# Patient Record
Sex: FEMALE | Race: WHITE | NOT HISPANIC OR LATINO | Employment: OTHER | URBAN - METROPOLITAN AREA
[De-identification: names, ages, dates, MRNs, and addresses within clinical notes are randomized per-mention and may not be internally consistent; named-entity substitution may affect disease eponyms.]

---

## 2023-09-27 ENCOUNTER — HOSPITAL ENCOUNTER (OUTPATIENT)
Dept: DATA CONVERSION | Facility: HOSPITAL | Age: 78
Setting detail: OBSERVATION
Discharge: HOME | End: 2023-09-30
Attending: STUDENT IN AN ORGANIZED HEALTH CARE EDUCATION/TRAINING PROGRAM | Admitting: FAMILY MEDICINE
Payer: MEDICARE

## 2023-09-27 DIAGNOSIS — K21.00 GASTROESOPHAGEAL REFLUX DISEASE WITH ESOPHAGITIS WITHOUT HEMORRHAGE: ICD-10-CM

## 2023-09-27 DIAGNOSIS — I24.9 ACS (ACUTE CORONARY SYNDROME) (MULTI): ICD-10-CM

## 2023-09-27 DIAGNOSIS — K58.1 IRRITABLE BOWEL SYNDROME WITH CONSTIPATION: Primary | ICD-10-CM

## 2023-09-27 LAB
ALANINE AMINOTRANSFERASE (SGPT) (U/L) IN SER/PLAS: 3 U/L (ref 7–45)
ALBUMIN (G/DL) IN SER/PLAS: 3.5 G/DL (ref 3.4–5)
ALKALINE PHOSPHATASE (U/L) IN SER/PLAS: 59 U/L (ref 33–136)
ANION GAP IN SER/PLAS: 9 MMOL/L (ref 10–20)
APPEARANCE, URINE: CLEAR
ASPARTATE AMINOTRANSFERASE (SGOT) (U/L) IN SER/PLAS: 12 U/L (ref 9–39)
BASOPHILS (10*3/UL) IN BLOOD BY AUTOMATED COUNT: 0.02 X10E9/L (ref 0–0.1)
BASOPHILS/100 LEUKOCYTES IN BLOOD BY AUTOMATED COUNT: 0.4 % (ref 0–2)
BILIRUBIN TOTAL (MG/DL) IN SER/PLAS: 0.2 MG/DL (ref 0–1.2)
BILIRUBIN, URINE: NEGATIVE
BLOOD, URINE: NEGATIVE
CALCIUM (MG/DL) IN SER/PLAS: 8.5 MG/DL (ref 8.6–10.3)
CARBON DIOXIDE, TOTAL (MMOL/L) IN SER/PLAS: 29 MMOL/L (ref 21–32)
CHLORIDE (MMOL/L) IN SER/PLAS: 106 MMOL/L (ref 98–107)
COLOR, URINE: YELLOW
CREATININE (MG/DL) IN SER/PLAS: 0.84 MG/DL (ref 0.5–1.05)
EOSINOPHILS (10*3/UL) IN BLOOD BY AUTOMATED COUNT: 0.41 X10E9/L (ref 0–0.4)
EOSINOPHILS/100 LEUKOCYTES IN BLOOD BY AUTOMATED COUNT: 8.6 % (ref 0–6)
ERYTHROCYTE DISTRIBUTION WIDTH (RATIO) BY AUTOMATED COUNT: 13.4 % (ref 11.5–14.5)
ERYTHROCYTE MEAN CORPUSCULAR HEMOGLOBIN CONCENTRATION (G/DL) BY AUTOMATED: 31.1 G/DL (ref 32–36)
ERYTHROCYTE MEAN CORPUSCULAR VOLUME (FL) BY AUTOMATED COUNT: 99 FL (ref 80–100)
ERYTHROCYTES (10*6/UL) IN BLOOD BY AUTOMATED COUNT: 3.84 X10E12/L (ref 4–5.2)
GFR FEMALE: 72 ML/MIN/1.73M2
GLUCOSE (MG/DL) IN SER/PLAS: 118 MG/DL (ref 74–99)
GLUCOSE, URINE: NEGATIVE MG/DL
HEMATOCRIT (%) IN BLOOD BY AUTOMATED COUNT: 38 % (ref 36–46)
HEMOGLOBIN (G/DL) IN BLOOD: 11.8 G/DL (ref 12–16)
HYALINE CASTS, URINE: ABNORMAL /LPF
IMMATURE GRANULOCYTES/100 LEUKOCYTES IN BLOOD BY AUTOMATED COUNT: 0.4 % (ref 0–0.9)
KETONES, URINE: ABNORMAL MG/DL
LACTATE (MMOL/L) IN SER/PLAS: 1.2 MMOL/L (ref 0.4–2)
LEUKOCYTE ESTERASE, URINE: NEGATIVE
LEUKOCYTES (10*3/UL) IN BLOOD BY AUTOMATED COUNT: 4.8 X10E9/L (ref 4.4–11.3)
LIPASE (U/L) IN SER/PLAS: 19 U/L (ref 9–82)
LYMPHOCYTES (10*3/UL) IN BLOOD BY AUTOMATED COUNT: 1.56 X10E9/L (ref 0.8–3)
LYMPHOCYTES/100 LEUKOCYTES IN BLOOD BY AUTOMATED COUNT: 32.6 % (ref 13–44)
MAGNESIUM (MG/DL) IN SER/PLAS: 1.18 MG/DL (ref 1.6–2.4)
MONOCYTES (10*3/UL) IN BLOOD BY AUTOMATED COUNT: 0.49 X10E9/L (ref 0.05–0.8)
MONOCYTES/100 LEUKOCYTES IN BLOOD BY AUTOMATED COUNT: 10.2 % (ref 2–10)
MUCUS, URINE: ABNORMAL /LPF
NEUTROPHILS (10*3/UL) IN BLOOD BY AUTOMATED COUNT: 2.29 X10E9/L (ref 1.6–5.5)
NEUTROPHILS/100 LEUKOCYTES IN BLOOD BY AUTOMATED COUNT: 47.8 % (ref 40–80)
NITRITE, URINE: NEGATIVE
PH, URINE: 5 (ref 5–8)
PLATELETS (10*3/UL) IN BLOOD AUTOMATED COUNT: 180 X10E9/L (ref 150–450)
POTASSIUM (MMOL/L) IN SER/PLAS: 3.9 MMOL/L (ref 3.5–5.3)
PROTEIN TOTAL: 6 G/DL (ref 6.4–8.2)
PROTEIN, URINE: ABNORMAL MG/DL
RBC, URINE: <1 /HPF (ref 0–5)
SARS-COV-2 RESULT: NOT DETECTED
SODIUM (MMOL/L) IN SER/PLAS: 140 MMOL/L (ref 136–145)
SPECIFIC GRAVITY, URINE: 1.03 (ref 1–1.03)
TROPONIN I, HIGH SENSITIVITY: 3 NG/L (ref 0–13)
UREA NITROGEN (MG/DL) IN SER/PLAS: 11 MG/DL (ref 6–23)
UROBILINOGEN, URINE: <2 MG/DL (ref 0–1.9)
WBC, URINE: <1 /HPF (ref 0–5)

## 2023-09-27 PROCEDURE — 74176 CT ABD & PELVIS W/O CONTRAST: CPT

## 2023-09-27 PROCEDURE — 80053 COMPREHEN METABOLIC PANEL: CPT

## 2023-09-27 PROCEDURE — 99285 EMERGENCY DEPT VISIT HI MDM: CPT | Mod: 25

## 2023-09-27 PROCEDURE — 93005 ELECTROCARDIOGRAM TRACING: CPT

## 2023-09-27 PROCEDURE — 94640 AIRWAY INHALATION TREATMENT: CPT

## 2023-09-27 PROCEDURE — 84484 ASSAY OF TROPONIN QUANT: CPT | Mod: 91

## 2023-09-27 PROCEDURE — 71046 X-RAY EXAM CHEST 2 VIEWS: CPT

## 2023-09-27 PROCEDURE — 83605 ASSAY OF LACTIC ACID: CPT

## 2023-09-27 PROCEDURE — 81001 URINALYSIS AUTO W/SCOPE: CPT

## 2023-09-27 PROCEDURE — 85025 COMPLETE CBC W/AUTO DIFF WBC: CPT

## 2023-09-27 PROCEDURE — 83690 ASSAY OF LIPASE: CPT

## 2023-09-27 PROCEDURE — 87635 SARS-COV-2 COVID-19 AMP PRB: CPT

## 2023-09-27 PROCEDURE — 83735 ASSAY OF MAGNESIUM: CPT

## 2023-09-27 PROCEDURE — 9990 CHARGE CONVERSION: Mod: 91

## 2023-09-27 PROCEDURE — 96372 THER/PROPH/DIAG INJ SC/IM: CPT

## 2023-09-28 LAB
ACTIVATED PARTIAL THROMBOPLASTIN TIME IN PPP BY COAGULATION ASSAY: 30 SEC (ref 27–38)
ANION GAP IN SER/PLAS: 10 MMOL/L (ref 10–20)
CALCIUM (MG/DL) IN SER/PLAS: 8.5 MG/DL (ref 8.6–10.3)
CARBON DIOXIDE, TOTAL (MMOL/L) IN SER/PLAS: 31 MMOL/L (ref 21–32)
CHLORIDE (MMOL/L) IN SER/PLAS: 104 MMOL/L (ref 98–107)
CREATININE (MG/DL) IN SER/PLAS: 0.97 MG/DL (ref 0.5–1.05)
ERYTHROCYTE DISTRIBUTION WIDTH (RATIO) BY AUTOMATED COUNT: 13.2 % (ref 11.5–14.5)
ERYTHROCYTE MEAN CORPUSCULAR HEMOGLOBIN CONCENTRATION (G/DL) BY AUTOMATED: 31.3 G/DL (ref 32–36)
ERYTHROCYTE MEAN CORPUSCULAR VOLUME (FL) BY AUTOMATED COUNT: 97 FL (ref 80–100)
ERYTHROCYTES (10*6/UL) IN BLOOD BY AUTOMATED COUNT: 3.99 X10E12/L (ref 4–5.2)
GFR FEMALE: 60 ML/MIN/1.73M2
GLUCOSE (MG/DL) IN SER/PLAS: 99 MG/DL (ref 74–99)
HEMATOCRIT (%) IN BLOOD BY AUTOMATED COUNT: 38.7 % (ref 36–46)
HEMOGLOBIN (G/DL) IN BLOOD: 12.1 G/DL (ref 12–16)
INR IN PPP BY COAGULATION ASSAY: 1 (ref 0.9–1.1)
LEUKOCYTES (10*3/UL) IN BLOOD BY AUTOMATED COUNT: 6.6 X10E9/L (ref 4.4–11.3)
PLATELETS (10*3/UL) IN BLOOD AUTOMATED COUNT: 189 X10E9/L (ref 150–450)
POTASSIUM (MMOL/L) IN SER/PLAS: 4.1 MMOL/L (ref 3.5–5.3)
PROTHROMBIN TIME (PT) IN PPP BY COAGULATION ASSAY: 10.9 SEC (ref 9.8–12.8)
SODIUM (MMOL/L) IN SER/PLAS: 141 MMOL/L (ref 136–145)
UREA NITROGEN (MG/DL) IN SER/PLAS: 7 MG/DL (ref 6–23)

## 2023-09-28 PROCEDURE — 80048 BASIC METABOLIC PNL TOTAL CA: CPT

## 2023-09-28 PROCEDURE — 99285 EMERGENCY DEPT VISIT HI MDM: CPT

## 2023-09-28 PROCEDURE — 87635 SARS-COV-2 COVID-19 AMP PRB: CPT

## 2023-09-28 PROCEDURE — 93005 ELECTROCARDIOGRAM TRACING: CPT | Mod: 59

## 2023-09-28 PROCEDURE — 83735 ASSAY OF MAGNESIUM: CPT

## 2023-09-28 PROCEDURE — 78452 HT MUSCLE IMAGE SPECT MULT: CPT

## 2023-09-28 PROCEDURE — 84484 ASSAY OF TROPONIN QUANT: CPT | Mod: 91

## 2023-09-28 PROCEDURE — 93017 CV STRESS TEST TRACING ONLY: CPT

## 2023-09-28 PROCEDURE — 83690 ASSAY OF LIPASE: CPT

## 2023-09-28 PROCEDURE — 85610 PROTHROMBIN TIME: CPT

## 2023-09-28 PROCEDURE — 85025 COMPLETE CBC W/AUTO DIFF WBC: CPT

## 2023-09-28 PROCEDURE — 85730 THROMBOPLASTIN TIME PARTIAL: CPT

## 2023-09-28 PROCEDURE — 9990 CHARGE CONVERSION

## 2023-09-28 PROCEDURE — 81001 URINALYSIS AUTO W/SCOPE: CPT

## 2023-09-28 PROCEDURE — A9502 TC99M TETROFOSMIN: HCPCS

## 2023-09-28 PROCEDURE — 83605 ASSAY OF LACTIC ACID: CPT

## 2023-09-28 PROCEDURE — 94640 AIRWAY INHALATION TREATMENT: CPT

## 2023-09-28 PROCEDURE — 85027 COMPLETE CBC AUTOMATED: CPT

## 2023-09-28 PROCEDURE — 80053 COMPREHEN METABOLIC PANEL: CPT

## 2023-09-28 PROCEDURE — 96372 THER/PROPH/DIAG INJ SC/IM: CPT

## 2023-09-29 PROBLEM — I24.9 ACS (ACUTE CORONARY SYNDROME) (MULTI): Status: ACTIVE | Noted: 2023-09-29

## 2023-09-29 PROCEDURE — 9990 CHARGE CONVERSION

## 2023-09-29 PROCEDURE — 85610 PROTHROMBIN TIME: CPT

## 2023-09-29 PROCEDURE — 78452 HT MUSCLE IMAGE SPECT MULT: CPT

## 2023-09-29 PROCEDURE — 80048 BASIC METABOLIC PNL TOTAL CA: CPT

## 2023-09-29 PROCEDURE — 85730 THROMBOPLASTIN TIME PARTIAL: CPT

## 2023-09-29 PROCEDURE — 85027 COMPLETE CBC AUTOMATED: CPT

## 2023-09-29 PROCEDURE — A9502 TC99M TETROFOSMIN: HCPCS

## 2023-09-29 PROCEDURE — 93005 ELECTROCARDIOGRAM TRACING: CPT

## 2023-09-29 RX ORDER — ACETAMINOPHEN 325 MG/1
650 TABLET ORAL EVERY 6 HOURS PRN
Status: DISCONTINUED | OUTPATIENT
Start: 2023-09-30 | End: 2023-09-30 | Stop reason: HOSPADM

## 2023-09-29 RX ORDER — DEXTROSE, SODIUM CHLORIDE, SODIUM LACTATE, POTASSIUM CHLORIDE, AND CALCIUM CHLORIDE 5; .6; .31; .03; .02 G/100ML; G/100ML; G/100ML; G/100ML; G/100ML
100 INJECTION, SOLUTION INTRAVENOUS CONTINUOUS
Status: DISCONTINUED | OUTPATIENT
Start: 2023-09-30 | End: 2023-09-30 | Stop reason: HOSPADM

## 2023-09-29 RX ORDER — POTASSIUM CHLORIDE 20 MEQ/1
20 TABLET, EXTENDED RELEASE ORAL DAILY
Status: DISCONTINUED | OUTPATIENT
Start: 2023-09-30 | End: 2023-09-30 | Stop reason: HOSPADM

## 2023-09-29 RX ORDER — LUBIPROSTONE 24 UG/1
24 CAPSULE ORAL 2 TIMES DAILY
Status: DISCONTINUED | OUTPATIENT
Start: 2023-09-30 | End: 2023-09-30 | Stop reason: HOSPADM

## 2023-09-29 RX ORDER — MEMANTINE HYDROCHLORIDE 10 MG/1
10 TABLET ORAL 2 TIMES DAILY
Status: DISCONTINUED | OUTPATIENT
Start: 2023-09-30 | End: 2023-09-30 | Stop reason: HOSPADM

## 2023-09-29 RX ORDER — DOCUSATE SODIUM 100 MG/1
100 CAPSULE, LIQUID FILLED ORAL 2 TIMES DAILY
Status: DISCONTINUED | OUTPATIENT
Start: 2023-09-30 | End: 2023-09-30 | Stop reason: HOSPADM

## 2023-09-29 RX ORDER — DONEPEZIL HYDROCHLORIDE 5 MG/1
10 TABLET, FILM COATED ORAL NIGHTLY
Status: DISCONTINUED | OUTPATIENT
Start: 2023-09-30 | End: 2023-09-30 | Stop reason: HOSPADM

## 2023-09-29 RX ORDER — ALPRAZOLAM 0.5 MG/1
0.5 TABLET ORAL EVERY 8 HOURS
Status: DISCONTINUED | OUTPATIENT
Start: 2023-09-30 | End: 2023-09-30 | Stop reason: HOSPADM

## 2023-09-29 RX ORDER — GABAPENTIN 300 MG/1
300 CAPSULE ORAL 3 TIMES DAILY
Status: DISCONTINUED | OUTPATIENT
Start: 2023-09-30 | End: 2023-09-30 | Stop reason: HOSPADM

## 2023-09-29 RX ORDER — ENOXAPARIN SODIUM 100 MG/ML
40 INJECTION SUBCUTANEOUS DAILY
Status: DISCONTINUED | OUTPATIENT
Start: 2023-09-30 | End: 2023-09-30 | Stop reason: HOSPADM

## 2023-09-29 RX ORDER — SUCRALFATE 1 G/1
1 TABLET ORAL
Status: DISCONTINUED | OUTPATIENT
Start: 2023-09-30 | End: 2023-09-30 | Stop reason: HOSPADM

## 2023-09-29 RX ORDER — FLUOXETINE HYDROCHLORIDE 20 MG/1
100 CAPSULE ORAL DAILY
Status: DISCONTINUED | OUTPATIENT
Start: 2023-09-30 | End: 2023-09-30 | Stop reason: HOSPADM

## 2023-09-29 RX ORDER — ZOLPIDEM TARTRATE 5 MG/1
5 TABLET ORAL NIGHTLY PRN
Status: DISCONTINUED | OUTPATIENT
Start: 2023-09-30 | End: 2023-09-30 | Stop reason: HOSPADM

## 2023-09-29 RX ORDER — PANTOPRAZOLE SODIUM 40 MG/1
40 TABLET, DELAYED RELEASE ORAL DAILY
Status: DISCONTINUED | OUTPATIENT
Start: 2023-09-30 | End: 2023-09-30 | Stop reason: HOSPADM

## 2023-09-29 RX ORDER — ALPRAZOLAM 0.5 MG/1
1 TABLET ORAL NIGHTLY
Status: DISCONTINUED | OUTPATIENT
Start: 2023-09-30 | End: 2023-09-30 | Stop reason: HOSPADM

## 2023-09-29 RX ORDER — TRAZODONE HYDROCHLORIDE 50 MG/1
50 TABLET ORAL 2 TIMES DAILY
Status: DISCONTINUED | OUTPATIENT
Start: 2023-09-30 | End: 2023-09-30 | Stop reason: HOSPADM

## 2023-09-29 RX ORDER — CARBIDOPA AND LEVODOPA 25; 100 MG/1; MG/1
2 TABLET ORAL 3 TIMES DAILY
Status: DISCONTINUED | OUTPATIENT
Start: 2023-09-30 | End: 2023-09-30 | Stop reason: HOSPADM

## 2023-09-29 RX ORDER — BUSPIRONE HYDROCHLORIDE 5 MG/1
10 TABLET ORAL EVERY 8 HOURS
Status: DISCONTINUED | OUTPATIENT
Start: 2023-09-30 | End: 2023-09-30 | Stop reason: HOSPADM

## 2023-09-29 RX ORDER — FLUTICASONE FUROATE AND VILANTEROL 100; 25 UG/1; UG/1
1 POWDER RESPIRATORY (INHALATION) DAILY
Status: DISCONTINUED | OUTPATIENT
Start: 2023-09-30 | End: 2023-09-30 | Stop reason: HOSPADM

## 2023-09-29 RX ORDER — NITROGLYCERIN 0.4 MG/1
0.4 TABLET SUBLINGUAL EVERY 5 MIN PRN
Status: DISCONTINUED | OUTPATIENT
Start: 2023-09-30 | End: 2023-09-30 | Stop reason: HOSPADM

## 2023-09-29 RX ORDER — BISACODYL 5 MG
5 TABLET, DELAYED RELEASE (ENTERIC COATED) ORAL NIGHTLY PRN
Status: DISCONTINUED | OUTPATIENT
Start: 2023-09-30 | End: 2023-09-30 | Stop reason: HOSPADM

## 2023-09-29 RX ORDER — CALCIUM CARBONATE 500(1250)
500 TABLET ORAL DAILY
Status: DISCONTINUED | OUTPATIENT
Start: 2023-09-30 | End: 2023-09-30 | Stop reason: HOSPADM

## 2023-09-30 ENCOUNTER — PHARMACY VISIT (OUTPATIENT)
Dept: PHARMACY | Facility: CLINIC | Age: 78
End: 2023-09-30

## 2023-09-30 VITALS
DIASTOLIC BLOOD PRESSURE: 79 MMHG | SYSTOLIC BLOOD PRESSURE: 147 MMHG | BODY MASS INDEX: 28.79 KG/M2 | TEMPERATURE: 97.7 F | HEIGHT: 64 IN | RESPIRATION RATE: 18 BRPM | OXYGEN SATURATION: 93 % | WEIGHT: 168.65 LBS | HEART RATE: 110 BPM

## 2023-09-30 PROBLEM — I10 ESSENTIAL (PRIMARY) HYPERTENSION: Status: ACTIVE | Noted: 2020-07-02

## 2023-09-30 PROBLEM — J44.9 COPD (CHRONIC OBSTRUCTIVE PULMONARY DISEASE) (MULTI): Status: ACTIVE | Noted: 2020-07-02

## 2023-09-30 PROBLEM — I24.9 ACS (ACUTE CORONARY SYNDROME) (MULTI): Status: RESOLVED | Noted: 2023-09-29 | Resolved: 2023-09-30

## 2023-09-30 PROBLEM — J96.11 CHRONIC RESPIRATORY FAILURE WITH HYPOXIA (MULTI): Status: ACTIVE | Noted: 2020-07-02

## 2023-09-30 PROBLEM — K58.9 IBS (IRRITABLE BOWEL SYNDROME): Status: ACTIVE | Noted: 2020-07-02

## 2023-09-30 LAB
ANION GAP SERPL CALC-SCNC: 9 MMOL/L (ref 10–20)
ATRIAL RATE: 89 BPM
BUN SERPL-MCNC: 8 MG/DL (ref 6–23)
CALCIUM SERPL-MCNC: 9.3 MG/DL (ref 8.6–10.3)
CHLORIDE SERPL-SCNC: 102 MMOL/L (ref 98–107)
CO2 SERPL-SCNC: 33 MMOL/L (ref 21–32)
CREAT SERPL-MCNC: 0.85 MG/DL (ref 0.5–1.05)
ERYTHROCYTE [DISTWIDTH] IN BLOOD BY AUTOMATED COUNT: 13.3 % (ref 11.5–14.5)
GFR SERPL CREATININE-BSD FRML MDRD: 71 ML/MIN/1.73M*2
GLUCOSE SERPL-MCNC: 109 MG/DL (ref 74–99)
HCT VFR BLD AUTO: 40.3 % (ref 36–46)
HGB BLD-MCNC: 12.9 G/DL (ref 12–16)
MAGNESIUM SERPL-MCNC: 1.64 MG/DL (ref 1.6–2.4)
MCH RBC QN AUTO: 31.4 PG (ref 26–34)
MCHC RBC AUTO-ENTMCNC: 32 G/DL (ref 32–36)
MCV RBC AUTO: 98 FL (ref 80–100)
NRBC BLD-RTO: 0 /100 WBCS (ref 0–0)
P AXIS: 25 DEGREES
P OFFSET: 197 MS
P ONSET: 152 MS
PLATELET # BLD AUTO: 205 X10*3/UL (ref 150–450)
PMV BLD AUTO: 11 FL (ref 7.5–11.5)
POTASSIUM SERPL-SCNC: 3.6 MMOL/L (ref 3.5–5.3)
PR INTERVAL: 144 MS
Q ONSET: 224 MS
QRS COUNT: 14 BEATS
QRS DURATION: 72 MS
QT INTERVAL: 412 MS
QTC CALCULATION(BAZETT): 501 MS
QTC FREDERICIA: 469 MS
R AXIS: -19 DEGREES
RBC # BLD AUTO: 4.11 X10*6/UL (ref 4–5.2)
SODIUM SERPL-SCNC: 140 MMOL/L (ref 136–145)
T AXIS: 12 DEGREES
T OFFSET: 430 MS
VENTRICULAR RATE: 89 BPM
WBC # BLD AUTO: 5.9 X10*3/UL (ref 4.4–11.3)

## 2023-09-30 PROCEDURE — 2500000004 HC RX 250 GENERAL PHARMACY W/ HCPCS (ALT 636 FOR OP/ED): Performed by: FAMILY MEDICINE

## 2023-09-30 PROCEDURE — 96360 HYDRATION IV INFUSION INIT: CPT

## 2023-09-30 PROCEDURE — RXMED WILLOW AMBULATORY MEDICATION CHARGE

## 2023-09-30 PROCEDURE — G0378 HOSPITAL OBSERVATION PER HR: HCPCS

## 2023-09-30 PROCEDURE — 2500000002 HC RX 250 W HCPCS SELF ADMINISTERED DRUGS (ALT 637 FOR MEDICARE OP, ALT 636 FOR OP/ED): Performed by: FAMILY MEDICINE

## 2023-09-30 PROCEDURE — 85027 COMPLETE CBC AUTOMATED: CPT | Performed by: INTERNAL MEDICINE

## 2023-09-30 PROCEDURE — 99239 HOSP IP/OBS DSCHRG MGMT >30: CPT | Performed by: INTERNAL MEDICINE

## 2023-09-30 PROCEDURE — 80048 BASIC METABOLIC PNL TOTAL CA: CPT | Performed by: INTERNAL MEDICINE

## 2023-09-30 PROCEDURE — 36415 COLL VENOUS BLD VENIPUNCTURE: CPT | Performed by: INTERNAL MEDICINE

## 2023-09-30 PROCEDURE — 99222 1ST HOSP IP/OBS MODERATE 55: CPT | Performed by: INTERNAL MEDICINE

## 2023-09-30 PROCEDURE — 96361 HYDRATE IV INFUSION ADD-ON: CPT

## 2023-09-30 PROCEDURE — 2500000001 HC RX 250 WO HCPCS SELF ADMINISTERED DRUGS (ALT 637 FOR MEDICARE OP): Performed by: FAMILY MEDICINE

## 2023-09-30 PROCEDURE — 83735 ASSAY OF MAGNESIUM: CPT | Performed by: INTERNAL MEDICINE

## 2023-09-30 RX ORDER — CARBIDOPA 25 MG/1
25 TABLET ORAL 3 TIMES DAILY
COMMUNITY
Start: 2023-08-23 | End: 2024-04-23

## 2023-09-30 RX ORDER — FLUTICASONE PROPIONATE AND SALMETEROL 100; 50 UG/1; UG/1
1 POWDER RESPIRATORY (INHALATION) 2 TIMES DAILY
COMMUNITY
Start: 2023-07-10

## 2023-09-30 RX ORDER — DONEPEZIL HYDROCHLORIDE 10 MG/1
1 TABLET, FILM COATED ORAL NIGHTLY
COMMUNITY
Start: 2023-03-24

## 2023-09-30 RX ORDER — ALBUTEROL SULFATE 90 UG/1
2 AEROSOL, METERED RESPIRATORY (INHALATION)
COMMUNITY
Start: 2021-08-26

## 2023-09-30 RX ORDER — DONEPEZIL HYDROCHLORIDE 10 MG/1
10 TABLET, FILM COATED ORAL NIGHTLY
Status: CANCELLED | OUTPATIENT
Start: 2023-09-30

## 2023-09-30 RX ORDER — LUBIPROSTONE 24 UG/1
24 CAPSULE ORAL 2 TIMES DAILY
Qty: 30 CAPSULE | Refills: 0 | Status: SHIPPED | OUTPATIENT
Start: 2023-09-30

## 2023-09-30 RX ORDER — FLAXSEED OIL 1000 MG
CAPSULE ORAL
COMMUNITY
End: 2024-01-25 | Stop reason: HOSPADM

## 2023-09-30 RX ORDER — ASCORBIC ACID, CHOLECALCIFEROL, PYRIDOXINE HYDROCHLORIDE, LEVOMEFOLATE MAGNESIUM, FOLIC ACID, CYANOCOBALAMIN, AND IRON PENTACARBONYL 170; 1000; 15; 600; 400; 16; 125 MG/1; [IU]/1; MG/1; UG/1; UG/1; UG/1; MG/1
1 TABLET ORAL DAILY
COMMUNITY

## 2023-09-30 RX ORDER — ONDANSETRON HYDROCHLORIDE 8 MG/1
8 TABLET, FILM COATED ORAL EVERY 8 HOURS PRN
COMMUNITY
Start: 2023-09-18

## 2023-09-30 RX ORDER — SIMETHICONE 125 MG
125 TABLET,CHEWABLE ORAL
COMMUNITY
Start: 2023-08-15 | End: 2024-01-25 | Stop reason: HOSPADM

## 2023-09-30 RX ORDER — BUSPIRONE HYDROCHLORIDE 5 MG/1
5 TABLET ORAL 2 TIMES DAILY
COMMUNITY
Start: 2017-01-21

## 2023-09-30 RX ORDER — SODIUM CHLORIDE 0.9 % (FLUSH) 0.9 %
10 SYRINGE (ML) INJECTION
COMMUNITY
Start: 2022-07-28 | End: 2023-12-09

## 2023-09-30 RX ORDER — FLUOXETINE HYDROCHLORIDE 20 MG/1
100 CAPSULE ORAL
COMMUNITY

## 2023-09-30 RX ORDER — CHOLECALCIFEROL (VITAMIN D3) 50 MCG
TABLET ORAL
COMMUNITY

## 2023-09-30 RX ORDER — CARBOXYMETHYLCELLULOSE SODIUM 10 MG/ML
GEL OPHTHALMIC 4 TIMES DAILY
COMMUNITY
End: 2024-01-25 | Stop reason: HOSPADM

## 2023-09-30 RX ORDER — ALPRAZOLAM 0.5 MG/1
TABLET ORAL 2 TIMES DAILY
COMMUNITY

## 2023-09-30 RX ORDER — DICYCLOMINE HYDROCHLORIDE 10 MG/1
10 CAPSULE ORAL EVERY 8 HOURS PRN
COMMUNITY
Start: 2023-07-18

## 2023-09-30 RX ORDER — FUROSEMIDE 20 MG/1
20-40 TABLET ORAL
COMMUNITY
Start: 2023-09-18 | End: 2024-01-25 | Stop reason: HOSPADM

## 2023-09-30 RX ORDER — OMEPRAZOLE 20 MG/1
20 CAPSULE, DELAYED RELEASE ORAL 2 TIMES DAILY
COMMUNITY
Start: 2023-09-18

## 2023-09-30 RX ORDER — ZOLPIDEM TARTRATE 10 MG/1
10 TABLET ORAL
COMMUNITY
Start: 2023-05-05 | End: 2023-11-01

## 2023-09-30 RX ORDER — SUCRALFATE 1 G/1
1 TABLET ORAL 2 TIMES DAILY
COMMUNITY
Start: 2023-09-22

## 2023-09-30 RX ORDER — TRAZODONE HYDROCHLORIDE 150 MG/1
50 TABLET ORAL 2 TIMES DAILY
COMMUNITY

## 2023-09-30 RX ORDER — GABAPENTIN 300 MG/1
300 CAPSULE ORAL 3 TIMES DAILY
COMMUNITY
Start: 2023-06-30

## 2023-09-30 RX ORDER — TIZANIDINE 4 MG/1
TABLET ORAL
COMMUNITY
Start: 2023-08-02 | End: 2024-01-25 | Stop reason: HOSPADM

## 2023-09-30 RX ORDER — POTASSIUM CHLORIDE 20 MEQ/1
1 TABLET, EXTENDED RELEASE ORAL DAILY
COMMUNITY

## 2023-09-30 RX ORDER — BUPROPION HYDROCHLORIDE 150 MG/1
TABLET ORAL EVERY 24 HOURS
COMMUNITY

## 2023-09-30 RX ORDER — MEMANTINE HYDROCHLORIDE 10 MG/1
TABLET ORAL 2 TIMES DAILY
COMMUNITY

## 2023-09-30 RX ORDER — ARFORMOTEROL TARTRATE 15 UG/2ML
15 SOLUTION RESPIRATORY (INHALATION) 2 TIMES DAILY
COMMUNITY
Start: 2022-11-22 | End: 2024-01-25 | Stop reason: HOSPADM

## 2023-09-30 RX ADMIN — FLUOXETINE HYDROCHLORIDE 100 MG: 20 CAPSULE ORAL at 09:00

## 2023-09-30 RX ADMIN — ENOXAPARIN SODIUM 40 MG: 40 INJECTION SUBCUTANEOUS at 09:00

## 2023-09-30 RX ADMIN — ALPRAZOLAM 0.5 MG: 0.5 TABLET ORAL at 03:36

## 2023-09-30 RX ADMIN — SODIUM CHLORIDE, SODIUM LACTATE, POTASSIUM CHLORIDE, CALCIUM CHLORIDE AND DEXTROSE MONOHYDRATE 100 ML/HR: 5; 600; 310; 30; 20 INJECTION, SOLUTION INTRAVENOUS at 00:00

## 2023-09-30 RX ADMIN — CALCIUM 1250 MG: 500 TABLET ORAL at 09:00

## 2023-09-30 RX ADMIN — ALPRAZOLAM 0.5 MG: 0.5 TABLET ORAL at 17:00

## 2023-09-30 RX ADMIN — FLUTICASONE FUROATE AND VILANTEROL TRIFENATATE 1 PUFF: 100; 25 POWDER RESPIRATORY (INHALATION) at 09:00

## 2023-09-30 RX ADMIN — LUBIPROSTONE 24 MCG: 24 CAPSULE, GELATIN COATED ORAL at 09:00

## 2023-09-30 RX ADMIN — ALPRAZOLAM 0.5 MG: 0.5 TABLET ORAL at 09:00

## 2023-09-30 RX ADMIN — MEMANTINE 10 MG: 10 TABLET ORAL at 09:00

## 2023-09-30 RX ADMIN — DOCUSATE SODIUM 100 MG: 100 CAPSULE, LIQUID FILLED ORAL at 09:00

## 2023-09-30 RX ADMIN — BUSPIRONE HYDROCHLORIDE 10 MG: 5 TABLET ORAL at 17:00

## 2023-09-30 RX ADMIN — POTASSIUM CHLORIDE 20 MEQ: 1500 TABLET, EXTENDED RELEASE ORAL at 09:00

## 2023-09-30 RX ADMIN — SUCRALFATE 1 G: 1 TABLET ORAL at 07:00

## 2023-09-30 RX ADMIN — GABAPENTIN 300 MG: 300 CAPSULE ORAL at 15:00

## 2023-09-30 RX ADMIN — CARBIDOPA AND LEVODOPA 2 TABLET: 25; 100 TABLET ORAL at 15:00

## 2023-09-30 RX ADMIN — GABAPENTIN 300 MG: 300 CAPSULE ORAL at 09:00

## 2023-09-30 RX ADMIN — SUCRALFATE 1 G: 1 TABLET ORAL at 16:00

## 2023-09-30 RX ADMIN — CARBIDOPA AND LEVODOPA 2 TABLET: 25; 100 TABLET ORAL at 09:00

## 2023-09-30 RX ADMIN — BUSPIRONE HYDROCHLORIDE 10 MG: 5 TABLET ORAL at 09:00

## 2023-09-30 RX ADMIN — PANTOPRAZOLE SODIUM 40 MG: 40 TABLET, DELAYED RELEASE ORAL at 09:00

## 2023-09-30 ASSESSMENT — ACTIVITIES OF DAILY LIVING (ADL)
BATHING: NEEDS ASSISTANCE
DRESSING YOURSELF: NEEDS ASSISTANCE
ASSISTIVE_DEVICE: WALKER
WALKS IN HOME: NEEDS ASSISTANCE
PATIENT'S MEMORY ADEQUATE TO SAFELY COMPLETE DAILY ACTIVITIES?: YES
JUDGMENT_ADEQUATE_SAFELY_COMPLETE_DAILY_ACTIVITIES: YES
ADEQUATE_TO_COMPLETE_ADL: YES
TOILETING: NEEDS ASSISTANCE
GROOMING: NEEDS ASSISTANCE
FEEDING YOURSELF: INDEPENDENT

## 2023-09-30 ASSESSMENT — COGNITIVE AND FUNCTIONAL STATUS - GENERAL
MOBILITY SCORE: 18
DAILY ACTIVITIY SCORE: 20
HELP NEEDED FOR BATHING: A LITTLE
MOVING TO AND FROM BED TO CHAIR: A LITTLE
WALKING IN HOSPITAL ROOM: A LITTLE
TURNING FROM BACK TO SIDE WHILE IN FLAT BAD: A LITTLE
STANDING UP FROM CHAIR USING ARMS: A LITTLE
CLIMB 3 TO 5 STEPS WITH RAILING: A LOT
DRESSING REGULAR LOWER BODY CLOTHING: A LOT
TOILETING: A LITTLE

## 2023-09-30 ASSESSMENT — PAIN DESCRIPTION - DESCRIPTORS: DESCRIPTORS: BURNING

## 2023-09-30 ASSESSMENT — PAIN SCALES - GENERAL: PAINLEVEL_OUTOF10: 3

## 2023-09-30 ASSESSMENT — PAIN - FUNCTIONAL ASSESSMENT: PAIN_FUNCTIONAL_ASSESSMENT: 0-10

## 2023-09-30 NOTE — DISCHARGE SUMMARY
Discharge Diagnosis  IBS (irritable bowel syndrome)     Issues Requiring Follow-Up  Follow-up with PCP in 1 to 2 weeks.  Follow-up with GI as outpatient for monitoring on new medications.  Follow recommendations for diet.  Follow-up with cardiology as needed.  If there is any confusion over your medications, please continue your prior home medications and start the Amitizia.    Discharge Meds     Your medication list        START taking these medications        Instructions Last Dose Given Next Dose Due   lubiprostone 24 mcg capsule  Commonly known as: Amitiza      Take 1 capsule (24 mcg) by mouth 2 times a day.              CONTINUE taking these medications        Instructions Last Dose Given Next Dose Due   albuterol 90 mcg/actuation inhaler           ALPRAZolam 0.5 mg tablet  Commonly known as: Xanax           arformoterol 15 mcg/2 mL nebulizer solution  Commonly known as: Brovana           buPROPion  mg 24 hr tablet  Commonly known as: Wellbutrin XL           busPIRone 5 mg tablet  Commonly known as: Buspar           carbidopa 25 mg tablet  Commonly known as: Lodsyn           carboxymethylcellulose 1 % ophthalmic solution dropperette  Commonly known as: Refresh Celluvisc           dicyclomine 10 mg capsule  Commonly known as: Bentyl           donepezil 10 mg tablet  Commonly known as: Aricept           flaxseed oiL 1,000 mg capsule           FLUoxetine 20 mg capsule  Commonly known as: PROzac           fluticasone propion-salmeteroL 100-50 mcg/dose diskus inhaler  Commonly known as: Advair Diskus           furosemide 20 mg tablet  Commonly known as: Lasix           gabapentin 300 mg capsule  Commonly known as: Neurontin           memantine 10 mg tablet  Commonly known as: Namenda           NuFera 125 mg-1 mg-170 mg-1,000 unit tablet  Generic drug: iron,carb-FA#9-vit C-[id number]-B12           omeprazole 20 mg DR capsule  Commonly known as: PriLOSEC           ondansetron 8 mg tablet  Commonly known as: Zofran            potassium chloride CR 20 mEq ER tablet  Commonly known as: Klor-Con M20           simethicone 125 mg chewable tablet  Commonly known as: Mylicon           sodium chloride 0.9% flush           sucralfate 1 gram tablet  Commonly known as: Carafate           Thera-D 50 MCG (2000 UT) tablet  Generic drug: cholecalciferol           tiZANidine 4 mg tablet  Commonly known as: Zanaflex           traZODone 150 mg tablet  Commonly known as: Desyrel           TYLENOL PM EXTRA STRENGTH ORAL           zolpidem 10 mg tablet  Commonly known as: Ambien                     Where to Get Your Medications        These medications are not ready yet because we are checking if your insurance will help you pay for them    We will let you know when these medications are ready. If you don't hear back within 3 business days, please contact us.  lubiprostone 24 mcg capsule         Test Results Pending At Discharge  Pending Labs       No current pending labs.            Hospital Course   78-year-old  female admitted to ED with abdominal pain and chest pain on 9/27.  Pain appears to be somewhat random and contains a significant GI element.  Has experienced chronic pain from her back for quite some time.  States the chest discomfort is new.  Becoming increasingly debilitated by chronic hypoxic respiratory failure and Parkinson's disease..  No EKG changes.  Nuclear stress tests were negative for ischemia, although patient does have some significant scar tissue.  Symptoms appear to be mostly GI in nature.  Responded well to GI cocktail.  Takes Prilosec twice a day as well as Carafate.  Appreciate input from GI.  Started on Amtizia.  Follow-up with PCP and GI as outpatient.    This discharge took greater than 35 minutes to arrange.    IBS.  Starting new medication.  IBS restrictions.  Follow-up GI as outpatient  COPD.  Chronic.  Continue current medications.  Chronic hypoxemic respiratory failure.  Currently on 2 L, states she was  on 3.  Polypharmacy.  Follow-up with PCP as outpatient.  DVT prophylaxis.    Pertinent Physical Exam At Time of Discharge  Physical Exam  Constitutional:       Appearance: Normal appearance. She is normal weight.   HENT:      Head: Normocephalic and atraumatic.      Nose: Nose normal. No congestion or rhinorrhea.      Mouth/Throat:      Mouth: Mucous membranes are dry.      Pharynx: Oropharynx is clear.   Eyes:      General: No scleral icterus.     Extraocular Movements: Extraocular movements intact.      Conjunctiva/sclera: Conjunctivae normal.      Pupils: Pupils are equal, round, and reactive to light.   Cardiovascular:      Rate and Rhythm: Normal rate and regular rhythm.      Pulses: Normal pulses.      Heart sounds: No murmur heard.     No gallop.   Pulmonary:      Effort: Pulmonary effort is normal.      Breath sounds: Normal breath sounds. No wheezing, rhonchi or rales.   Chest:      Chest wall: No tenderness.   Abdominal:      General: Bowel sounds are normal. There is no distension.      Palpations: Abdomen is soft.      Tenderness: There is no abdominal tenderness. There is no guarding or rebound.   Musculoskeletal:         General: No swelling, tenderness or signs of injury. Normal range of motion.   Skin:     General: Skin is warm and dry.      Coloration: Skin is not jaundiced.      Findings: No bruising, erythema or rash.   Neurological:      General: No focal deficit present.      Mental Status: She is alert and oriented to person, place, and time.      Cranial Nerves: No cranial nerve deficit.      Sensory: No sensory deficit.   Psychiatric:         Mood and Affect: Mood normal.         Behavior: Behavior normal.         Outpatient Follow-Up  No future appointments.      Henok Pavon MD

## 2023-09-30 NOTE — PROGRESS NOTES
Consult Type: subsequent visit/care     Service: Medicine     Subjective Data:   WILLIAM BRODERICK is a 78 year old Female who is Hospital Day # 2.    Additional Information:    78 year old Female admitted from the ED after presenting from home complaining of worsening abdominal pain and chest pain.  She describes that over the past month  she has been having intermittent abdominal pain and chest pain of a very random nature and cannot identify precipitating or relieving factors.  Presented today as it did not seem to go away as it has with previous episodes.  Also feels she has had some  shortness of breath and cough over the past week.  However no sputum fever or chills.  Overall relates that she is becoming very discouraged with her life as she is becoming increasingly debilitated by her chronic hypoxic respiratory failure and Parkinson's symptoms.  However feels she has been very compliant with her medications.  Has not noticed any unusual lower extremity edema    9/28:              Today the patient appeared to be remaining improved until about 6:30 PM when notified by the nurse that she was having severe episode of upper abdominal and sternal area chest discomfort.  This is essentially the same as her presenting  symptoms.  EKG at the time continue to show no obvious ischemic changes or changes from prior EKGs.  Stress test revealed no evidence for ischemia either on the EKG portion or perfusion portion.  When she had her episode this evening she was given GI cocktail which seemed to fairly quickly provide relief of her symptoms.  At this point it would seem certainly GI related.  She does take Prilosec 20 twice daily as well as Carafate twice daily and is compliant with this.  She has seen GI in the past in [place] and has had EGD at some time in the past but not sure how long  ago.  Feel at this point need to ask GI for evaluation to see if she would benefit from another EGD or advise in terms of  medication adjustment. Otherwise denies interval new symptoms or complaints including pleuritic type chest pain unusual shortness of breath  sputum nausea diarrhea dysuria flank pain fever or chills.        Objective Data:     Objective Information:      T   P  R  BP   MAP  SpO2   Value  36.7  89  20  151/76   107  95%  Date/Time 9/28 15:46 9/28 17:58 9/28 17:58 9/28 17:58  9/28 15:46 9/28 17:58  Range  (36.2C - 36.7C )  (64 - 91 )  (17 - 20 )  (111 - 178 )/ (51 - 98 )  (95 - 123 )  (85% - 100% )   As of 28-Sep-2023 17:35:00, patient is on 3 L/min of oxygen via nasal cannula.      Pain reported at 9/28 18:42: 8 = Severe      T   P  R  BP   MAP  SpO2   Value  36.7  89  20  151/76   107  95%  Date/Time 9/28 15:46 9/28 17:58 9/28 17:58 9/28 17:58  9/28 15:46 9/28 17:58  Range  (36.2C - 36.7C )  (64 - 91 )  (17 - 20 )  (111 - 178 )/ (51 - 98 )  (95 - 123 )  (85% - 100% )   As of 28-Sep-2023 17:35:00, patient is on 3 L/min of oxygen via nasal cannula.    Physical Exam by System:    Constitutional: Overweight elderly  female  who is awake alert and interactive appropriately and appears NAD at the time of visit   Eyes: PERRL, EOMI, clear sclera   ENMT: mucous membranes moist, no apparent injury,  no lesions seen   Head/Neck: Neck supple, no apparent injury, thyroid  without mass or tenderness, No JVD, trachea midline, no bruits   Respiratory/Thorax: Patent airways, CTAB, normal  breath sounds with good chest expansion, thorax symmetric   Cardiovascular: Regular, rate and rhythm, no murmurs,  2+ equal pulses of the extremities, normal S 1and S 2   Gastrointestinal: Nondistended, soft, mild diffuse  tenderness on palpation mostly in the epigastrium, no rebound tenderness or guarding, no masses palpable, no organomegaly, +BS, no bruits   Genitourinary: Deferred   Musculoskeletal: ROM intact, no joint swelling, normal  strength   Extremities: normal extremities, no cyanosis edema,  contusions or wounds, no clubbing    Neurological: alert and oriented x3, intact senses,  motor, response and reflexes, normal strength  Parkinsonian resting tremor upper extremities   Breast: Deferred   Lymphatic: No significant lymphadenopathy   Psychological: Appropriate mood and behavior  Pleasant and cooperative to exam   Skin: Warm and dry, no lesions, no rashes     Medication:    Medications:          Continuous Medications       --------------------------------    1. Dextrose 5% - Lactated Ringers Infusion:  1000  mL  IntraVenous  <Continuous>         Scheduled Medications       --------------------------------    1. ALPRAZolam:  0.5  mg  Oral  Every 8 Hours    2. ALPRAZolam:  1  mg  Oral  At Bedtime    3. busPIRone (BUSPAR):  10  mg  Oral  Every 8 Hours    4. Calcium Carbonate:  1250  mg  Oral  Daily    5. Carbidopa 25 mg - Levodopa 100 m  tablet(s)  Oral  3 Times a Day    6. Donepezil:  10  mg  Oral  At Bedtime    7. Enoxaparin SubCutaneous:  40  mg  SubCutaneous  Every 24 Hours    8. FLUoxetine:  100  mg  Oral  Daily    9. Fluticasone 100 microgram - Vilanterol 25 microgram/ Inh:  1  inhalation  Inhalation  Every 24 Hours    10. Gabapentin:  300  mg  Oral  3 Times a Day    11. Influenza Virus (Inactive) HIGH DOSE Adult Vaccine:  0.7  mL  IntraMuscular  Once    12. Memantine:  10  mg  Oral  2 Times a Day    13. Pantoprazole:  40  mg  Oral  Daily    14. Potassium Chloride Extended Release:  20  mEq  Oral  Daily    15. Regadenoson Injectable:  0.4  mg  IntraVenous Push  Once    16. Sucralfate:  1  gram(s)  Oral  2 Times a Day Before Meals    17. traZODone:  50  mg  Oral  2 Times a Day    18. traZODone:  200  mg  Oral  At Bedtime         PRN Medications       --------------------------------    1. Acetaminophen:  650  mg  Oral  Every 6 Hours    2. Nitroglycerin SubLingual:  0.4  mg  SubLingual  Every 5 Minutes    3. Sodium Chloride 0.9% Injectable Flush:  10  mL  IntraVenous Flush  Every 8 Hours and as Needed    4. Zolpidem:  5  mg  Oral   At Bedtime         Conditional Medication Orders       --------------------------------    1. Perflutren Protein A Microsphere Inj:  3  mL  IntraVenous Push  Once      Recent Lab Results:    Results:    CBC: 9/28/2023 03:37              \     Hgb     /                              \     12.1       /  WBC  ----------------  Plt               6.6       ----------------    189              /     Hct     \                              /     38.7       \            RBC: 3.99 L    MCV: 97           BMP: 9/28/2023 03:37  NA+        Cl-     BUN  /                         141    104    7  /  --------------------------------  Glucose                ---------------------------  99    K+     HCO3-   Creat \                         4.1  31    0.97  \  Calcium : 8.5 L    Anion Gap : 10      Coagulation: 9/28/2023 03:37  PT  /                    10.9  /  -------<    INR          ----------<      1.0  PTT\                    30  \                       Assessment and Plan:   Code Status:  ·  Code Status Full Code     Assessment:    Impression 1: Abdominal pain and chest pain   Plan for Impression 1: Uncertain etiology  and whether or not related.  ED reported abdominal pain resolved but chest pain  recurred requiring admission.  Very little to support cardiac etiology.  CT of the abdomen reveals no acute findings  She is on significant polypharmacy and quite high doses on many medications.  Suspect possible gastritis/GERD at this point.  EKG unremarkable  We will check stress test.  9/28:   Stress test reveals no evidence to support ischemia.  In view of patient having another episode this evening exactly as she had causing presentation which seem to be relieved by GI cocktail we will ask GI for opinion.   Impression 2: COPD   Plan for Impression 2: No evident exacerbation  Continue home medications   Impression 3: Chronic hypoxic respiratory  failure   Plan for Impression 3: Appears to be oxygenating  well on 1 L here  though describes she is on 3 L at home  Wean as able   Impression 4: Parkinson's   Plan for Impression 4: Continue home medications   Impression 5: Depression   Plan for Impression 5: Continue home medications   Impression 6: Polypharmacy   Plan for Impression 6: Very extensive list  of medications with several at very high doses.  We will review to see if we can improve this at all.   Impression 7: DVT prophylaxis   Plan for Impression 7: SCDs  Lovenox subcu         Electronic Signatures:  Mat Encarnacion)  (Signed 28-Sep-2023 19:44)   Authored: Service, Subjective Data, Objective Data, Assessment  and Plan, Note Completion      Last Updated: 28-Sep-2023 19:44 by Mat Encarnacion)

## 2023-09-30 NOTE — CONSULTS
[facility] Consult Note    Patient Name: Fidelia Norman  Patient : 1945  Room/Bed: -A    Date: 23  Time: 9:25 AM    Referred by Dr. Win ref. provider found for Other (AB PAIN.)     History Of Present Illness:    Fidelia Norman is a 78 y.o. female for which cardiology was asked to evaluate regarding chest discomfort.  Patient reports that she has had chest discomfort which will occur at random and can last all day.  She states that there are no specific inciting factors for her chest discomfort.  She does report her chest wall is tender with palpation which does reproduce some of her chest discomfort.  She states that she did receive some mild relief when taking a nitroglycerin.  CBC shows a hemoglobin of 12.1.  BMP shows normal serum sodium and potassium with a serum creatinine 0.97.  Troponin was 3x3.  Chest x-ray done 927 showed improved bronchial thickening.  Nuclear stress showed a small fixed apical perfusion defect as well as a small to moderate-sized fixed perfusion defect in the inferior wall, no reversible perfusion defects, low probability of ischemia, calculated ejection fraction 62%.  Echocardiogram done in 2023 showed normal left ventricular systolic function, normal right ventricular systolic function, no significant valve abnormalities.  Patient reports she had a fairly recent (within the last few months) cardiac catheterization through [facility] which per her report showed no significant obstructive disease although I do not have records for review.  During my exam patient was resting comfortably in bed.    Assessment/Plan:   1.  Atypical chest pain  The patient is reporting atypical chest pain which she states is reproducible when palpating her chest wall and upper abdomen.  Troponins have remained negative.  Nuclear stress showed a small fixed apical perfusion defect as well as a small to moderate-sized fixed perfusion defect in the  "inferior wall, no reversible perfusion defects, low probability of ischemia, calculated ejection fraction 62%.    Echocardiogram done in July 2023 showed normal left ventricular systolic function, normal right ventricular systolic function, no significant valve abnormalities.   I do not believe additional testing is necessary at this time.      Past Medical History:  She has no past medical history on file.  COPD/asthma, chronic hypoxic respiratory failure, Parkinson's disease, IBS, hyperlipidemia    Past Surgical History:  She has a past surgical history that includes Other surgical history (12/29/2020); Other surgical history (12/29/2020); MR angio head wo IV contrast (6/10/2022); and MR angio neck wo IV contrast (6/10/2022).      Social History:  She has no history on file for tobacco use, alcohol use, and drug use.  Former smoker, denies alcohol or illegal drug use.    Family History:  No family history on file.  Diabetes mellitus     Allergies:  Amitriptyline, Bactrim [sulfamethoxazole-trimethoprim], Iodinated contrast media, Penicillins, Topamax [topiramate], and Zoloft [sertraline]    Outpatient Medications:  No current outpatient medications     ROS:  A 14 point review of systems was done and is negative other than as stated in HPI    Vitals:  Vitals:    09/29/23 0008 09/29/23 2105   Weight: 76.5 kg (168 lb 10.4 oz) 76.5 kg (168 lb 10.4 oz)   Height: 1.625 m (5' 3.98\") 1.625 m (5' 3.98\")       Physical Exam:     General: Alert and Oriented, No distress, cooperative, appears stated age  Head: Normocephalic without obvious abnormality, atraumatic  Eyes: Conjunctiva/corneas clear, EOM's grossly intact  Nose: No drainage or sinus tenderness  Throat: Mucus membranes moist.  No ulcerations noted  Neck: Supple, trachea midline, No thyroid enlargement/tenderness/nodules; No carotid bruit or JVD  Lungs: Clear to auscultation bilaterally, no wheezes, rhonci, or rales. respirations unlabored  Chest Wall: No tenderness " or deformity  Heart: Regular rhythm, normal S1/S2, no murmur  Abdomen: Soft, non-tender, Non-distended, bowel sounds active, no masses  Extremities: No significant pitting edema, no cyanosis  Skin: Skin color, texture, turgor normal.  No rashes or lesions noted  Neurologic: CNII-XII grossly intact. Strength equal bilaterally, Sensation grossly intact.    Intake/Output:   No intake/output data recorded.    Outpatient Medications  No current facility-administered medications on file prior to encounter.     No current outpatient medications on file prior to encounter.       Scheduled medications  ALPRAZolam, 0.5 mg, oral, q8h  ALPRAZolam, 1 mg, oral, Nightly  busPIRone, 10 mg, oral, q8h  calcium carbonate, 500 mg of elemental calcium, oral, Daily  carbidopa-levodopa, 2 tablet, oral, TID  docusate sodium, 100 mg, oral, BID  donepezil, 10 mg, oral, Nightly  enoxaparin, 40 mg, subcutaneous, Daily  FLUoxetine, 100 mg, oral, Daily  fluticasone furoate-vilanteroL, 1 puff, inhalation, Daily  gabapentin, 300 mg, oral, TID  lubiprostone, 24 mcg, oral, BID  memantine, 10 mg, oral, BID  pantoprazole, 40 mg, oral, Daily  potassium chloride CR, 20 mEq, oral, Daily  sucralfate, 1 g, oral, BID AC  traZODone, 200 mg, oral, Nightly  traZODone, 50 mg, oral, BID      Continuous medications  dextrose 5 % and lactated Ringer's, 100 mL/hr, Last Rate: 100 mL/hr (09/30/23 0000)      PRN medications  PRN medications: acetaminophen, artificial tears (hypromellose), bisacodyl, nitroglycerin, zolpidem   No medications prior to admission.       Recent Labs: (past 2 days)  No results found for this or any previous visit (from the past 48 hour(s)).    ECG  No results found for this or any previous visit (from the past 4464 hour(s)).    Echocardiogram  No results found for this or any previous visit from the past 1095 days.        Ricci Sena DO, FACC, FACOI  9/30/2023  9:25 AM

## 2023-09-30 NOTE — H&P
History of Present Illness:   Admission Reason: Chest pain   HPI:    WILLIAM BRODERICK is a 78 year old Female admitted from the ED after presenting from home complaining of worsening abdominal pain and chest pain.  She describes that  over the past month she has been having intermittent abdominal pain and chest pain of a very random nature and cannot identify precipitating or relieving factors.  Presented today as it did not seem to go away as it has with previous episodes.  Also feels  she has had some shortness of breath and cough over the past week.  However no sputum fever or chills.  Overall relates that she is becoming very discouraged with her life as she is becoming increasingly debilitated by her chronic hypoxic respiratory failure and Parkinson's symptoms.  However feels she has been very compliant with her medications.  Has not noticed any unusual lower extremity edema.  Otherwise denies interval new symptoms or complaints including pleuritic type chest pain unusual shortness of breath sputum nausea diarrhea dysuria flank pain fever or chills.    PCP Bogdan Montana  PMH includes asthma/COPD chronic hypoxic respiratory failure on 3 L O2 hyperlipidemia Parkinson's disease IBS depression liver cysts  FMH includes DM 2  Social history reveals she is a former smoker and denies alcohol or illegal drug use  Describes allergies to penicillin Zoloft Topamax Elavil and IV contrast as well as Bactrim    Social History:   Social History:  Smoking Status former smoker (1)              Allergies:  ·  contrast (specific type unknown) : Unknown  ·  amitriptyline : Unknown  ·  Zoloft : Unknown  ·  Bactrim : Unknown  ·  Topamax : Unknown  ·  penicillin : Unknown    Medications Prior to Admission:      Orders Reconciliation is incomplete.    calcium carbonate: 1200 milligram(s) orally once a day  donepezil 10 mg oral tablet: 1 tab(s) orally once a day (at bedtime)  carbidopa-levodopa 25 mg-100 mg oral tablet: 2 tab(s)  orally 3 times a day (every 4-5 hours during the day)  dicyclomine 10 mg oral capsule: 1 cap(s) orally 3 times a day, As Needed  omeprazole 20 mg oral delayed release capsule: 1 cap(s) orally 2 times a day  FLUoxetine 20 mg oral capsule: 5 capsules (100mg) orally once a day  ondansetron 8 mg oral tablet: 1 tab(s) orally every 8 hours, As Needed  gabapentin 300 mg oral capsule: 1 cap(s) orally 3 times a day      Multiple Vitamins with Folic Acid and Iron oral tablet: 1 tab(s) orally once a day  traZODone 150 mg oral tablet: 50 milligram(s) orally 2 times a day  memantine 10 mg oral tablet: 1 tab(s) orally 2 times a day  potassium chloride 20 mEq oral tablet, extended release: 1 tab(s) orally once a day  flax oral capsule: 1 cap(s) orally once a day  cholecalciferol 25 mcg (1000 intl units) oral tablet: 1 tab(s) orally once a day  ALPRAZolam 0.5 mg oral tablet: 1 tab(s) orally 3 times a day  busPIRone 10 mg oral tablet: 1 tab(s) orally 3 times a day  traMADol 50 mg oral tablet: 0.5-1 tab(s) orally 2 times a day, As Needed     ALPRAZolam 1 mg oral tablet: 1 tab(s) orally once a day (at bedtime)  Advair Diskus 100 mcg-50 mcg inhalation powder: 1 puff(s) inhaled 2 times a day, As Needed  furosemide 20 mg oral tablet: 1-2 tab(s) orally once a day, As Needed  sucralfate 1 g oral tablet: 1 tab(s) orally 2 times a day  tiZANidine 4 mg oral tablet: 1 tab(s) orally 3 times a day, As Needed  zolpidem 10 mg oral tablet: 1 tab(s) orally once a day (at bedtime)  Vitamin B12 1000 mcg oral tablet: 1 tab(s) orally once a day  Vitamin B1 100 mg oral tablet: 1 tab(s) orally once a day  Hair, skin and Nails Multiple Vitamins with Minerals oral tablet: 1 tab(s) orally once a day  traZODone 150 mg oral tablet: 1.33 tab(s) orally once a day (at bedtime).    Review of Systems:   Constitutional: POSITIVE: Malaise; NEGATIVE: Fever,  Chills, Anorexia, Weight Loss     Eyes: NEGATIVE: Blurry Vision, Drainage, Diploplia,  Redness, Vision Loss/  Change     ENMT: NEGATIVE: Nasal Discharge, Nasal Congestion,  Ear Pain, Mouth Pain, Throat Pain     Respiratory: POSITIVE: Dry Cough, Shortness of Breath ; NEGATIVE: Productive Cough, Hemoptysis, Wheezing     Cardiac: POSITIVE: Chest Pain, Dyspnea on Exertion ; NEGATIVE: Orthopnea, Palpitations, Syncope     Gastrointestinal: POSITIVE: Abdominal Pain; NEGATIVE:  Nausea, Vomiting, Diarrhea, Constipation     Genitourinary: NEGATIVE: Discharge, Dysuria, Flank  Pain, Frequency, Hematuria     Musculoskeletal: NEGATIVE: Decreased ROM, Pain, Swelling,  Stiffness, Weakness     Neurological: NEGATIVE: Dizziness, Confusion, Headache,  Seizures, Syncope     Psychiatric: NEGATIVE: Mood Changes, Anxiety, Hallucinations,  Sleep Changes, Suicidal Ideas     Skin: NEGATIVE: Mass, Pain, Pruritus, Rash, Ulcer     Endocrine: NEGATIVE: Heat Intolerance, Cold Intolerance,  Sweat, Polyuria, Thirst     Hematologic/Lymph: NEGATIVE: Anemia, Bruising, Easy  Bleeding, Night Sweats, Petechiae     Allergic/Immunologic: NEGATIVE: Anaphylaxis, Itchy/  Teary Eyes, Itching, Sneezing, Swelling       Objective:     Objective Information:      T   P  R  BP   MAP  SpO2   Value  36.2  81  17  178/92      98%  Date/Time 9/27 17:29 9/27 17:29 9/27 17:29 9/27 17:29 9/27 17:35  Range  (36.2C - 36.2C )  (64 - 86 )  (17 - 18 )  (111 - 178 )/ (51 - 92 )    (85% - 100% )   As of 27-Sep-2023 17:45:00, patient is on 3 L/min of oxygen via nasal cannula.      Pain reported at 9/27 17:45: 2 = Mild      T   P  R  BP   MAP  SpO2   Value  36.2  81  17  178/92      98%  Date/Time 9/27 17:29 9/27 17:29 9/27 17:29 9/27 17:29 9/27 17:35  Range  (36.2C - 36.2C )  (64 - 86 )  (17 - 18 )  (111 - 178 )/ (51 - 92 )    (85% - 100% )   As of 27-Sep-2023 17:45:00, patient is on 3 L/min of oxygen via nasal cannula.    Physical Exam by System:    Constitutional: Overweight elderly  female  who is awake alert and interactive appropriately and appears NAD at the time of  visit   Eyes: PERRL, EOMI, clear sclera   ENMT: mucous membranes moist, no apparent injury,  no lesions seen   Head/Neck: Neck supple, no apparent injury, thyroid  without mass or tenderness, No JVD, trachea midline, no bruits   Respiratory/Thorax: Patent airways, CTAB, normal  breath sounds with good chest expansion, thorax symmetric   Cardiovascular: Regular, rate and rhythm, no murmurs,  2+ equal pulses of the extremities, normal S 1and S 2   Gastrointestinal: Nondistended, soft, mild diffuse  tenderness on palpation mostly in the epigastrium, no rebound tenderness or guarding, no masses palpable, no organomegaly, +BS, no bruits   Genitourinary: Deferred   Musculoskeletal: ROM intact, no joint swelling, normal  strength   Extremities: normal extremities, no cyanosis edema,  contusions or wounds, no clubbing   Neurological: alert and oriented x3, intact senses,  motor, response and reflexes, normal strength  Parkinsonian resting tremor upper extremities   Breast: Deferred   Lymphatic: No significant lymphadenopathy   Psychological: Appropriate mood and behavior  Pleasant and cooperative to exam   Skin: Warm and dry, no lesions, no rashes     Medications:    Medications:          Continuous Medications       --------------------------------    1. Dextrose 5% - Lactated Ringers Infusion:  1000  mL  IntraVenous  <Continuous>         Scheduled Medications       --------------------------------    1. ALPRAZolam:  0.5  mg  Oral  Every 8 Hours    2. ALPRAZolam:  1  mg  Oral  At Bedtime    3. busPIRone (BUSPAR):  10  mg  Oral  Every 8 Hours    4. Calcium Carbonate:  1250  mg  Oral  Daily    5. Carbidopa 25 mg - Levodopa 100 m  tablet(s)  Oral  3 Times a Day    6. Donepezil:  10  mg  Oral  At Bedtime    7. Enoxaparin SubCutaneous:  40  mg  SubCutaneous  Every 24 Hours    8. FLUoxetine:  100  mg  Oral  Daily    9. Fluticasone 100 microgram - Vilanterol 25 microgram/ Inh:  1  inhalation  Inhalation  Every 24 Hours     10. Gabapentin:  300  mg  Oral  3 Times a Day    11. Memantine:  10  mg  Oral  2 Times a Day    12. Pantoprazole:  40  mg  Oral  Daily    13. Potassium Chloride Extended Release:  20  mEq  Oral  Daily    14. Regadenoson Injectable:  0.4  mg  IntraVenous Push  Once    15. Sucralfate:  1  gram(s)  Oral  2 Times a Day Before Meals    16. Technetium Tc 99m Tetrofosmin (MYOVIEW - Radiology Contrast):  30  milliCurie  IntraVenous Push  Once    17. Technetium Tc 99m Tetrofosmin (MYOVIEW - Radiology Contrast):  10  milliCurie  IntraVenous Push  Once    18. traZODone:  200  mg  Oral  At Bedtime    19. traZODone:  50  mg  Oral  2 Times a Day         PRN Medications       --------------------------------    1. Acetaminophen:  650  mg  Oral  Every 6 Hours    2. Nitroglycerin SubLingual:  0.4  mg  SubLingual  Every 5 Minutes    3. Sodium Chloride 0.9% Injectable Flush:  10  mL  IntraVenous Flush  Every 8 Hours and as Needed    4. Zolpidem:  5  mg  Oral  At Bedtime         Conditional Medication Orders       --------------------------------    1. Perflutren Protein A Microsphere Inj:  3  mL  IntraVenous Push  Once      Recent Lab Results:    Results:    CBC: 9/27/2023 02:29              \     Hgb     /                              \     11.8 L    /  WBC  ----------------  Plt               4.8       ----------------    180              /     Hct     \                              /     38.0       \            RBC: 3.84 L    MCV: 99     Neutrophil %: 47.8      CMP: 9/27/2023 02:30  NA+        Cl-     BUN  /                         140    106    11  /  --------------------------------  Glucose                ---------------------------  118 H    K+     HCO3-   Creat \                         3.9    29    0.84  \           \  T Bili  /                    \  0.2  /  AST  x ---- x ALT        12 x ---- x 3 L         /  Alk P   \               /  59  \  Calcium : 8.5 L    Anion Gap : 9 L     Albumin : 3.5     T Protein : 6.0 L  "         Assessment and Plan:     Impression 1: Abdominal pain and chest pain   Plan for Impression 1: Uncertain etiology and whether  or not related.  ED reported abdominal pain resolved but chest pain  recurred requiring admission.  Very little to support cardiac etiology.  CT of the abdomen reveals no acute findings  She is on significant polypharmacy and quite high doses on many medications.  Suspect possible gastritis/GERD at this point.  EKG unremarkable  We will check stress test.   Impression 2: COPD   Plan for Impression 2: No evident exacerbation  Continue home medications   Impression 3: Chronic hypoxic respiratory failure   Plan for Impression 3: Appears to be oxygenating  well on 1 L here though describes she is on 3 L at home  Wean as able   Impression 4: Parkinson's   Plan for Impression 4: Continue home medications   Impression 5: Depression   Plan for Impression 5: Continue home medications   Impression 6: Polypharmacy   Plan for Impression 6: Very extensive list of medications  with several at very high doses.  We will review to see if we can improve this at all.   Impression 7: DVT prophylaxis   Plan for Impression 7: SCDs  Lovenox subcu       Electronic Signatures:  Mat Encarnacion)  (Signed 27-Sep-2023 18:41)   Authored: History of Present Illness, Comorbidities,  Social History, Allergies, Medications Prior to Admission, Review of Systems, Objective, Assessment and Plan, Note Completion      Last Updated: 27-Sep-2023 18:41 by Mat Encarnacion (MD)    References:  1.  Data Referenced From \"Patient Profile - Adult v2\" 27-Sep-2023 11:31   "

## 2023-09-30 NOTE — CARE PLAN
The patient's goals for the shift include      The clinical goals for the shift include      Over the shift, the patient did not make progress toward the following goals. Barriers to progression include being free from falls and protecting pt's skin. Recommendations to address these barriers include limited mobility.

## 2023-09-30 NOTE — PROGRESS NOTES
09/30/23 0903   Discharge Planning   Living Arrangements Spouse/significant other;Children   Support Systems Spouse/significant other;Children   Assistance Needed Patient ambulates with a walker and denies recent falls   Type of Residence Private residence   Home or Post Acute Services None   Patient expects to be discharged to: Home   Does the patient need discharge transport arranged? No

## 2023-09-30 NOTE — PROGRESS NOTES
Consult Type: subsequent visit/care     Service: Medicine     Subjective Data:   WILLIAM BRODERICK is a 78 year old Female who is Hospital Day # 3.    Additional Information:    78 year old Female admitted from the ED after presenting from home complaining of worsening abdominal pain and chest pain.  She describes that over the past month  she has been having intermittent abdominal pain and chest pain of a very random nature and cannot identify precipitating or relieving factors.  Presented today as it did not seem to go away as it has with previous episodes.  Also feels she has had some  shortness of breath and cough over the past week.  However no sputum fever or chills.  Overall relates that she is becoming very discouraged with her life as she is becoming increasingly debilitated by her chronic hypoxic respiratory failure and Parkinson's symptoms.  However feels she has been very compliant with her medications.  Has not noticed any unusual lower extremity edema    9/28:              Today the patient appeared to be remaining improved until about 6:30 PM when notified by the nurse that she was having severe episode of upper abdominal and sternal area chest discomfort.  This is essentially the same as her presenting  symptoms.  EKG at the time continue to show no obvious ischemic changes or changes from prior EKGs.  Stress test revealed no evidence for ischemia either on the EKG portion or perfusion portion.  When she had her episode this evening she was given GI cocktail which seemed to fairly quickly provide relief of her symptoms.  At this point it would seem certainly GI related.  She does take Prilosec 20 twice daily as well as Carafate twice daily and is compliant with this.  She has seen GI in the past in [place] and has had EGD at some time in the past but not sure how long  ago.  Feel at this point need to ask GI for evaluation to see if she would benefit from another EGD or advise in terms of  medication adjustment  9/29:           Today the patient states she is feeling improved and has had no further episodes of severe abdominal or chest discomfort.  GI did see the patient today and feels likely etiology is IBS and has recommended aggressive bowel regimen and recommended she remain in the hospital to start this.  Patient is agreeable to this.  She will be started on Colace and Dulcolax every other  day and Amitiza 24 daily which can be increased to twice daily. Otherwise denies interval new symptoms or complaints including pleuritic type chest pain unusual shortness of breath sputum nausea diarrhea dysuria flank pain fever or chills.      Objective Data:     Objective Information:      T   P  R  BP   MAP  SpO2   Value  36.3  89  18  139/70   93  95%  Date/Time 9/29 15:21 9/29 15:21 9/29 5:05 9/29 15:21  9/29 15:21 9/29 15:21  Range  (36.2C - 37C )  (74 - 95 )  (18 - 20 )  (114 - 173 )/ (66 - 98 )  (86 - 123 )  (93% - 99% )   As of 29-Sep-2023 11:01:00, patient is on 3 L/min of oxygen via nasal cannula.  Highest temp of 37 C was recorded at 9/29 5:05      Pain reported at 9/29 8:57: 0 = None    ---- Intake and Output  -----  Mn/Dy/Year Time  Intake   Output  Net  Sep 29, 2023 2:00 pm  0   1  -1  Sep 28, 2023 10:00 pm  0   0  0        T   P  R  BP   MAP  SpO2   Value  36.3  89  18  139/70   93  95%  Date/Time 9/29 15:21 9/29 15:21 9/29 5:05 9/29 15:21  9/29 15:21 9/29 15:21  Range  (36.2C - 37C )  (74 - 95 )  (18 - 20 )  (114 - 173 )/ (66 - 98 )  (86 - 123 )  (93% - 99% )   As of 29-Sep-2023 11:01:00, patient is on 3 L/min of oxygen via nasal cannula.  Highest temp of 37 C was recorded at 9/29 5:05    Physical Exam by System:    Constitutional: Overweight elderly  female  who is awake alert and interactive appropriately and appears NAD at the time of visit   Eyes: PERRL, EOMI, clear sclera   ENMT: mucous membranes moist, no apparent injury,  no lesions seen   Head/Neck: Neck supple, no apparent  injury, thyroid  without mass or tenderness, No JVD, trachea midline, no bruits   Respiratory/Thorax: Patent airways, CTAB, normal  breath sounds with good chest expansion, thorax symmetric   Cardiovascular: Regular, rate and rhythm, no murmurs,  2+ equal pulses of the extremities, normal S 1and S 2   Gastrointestinal: Nondistended, soft, mild diffuse  tenderness on palpation mostly in the epigastrium, no rebound tenderness or guarding, no masses palpable, no organomegaly, +BS, no bruits   Genitourinary: Deferred   Musculoskeletal: ROM intact, no joint swelling, normal  strength   Extremities: normal extremities, no cyanosis edema,  contusions or wounds, no clubbing   Neurological: alert and oriented x3, intact senses,  motor, response and reflexes, normal strength  Parkinsonian resting tremor upper extremities   Breast: Deferred   Lymphatic: No significant lymphadenopathy   Psychological: Appropriate mood and behavior  Pleasant and cooperative to exam   Skin: Warm and dry, no lesions, no rashes     Medication:    Medications:          Continuous Medications       --------------------------------    1. Dextrose 5% - Lactated Ringers Infusion:  1000  mL  IntraVenous  <Continuous>         Scheduled Medications       --------------------------------    1. ALPRAZolam:  0.5  mg  Oral  Every 8 Hours    2. ALPRAZolam:  1  mg  Oral  At Bedtime    3. busPIRone (BUSPAR):  10  mg  Oral  Every 8 Hours    4. Calcium Carbonate:  1250  mg  Oral  Daily    5. Carbidopa 25 mg - Levodopa 100 m  tablet(s)  Oral  3 Times a Day    6. Docusate:  100  mg  Oral  2 Times a Day    7. Donepezil:  10  mg  Oral  At Bedtime    8. Enoxaparin SubCutaneous:  40  mg  SubCutaneous  Every 24 Hours    9. FLUoxetine:  100  mg  Oral  Daily    10. Fluticasone 100 microgram - Vilanterol 25 microgram/ Inh:  1  inhalation  Inhalation  Every 24 Hours    11. Gabapentin:  300  mg  Oral  3 Times a Day    12. Lubiprostone:  24  microgram(s)  Oral  Daily     13. Memantine:  10  mg  Oral  2 Times a Day    14. Pantoprazole:  40  mg  Oral  Daily    15. Potassium Chloride Extended Release:  20  mEq  Oral  Daily    16. Regadenoson Injectable:  0.4  mg  IntraVenous Push  Once    17. Sucralfate:  1  gram(s)  Oral  2 Times a Day Before Meals    18. traZODone:  50  mg  Oral  2 Times a Day    19. traZODone:  200  mg  Oral  At Bedtime         PRN Medications       --------------------------------    1. Acetaminophen:  650  mg  Oral  Every 6 Hours    2. Artificial Tears (Preservative Free):  2  drop(s)  Both Eyes  Every 4 Hours    3. Bisacodyl Enteric Coated:  5  mg  Oral  At Bedtime    4. Nitroglycerin SubLingual:  0.4  mg  SubLingual  Every 5 Minutes    5. Sodium Chloride 0.9% Injectable Flush:  10  mL  IntraVenous Flush  Every 8 Hours and as Needed    6. Zolpidem:  5  mg  Oral  At Bedtime         Conditional Medication Orders       --------------------------------    1. Perflutren Protein A Microsphere Inj:  3  mL  IntraVenous Push  Once      Assessment and Plan:   Code Status:  ·  Code Status Full Code     Assessment:       Impression 1: Abdominal pain and chest pain   Plan for Impression 1: Uncertain etiology  and whether or not related.  ED reported abdominal pain resolved but chest pain  recurred requiring admission.  Very little to support cardiac etiology.  CT of the abdomen reveals no acute findings  She is on significant polypharmacy and quite high doses on many medications.  Suspect possible gastritis/GERD at this point.  EKG unremarkable  We will check stress test.  9/28:   Stress test reveals no evidence to support ischemia.  In view of patient having another episode this evening exactly as she had causing presentation which seem to be relieved by GI cocktail we will ask GI for opinion.  9/29: No further severe episodes.  GI saw the patient and felt it was likely related to IBS and initiated aggressive bowel regimen which they wanted to perform in the hospital.  If  doing well tomorrow perhaps can be discharged.   Impression 2: COPD   Plan for Impression 2: No evident exacerbation  Continue home medications   Impression 3: Chronic hypoxic respiratory  failure   Plan for Impression 3: Appears to be oxygenating  well on 1 L here though describes she is on 3 L at home  Wean as able   Impression 4: Parkinson's   Plan for Impression 4: Continue home medications   Impression 5: Depression   Plan for Impression 5: Continue home medications   Impression 6: Polypharmacy   Plan for Impression 6: Very extensive list  of medications with several at very high doses.  We will review to see if we can improve this at all.   Impression 7: DVT prophylaxis   Plan for Impression 7: SCDs  Lovenox subcu         Electronic Signatures:  Mat Encarnacion)  (Signed 29-Sep-2023 17:38)   Authored: Service, Subjective Data, Objective Data, Assessment  and Plan, Note Completion      Last Updated: 29-Sep-2023 17:38 by Mat Encarnacion)

## 2023-09-30 NOTE — DISCHARGE INSTRUCTIONS
Follow up with PCP in 1-2 weeks. Call to schedule. Bring all your discharge paperwork and medications when you go to your follow up appointment. Return to ED if your symptoms come back.  Follow-up with GI doctors.

## 2023-10-02 ENCOUNTER — TELEPHONE (OUTPATIENT)
Dept: HOME HEALTH SERVICES | Age: 78
End: 2023-10-02
Payer: MEDICARE

## 2023-10-03 ENCOUNTER — TELEPHONE (OUTPATIENT)
Dept: HOME HEALTH SERVICES | Age: 78
End: 2023-10-03
Payer: MEDICARE

## 2023-10-04 LAB
ANION GAP IN SER/PLAS: NORMAL
CALCIUM (MG/DL) IN SER/PLAS: NORMAL
CARBON DIOXIDE, TOTAL (MMOL/L) IN SER/PLAS: NORMAL
CHLORIDE (MMOL/L) IN SER/PLAS: NORMAL
CREATININE (MG/DL) IN SER/PLAS: NORMAL
ERYTHROCYTE DISTRIBUTION WIDTH (RATIO) BY AUTOMATED COUNT: NORMAL
ERYTHROCYTE MEAN CORPUSCULAR HEMOGLOBIN CONCENTRATION (G/DL) BY AUTOMATED: NORMAL
ERYTHROCYTE MEAN CORPUSCULAR VOLUME (FL) BY AUTOMATED COUNT: NORMAL
ERYTHROCYTES (10*6/UL) IN BLOOD BY AUTOMATED COUNT: NORMAL
GFR FEMALE: NORMAL
GFR MALE: NORMAL
GLOMERULAR FILTRATION RATE-AFRICAN AMERICAN: NORMAL ML/MIN/1.73M2
GLOMERULAR FILTRATION RATE-NON AFRICAN AMERICAN: NORMAL ML/MIN/1.73M2
GLUCOSE (MG/DL) IN SER/PLAS: NORMAL
HEMATOCRIT (%) IN BLOOD BY AUTOMATED COUNT: NORMAL
HEMOGLOBIN (G/DL) IN BLOOD: NORMAL
LEUKOCYTES (10*3/UL) IN BLOOD BY AUTOMATED COUNT: NORMAL
MAGNESIUM (MG/DL) IN SER/PLAS: NORMAL
NRBC (PER 100 WBCS) BY AUTOMATED COUNT: NORMAL
PLATELETS (10*3/UL) IN BLOOD AUTOMATED COUNT: NORMAL
POTASSIUM (MMOL/L) IN SER/PLAS: NORMAL
SODIUM (MMOL/L) IN SER/PLAS: NORMAL
UREA NITROGEN (MG/DL) IN SER/PLAS: NORMAL

## 2023-10-12 NOTE — CONSULTS
Service:   Service: Gastroenterology     Consult:  Consult requested by (Attending Name): Mat Encarnacion   Reason: Atypical chest pain with history of GERD     History of Present Illness:   HPI:    WILLIAM BRODERICK is a 78 year old Female who presented to the ED with complaints of worsening abdominal pain and chest pain.  She describeed that over the past month  she has had intermittent abdominal pain and chest pain of a very random nature and could not identify precipitating or relieving factors.  However on direct questioning she admitted that the abdominal pain is at times somewhat relieved after bowel movement  or after belching.  She has had multiple evaluations at the [facility] over the past several months and at the time of presentation has been taking pantoprazole 20 mg twice daily and Carafate 1 tablet twice daily.  She has an element of constipation, reporting about 3 bowel movements per week.  Linzess had been recommended in the past but according to patient she was unable to get it on account of cost.  An upper GI endoscopy in May 2023 revealed a small hiatal hernia.  At colonoscopy done at the same setting 4 colon polyps were removed.  She admitted to history of past back surgeries x2.    Review Family/Social History and ROS:     Review Family/Social History and ROS:    Constitutional: no fever, no chills, fatigue,  not feeling tired and no recent weight loss. No reports of dizziness.  SKIN: No skin rash or lesions  EYE: No pain photophobia, diplopia or blurred vision  EAR: No discharge, pain or hearing loss  NOSE: No congestion, epistaxis or obstruction  RESPIRATORY: No cough , dyspnea or  chest pain   CV: No chest pain, lower extremity swelling or palpitations  GE: Has chronic intermittent abdominal pain, nausea, vomiting, dysphagia or odynophagia. Denied , diarrhea has reported constipation, about 2-3 bowel movements per week.    : No dysuria or hematuria, urinary incontinence  or urine frequency  NEURO: Denied weakness, confusion, dizziness, or numbness   PSYCH : Denies anxiety, hallucinations or insomnia  HEME/ LYMPH : Denied bleeding , bruising or enlarged nodes  ENDOCRINE: No change in weight, polydipsia, or abnormal bleeding  .       Social History:    Smoking Status: former smoker  (1)            Allergies:  ·  contrast (specific type unknown) : Unknown  ·  amitriptyline : Unknown  ·  Zoloft : Unknown  ·  Bactrim : Unknown  ·  Topamax : Unknown  ·  penicillin : Unknown    Objective:     Objective Information:        T   P  R  BP   MAP  SpO2   Value  36.7  87  18  124/66   86  98%  Date/Time 9/29 11:01 9/29 11:01 9/29 5:05 9/29 11:01  9/29 11:01 9/29 11:01  Range  (36.2C - 37C )  (74 - 95 )  (18 - 20 )  (114 - 173 )/ (66 - 98 )  (86 - 123 )  (93% - 99% )   As of 29-Sep-2023 11:01:00, patient is on 3 L/min of oxygen via nasal cannula.  Highest temp of 37 C was recorded at 9/29 5:05          Head and neck examinations were  unremarkable. There was no temporal wasting. No jaundice noted. No thyromegaly.  No carotid bruits.  There is no peripheral lymphadenopathy.  Lungs were clear to auscultation. There when no rales, rhonchi or wheezes.  Cardiac examination revealed normal heart sounds. Rhythm was sinus . There were no murmurs.  Abdomen was full and soft. There was no organomegaly. Bowel sounds were normo- active. There was no ascites. The abdomen was nontender.  Rectal examination was not done.  Extremities: No edema or joint swelling.  Neurological examination: Patient was alert, oriented in person place, and time. There when no focal neurological signs. Cranial nerves were grossly intact. No evidence of encephalopathy. There was no asterixis. The  plantar response was flexor.      Medications:    Medications:          Continuous Medications       --------------------------------    1. Dextrose 5% - Lactated Ringers Infusion:  1000  mL  IntraVenous  <Continuous>         Scheduled  Medications       --------------------------------    1. ALPRAZolam:  0.5  mg  Oral  Every 8 Hours    2. ALPRAZolam:  1  mg  Oral  At Bedtime    3. busPIRone (BUSPAR):  10  mg  Oral  Every 8 Hours    4. Calcium Carbonate:  1250  mg  Oral  Daily    5. Carbidopa 25 mg - Levodopa 100 m  tablet(s)  Oral  3 Times a Day    6. Donepezil:  10  mg  Oral  At Bedtime    7. Enoxaparin SubCutaneous:  40  mg  SubCutaneous  Every 24 Hours    8. FLUoxetine:  100  mg  Oral  Daily    9. Fluticasone 100 microgram - Vilanterol 25 microgram/ Inh:  1  inhalation  Inhalation  Every 24 Hours    10. Gabapentin:  300  mg  Oral  3 Times a Day    11. Memantine:  10  mg  Oral  2 Times a Day    12. Pantoprazole:  40  mg  Oral  Daily    13. Potassium Chloride Extended Release:  20  mEq  Oral  Daily    14. Regadenoson Injectable:  0.4  mg  IntraVenous Push  Once    15. Sucralfate:  1  gram(s)  Oral  2 Times a Day Before Meals    16. traZODone:  50  mg  Oral  2 Times a Day    17. traZODone:  200  mg  Oral  At Bedtime         PRN Medications       --------------------------------    1. Acetaminophen:  650  mg  Oral  Every 6 Hours    2. Artificial Tears (Preservative Free):  2  drop(s)  Both Eyes  Every 4 Hours    3. Nitroglycerin SubLingual:  0.4  mg  SubLingual  Every 5 Minutes    4. Sodium Chloride 0.9% Injectable Flush:  10  mL  IntraVenous Flush  Every 8 Hours and as Needed    5. Zolpidem:  5  mg  Oral  At Bedtime         Conditional Medication Orders       --------------------------------    1. Perflutren Protein A Microsphere Inj:  3  mL  IntraVenous Push  Once      Recent Lab Results:    Results:        I have reviewed these laboratory results:    Complete Blood Count  28-Sep-2023 03:37:00      Result Value    White Blood Cell Count  6.6    Red Blood Cell Count  3.99   L   HGB  12.1    HCT  38.7    MCV  97    MCHC  31.3   L   PLT  189    RDW-CV  13.2      Basic Metabolic Panel  28-Sep-2023 03:37:00      Result Value    Glucose, Serum  99     NA  141    K  4.1    CL  104    Bicarbonate, Serum  31    Anion Gap, Serum  10    BUN  7    CREAT  0.97    GFR Female  60    Calcium, Serum  8.5   L     Troponin I, High Sensitivity  Trending View      Result 27-Sep-2023 08:00:00  27-Sep-2023 03:37:00  27-Sep-2023 02:29:00    Troponin I, High Sensitivity 3   3   3        Urinalysis, Microscopic  27-Sep-2023 05:10:00      Result Value    White Cells  <1    Red Blood Cells  <1    Mucous  1+    Hyaline Casts  OCC   A     Comprehensive Metabolic Panel  27-Sep-2023 02:30:00      Result Value    Glucose, Serum  118   H   NA  140    K  3.9    CL  106    Bicarbonate, Serum  29    Anion Gap, Serum  9   L   BUN  11    CREAT  0.84    GFR Female  72    Calcium, Serum  8.5   L   ALB  3.5    ALKP  59    T Pro  6.0   L   T Bili  0.2    Alanine Aminotransferase, Serum  3   L   Aspartate Transaminase, Serum  12      Complete Blood Count + Differential  27-Sep-2023 02:29:00      Result Value    White Blood Cell Count  4.8    Red Blood Cell Count  3.84   L   HGB  11.8   L   HCT  38.0    MCV  99    MCHC  31.1   L   PLT  180    RDW-CV  13.4    Neutrophil %  47.8    Immature Granulocytes %  0.4    Lymphocyte %  32.6    Monocyte %  10.2    Eosinophil %  8.6    Basophil %  0.4    Neutrophil Count  2.29    Lymphocyte Count  1.56    Monocyte Count  0.49    Eosinophil Count  0.41   H   Basophil Count  0.02      Lactate, Level  27-Sep-2023 02:29:00      Result Value    Lactate, Level  1.2        Radiology Results:    Results:        Impression:    1. Fixed perfusion defects as noted above. No reversible perfusion  defects seen. There is a low probability of ischemia.  2. Calculated ejection fraction of 62% without segmental wall motion  abnormality seen.           NM Cardiac Stress/Rest Nuclear Med Order [Sep 28 2023  5:11PM]      Conclusion:    [place] [id number]  Phone [phone] Fax [phone]    Nuclear Pharmacologic Stress  Test    Patient Name:     WILLIAM BRODERICK Ordering Physician:  Study Date:       9/28/2023     Reading Physician:     [id number] Ricci Sena DO  MRN/PID:          [id number]      Supervising Physician: [id number] Sena DO  Accession/Order#: [id number]     Referring Physician:   HOMER HEBERT  YOB: 1945      PCP:  Gender:           F             Fellow:  Admission Status: Inpatient     Technician:  Height:           162.56 cm     Nurse:                 Gita Lerma RN  Weight:           76.50 kg      Sonographer:           SARAH  BSA:              1.82 m2       Technologist:  BMI:              28.95 kg/m2  Additional Staff:  Age:              78 years      cc report to:  Patient Location: [facility]   cc report to:    Study Type:    Nuclear Stress Test Pharmacological  Diagnosis/ICD: R07.9-Chest pain, unspecified  Indication:    Chest Pain  Procedure/CPT: Stress Test Interpretation-66320; Stress Test Supervision-[id number]    Falls Risk:    Study Details: Correct procedure and correct patient verified verbally and with  ID Band checked.      Patient History: Chest pain and paarkinsons, chronic hypoxic resp. failure - home O2.  Allergies: Contrasat, amitryptilline, bactrim, PCN, topamax, zoloft.      Medications: Trazodone, zoldipem, potassium, omeprazole, memantine, gabapentin, Advair, alprazelam, buspirone, carbidopa-levodopa, doneziil, lasix. The patient took medications as prescribed.    Patient Performance: Patient received a total of 0.4 mg of Regadenoson at 11:37:19 AM. The resting blood pressure was 182/92 mmHg with a heart rate of 84 bpm. The patient developed no symptoms during the stress exam. The blood pressure response was normal.  The test was terminated due to: completed lab protocol and end of vasodilator infusion. Patient has met the discharge criteria and is discharged to home.    Baseline ECG: Resting ECG showed normal sinus rhythm with normal tracing.    Stress ECG: Stress ECG showed sinus tachycardia.  No ST changes.    Stress Stage Data:  +----------------+--+------+-------+                  HRSys BPDias BP  +----------------+--+------+-------+  Baseline [id number]   92       +----------------+--+------+-------+  Stage I         64795   89       +----------------+--+------+-------+  Stage II        99               +----------------+--+------+-------+  Stage III       47307   91       +----------------+--+------+-------+  Stage IV        96               +----------------+--+------+-------+  Stage V         44136   88       +----------------+--+------+-------+      Summary:  1. Correlate with myocardial perfusion imaging results.  2. No clinical or electrocardiographic evidence for ischemia at maximal infusion.  3. No ECG changes from baseline.  4. Nuclear image results are reported separately.    [id number] Ricci Sena DO  Electronically signed on 9/28/2023 at 12:24:18 PM                    *** Final ***     Nuclear Stress Testing Cardiology Order [Sep 28 2023 12:24PM]      Impression:    1. No acute process in the abdomen or pelvis.     2. Bibasilar bronchial wall thickening (right > left) with emphysema.     3. Cholelithiasis without evidence of acute cholecystitis.     4. Additional chronic nonacute findings as above.     MACRO:  None.     CT Abdomen and Pelvis without Contrast [Sep 27 2023  3:22AM]        Assessment:    78-year-old lady with longstanding history of COPD and Parkinson's disease presented with continued abdominal pain and also lower chest pain.  Troponin estimations  and cardiac nuclear medicine imaging excluded myocardial cause for her pain.  Abdominal pain has been chronic and intermittent and was most recently evaluated through the [facility].  The records indicate an upper GI endoscopy done in May 2023 revealed a small hiatal hernia.  Colonoscopy done at the same time revealed  4 benign polyps all of which were removed.  HIDA scan done 2  "weeks ago was unremarkable.  She has been chronically on treatment with Carafate 1 tablet twice daily and pantoprazole 20 mg twice daily.  These medications have not offered her relief.    However she did not some relief of her symptoms after bowel movement and after belching.  According to her efforts to treat her constipation (at the [facility] ) where limited because she could not afford Linzess which was prescribed at that time.   She has 2-3 bowel movements per week and believes that she is constipated.  Given the extent of her evaluation so far with no definite diagnosis her presentation appears suggestive of irritable bowel syndrome with constipation predominance.  She has not been able to afford Linzess and so has not tried such treatment.    REC:   Continue current treatment with Carafate and pantoprazole.  Start her on a bowel program that includes Colace 100 mg p.o. daily and Dulcolax 5 mg Q.O.D.  I suggest consideration at this abdominal pain may be referred pain from her chronic back lesions.  Trial ( while inpatient ) Amitiza 24mcg daily , may increase to 24 mcg BID, and observe.    Consult Status:  Consult Status    (select all that apply): initial  consult complete, will follow   Consult Order ID: [id number]       Electronic Signatures:  Joe Carpenter (MD)  (Signed 29-Sep-2023 14:45)   Authored: Service, History of Present Illness, Review  Family/Social History and ROS, Allergies, Objective, Assessment/Recommendations, Note Completion      Last Updated: 29-Sep-2023 14:45 by Joe Carpenter (MD)    References:  1.  Data Referenced From \"History and Physical\" 27-Sep-2023 18:26   "

## 2023-10-17 ENCOUNTER — PHARMACY VISIT (OUTPATIENT)
Dept: PHARMACY | Facility: CLINIC | Age: 78
End: 2023-10-17

## 2023-10-17 PROCEDURE — RXMED WILLOW AMBULATORY MEDICATION CHARGE

## 2023-11-10 ENCOUNTER — PHARMACY VISIT (OUTPATIENT)
Dept: PHARMACY | Facility: CLINIC | Age: 78
End: 2023-11-10
Payer: COMMERCIAL

## 2023-11-10 PROCEDURE — RXMED WILLOW AMBULATORY MEDICATION CHARGE

## 2023-11-10 RX ORDER — PREDNISONE 20 MG/1
40 TABLET ORAL DAILY
Qty: 10 TABLET | Refills: 0 | OUTPATIENT
Start: 2023-11-10 | End: 2024-01-25 | Stop reason: HOSPADM

## 2023-11-16 ENCOUNTER — PHARMACY VISIT (OUTPATIENT)
Dept: PHARMACY | Facility: CLINIC | Age: 78
End: 2023-11-16

## 2023-12-02 ENCOUNTER — PHARMACY VISIT (OUTPATIENT)
Dept: PHARMACY | Facility: CLINIC | Age: 78
End: 2023-12-02

## 2023-12-02 PROCEDURE — RXMED WILLOW AMBULATORY MEDICATION CHARGE

## 2023-12-23 ENCOUNTER — PHARMACY VISIT (OUTPATIENT)
Dept: PHARMACY | Facility: CLINIC | Age: 78
End: 2023-12-23

## 2023-12-23 PROCEDURE — RXMED WILLOW AMBULATORY MEDICATION CHARGE

## 2024-01-11 ENCOUNTER — PHARMACY VISIT (OUTPATIENT)
Dept: PHARMACY | Facility: CLINIC | Age: 79
End: 2024-01-11

## 2024-01-11 PROCEDURE — RXMED WILLOW AMBULATORY MEDICATION CHARGE

## 2024-01-22 ENCOUNTER — HOSPITAL ENCOUNTER (INPATIENT)
Facility: HOSPITAL | Age: 79
LOS: 2 days | Discharge: HOME HEALTH | DRG: 311 | End: 2024-01-25
Attending: STUDENT IN AN ORGANIZED HEALTH CARE EDUCATION/TRAINING PROGRAM | Admitting: INTERNAL MEDICINE
Payer: MEDICARE

## 2024-01-22 ENCOUNTER — APPOINTMENT (OUTPATIENT)
Dept: CARDIOLOGY | Facility: HOSPITAL | Age: 79
DRG: 311 | End: 2024-01-22
Payer: MEDICARE

## 2024-01-22 ENCOUNTER — APPOINTMENT (OUTPATIENT)
Dept: RADIOLOGY | Facility: HOSPITAL | Age: 79
DRG: 311 | End: 2024-01-22
Payer: MEDICARE

## 2024-01-22 DIAGNOSIS — R10.9 ABDOMINAL PAIN, UNSPECIFIED ABDOMINAL LOCATION: ICD-10-CM

## 2024-01-22 DIAGNOSIS — I10 ESSENTIAL (PRIMARY) HYPERTENSION: ICD-10-CM

## 2024-01-22 DIAGNOSIS — R07.9 CHEST PAIN, UNSPECIFIED TYPE: Primary | ICD-10-CM

## 2024-01-22 DIAGNOSIS — G20.A1 PARKINSON'S DISEASE, UNSPECIFIED WHETHER DYSKINESIA PRESENT, UNSPECIFIED WHETHER MANIFESTATIONS FLUCTUATE (MULTI): ICD-10-CM

## 2024-01-22 LAB
ALBUMIN SERPL BCP-MCNC: 4 G/DL (ref 3.4–5)
ALP SERPL-CCNC: 47 U/L (ref 33–136)
ALT SERPL W P-5'-P-CCNC: 16 U/L (ref 7–45)
ANION GAP SERPL CALC-SCNC: 16 MMOL/L
APPEARANCE UR: ABNORMAL
AST SERPL W P-5'-P-CCNC: 21 U/L (ref 9–39)
BASOPHILS # BLD AUTO: 0.02 X10*3/UL (ref 0–0.1)
BASOPHILS NFR BLD AUTO: 0.2 %
BILIRUB SERPL-MCNC: 0.8 MG/DL (ref 0–1.2)
BILIRUB UR STRIP.AUTO-MCNC: NEGATIVE MG/DL
BUN SERPL-MCNC: 14 MG/DL (ref 6–23)
CALCIUM SERPL-MCNC: 9.8 MG/DL (ref 8.6–10.3)
CARDIAC TROPONIN I PNL SERPL HS: 14 NG/L (ref 0–13)
CARDIAC TROPONIN I PNL SERPL HS: 15 NG/L (ref 0–13)
CARDIAC TROPONIN I PNL SERPL HS: 16 NG/L (ref 0–13)
CHLORIDE SERPL-SCNC: 98 MMOL/L (ref 98–107)
CO2 SERPL-SCNC: 32 MMOL/L (ref 21–32)
COLOR UR: YELLOW
CREAT SERPL-MCNC: 0.67 MG/DL (ref 0.5–1.05)
CREAT SERPL-MCNC: 0.68 MG/DL (ref 0.5–1.05)
EGFRCR SERPLBLD CKD-EPI 2021: 90 ML/MIN/1.73M*2
EGFRCR SERPLBLD CKD-EPI 2021: >90 ML/MIN/1.73M*2
EOSINOPHIL # BLD AUTO: 0.02 X10*3/UL (ref 0–0.4)
EOSINOPHIL NFR BLD AUTO: 0.2 %
ERYTHROCYTE [DISTWIDTH] IN BLOOD BY AUTOMATED COUNT: 12.1 % (ref 11.5–14.5)
GLUCOSE SERPL-MCNC: 130 MG/DL (ref 74–99)
GLUCOSE UR STRIP.AUTO-MCNC: NEGATIVE MG/DL
HCT VFR BLD AUTO: 44.6 % (ref 36–46)
HGB BLD-MCNC: 14.5 G/DL (ref 12–16)
HYALINE CASTS #/AREA URNS AUTO: ABNORMAL /LPF
IMM GRANULOCYTES # BLD AUTO: 0.04 X10*3/UL (ref 0–0.5)
IMM GRANULOCYTES NFR BLD AUTO: 0.4 % (ref 0–0.9)
KETONES UR STRIP.AUTO-MCNC: ABNORMAL MG/DL
LACTATE SERPL-SCNC: 0.9 MMOL/L (ref 0.4–2)
LEUKOCYTE ESTERASE UR QL STRIP.AUTO: ABNORMAL
LIPASE SERPL-CCNC: 13 U/L (ref 9–82)
LYMPHOCYTES # BLD AUTO: 1.15 X10*3/UL (ref 0.8–3)
LYMPHOCYTES NFR BLD AUTO: 12.1 %
MAGNESIUM SERPL-MCNC: 1.51 MG/DL (ref 1.6–2.4)
MCH RBC QN AUTO: 31.3 PG (ref 26–34)
MCHC RBC AUTO-ENTMCNC: 32.5 G/DL (ref 32–36)
MCV RBC AUTO: 96 FL (ref 80–100)
MONOCYTES # BLD AUTO: 1.02 X10*3/UL (ref 0.05–0.8)
MONOCYTES NFR BLD AUTO: 10.8 %
MUCOUS THREADS #/AREA URNS AUTO: ABNORMAL /LPF
NEUTROPHILS # BLD AUTO: 7.22 X10*3/UL (ref 1.6–5.5)
NEUTROPHILS NFR BLD AUTO: 76.3 %
NITRITE UR QL STRIP.AUTO: NEGATIVE
NRBC BLD-RTO: 0 /100 WBCS (ref 0–0)
PH UR STRIP.AUTO: 6 [PH]
PLATELET # BLD AUTO: 255 X10*3/UL (ref 150–450)
POTASSIUM SERPL-SCNC: 2.8 MMOL/L (ref 3.5–5.3)
PROT SERPL-MCNC: 6.7 G/DL (ref 6.4–8.2)
PROT UR STRIP.AUTO-MCNC: ABNORMAL MG/DL
RBC # BLD AUTO: 4.64 X10*6/UL (ref 4–5.2)
RBC # UR STRIP.AUTO: ABNORMAL /UL
RBC #/AREA URNS AUTO: ABNORMAL /HPF
SODIUM SERPL-SCNC: 143 MMOL/L (ref 136–145)
SP GR UR STRIP.AUTO: 1.02
SQUAMOUS #/AREA URNS AUTO: ABNORMAL /HPF
UROBILINOGEN UR STRIP.AUTO-MCNC: <2 MG/DL
WBC # BLD AUTO: 9.5 X10*3/UL (ref 4.4–11.3)
WBC #/AREA URNS AUTO: ABNORMAL /HPF

## 2024-01-22 PROCEDURE — 84484 ASSAY OF TROPONIN QUANT: CPT | Performed by: PHYSICIAN ASSISTANT

## 2024-01-22 PROCEDURE — 2500000001 HC RX 250 WO HCPCS SELF ADMINISTERED DRUGS (ALT 637 FOR MEDICARE OP)

## 2024-01-22 PROCEDURE — 81001 URINALYSIS AUTO W/SCOPE: CPT | Performed by: PHYSICIAN ASSISTANT

## 2024-01-22 PROCEDURE — 2500000001 HC RX 250 WO HCPCS SELF ADMINISTERED DRUGS (ALT 637 FOR MEDICARE OP): Performed by: PHYSICIAN ASSISTANT

## 2024-01-22 PROCEDURE — 84484 ASSAY OF TROPONIN QUANT: CPT

## 2024-01-22 PROCEDURE — 71046 X-RAY EXAM CHEST 2 VIEWS: CPT

## 2024-01-22 PROCEDURE — 74176 CT ABD & PELVIS W/O CONTRAST: CPT

## 2024-01-22 PROCEDURE — 2500000004 HC RX 250 GENERAL PHARMACY W/ HCPCS (ALT 636 FOR OP/ED): Performed by: STUDENT IN AN ORGANIZED HEALTH CARE EDUCATION/TRAINING PROGRAM

## 2024-01-22 PROCEDURE — 36415 COLL VENOUS BLD VENIPUNCTURE: CPT | Performed by: PHYSICIAN ASSISTANT

## 2024-01-22 PROCEDURE — 2500000004 HC RX 250 GENERAL PHARMACY W/ HCPCS (ALT 636 FOR OP/ED)

## 2024-01-22 PROCEDURE — 96366 THER/PROPH/DIAG IV INF ADDON: CPT

## 2024-01-22 PROCEDURE — 82565 ASSAY OF CREATININE: CPT | Performed by: PHYSICIAN ASSISTANT

## 2024-01-22 PROCEDURE — 96375 TX/PRO/DX INJ NEW DRUG ADDON: CPT

## 2024-01-22 PROCEDURE — 93005 ELECTROCARDIOGRAM TRACING: CPT

## 2024-01-22 PROCEDURE — 85025 COMPLETE CBC W/AUTO DIFF WBC: CPT | Performed by: PHYSICIAN ASSISTANT

## 2024-01-22 PROCEDURE — 96376 TX/PRO/DX INJ SAME DRUG ADON: CPT

## 2024-01-22 PROCEDURE — 99222 1ST HOSP IP/OBS MODERATE 55: CPT

## 2024-01-22 PROCEDURE — 83735 ASSAY OF MAGNESIUM: CPT | Performed by: PHYSICIAN ASSISTANT

## 2024-01-22 PROCEDURE — 83690 ASSAY OF LIPASE: CPT | Performed by: PHYSICIAN ASSISTANT

## 2024-01-22 PROCEDURE — 74176 CT ABD & PELVIS W/O CONTRAST: CPT | Performed by: RADIOLOGY

## 2024-01-22 PROCEDURE — 87086 URINE CULTURE/COLONY COUNT: CPT | Mod: GEALAB | Performed by: PHYSICIAN ASSISTANT

## 2024-01-22 PROCEDURE — 96368 THER/DIAG CONCURRENT INF: CPT

## 2024-01-22 PROCEDURE — 96365 THER/PROPH/DIAG IV INF INIT: CPT | Mod: 59

## 2024-01-22 PROCEDURE — 84075 ASSAY ALKALINE PHOSPHATASE: CPT | Performed by: PHYSICIAN ASSISTANT

## 2024-01-22 PROCEDURE — 96372 THER/PROPH/DIAG INJ SC/IM: CPT

## 2024-01-22 PROCEDURE — 99285 EMERGENCY DEPT VISIT HI MDM: CPT | Performed by: STUDENT IN AN ORGANIZED HEALTH CARE EDUCATION/TRAINING PROGRAM

## 2024-01-22 PROCEDURE — 71046 X-RAY EXAM CHEST 2 VIEWS: CPT | Performed by: RADIOLOGY

## 2024-01-22 PROCEDURE — 83605 ASSAY OF LACTIC ACID: CPT | Performed by: PHYSICIAN ASSISTANT

## 2024-01-22 PROCEDURE — G0378 HOSPITAL OBSERVATION PER HR: HCPCS

## 2024-01-22 PROCEDURE — 2500000002 HC RX 250 W HCPCS SELF ADMINISTERED DRUGS (ALT 637 FOR MEDICARE OP, ALT 636 FOR OP/ED): Performed by: STUDENT IN AN ORGANIZED HEALTH CARE EDUCATION/TRAINING PROGRAM

## 2024-01-22 RX ORDER — CHOLECALCIFEROL (VITAMIN D3) 25 MCG
2000 TABLET ORAL DAILY
Status: DISCONTINUED | OUTPATIENT
Start: 2024-01-23 | End: 2024-01-25 | Stop reason: HOSPADM

## 2024-01-22 RX ORDER — CEPHALEXIN 500 MG/1
500 CAPSULE ORAL ONCE
Status: COMPLETED | OUTPATIENT
Start: 2024-01-22 | End: 2024-01-22

## 2024-01-22 RX ORDER — TRAZODONE HYDROCHLORIDE 50 MG/1
50 TABLET ORAL 2 TIMES DAILY
Status: DISCONTINUED | OUTPATIENT
Start: 2024-01-22 | End: 2024-01-25 | Stop reason: HOSPADM

## 2024-01-22 RX ORDER — FUROSEMIDE 20 MG/1
20 TABLET ORAL DAILY
Status: DISCONTINUED | OUTPATIENT
Start: 2024-01-23 | End: 2024-01-24

## 2024-01-22 RX ORDER — MORPHINE SULFATE 4 MG/ML
4 INJECTION INTRAVENOUS ONCE
Status: COMPLETED | OUTPATIENT
Start: 2024-01-22 | End: 2024-01-22

## 2024-01-22 RX ORDER — ONDANSETRON 4 MG/1
4 TABLET, ORALLY DISINTEGRATING ORAL EVERY 8 HOURS PRN
Status: DISCONTINUED | OUTPATIENT
Start: 2024-01-22 | End: 2024-01-25 | Stop reason: HOSPADM

## 2024-01-22 RX ORDER — SUCRALFATE 1 G/1
1 TABLET ORAL
Status: DISCONTINUED | OUTPATIENT
Start: 2024-01-23 | End: 2024-01-25 | Stop reason: HOSPADM

## 2024-01-22 RX ORDER — CARBIDOPA 25 MG/1
25 TABLET ORAL 3 TIMES DAILY
Status: DISCONTINUED | OUTPATIENT
Start: 2024-01-22 | End: 2024-01-24

## 2024-01-22 RX ORDER — AMOXICILLIN 250 MG
2 CAPSULE ORAL 2 TIMES DAILY
Status: DISCONTINUED | OUTPATIENT
Start: 2024-01-22 | End: 2024-01-25 | Stop reason: HOSPADM

## 2024-01-22 RX ORDER — BUPROPION HYDROCHLORIDE 150 MG/1
150 TABLET ORAL EVERY 24 HOURS
Status: DISCONTINUED | OUTPATIENT
Start: 2024-01-22 | End: 2024-01-25 | Stop reason: HOSPADM

## 2024-01-22 RX ORDER — DONEPEZIL HYDROCHLORIDE 5 MG/1
10 TABLET, FILM COATED ORAL NIGHTLY
Status: DISCONTINUED | OUTPATIENT
Start: 2024-01-22 | End: 2024-01-25 | Stop reason: HOSPADM

## 2024-01-22 RX ORDER — MEMANTINE HYDROCHLORIDE 10 MG/1
10 TABLET ORAL 2 TIMES DAILY
Status: DISCONTINUED | OUTPATIENT
Start: 2024-01-22 | End: 2024-01-25 | Stop reason: HOSPADM

## 2024-01-22 RX ORDER — DICYCLOMINE HYDROCHLORIDE 10 MG/1
10 CAPSULE ORAL EVERY 8 HOURS PRN
Status: DISCONTINUED | OUTPATIENT
Start: 2024-01-22 | End: 2024-01-25 | Stop reason: HOSPADM

## 2024-01-22 RX ORDER — FLUTICASONE FUROATE AND VILANTEROL 100; 25 UG/1; UG/1
1 POWDER RESPIRATORY (INHALATION)
Status: DISCONTINUED | OUTPATIENT
Start: 2024-01-23 | End: 2024-01-25 | Stop reason: HOSPADM

## 2024-01-22 RX ORDER — PANTOPRAZOLE SODIUM 40 MG/1
40 TABLET, DELAYED RELEASE ORAL
Status: DISCONTINUED | OUTPATIENT
Start: 2024-01-23 | End: 2024-01-25 | Stop reason: HOSPADM

## 2024-01-22 RX ORDER — ONDANSETRON HYDROCHLORIDE 2 MG/ML
4 INJECTION, SOLUTION INTRAVENOUS EVERY 8 HOURS PRN
Status: DISCONTINUED | OUTPATIENT
Start: 2024-01-22 | End: 2024-01-25 | Stop reason: HOSPADM

## 2024-01-22 RX ORDER — POTASSIUM CHLORIDE 14.9 MG/ML
20 INJECTION INTRAVENOUS
Status: COMPLETED | OUTPATIENT
Start: 2024-01-22 | End: 2024-01-22

## 2024-01-22 RX ORDER — LUBIPROSTONE 24 UG/1
24 CAPSULE ORAL
Status: DISCONTINUED | OUTPATIENT
Start: 2024-01-23 | End: 2024-01-25 | Stop reason: HOSPADM

## 2024-01-22 RX ORDER — BUSPIRONE HYDROCHLORIDE 5 MG/1
5 TABLET ORAL 2 TIMES DAILY
Status: DISCONTINUED | OUTPATIENT
Start: 2024-01-22 | End: 2024-01-25 | Stop reason: HOSPADM

## 2024-01-22 RX ORDER — ALBUTEROL SULFATE 90 UG/1
2 AEROSOL, METERED RESPIRATORY (INHALATION) EVERY 6 HOURS PRN
Status: DISCONTINUED | OUTPATIENT
Start: 2024-01-22 | End: 2024-01-25 | Stop reason: HOSPADM

## 2024-01-22 RX ORDER — ENOXAPARIN SODIUM 100 MG/ML
40 INJECTION SUBCUTANEOUS EVERY 24 HOURS
Status: DISCONTINUED | OUTPATIENT
Start: 2024-01-22 | End: 2024-01-25 | Stop reason: HOSPADM

## 2024-01-22 RX ORDER — GABAPENTIN 300 MG/1
300 CAPSULE ORAL 3 TIMES DAILY
Status: DISCONTINUED | OUTPATIENT
Start: 2024-01-22 | End: 2024-01-25 | Stop reason: HOSPADM

## 2024-01-22 RX ORDER — MAGNESIUM SULFATE HEPTAHYDRATE 40 MG/ML
2 INJECTION, SOLUTION INTRAVENOUS ONCE
Status: COMPLETED | OUTPATIENT
Start: 2024-01-22 | End: 2024-01-22

## 2024-01-22 RX ORDER — ONDANSETRON HYDROCHLORIDE 2 MG/ML
INJECTION, SOLUTION INTRAVENOUS
Status: COMPLETED
Start: 2024-01-22 | End: 2024-01-22

## 2024-01-22 RX ORDER — IPRATROPIUM BROMIDE AND ALBUTEROL SULFATE 2.5; .5 MG/3ML; MG/3ML
3 SOLUTION RESPIRATORY (INHALATION) EVERY 20 MIN
Status: DISCONTINUED | OUTPATIENT
Start: 2024-01-22 | End: 2024-01-22

## 2024-01-22 RX ORDER — FLUOXETINE HYDROCHLORIDE 20 MG/1
100 CAPSULE ORAL DAILY
Status: DISCONTINUED | OUTPATIENT
Start: 2024-01-23 | End: 2024-01-25 | Stop reason: HOSPADM

## 2024-01-22 RX ORDER — IPRATROPIUM BROMIDE AND ALBUTEROL SULFATE 2.5; .5 MG/3ML; MG/3ML
3 SOLUTION RESPIRATORY (INHALATION) EVERY 20 MIN
Status: COMPLETED | OUTPATIENT
Start: 2024-01-22 | End: 2024-01-22

## 2024-01-22 RX ADMIN — GABAPENTIN 300 MG: 300 CAPSULE ORAL at 23:12

## 2024-01-22 RX ADMIN — DICYCLOMINE HYDROCHLORIDE 10 MG: 10 CAPSULE ORAL at 23:34

## 2024-01-22 RX ADMIN — ONDANSETRON 4 MG: 2 INJECTION INTRAMUSCULAR; INTRAVENOUS at 22:45

## 2024-01-22 RX ADMIN — POTASSIUM CHLORIDE 20 MEQ: 14.9 INJECTION, SOLUTION INTRAVENOUS at 14:26

## 2024-01-22 RX ADMIN — BUSPIRONE HYDROCHLORIDE 5 MG: 5 TABLET ORAL at 23:12

## 2024-01-22 RX ADMIN — CEPHALEXIN 500 MG: 500 CAPSULE ORAL at 18:40

## 2024-01-22 RX ADMIN — ENOXAPARIN SODIUM 40 MG: 40 INJECTION SUBCUTANEOUS at 23:12

## 2024-01-22 RX ADMIN — METHYLPREDNISOLONE SODIUM SUCCINATE 125 MG: 125 INJECTION, POWDER, FOR SOLUTION INTRAMUSCULAR; INTRAVENOUS at 14:25

## 2024-01-22 RX ADMIN — MAGNESIUM SULFATE HEPTAHYDRATE 2 G: 2 INJECTION, SOLUTION INTRAVENOUS at 14:26

## 2024-01-22 RX ADMIN — IPRATROPIUM BROMIDE AND ALBUTEROL SULFATE 3 ML: 2.5; .5 SOLUTION RESPIRATORY (INHALATION) at 16:01

## 2024-01-22 RX ADMIN — MEMANTINE 10 MG: 10 TABLET ORAL at 23:12

## 2024-01-22 RX ADMIN — IPRATROPIUM BROMIDE AND ALBUTEROL SULFATE 3 ML: 2.5; .5 SOLUTION RESPIRATORY (INHALATION) at 14:26

## 2024-01-22 RX ADMIN — MORPHINE SULFATE 4 MG: 4 INJECTION INTRAVENOUS at 14:25

## 2024-01-22 RX ADMIN — SENNOSIDES AND DOCUSATE SODIUM 2 TABLET: 8.6; 5 TABLET ORAL at 23:12

## 2024-01-22 RX ADMIN — IPRATROPIUM BROMIDE AND ALBUTEROL SULFATE 3 ML: 2.5; .5 SOLUTION RESPIRATORY (INHALATION) at 15:41

## 2024-01-22 RX ADMIN — ONDANSETRON 4 MG: 2 INJECTION INTRAMUSCULAR; INTRAVENOUS at 14:33

## 2024-01-22 RX ADMIN — TRAZODONE HYDROCHLORIDE 50 MG: 50 TABLET ORAL at 23:12

## 2024-01-22 RX ADMIN — BUPROPION HYDROCHLORIDE 150 MG: 150 TABLET, FILM COATED, EXTENDED RELEASE ORAL at 23:12

## 2024-01-22 RX ADMIN — POTASSIUM CHLORIDE 20 MEQ: 14.9 INJECTION, SOLUTION INTRAVENOUS at 16:42

## 2024-01-22 RX ADMIN — DONEPEZIL HYDROCHLORIDE 10 MG: 5 TABLET ORAL at 23:12

## 2024-01-22 SDOH — SOCIAL STABILITY: SOCIAL INSECURITY: ABUSE: ADULT

## 2024-01-22 SDOH — SOCIAL STABILITY: SOCIAL INSECURITY: ARE THERE ANY APPARENT SIGNS OF INJURIES/BEHAVIORS THAT COULD BE RELATED TO ABUSE/NEGLECT?: NO

## 2024-01-22 SDOH — SOCIAL STABILITY: SOCIAL INSECURITY: ARE YOU OR HAVE YOU BEEN THREATENED OR ABUSED PHYSICALLY, EMOTIONALLY, OR SEXUALLY BY ANYONE?: NO

## 2024-01-22 SDOH — SOCIAL STABILITY: SOCIAL INSECURITY: HAS ANYONE EVER THREATENED TO HURT YOUR FAMILY OR YOUR PETS?: NO

## 2024-01-22 SDOH — SOCIAL STABILITY: SOCIAL INSECURITY: DOES ANYONE TRY TO KEEP YOU FROM HAVING/CONTACTING OTHER FRIENDS OR DOING THINGS OUTSIDE YOUR HOME?: NO

## 2024-01-22 SDOH — SOCIAL STABILITY: SOCIAL INSECURITY: DO YOU FEEL UNSAFE GOING BACK TO THE PLACE WHERE YOU ARE LIVING?: NO

## 2024-01-22 SDOH — SOCIAL STABILITY: SOCIAL INSECURITY: DO YOU FEEL ANYONE HAS EXPLOITED OR TAKEN ADVANTAGE OF YOU FINANCIALLY OR OF YOUR PERSONAL PROPERTY?: NO

## 2024-01-22 SDOH — SOCIAL STABILITY: SOCIAL INSECURITY: WERE YOU ABLE TO COMPLETE ALL THE BEHAVIORAL HEALTH SCREENINGS?: YES

## 2024-01-22 SDOH — SOCIAL STABILITY: SOCIAL INSECURITY: HAVE YOU HAD THOUGHTS OF HARMING ANYONE ELSE?: NO

## 2024-01-22 ASSESSMENT — ACTIVITIES OF DAILY LIVING (ADL)
WALKS IN HOME: NEEDS ASSISTANCE
ADEQUATE_TO_COMPLETE_ADL: YES
BATHING: NEEDS ASSISTANCE
DRESSING YOURSELF: NEEDS ASSISTANCE
PATIENT'S MEMORY ADEQUATE TO SAFELY COMPLETE DAILY ACTIVITIES?: YES
TOILETING: NEEDS ASSISTANCE
FEEDING YOURSELF: INDEPENDENT
JUDGMENT_ADEQUATE_SAFELY_COMPLETE_DAILY_ACTIVITIES: YES
GROOMING: NEEDS ASSISTANCE
ASSISTIVE_DEVICE: CANE;WALKER
LACK_OF_TRANSPORTATION: NO
HEARING - LEFT EAR: FUNCTIONAL
HEARING - RIGHT EAR: FUNCTIONAL

## 2024-01-22 ASSESSMENT — COGNITIVE AND FUNCTIONAL STATUS - GENERAL
MOBILITY SCORE: 18
DRESSING REGULAR UPPER BODY CLOTHING: A LITTLE
HELP NEEDED FOR BATHING: A LITTLE
WALKING IN HOSPITAL ROOM: A LITTLE
DRESSING REGULAR LOWER BODY CLOTHING: A LITTLE
TURNING FROM BACK TO SIDE WHILE IN FLAT BAD: A LITTLE
TOILETING: A LITTLE
PATIENT BASELINE BEDBOUND: NO
CLIMB 3 TO 5 STEPS WITH RAILING: A LOT
STANDING UP FROM CHAIR USING ARMS: A LITTLE
DAILY ACTIVITIY SCORE: 20
MOVING TO AND FROM BED TO CHAIR: A LITTLE

## 2024-01-22 ASSESSMENT — PAIN - FUNCTIONAL ASSESSMENT: PAIN_FUNCTIONAL_ASSESSMENT: 0-10

## 2024-01-22 ASSESSMENT — PAIN SCALES - GENERAL
PAINLEVEL_OUTOF10: 0 - NO PAIN
PAINLEVEL_OUTOF10: 0 - NO PAIN

## 2024-01-22 ASSESSMENT — LIFESTYLE VARIABLES
HAVE YOU EVER FELT YOU SHOULD CUT DOWN ON YOUR DRINKING: NO
HAVE PEOPLE ANNOYED YOU BY CRITICIZING YOUR DRINKING: NO
HOW MANY STANDARD DRINKS CONTAINING ALCOHOL DO YOU HAVE ON A TYPICAL DAY: PATIENT DOES NOT DRINK
EVER HAD A DRINK FIRST THING IN THE MORNING TO STEADY YOUR NERVES TO GET RID OF A HANGOVER: NO
REASON UNABLE TO ASSESS: NO
HOW OFTEN DO YOU HAVE 6 OR MORE DRINKS ON ONE OCCASION: NEVER
AUDIT-C TOTAL SCORE: 0
SKIP TO QUESTIONS 9-10: 1
EVER FELT BAD OR GUILTY ABOUT YOUR DRINKING: NO
AUDIT-C TOTAL SCORE: 0
HOW OFTEN DO YOU HAVE A DRINK CONTAINING ALCOHOL: NEVER

## 2024-01-22 ASSESSMENT — PATIENT HEALTH QUESTIONNAIRE - PHQ9
SUM OF ALL RESPONSES TO PHQ9 QUESTIONS 1 & 2: 0
1. LITTLE INTEREST OR PLEASURE IN DOING THINGS: NOT AT ALL
2. FEELING DOWN, DEPRESSED OR HOPELESS: NOT AT ALL

## 2024-01-22 ASSESSMENT — COLUMBIA-SUICIDE SEVERITY RATING SCALE - C-SSRS
6. HAVE YOU EVER DONE ANYTHING, STARTED TO DO ANYTHING, OR PREPARED TO DO ANYTHING TO END YOUR LIFE?: NO
1. IN THE PAST MONTH, HAVE YOU WISHED YOU WERE DEAD OR WISHED YOU COULD GO TO SLEEP AND NOT WAKE UP?: NO
2. HAVE YOU ACTUALLY HAD ANY THOUGHTS OF KILLING YOURSELF?: NO

## 2024-01-22 NOTE — ED PROVIDER NOTES
HPI   Chief Complaint   Patient presents with    Shortness of Breath       History of present illness:  78-year-old female presents the emergency room for complaints of multiple complaints.  The patient states that she has some left upper abdominal pain and cramping and has been having some vomiting as well.  She states she got up late last night and was feeling nauseous and vomited multiple times.  She states she has some generalized weakness and discomfort.  She states she is also been having some shortness of breath denies any chest pain.  She states she has a history of COPD and has to wear 2 L of oxygen at all times.  She also has a history of Parkinson's disease as well as hypertension.  She denies any change in bowel habits or any blood in her stool.    Social history: Negative for alcohol and drug use.    Review of systems:   Gen.: No weight loss, fever.   Eyes: No vision loss, double vision  ENT: No pharyngitis, neck pain  Cardiac: No chest pain, palpitations, syncope  Pulmonary: No shortness of breath, cough, hemoptysis.   Heme/lymph: No swollen glands, fever, bleeding.   GI: No abdominal pain, change in bowel habits, melena, hematemesis, hematochezia, nausea, vomiting, diarrhea.   : No discharge, dysuria, frequency, urgency, hematuria.   Musculoskeletal: No limb pain, joint pain, joint swelling.   Skin: No rashes.   Review of systems is otherwise negative unless stated above or in history of present illness.      Physical exam:  General: Vitals noted, no distress. Afebrile.   EENT: No lymphadenopathy appreciated  Cardiac: Regular, rate, rhythm, no murmur.   Pulmonary: Lungs clear bilaterally with good aeration. No adventitious breath sounds.   Abdomen: Soft, nonsurgical. Nontender. No peritoneal signs. Normoactive bowel sounds.   Extremities: No peripheral edema.   Skin: No rash.   Neuro: No focal neurologic deficits, resting tremor present in the neck and head        Medical decision making:   Testing:  CBC CMP troponin EKG chest x-ray, CT abdomen pelvis without contrast: CT scan did not show any acute findings at this time CMP showed concerns for hypokalemia as well as hypomagnesia, UTI also noted  Treatment: Potassium replacement started magnesium replacement started  Reevaluation:   Plan: Home-going.  Discussed differential. Will follow-up with the primary physician in the next 2-3 days. Return if worse. They understand return precautions and discharge instructions. Patient and family/friend/caregiver are in agreement with this plan. 78-year-old female presents the emergency room for complaints of multiple complaints.  The patient states that she has some left upper abdominal pain and cramping and has been having some vomiting as well.  She states she got up late last night and was feeling nauseous and vomited multiple times.  She states she has some generalized weakness and discomfort.  She states she is also been having some shortness of breath denies any chest pain.  She states she has a history of COPD and has to wear 2 L of oxygen at all times.  She also has a history of Parkinson's disease as well as hypertension.  She denies any change in bowel habits or any blood in her stool.  No lymphadenopathy appreciated on exam, no pain to palpation across the abdomen, patient complains of persistent pain in her left upper side but does not appear to palpation.  Lungs are clear to auscultation throughout vitals appear appropriate as well.  I explained to the patient that I believe that she is most likely suffering gastroenteritis at this time given the vomiting.  Explained to her that this would explain the magnesium issue as well as the potassium issues at this time.  The care of the patient was handed over to the oncoming physician at this time pending further potassium.  Impression:   1.  UTI  2.  Hypokalemia  3.  Hypomagnesia          History provided by:  Patient   used: No                         Ana Coma Scale Score: 15                  Patient History   History reviewed. No pertinent past medical history.  Past Surgical History:   Procedure Laterality Date    MR HEAD ANGIO WO IV CONTRAST  6/10/2022    MR HEAD ANGIO WO IV CONTRAST 6/10/2022 [facility]    MR NECK ANGIO WO IV CONTRAST  6/10/2022    MR NECK ANGIO WO IV CONTRAST 6/10/2022 [facility]    OTHER SURGICAL HISTORY  12/29/2020    Back surgery    OTHER SURGICAL HISTORY  12/29/2020    Hysterectomy     No family history on file.  Social History     Tobacco Use    Smoking status: Former     Types: Cigarettes     Quit date: 8/29/2013     Years since quitting: 10.4    Smokeless tobacco: Never   Vaping Use    Vaping Use: Never used   Substance Use Topics    Alcohol use: Never    Drug use: Never       Physical Exam   ED Triage Vitals [01/22/24 1158]   Temp Heart Rate Respirations BP   36.6 °C (97.9 °F) 82 (!) 22 (!) 160/104      Pulse Ox Temp Source Heart Rate Source Patient Position   98 % Tympanic Monitor Sitting      BP Location FiO2 (%)     -- --       Physical Exam    ED Course & MDM   ED Course as of 01/23/24 1817 Mon Jan 22, 2024 1903 EKG taken at 1845 on January 22, 2024 shows normal sinus rhythm 89 no ST elevation or depression no T wave inversion no Q waves [JF]      ED Course User Index  [JF] Suraj Kevin PA-C         Diagnoses as of 01/23/24 1817   Chest pain, unspecified type   Abdominal pain, unspecified abdominal location       Medical Decision Making      Procedure  Procedures     Suraj Kevin PA-C  01/23/24 1819

## 2024-01-22 NOTE — ED TRIAGE NOTES
Pt presented to the ED with c/o SOB. Pt states that this started last night and used her inhaler she has for her asthma with little relief. This morning SOB got worse with some chest discomfort. Pt has a hx of asthma and COPD and is chronically on 2L. EMS gave 1 breathing treatment and pt states it did help with 4L NC. Pt is 98% on 2L. Pt has parkinson's and is her baseline.

## 2024-01-23 LAB
ALBUMIN SERPL BCP-MCNC: 3.4 G/DL (ref 3.4–5)
ALP SERPL-CCNC: 41 U/L (ref 33–136)
ALT SERPL W P-5'-P-CCNC: 18 U/L (ref 7–45)
ANION GAP SERPL CALC-SCNC: 14 MMOL/L (ref 10–20)
AST SERPL W P-5'-P-CCNC: 31 U/L (ref 9–39)
BILIRUB SERPL-MCNC: 0.7 MG/DL (ref 0–1.2)
BUN SERPL-MCNC: 13 MG/DL (ref 6–23)
CALCIUM SERPL-MCNC: 8.9 MG/DL (ref 8.6–10.3)
CHLORIDE SERPL-SCNC: 100 MMOL/L (ref 98–107)
CHOLEST SERPL-MCNC: 141 MG/DL (ref 0–199)
CHOLESTEROL/HDL RATIO: 2.9
CO2 SERPL-SCNC: 31 MMOL/L (ref 21–32)
CREAT SERPL-MCNC: 0.64 MG/DL (ref 0.5–1.05)
EGFRCR SERPLBLD CKD-EPI 2021: >90 ML/MIN/1.73M*2
ERYTHROCYTE [DISTWIDTH] IN BLOOD BY AUTOMATED COUNT: 12.2 % (ref 11.5–14.5)
EST. AVERAGE GLUCOSE BLD GHB EST-MCNC: 94 MG/DL
GLUCOSE BLD MANUAL STRIP-MCNC: 114 MG/DL (ref 74–99)
GLUCOSE SERPL-MCNC: 99 MG/DL (ref 74–99)
HBA1C MFR BLD: 4.9 %
HCT VFR BLD AUTO: 40.9 % (ref 36–46)
HDLC SERPL-MCNC: 48 MG/DL
HGB BLD-MCNC: 12.8 G/DL (ref 12–16)
HOLD SPECIMEN: NORMAL
LDLC SERPL CALC-MCNC: 58 MG/DL
MCH RBC QN AUTO: 31.1 PG (ref 26–34)
MCHC RBC AUTO-ENTMCNC: 31.3 G/DL (ref 32–36)
MCV RBC AUTO: 100 FL (ref 80–100)
NON HDL CHOLESTEROL: 93 MG/DL (ref 0–149)
NRBC BLD-RTO: 0 /100 WBCS (ref 0–0)
PLATELET # BLD AUTO: 218 X10*3/UL (ref 150–450)
POTASSIUM SERPL-SCNC: 2.7 MMOL/L (ref 3.5–5.3)
PROT SERPL-MCNC: 5.6 G/DL (ref 6.4–8.2)
RBC # BLD AUTO: 4.11 X10*6/UL (ref 4–5.2)
SODIUM SERPL-SCNC: 142 MMOL/L (ref 136–145)
TRIGL SERPL-MCNC: 177 MG/DL (ref 0–149)
VLDL: 35 MG/DL (ref 0–40)
WBC # BLD AUTO: 9.9 X10*3/UL (ref 4.4–11.3)

## 2024-01-23 PROCEDURE — 97165 OT EVAL LOW COMPLEX 30 MIN: CPT | Mod: GO

## 2024-01-23 PROCEDURE — 97161 PT EVAL LOW COMPLEX 20 MIN: CPT | Mod: GP

## 2024-01-23 PROCEDURE — 80061 LIPID PANEL: CPT | Performed by: PHYSICIAN ASSISTANT

## 2024-01-23 PROCEDURE — 2500000001 HC RX 250 WO HCPCS SELF ADMINISTERED DRUGS (ALT 637 FOR MEDICARE OP): Performed by: PHYSICIAN ASSISTANT

## 2024-01-23 PROCEDURE — 82947 ASSAY GLUCOSE BLOOD QUANT: CPT

## 2024-01-23 PROCEDURE — 2500000004 HC RX 250 GENERAL PHARMACY W/ HCPCS (ALT 636 FOR OP/ED)

## 2024-01-23 PROCEDURE — 36415 COLL VENOUS BLD VENIPUNCTURE: CPT

## 2024-01-23 PROCEDURE — 99222 1ST HOSP IP/OBS MODERATE 55: CPT | Performed by: INTERNAL MEDICINE

## 2024-01-23 PROCEDURE — 1200000002 HC GENERAL ROOM WITH TELEMETRY DAILY

## 2024-01-23 PROCEDURE — 2500000001 HC RX 250 WO HCPCS SELF ADMINISTERED DRUGS (ALT 637 FOR MEDICARE OP)

## 2024-01-23 PROCEDURE — 2500000002 HC RX 250 W HCPCS SELF ADMINISTERED DRUGS (ALT 637 FOR MEDICARE OP, ALT 636 FOR OP/ED): Performed by: INTERNAL MEDICINE

## 2024-01-23 PROCEDURE — 2500000001 HC RX 250 WO HCPCS SELF ADMINISTERED DRUGS (ALT 637 FOR MEDICARE OP): Performed by: INTERNAL MEDICINE

## 2024-01-23 PROCEDURE — 83036 HEMOGLOBIN GLYCOSYLATED A1C: CPT | Mod: GEALAB | Performed by: PHYSICIAN ASSISTANT

## 2024-01-23 PROCEDURE — 99232 SBSQ HOSP IP/OBS MODERATE 35: CPT | Performed by: INTERNAL MEDICINE

## 2024-01-23 PROCEDURE — 80053 COMPREHEN METABOLIC PANEL: CPT

## 2024-01-23 PROCEDURE — 85027 COMPLETE CBC AUTOMATED: CPT

## 2024-01-23 PROCEDURE — 2500000004 HC RX 250 GENERAL PHARMACY W/ HCPCS (ALT 636 FOR OP/ED): Performed by: INTERNAL MEDICINE

## 2024-01-23 RX ORDER — PROCHLORPERAZINE 25 MG/1
25 SUPPOSITORY RECTAL EVERY 12 HOURS PRN
Status: DISCONTINUED | OUTPATIENT
Start: 2024-01-23 | End: 2024-01-25 | Stop reason: HOSPADM

## 2024-01-23 RX ORDER — LOSARTAN POTASSIUM 50 MG/1
100 TABLET ORAL DAILY
Status: DISCONTINUED | OUTPATIENT
Start: 2024-01-23 | End: 2024-01-25 | Stop reason: HOSPADM

## 2024-01-23 RX ORDER — HYDROCODONE BITARTRATE AND ACETAMINOPHEN 5; 325 MG/1; MG/1
1 TABLET ORAL ONCE
Status: COMPLETED | OUTPATIENT
Start: 2024-01-23 | End: 2024-01-23

## 2024-01-23 RX ORDER — PROCHLORPERAZINE EDISYLATE 5 MG/ML
10 INJECTION INTRAMUSCULAR; INTRAVENOUS EVERY 6 HOURS PRN
Status: DISCONTINUED | OUTPATIENT
Start: 2024-01-23 | End: 2024-01-25 | Stop reason: HOSPADM

## 2024-01-23 RX ORDER — CARBIDOPA AND LEVODOPA 25; 100 MG/1; MG/1
2 TABLET ORAL
Status: DISCONTINUED | OUTPATIENT
Start: 2024-01-23 | End: 2024-01-25 | Stop reason: HOSPADM

## 2024-01-23 RX ORDER — AMLODIPINE BESYLATE 5 MG/1
5 TABLET ORAL DAILY
Status: DISCONTINUED | OUTPATIENT
Start: 2024-01-23 | End: 2024-01-25 | Stop reason: HOSPADM

## 2024-01-23 RX ORDER — POTASSIUM CHLORIDE 20 MEQ/1
40 TABLET, EXTENDED RELEASE ORAL ONCE
Status: DISCONTINUED | OUTPATIENT
Start: 2024-01-23 | End: 2024-01-25 | Stop reason: HOSPADM

## 2024-01-23 RX ORDER — POTASSIUM CHLORIDE 1.5 G/1.58G
40 POWDER, FOR SOLUTION ORAL ONCE
Status: COMPLETED | OUTPATIENT
Start: 2024-01-23 | End: 2024-01-23

## 2024-01-23 RX ORDER — POTASSIUM CHLORIDE 14.9 MG/ML
20 INJECTION INTRAVENOUS
Status: COMPLETED | OUTPATIENT
Start: 2024-01-23 | End: 2024-01-23

## 2024-01-23 RX ORDER — ZOLPIDEM TARTRATE 5 MG/1
10 TABLET ORAL NIGHTLY PRN
Status: DISCONTINUED | OUTPATIENT
Start: 2024-01-23 | End: 2024-01-25 | Stop reason: HOSPADM

## 2024-01-23 RX ORDER — PROCHLORPERAZINE MALEATE 5 MG
10 TABLET ORAL EVERY 6 HOURS PRN
Status: DISCONTINUED | OUTPATIENT
Start: 2024-01-23 | End: 2024-01-25 | Stop reason: HOSPADM

## 2024-01-23 RX ADMIN — POTASSIUM CHLORIDE 20 MEQ: 14.9 INJECTION, SOLUTION INTRAVENOUS at 14:18

## 2024-01-23 RX ADMIN — ONDANSETRON 4 MG: 2 INJECTION INTRAMUSCULAR; INTRAVENOUS at 10:14

## 2024-01-23 RX ADMIN — ENOXAPARIN SODIUM 40 MG: 40 INJECTION SUBCUTANEOUS at 21:44

## 2024-01-23 RX ADMIN — BUSPIRONE HYDROCHLORIDE 5 MG: 5 TABLET ORAL at 08:23

## 2024-01-23 RX ADMIN — PROCHLORPERAZINE EDISYLATE 10 MG: 5 INJECTION INTRAMUSCULAR; INTRAVENOUS at 15:58

## 2024-01-23 RX ADMIN — TRAZODONE HYDROCHLORIDE 50 MG: 50 TABLET ORAL at 21:39

## 2024-01-23 RX ADMIN — GABAPENTIN 300 MG: 300 CAPSULE ORAL at 21:39

## 2024-01-23 RX ADMIN — AMLODIPINE BESYLATE 5 MG: 5 TABLET ORAL at 16:33

## 2024-01-23 RX ADMIN — ONDANSETRON 4 MG: 2 INJECTION INTRAMUSCULAR; INTRAVENOUS at 18:54

## 2024-01-23 RX ADMIN — BUPROPION HYDROCHLORIDE 150 MG: 150 TABLET, FILM COATED, EXTENDED RELEASE ORAL at 21:44

## 2024-01-23 RX ADMIN — LUBIPROSTONE 24 MCG: 24 CAPSULE, GELATIN COATED ORAL at 08:24

## 2024-01-23 RX ADMIN — SUCRALFATE 1 G: 1 TABLET ORAL at 06:29

## 2024-01-23 RX ADMIN — BUSPIRONE HYDROCHLORIDE 5 MG: 5 TABLET ORAL at 21:39

## 2024-01-23 RX ADMIN — HYDROCODONE BITARTRATE AND ACETAMINOPHEN 1 TABLET: 5; 325 TABLET ORAL at 00:52

## 2024-01-23 RX ADMIN — POTASSIUM CHLORIDE 40 MEQ: 1.5 POWDER, FOR SOLUTION ORAL at 18:49

## 2024-01-23 RX ADMIN — TRAZODONE HYDROCHLORIDE 50 MG: 50 TABLET ORAL at 08:22

## 2024-01-23 RX ADMIN — ZOLPIDEM TARTRATE 10 MG: 5 TABLET ORAL at 21:44

## 2024-01-23 RX ADMIN — MEMANTINE 10 MG: 10 TABLET ORAL at 08:23

## 2024-01-23 RX ADMIN — POTASSIUM CHLORIDE 20 MEQ: 14.9 INJECTION, SOLUTION INTRAVENOUS at 11:18

## 2024-01-23 RX ADMIN — SUCRALFATE 1 G: 1 TABLET ORAL at 17:15

## 2024-01-23 RX ADMIN — Medication 2000 UNITS: at 08:22

## 2024-01-23 RX ADMIN — SENNOSIDES AND DOCUSATE SODIUM 2 TABLET: 8.6; 5 TABLET ORAL at 08:23

## 2024-01-23 RX ADMIN — SENNOSIDES AND DOCUSATE SODIUM 2 TABLET: 8.6; 5 TABLET ORAL at 21:39

## 2024-01-23 RX ADMIN — GABAPENTIN 300 MG: 300 CAPSULE ORAL at 17:15

## 2024-01-23 RX ADMIN — FLUOXETINE 100 MG: 20 CAPSULE ORAL at 08:23

## 2024-01-23 RX ADMIN — CARBIDOPA AND LEVODOPA 2 TABLET: 25; 100 TABLET ORAL at 17:00

## 2024-01-23 RX ADMIN — DONEPEZIL HYDROCHLORIDE 10 MG: 5 TABLET ORAL at 21:39

## 2024-01-23 RX ADMIN — GABAPENTIN 300 MG: 300 CAPSULE ORAL at 08:22

## 2024-01-23 RX ADMIN — LUBIPROSTONE 24 MCG: 24 CAPSULE, GELATIN COATED ORAL at 17:15

## 2024-01-23 RX ADMIN — PANTOPRAZOLE SODIUM 40 MG: 40 TABLET, DELAYED RELEASE ORAL at 06:29

## 2024-01-23 RX ADMIN — FUROSEMIDE 20 MG: 20 TABLET ORAL at 08:23

## 2024-01-23 RX ADMIN — LOSARTAN POTASSIUM 100 MG: 50 TABLET, FILM COATED ORAL at 16:33

## 2024-01-23 RX ADMIN — MEMANTINE 10 MG: 10 TABLET ORAL at 21:39

## 2024-01-23 ASSESSMENT — COGNITIVE AND FUNCTIONAL STATUS - GENERAL
MOVING TO AND FROM BED TO CHAIR: A LITTLE
DAILY ACTIVITIY SCORE: 20
MOBILITY SCORE: 18
DAILY ACTIVITIY SCORE: 17
DRESSING REGULAR LOWER BODY CLOTHING: A LOT
DAILY ACTIVITIY SCORE: 17
HELP NEEDED FOR BATHING: A LOT
DRESSING REGULAR LOWER BODY CLOTHING: A LOT
MOVING TO AND FROM BED TO CHAIR: A LITTLE
CLIMB 3 TO 5 STEPS WITH RAILING: A LITTLE
TURNING FROM BACK TO SIDE WHILE IN FLAT BAD: A LITTLE
MOVING FROM LYING ON BACK TO SITTING ON SIDE OF FLAT BED WITH BEDRAILS: A LITTLE
TOILETING: A LITTLE
DRESSING REGULAR UPPER BODY CLOTHING: A LITTLE
TOILETING: A LOT
CLIMB 3 TO 5 STEPS WITH RAILING: A LITTLE
STANDING UP FROM CHAIR USING ARMS: A LITTLE
MOBILITY SCORE: 19
WALKING IN HOSPITAL ROOM: A LITTLE
STANDING UP FROM CHAIR USING ARMS: A LITTLE
DRESSING REGULAR LOWER BODY CLOTHING: A LITTLE
HELP NEEDED FOR BATHING: A LITTLE
TURNING FROM BACK TO SIDE WHILE IN FLAT BAD: A LITTLE
DRESSING REGULAR UPPER BODY CLOTHING: A LITTLE
CLIMB 3 TO 5 STEPS WITH RAILING: A LOT
MOBILITY SCORE: 18
HELP NEEDED FOR BATHING: A LOT
WALKING IN HOSPITAL ROOM: A LITTLE
TOILETING: A LOT
MOVING FROM LYING ON BACK TO SITTING ON SIDE OF FLAT BED WITH BEDRAILS: A LITTLE
MOVING TO AND FROM BED TO CHAIR: A LITTLE
WALKING IN HOSPITAL ROOM: A LITTLE
STANDING UP FROM CHAIR USING ARMS: A LITTLE
DRESSING REGULAR UPPER BODY CLOTHING: A LITTLE

## 2024-01-23 ASSESSMENT — ACTIVITIES OF DAILY LIVING (ADL)
ADL_ASSISTANCE: INDEPENDENT
ADL_ASSISTANCE: INDEPENDENT
LACK_OF_TRANSPORTATION: NO

## 2024-01-23 ASSESSMENT — PAIN - FUNCTIONAL ASSESSMENT
PAIN_FUNCTIONAL_ASSESSMENT: 0-10
PAIN_FUNCTIONAL_ASSESSMENT: 0-10

## 2024-01-23 ASSESSMENT — PAIN SCALES - GENERAL
PAINLEVEL_OUTOF10: 8
PAINLEVEL_OUTOF10: 2
PAINLEVEL_OUTOF10: 0 - NO PAIN
PAINLEVEL_OUTOF10: 2

## 2024-01-23 ASSESSMENT — PAIN DESCRIPTION - DESCRIPTORS: DESCRIPTORS: DISCOMFORT

## 2024-01-23 NOTE — PROGRESS NOTES
01/23/24 0905   Discharge Planning   Living Arrangements Spouse/significant other;Children   Support Systems Spouse/significant other;Children   Assistance Needed A&OX3; independent with ADLs with walker but limited gait as of late (Parkinson's); doesn't drive; 2L NC baseline and 2L NC currently   Type of Residence Private residence   Number of Stairs to Enter Residence 4   Number of Stairs Within Residence 0   Do you have animals or pets at home? No   Who is requesting discharge planning? Provider   Patient expects to be discharged to: TBD (patient may need SNF?)   Does the patient need discharge transport arranged? Yes   RoundTrip coordination needed? Yes   Has discharge transport been arranged? No   Financial Resource Strain   How hard is it for you to pay for the very basics like food, housing, medical care, and heating? Not hard   Housing Stability   In the last 12 months, was there a time when you were not able to pay the mortgage or rent on time? N   In the last 12 months, how many places have you lived? 1   In the last 12 months, was there a time when you did not have a steady place to sleep or slept in a shelter (including now)? N   Transportation Needs   In the past 12 months, has lack of transportation kept you from medical appointments or from getting medications? no   In the past 12 months, has lack of transportation kept you from meetings, work, or from getting things needed for daily living? No        01/24/24 1110   Discharge Planning   Living Arrangements Spouse/significant other;Children   Support Systems Spouse/significant other;Children   Assistance Needed A&OX3; independent with ADLs with walker but limited gait as of late (Parkinson's); doesn't drive; 2L NC baseline and 2L NC currently   Type of Residence Private residence   Number of Stairs to Enter Residence 4   Number of Stairs Within Residence 0   Do you have animals or pets at home? No   Who is requesting discharge planning? Provider    Financial Resource Strain   How hard is it for you to pay for the very basics like food, housing, medical care, and heating? Not hard   Housing Stability   In the last 12 months, was there a time when you were not able to pay the mortgage or rent on time? N   In the last 12 months, how many places have you lived? 1   In the last 12 months, was there a time when you did not have a steady place to sleep or slept in a shelter (including now)? N   Transportation Needs   In the past 12 months, has lack of transportation kept you from medical appointments or from getting medications? no   In the past 12 months, has lack of transportation kept you from meetings, work, or from getting things needed for daily living? No   01/24/2024 1110am  Made aware PT recommendation is LOW and 18 which would indicate home care. Met with patient and made her aware she is appropriate for home with home care (patient with [facility] PCP so outgoing home care referral and agency needed). Patient stated she is not going home until her son is out of the home. I made her aware that unfortunately insurance won't pay for snf for that reason. She requested to speak with ANU. ANGEL.S.SW was made aware. Will continue to follow.     01/25/2024 1005am  Made aware patient to discharge to home today with new home care. Spoke with patient and she is aware of discharge and home care. Patient cannot remember home care agency of past. Patient has [facility] PCP and lives in [place]. Referral sent via [facility] to [facility] and they are able to accept. Patient stated she is able to arrange transport home with her . Patient to call spouse for transport.

## 2024-01-23 NOTE — PROGRESS NOTES
Fidelia Norman is a 78 y.o. female on day 0 of admission presenting with Chest pain, unspecified type.      Subjective   The patient is seen and evaluated this morning.  She is a bit shaky and states she has not eaten this morning.  Her blood pressure is noted to be elevated as well.  She states she has had intermittent epigastric and chest pain for a long time and has seen a GI in the past.  She states PPI have not been helping.  The patient also states that she has not been get along with her spine as well.  The patient also reports some nausea.  Denies any fever or chills at this time.       Objective     Last Recorded Vitals  /83 (BP Location: Left arm, Patient Position: Lying)   Pulse 109   Temp 37.1 °C (98.8 °F) (Oral)   Resp 23   Wt 81.5 kg (179 lb 10.8 oz)   SpO2 90%   Intake/Output last 3 Shifts:    Intake/Output Summary (Last 24 hours) at 1/23/2024 1135  Last data filed at 1/22/2024 2325  Gross per 24 hour   Intake 250 ml   Output --   Net 250 ml       Admission Weight  Weight: 81.5 kg (179 lb 10.8 oz) (01/22/24 1158)    Daily Weight  01/22/24 : 81.5 kg (179 lb 10.8 oz)    Image Results  ECG 12 lead  Normal sinus rhythm  Nonspecific ST abnormality  Abnormal ECG  When compared with ECG of 22-JAN-2024 14:01, (unconfirmed)  QT has shortened      HENT:      Head: Normocephalic and atraumatic.   Cardiovascular:      Rate and Rhythm: Normal rate and regular rhythm.      Pulses: Normal pulses.      Heart sounds: Normal heart sounds.   Pulmonary:      Effort: Pulmonary effort is normal.      Breath sounds: Normal breath sounds.   Abdominal:      General: Bowel sounds are normal. There is distension.      Tenderness: There is abdominal tenderness.      Hernia: A hernia is present.      Comments: Significant tenderness, most notable in the epigastric area.   Skin:     General: Skin is warm and dry.   Neurological:      General: No focal deficit present.      Mental Status: She is alert and oriented to  person, place, and time.     Relevant Results               Assessment/Plan          Fidelia Norman is a 78 y.o. female with a past medical history significant for COPD (baseline 2L), Parkinson Disease, and HTN, Parkinson's disease, IBS, sleep apnea, depression who presented to the hospital for abdominal pain.          Principal Problem:    Chest pain, unspecified type    Abdominal Pain, Cramping   2/2 Chronic Constipation v/s GERD  - Appears to be a chronic issue, patient was undergoing Prior Auth for Amitiza and on intensive bloating and bowel regimen   - No alarming symptoms, CT with no acute pathology.   - History of Parkinson's disease, advanced with tremors + stiffness  -Continue Bentyl 10 mg q8h PRN, Amitiza 24 mcg BID   -Senna S BID   - Zofran PRN      Chest Pain  2/2 Stable Angina vs ACS Rule Out   - Troponin 14 --> 15 --> 16  - ECHO (7/2023): Normal EF + mildly elevated RVSP  - Nuclear Stress Test (9/2023): fixed perfusion defect in the apical wall, small-moderate sized fixed perfusion defect in the inferior wall. No reversible perfusion defects seen. Calculated EF 62%.   - As per chart review, patient supposedly had a Cardiac Cath that was negative, but no record on file.  Will follow-up with cardiology    Hypokalemia  -Potassium noted to be 2.7.  Will replace orally and IV.    Hypomagnesemia  -Magnesium was replaced as well.  Will recheck in the morning.     Chronic Medical Issues:      # COPD: continue home inhalers  # Parkinson's Disease: Aricept 10 mg at bedtime, Namenda 10 mg BID   # Depression: Continue Buspirone 5 mg BID, Bupropion 150 mg every day, Fluoxetine 100 mg every day, Trazodone 50 mg BID   # GERD: Omeprazole 20 mg BID      Fluids: None  Electrolytes: replete as needed  Nutrition: Cardiac Diet   GI Prophylaxis: None  DVT Prophylaxis: Lovenox    Dispo: Initially presented for abdominal pain which appears chronic in nature.  Troponin trend without significant delta.  PT OT and the patient is  requesting for SNF if she qualifies.              Madalyn Murillo MD

## 2024-01-23 NOTE — PROGRESS NOTES
I received signout on this patient.  The patient initially presented to hospital with abdominal symptoms.  CT scan was obtained that demonstrated no acute abdominal process at this time.  Upon reexamination she described chest pain features.  Had a nonischemic EKG.  The patient had troponin of 14 and 15 with low likelihood of ACS.  Patient's pain did resolve with nitroglycerin.  Given this in the setting of an elevated heart score of 5 I do anticipate hospitalization

## 2024-01-23 NOTE — NURSING NOTE
0745 Assumed care of pt.    1020 Pt states she is feeling unwell and is visibly diaphoretic. BS was stable at 114. Zofran given to relieve the nausea. BP was 172/97. Cardio made aware, states they will take a look at her medications. Pt placed on telemetry as ordered.     1130 BP medications ordered. Pt states she is too nauseous to take them. Will medicate with antiemetic and attempt to give them again.    1217 Pt had large, loose bowel movement. States she feels better after doing so.     1300 Pt able to tolerate lunch, however still refusing to take BP medications due to fear of becoming sick after taking.     1558 Pt still unable to take BP meds due to nausea. Provider made aware, ordered compazine to help better control nausea.    1633 Pt able to tolerate BP meds.

## 2024-01-23 NOTE — PROGRESS NOTES
Occupational Therapy    Evaluation    Patient Name: Fidelia Norman  MRN: [id number]  Today's Date: 1/23/2024  Time Calculation  Start Time: 1304  Stop Time: 1317  Time Calculation (min): 13 min    Assessment  IP OT Assessment  OT Assessment: Pt presents with SOB, fatigue, decreased balance, strength, and endurance. Pt to benefit from OT services to increase independence with ADL's, transfers, and mobility.  Prognosis: Excellent  Barriers to Discharge: None  Evaluation/Treatment Tolerance: Patient limited by fatigue  Medical Staff Made Aware: Yes  End of Session Communication: Bedside nurse  End of Session Patient Position: Up in chair, Alarm on  Plan:  Treatment Interventions: ADL retraining, Functional transfer training, UE strengthening/ROM, Endurance training  OT Frequency: 3 times per week  OT Discharge Recommendations: Moderate intensity level of continued care  Equipment Recommended upon Discharge: Wheeled walker  OT Recommended Transfer Status:  (CGA with FWW)  OT - OK to Discharge: Yes (per OT POC)    Subjective   Current Problem:  1. Chest pain, unspecified type        2. Abdominal pain, unspecified abdominal location          General:  General  Reason for Referral: Pt is a 79 y/o woman who was admitted for abdominal and chest pain  Past Medical History Relevant to Rehab: Past medical history significant for COPD (baseline 2L), Parkinson Disease, and HTN  Family/Caregiver Present: No  Prior to Session Communication: Bedside nurse  Patient Position Received: Up in chair, Alarm on  General Comment: Pt pleasant and agreeable to OT eval. Session limited d/t pt feeling fatigued and nauseated. Pt is typically on 2 L of oxygen at home. Pt reports multiple falls at home in the last couple of months.  Precautions:  Medical Precautions: Fall precautions, Oxygen therapy device and L/min (2L of oxygen, IV, pure-wick, alarms)    Pain:  Pain Assessment  Pain Assessment: 0-10  Pain Score: 0 - No pain    Objective    Cognition:  Overall Cognitive Status: Within Functional Limits  Orientation Level: Oriented X4 (observed through conversation)     Home Living:  Type of Home: House  Lives With: Spouse, Adult children  Home Adaptive Equipment: Walker rolling or standard, Cane  Home Layout: One level  Home Access: Stairs to enter with rails  Entrance Stairs-Rails: Both  Entrance Stairs-Number of Steps: 4  Bathroom Shower/Tub: Walk-in shower  Bathroom Toilet: Handicapped height  Bathroom Equipment:  (suction grab bars)   Prior Function:  Level of [place]: Independent with ADLs and functional transfers, Needs assistance with homemaking  Receives Help From: Family  ADL Assistance: Independent  Homemaking Assistance: Needs assistance  Ambulatory Assistance: Independent  Prior Function Comments: Pt reports use of cane or FWW for mobility. Pt reports being independent with ADL's and requiring assist for IADL's. -Driving    ADL:  ADL Comments: Anticipate CGA-min A for LB ADL's  Activity Tolerance:  Endurance: Tolerates less than 10 min exercise, no significant change in vital signs  Bed Mobility/Transfers:      Transfers  Transfer: Yes  Transfer 1  Transfer From 1: Sit to, Stand to  Transfer to 1: Sit, Stand  Technique 1: Sit to stand, Stand to sit  Transfer Device 1: Walker  Transfer Level of Assistance 1: Contact guard    Ambulation/Gait Training:  Ambulation/Gait Training  Ambulation/Gait Training Performed: Yes  Ambulation/Gait Training 1  Surface 1: Level tile  Device 1: Rolling walker  Assistance 1: Contact guard  Comments/Distance (ft) 1: Pt took 4-5 steps forward and backwards from the chair with the FWW at CGA. Pt reports she can't go any farther d/t fatigue  Sitting Balance:  Static Sitting Balance  Static Sitting-Balance Support: Feet supported, No upper extremity supported  Static Sitting-Level of Assistance: Independent  Standing Balance:  Static Standing Balance  Static Standing-Balance Support: Bilateral upper  extremity supported  Static Standing-Level of Assistance: Contact guard  Dynamic Standing Balance  Dynamic Standing-Balance Support: Bilateral upper extremity supported  Dynamic Standing-Comments: CGA-Min A with FWW    Sensation:  Light Touch: No apparent deficits (Pt reports feelings of pins and needles in the hands)  Strength:  Strength Comments: Grossly 3+/5 bilaterally; Slight tremors in UE's     Extremities: RUE   RUE : Within Functional Limits and LUE   LUE: Within Functional Limits    Outcome Measures: [facility] Daily Activity  Putting on and taking off regular lower body clothing: A lot  Bathing (including washing, rinsing, drying): A lot  Putting on and taking off regular upper body clothing: A little  Toileting, which includes using toilet, bedpan or urinal: A lot  Taking care of personal grooming such as brushing teeth: None  Eating Meals: None  Daily Activity - Total Score: 17      Education Documentation  No documentation found.  Education Comments  No comments found.      Goals:   Encounter Problems       Encounter Problems (Active)       Balance       Pt will demo good static/dynamic standing balance during ADL and functional activity with FWW >5 minutes for improved independence and participation in ADL        Start:  01/23/24    Expected End:  02/06/24               Dressings Lower Extremities       Patient to complete lower body dressing with FWW at mod I        Start:  01/23/24    Expected End:  02/06/24               Endurance       Pt will increase endurance to tolerate 10 min of activity with no more than 1 rest break in order to increase ability to engage in ADL completion.        Start:  01/23/24    Expected End:  02/06/24            Pt will demonstrate 1-2 energy conservation techniques during ADL tasks and functional mobility        Start:  01/23/24    Expected End:  02/06/24               Mobility       Pt will demo increased functional mobility to tolerate tasks necessary to complete ADL  routine with FWW at mod I        Start:  01/23/24    Expected End:  02/06/24               Toileting       Patient will complete toileting tasks with FWW at mod I        Start:  01/23/24    Expected End:  02/06/24               Transfers       Pt to demonstrate transfers with FWW at [facility] I        Start:  01/23/24    Expected End:  02/06/24

## 2024-01-23 NOTE — PROGRESS NOTES
Physical Therapy    Physical Therapy Evaluation    Patient Name: Fidelia Norman  MRN: [id number]  Today's Date: 1/23/2024   Time Calculation  Start Time: 1425  Stop Time: 1437  Time Calculation (min): 12 min    Assessment/Plan   PT Assessment  PT Assessment Results: Decreased strength, Decreased endurance, Impaired balance, Decreased mobility  Rehab Prognosis: Good  Evaluation/Treatment Tolerance: Patient tolerated treatment well  Medical Staff Made Aware: Yes  Strengths: Ability to acquire knowledge, Housing layout, Support of Caregivers  End of Session Communication: Bedside nurse  Assessment Comment: Patient tolerated mobility well, RN reports patient has also been able to ambulate to/from bathroom with staff. Patient feels ambulation is typical of home mobility.  End of Session Patient Position: Up in chair, Alarm on  IP OR SWING BED PT PLAN  Inpatient or Swing Bed: Inpatient  PT Plan  Treatment/Interventions: Bed mobility, Transfer training, Gait training, Stair training, Neuromuscular re-education, Endurance training  PT Plan: Skilled PT  PT Frequency: 2 times per week  PT Discharge Recommendations: Low intensity level of continued care  Equipment Recommended upon Discharge: Wheeled walker  PT Recommended Transfer Status: Assist x1  PT - OK to Discharge: Yes (per PT POC)      Subjective   General Visit Information:  General  Reason for Referral: Pt is a 79 y/o woman who was admitted for abdominal and chest pain  Referred By: Madalyn Murillo  Past Medical History Relevant to Rehab: Past medical history significant for COPD (baseline 2L), Parkinson Disease, and HTN  Family/Caregiver Present: No  Prior to Session Communication: Bedside nurse  Patient Position Received: Up in chair, Alarm on  General Comment: Patient pleasant, cooperative and agreeable to PT eval  Home Living:  Home Living  Type of Home: House  Lives With: Spouse, Adult children (Son)  Home Adaptive Equipment: Walker rolling or standard, Cane  Home  Layout: One level  Home Access: Stairs to enter with rails  Entrance Stairs-Rails: Both  Entrance Stairs-Number of Steps: 4  Bathroom Shower/Tub: Walk-in shower  Bathroom Toilet: Handicapped height  Bathroom Equipment:  (Suction grab bars)  Prior Level of Function:  Prior Function Per Pt/Caregiver Report  Level of [place]: Independent with ADLs and functional transfers, Needs assistance with homemaking  Receives Help From: Family  ADL Assistance: Independent  Homemaking Assistance: Needs assistance  Ambulatory Assistance: Independent  Precautions:  Precautions  Medical Precautions: Fall precautions (2L O2, external cath, IV)  Vital Signs:       Objective   Pain:  Pain Assessment  Pain Assessment: 0-10  Pain Score: 2  Pain Type: Acute pain  Pain Location: Abdomen  Pain Descriptors: Discomfort  Cognition:  Cognition  Overall Cognitive Status: Within Functional Limits  Orientation Level: Oriented X4    General Assessments:      Activity Tolerance  Endurance: Tolerates less than 10 min exercise, no significant change in vital signs    Sensation  Light Touch: No apparent deficits    Strength  Strength Comments: B LE 4/5    Coordination  Movements are Fluid and Coordinated: Yes    Postural Control  Postural Control: Within Functional Limits    Static Sitting Balance  Static Sitting-Balance Support: No upper extremity supported, Feet supported  Static Sitting-Level of Assistance: Independent    Static Standing Balance  Static Standing-Balance Support: Bilateral upper extremity supported  Static Standing-Level of Assistance: Close supervision  Functional Assessments:            Transfers  Transfer: Yes  Transfer 1  Transfer From 1: Sit to, Stand to  Transfer to 1: Sit, Stand  Technique 1: Sit to stand, Stand to sit  Transfer Device 1: Walker  Transfer Level of Assistance 1: Contact guard    Ambulation/Gait Training  Ambulation/Gait Training Performed: Yes  Ambulation/Gait Training 1  Surface 1: Level tile  Device 1:  Rolling walker  Assistance 1: Close supervision  Quality of Gait 1: Decreased step length (no festinating gait noted)  Comments/Distance (ft) 1: 20'    Outcome Measures:  [facility] Basic Mobility  Turning from your back to your side while in a flat bed without using bedrails: A little  Moving from lying on your back to sitting on the side of a flat bed without using bedrails: A little  Moving to and from bed to chair (including a wheelchair): A little  Standing up from a chair using your arms (e.g. wheelchair or bedside chair): A little  To walk in hospital room: A little  Climbing 3-5 steps with railing: A little  Basic Mobility - Total Score: 18    Encounter Problems       Encounter Problems (Active)       Balance       Pt will demo good static/dynamic standing balance during ADL and functional activity with FWW >5 minutes for improved independence and participation in ADL        Start:  01/23/24    Expected End:  02/06/24               Mobility       STG - Patient will ambulate with 2WW 150' mod I  (Progressing)       Start:  01/23/24    Expected End:  02/06/24            STG - Patient will ascend and descend four stairs with B rails supervision  (Progressing)       Start:  01/23/24    Expected End:  02/06/24               Transfers       STG - Patient will perform bed mobility independently  (Progressing)       Start:  01/23/24    Expected End:  02/06/24            STG - Patient will transfer sit to and from stand mod I  (Progressing)       Start:  01/23/24    Expected End:  02/06/24                   Education Documentation  Precautions, taught by Neli Craig PT at 1/23/2024  2:52 PM.  Learner: Patient  Readiness: Acceptance  Method: Explanation  Response: Verbalizes Understanding    Body Mechanics, taught by Neli Craig PT at 1/23/2024  2:52 PM.  Learner: Patient  Readiness: Acceptance  Method: Explanation  Response: Verbalizes Understanding    Mobility Training, taught by Neli Craig PT at 1/23/2024   2:52 PM.  Learner: Patient  Readiness: Acceptance  Method: Explanation  Response: Verbalizes Understanding    Education Comments  No comments found.

## 2024-01-23 NOTE — CARE PLAN
The patient's goals for the shift include rest and no nausea     The clinical goals for the shift include symptom improvement

## 2024-01-23 NOTE — H&P
Patient: Fidelia Norman Age: 78 y.o.   Gender: female Room/Bed: AC02/AC02     Attending: Josue Hurtado MD  Code Status:  Prior    Chief Complaint   Patient: Fidelia Norman is a 78 y.o. female with a past medical history significant for COPD (baseline 2L), Parkinson Disease, and HTN who presented to the hospital for abdominal pain.     HPI   She states that the abdominal pain began about 3 days ago. It initially started in the lower portion of her abdomen. It was sharp in nature from the beginning. The pain has been consistent for the last 3 days. She tried to alleviate the pain with Tylenol which was not providing much relief. Yesterday, eating started to exacerbate the pain. She also started to become nauseous and could not keep anything down the last 3 days. She has tried to eat light foods and water which caused her to vomit. She has vomited approximately 6 times since yesterday. The consistency was a mucus like substance. She chronically deals with constipation and over the last few days this has worsened. She usually takes some over the counter things and a stool softener to help with this. While in the ED, the patient began to experience lower chest pain that was noted to be across her entire chest bilaterally. The chest pain was sharp in nature and similar to pain in abdomen.     Denies chest pain/pressure, headaches, diarrhea, shortness of breath, cough, fever, chills.     Past Medical History: Parkinson's Disease, COPD, Depression, GERD, HTN, IBS, Sleep Apnea (No CPAP)  Past Surgical History: Colonoscopy, EGD, Elbow Surgery (Right), Hysterectomy, Back Surgeries x2  Allergies: Amantadine, Penicillins, Amitriptyline, Baclofen, Bextra, Exelon, Sertraline, Sulfa Drugs, Topamax   Family History: non-contributory   Medications: Bupropion 150 mg every day, Buspirone 5 mg BID, Carbidopa 25 mg TID, Donepezil 10 mg at bedtime, Fluoxetine 20 mg every day, Furosemide 20 mg (1-2 tablets) every day, Memantine 10 mg  BID, Trazodone 50 mg BID     Social History:   - Tobacco: smoked 1/2 PPD x 40 years, quit 6 years ago   - Alcohol: denies  - Illicit Drug: denies     ED Course   Vitals - /79   Pulse 96   Temp 36.6 °C (97.9 °F) (Tympanic)   Resp (!) 29   Wt 81.5 kg (179 lb 10.8 oz)   SpO2 98%     Labs:   - CBC: WNL  - CMP: Potassium 2.8  - Ma.51  - Troponin: 14 --> 15   - Lactate: 0.9  - UA: trace LE, Urine Culture pending      Micro/ID:   No results found for the last 90 days.    Lab Results   Component Value Date    BLOODCULT  2023     No Growth at 1 days~No Growth at 2 days~No Growth at 3 days~NO GROWTH at 4 days - FINAL REPORT     Imaging:   - XR chest 2 views (2024): no focal infiltrate or pneumothorax   - CT abdomen pelvis wo IV contrast (2024): No hydronephrosis, hydroureter or renal/ureteral calculus. No evidence of bowel obstruction, free intraperitoneal air or abnormal intra-abdominal fluid collection.       EKG's  ECG 12 lead (2024): Normal sinus rhythm Possible Anterolateral infarct (cited on or before 28-SEP-2023) Abnormal ECG When compared with ECG of 28-SEP-2023 17:54, QT has lengthened  ECG 12 lead (2024): Normal sinus rhythm Possible Anterolateral infarct (cited on or before 28-SEP-2023) Abnormal ECG When compared with ECG of 2024 13:07, (unconfirmed) Nonspecific T wave abnormality, worse in Anterior leads    Interventions: In the ED, the patient was given Magnesium Sulfate + Solumedrol + Morphine + Keflex. Admitted onto the floor due a Heart Score of 5 for ACS Rule Out.     Objective    Physical Exam  HENT:      Head: Normocephalic and atraumatic.   Cardiovascular:      Rate and Rhythm: Normal rate and regular rhythm.      Pulses: Normal pulses.      Heart sounds: Normal heart sounds.   Pulmonary:      Effort: Pulmonary effort is normal.      Breath sounds: Normal breath sounds.   Abdominal:      General: Bowel sounds are normal. There is distension.      Tenderness:  "There is abdominal tenderness.      Hernia: A hernia is present.      Comments: Significant tenderness, most notable in the LLQ and RLQ. Tenderness primarily to deep palpation.    Skin:     General: Skin is warm and dry.   Neurological:      General: No focal deficit present.      Mental Status: She is alert and oriented to person, place, and time.        Visit Vitals  /79   Pulse 96   Temp 36.6 °C (97.9 °F) (Tympanic)   Resp (!) 29   Ht 1.626 m (5' 4\")   Wt 81.5 kg (179 lb 10.8 oz)   SpO2 98%   BMI 30.84 kg/m²   Smoking Status Former   BSA 1.92 m²      No intake or output data in the 24 hours ending 01/22/24 2013    Assessment and Plan    Fidelia Norman is a 78 y.o. female with a past medical history significant for COPD (baseline 2L), Parkinson Disease, and HTN who presented to the hospital for abdominal pain.     Acute Medical Issues:    # Abdominal Pain, Cramping   2/2 Chronic Constipation  - Appears to be a chronic issue, patient was undergoing Prior Auth for Amitiza and on intensive bloating and bowel regimen   - No alarming symptoms, CT with no acute pathology.   - History of Parkinson's disease, advanced with tremors + stiffness  [ ] Continue Bentyl 10 mg q8h PRN, Amitiza 24 mcg BID   [ ] Senna S BID   [ ] Zofran PRN     # Chest Pain  2/2 Stable Angina vs ACS Rule Out   - MIMI Score: 2  - Heart Score : 4   - Troponin 14 --> 15   - ECHO (7/2023): Normal EF + mildly elevated RVSP  - Nuclear Stress Test (9/2023): fixed perfusion defect in the apical wall, small-moderate sized fixed perfusion defect in the inferior wall. No reversible perfusion defects seen. Calculated EF 62%.   - As per chart review, patient supposedly had a Cardiac Cath that was negative, but no record on file.  Will follow-up with cardiology  [ ] 3rd troponin   [ ] Nitroglycerin PRN   [ ] Consider starting Metoprolol as this appears to be a chronic issue     Chronic Medical Issues:     # COPD: continue home inhalers  # Parkinson's " Disease: Aricept 10 mg at bedtime, Namenda 10 mg BID   # Depression: Continue Buspirone 5 mg BID, Bupropion 150 mg every day, Fluoxetine 100 mg every day, Trazodone 50 mg BID   # GERD: Omeprazole 20 mg BID     Fluids: None  Electrolytes: replete as needed  Nutrition: Cardiac Diet   GI Prophylaxis: None  DVT Prophylaxis: Lovenox    Dispo: Initially presented for abdominal pain which appears chronic in nature. Admitted onto the floor for chest pain likely due to Stable Angina. Troponin's not indicative of active ischemia. Will trend a 3rd and continue to monitor EKG. Expected LOS < 48 hours.

## 2024-01-23 NOTE — CONSULTS
"Inpatient consult to Cardiology  Consult performed by: Parisa Aguiar PA-C  Consult ordered by: Ken Leonard MD        History Of Present Illness:    Fidelia Norman is a 78 y.o. female presenting with chest pain. Patient reports that she has been having chest pain for \"a while\". States that she is unable to quantify when her discomfort began. States that it feels as if her \"insides are being turned around and grabbed\". Pain is located in the epigastric to R side chest. Occurs usually when she is sitting. Is not worsened by exertion. Lasts minutes to hours without a specific trigger or duration. States that the pain is relieved/improved with oxygen therapy. States discomfort is worse off oxygen, worse w/ movement, cough and is reproducible when someone puts on her chest. Reports dizziness when she stands, vomiting, nausea and poor intake for \"a while\".        Last Recorded Vitals:  Vitals:    01/22/24 1800 01/22/24 2149 01/23/24 0625 01/23/24 1017   BP: 173/79 176/77 154/84 (!) 172/97   BP Location:   Left arm Left arm   Patient Position:  Lying Lying Lying   Pulse: 96 85 79    Resp: (!) 29 24 22    Temp:  36.7 °C (98.1 °F) 36.3 °C (97.3 °F)    TempSrc:  Temporal Temporal    SpO2:  95% 97%    Weight:       Height:           Last Labs:  CBC - 1/23/2024:  6:34 AM  9.9 12.8 218    40.9      CMP - 1/23/2024:  6:34 AM  8.9 5.6 31 --- 0.7   2.1 3.4 18 41      PTT - 9/28/2023:  3:37 AM  1.0   10.9 30     Troponin I, High Sensitivity   Date/Time Value Ref Range Status   01/22/2024 11:28 PM 16 (H) 0 - 13 ng/L Final   01/22/2024 02:04 PM 15 (H) 0 - 13 ng/L Final   01/22/2024 01:10 PM 14 (H) 0 - 13 ng/L Final     BNP   Date/Time Value Ref Range Status   07/19/2023 08:24 PM 41 0 - 99 pg/mL Final     Comment:     .  <100 pg/mL - Heart failure unlikely  100-299 pg/mL - Intermediate probability of acute heart  .               failure exacerbation. Correlate with clinical  .               context and patient history.    " >=300 pg/mL - Heart Failure likely. Correlate with clinical  .               context and patient history.  BNP testing is performed using different testing   methodology at [facility] than at other   system hospitals. Direct result comparisons should   only be made within the same method.     07/16/2023 10:20 AM 58 0 - 99 pg/mL Final     Comment:     .  <100 pg/mL - Heart failure unlikely  100-299 pg/mL - Intermediate probability of acute heart  .               failure exacerbation. Correlate with clinical  .               context and patient history.    >=300 pg/mL - Heart Failure likely. Correlate with clinical  .               context and patient history.  BNP testing is performed using different testing   methodology at [facility] than at other   system hospitals. Direct result comparisons should   only be made within the same method.       Hemoglobin A1C   Date/Time Value Ref Range Status   08/11/2023 12:39 PM 5.1 4.3 - 5.6 % Final     Comment:     American Diabetes Association guidelines indicate that patients with HgbA1c in the range 5.7-6.4% are at increased risk for development of diabetes, and intervention by lifestyle modification may be beneficial. HgbA1c greater or equal to 6.5% is considered diagnostic of diabetes.   06/10/2022 06:38 AM 5.2 % Final     Comment:          Diagnosis of Diabetes-Adults   Non-Diabetic: < or = 5.6%   Increased risk for developing diabetes: 5.7-6.4%   Diagnostic of diabetes: > or = 6.5%  .       Monitoring of Diabetes                Age (y)     Therapeutic Goal (%)   Adults:          >18           <7.0   Pediatrics:    13-18           <7.5                   7-12           <8.0                   0- 6            7.5-8.5   American Diabetes Association. Diabetes Care 33(S1), Jan 2010.     02/16/2022 01:00 PM 5.4 4.3 - 5.6 % Final     Comment:     American Diabetes Association guidelines indicate that patients with HgbA1c in   the range 5.7-6.4% are at  "increased risk for development of diabetes, and   intervention by lifestyle modification may be beneficial. HgbA1c greater or   equal to 6.5% is considered diagnostic of diabetes.     LDL Calculated   Date/Time Value Ref Range Status   01/23/2024 06:34 AM 58 <=99 mg/dL Final     Comment:                                 Near   Borderline      AGE      Desirable  Optimal    High     High     Very High     0-19 Y     0 - 109     ---    110-129   >/= 130     ----    20-24 Y     0 - 119     ---    120-159   >/= 160     ----      >24 Y     0 -  99   100-129  130-159   160-189     >/=190       VLDL   Date/Time Value Ref Range Status   01/23/2024 06:34 AM 35 0 - 40 mg/dL Final   06/10/2022 06:38 AM 32 0 - 40 mg/dL Final   06/06/2020 08:18 AM 45 (H) 0 - 40 mg/dL Final      Last I/O:  I/O last 3 completed shifts:  In: 250 (3.1 mL/kg) [I.V.:50 (0.6 mL/kg); IV Piggyback:200]  Out: - (0 mL/kg)   Weight: 81.5 kg     Past Cardiology Tests (Last 3 Years):  EKG:  ECG 12 lead 01/22/2024 (Preliminary)      ECG 12 lead 01/22/2024 (Preliminary)    Echo:  Transthoracic echocardiogram (01/23/2024):  LVSF is normal, mildly elevated RVSP.     Ejection Fractions:  No results found for: \"EF\"    Cath:  [facility] (09/30/2022): @ [facility]  CATH LAB PROCEDURE REPORT    SERVICE DATE: 9/30/2022  SERVICE TIME: 10:46 AM    CARDIOLOGIST: Rupinder Archer MD  ATTENDING: Rupinder Archer MD  PRIMARY CARE PHYSICIAN: Bogdan Montana MD  REFERRING PROVIDER: DONIS Queen MD    [place] STUDY OF HEALTH and AGING SCALE:2=Well    CARDIOVASCULAR INSTABILITY:No    PRE-PROCEDURE DIAGNOSIS:  Abnormal Stress Test  Dyspnea    POST PROCEDURE DIAGNOSIS:  Normal epicardial coronary arteries  Preserved LV systolic function    PROCEDURE:  Left Heart Catheterization  LV Gram    MODERATE SEDATION: No moderate sedation given  PRIORITY AT TIME OF PROCEDURE: Elective    SITE OF ENTRY: Radial:Right Radial    CONTRAST: Omnipaque 350 mg Iodine/mL (iohexol injection, solution): 30 " mL    LEFT HEART CATHETERIZATION & FINDINGS:  The patient was taken to the cardiac cath lab where the entry site was prepped and draped in a sterile manner. Under local anesthesic with 2% Lidocaine, the vessel was cannulated with micropuncture technique using an arterial needle and a 5F sheath was introduced.    Selective injections were made in the left and right coronary arteries and various right, left and oblique views were obtained. The aortic valve was crossed and hemodynamic measurements were recorded. LV Angiogram was performed.    CORONARY ANGIOGRAPHY:  LEFT MAIN TRUNK: No Stenosis    LEFT ANTERIOR DESCENDING: No Stenosis    DIAGONAL #1: No Stenosis    LEFT CIRCUMFLEX: No Stenosis    MARGINAL #1: No Stenosis  MARGINAL #2: No Stenosis    DOMINANT: NO  RIGHT CORONARY: No Stenosis    DOMINANT: YES  POSTERIOR DESCENDING: No Stenosis  POSTERIOR LATERAL: No Stenosis      LV GRAM:  LVEF: Normal ( 55% or Greater)  WALL MOTION: Normal  MITRAL VALVE REGURGITATION: Mild    HEMODYNAMICS:  LVEDP: 19 mmhg  LV - AORTA: No Gradient    The sheath was removed and hemostatsis was established using manual pressure using wrist band. There was no bleeding at the end of the procedure. The pt was returned to the recovery room in a stable condition.    ESTIMATED BLOOD LOSS: Less Than Minimal Unless Noted Here.    COMPLICATIONS: None    SPECIMENS: No specimens obtained unless noted here.    CONDITION: Stable    RECOMMENDATIONS: Follow protocol of post-op orders. Aggressive modification of risk factors. Optimize Medical Management.  SIGNATURE: Rupinder Archer MD PATIENT NAME: Fidelia Norman  DATE: September 30, 2022 MRN: [id number]  TIME: 10:46 AM      Stress Test:  Nuclear Stress Test 09/28/2023  IMPRESSION:  1. Fixed perfusion defects as noted above. No reversible perfusion defects seen. There is a low probability of ischemia.  2. Calculated ejection fraction of 62% without segmental wall motion abnormality seen.    Cardiac Imaging:  No  results found for this or any previous visit from the past 1095 days.      Past Medical History:  She has no past medical history on file.    Past Surgical History:  She has a past surgical history that includes Other surgical history (12/29/2020); Other surgical history (12/29/2020); MR angio head wo IV contrast (6/10/2022); and MR angio neck wo IV contrast (6/10/2022).      Social History:  She reports that she quit smoking about 10 years ago. Her smoking use included cigarettes. She has never used smokeless tobacco. She reports that she does not drink alcohol and does not use drugs.    Family History:  No family history on file.     Allergies:  Amitriptyline, Bactrim [sulfamethoxazole-trimethoprim], Iodinated contrast media, Penicillins, Topamax [topiramate], and Zoloft [sertraline]    Inpatient Medications:  Scheduled medications   Medication Dose Route Frequency    amLODIPine  5 mg oral Daily    buPROPion XL  150 mg oral q24h    busPIRone  5 mg oral BID    carbidopa  25 mg oral TID    cholecalciferol  2,000 Units oral Daily    donepezil  10 mg oral Nightly    enoxaparin  40 mg subcutaneous q24h    FLUoxetine  100 mg oral Daily    fluticasone furoate-vilanteroL  1 puff inhalation Daily    furosemide  20 mg oral Daily    gabapentin  300 mg oral TID    losartan  100 mg oral Daily    lubiprostone  24 mcg oral BID with meals    memantine  10 mg oral BID    pantoprazole  40 mg oral Daily before breakfast    potassium chloride  20 mEq intravenous q2h    potassium chloride CR  40 mEq oral Once    sennosides-docusate sodium  2 tablet oral BID    sucralfate  1 g oral BID AC    traZODone  50 mg oral BID     PRN medications   Medication    albuterol    dicyclomine    lidocaine-diphenhydrAMINE-Maalox 1:1:1    ondansetron ODT    Or    ondansetron     Continuous Medications   Medication Dose Last Rate     Outpatient Medications:  Current Outpatient Medications   Medication Instructions    acetaminophen/diphenhydramine  (TYLENOL PM EXTRA STRENGTH ORAL) oral    albuterol 90 mcg/actuation inhaler 2 puffs, inhalation    ALPRAZolam (Xanax) 0.5 mg tablet oral, 2 times daily    arformoterol (BROVANA) 15 mcg, inhalation, 2 times daily    BMX ORAL SUSPENSION (1:1:1) 5 mL, Swish & Swallow, 4 times daily PRN    BMX ORAL SUSPENSION (1:1:1) take 5 mL by mouth every 6 hours as needed    buPROPion XL (Wellbutrin XL) 150 mg 24 hr tablet oral, Every 24 hours    busPIRone (BUSPAR) 5 mg, oral, 2 times daily    carbidopa (LODSYN) 25 mg, oral, 3 times daily    carboxymethylcellulose (Refresh Celluvisc) 1 % ophthalmic solution dropperette ophthalmic (eye), 4 times daily    cefdinir (Omnicef) 300 mg capsule TAKE 1 CAPSULE BY MOUTH TWO TIMES A DAY    cholecalciferol (Thera-D) 50 MCG (2000 UT) tablet oral    dicyclomine (BENTYL) 10 mg, oral, Every 8 hours PRN    diphenhydrAMINE-lidocaine-alum-mag hydroxide-simeth Take 5 mL by mouth every 6 hours as needed.    donepezil (Aricept) 10 mg tablet 1 tablet, oral, Nightly    flaxseed oiL 1,000 mg capsule oral    FLUoxetine (PROZAC) 100 mg, oral, Daily RT    fluticasone propion-salmeteroL (Advair Diskus) 100-50 mcg/dose diskus inhaler 1 puff, inhalation, 2 times daily    furosemide (LASIX) 20-40 mg, oral    gabapentin (NEURONTIN) 300 mg, oral, 3 times daily    iron,carb-FA#9-vit C-D3-B6-B12 (NuFera) 125 mg-1 mg-170 mg-1,000 unit tablet 1 tablet, oral, Daily    lido-diphen-Maalox 1:1:1 Magic Mouthwash Take 5 mL by mouth every 6 hours as needed    lubiprostone (AMITIZA) 24 mcg, oral, 2 times daily    memantine (Namenda) 10 mg tablet oral, 2 times daily    omeprazole (PRILOSEC) 20 mg, oral, 2 times daily    ondansetron (ZOFRAN) 8 mg, oral, Every 8 hours PRN    potassium chloride CR 20 mEq ER tablet 1 tablet, oral, Daily    predniSONE (Deltasone) 20 mg tablet Take 2 tablets by mouth once daily for 5 days.    simethicone (MYLICON) 125 mg, oral    sucralfate (CARAFATE) 1 g, oral, 2 times daily    tiZANidine (Zanaflex)  4 mg tablet TAKE ONE TABLET BY MOUTH THREE TIMES A DAY AS NEEDED (MAY TAKE 1 OR 2 TABS AT BEDTIME)    traZODone (DESYREL) 50 mg, oral, 2 times daily       Physical Exam:  Physical Exam  Constitutional:       Appearance: Normal appearance.   HENT:      Head: Normocephalic.      Nose: Nose normal.      Mouth/Throat:      Mouth: Mucous membranes are moist.   Eyes:      Conjunctiva/sclera: Conjunctivae normal.   Cardiovascular:      Rate and Rhythm: Normal rate and regular rhythm.   Pulmonary:      Effort: Pulmonary effort is normal.      Breath sounds: Wheezing present.   Abdominal:      Palpations: Abdomen is soft.   Musculoskeletal:      Cervical back: Normal range of motion and neck supple.      Right lower leg: No edema.      Left lower leg: No edema.   Skin:     General: Skin is warm and dry.   Neurological:      General: No focal deficit present.      Mental Status: She is alert and oriented to person, place, and time. Mental status is at baseline.   Psychiatric:         Mood and Affect: Mood normal.         Behavior: Behavior normal.            Assessment/Plan     Fidelia Norman is a 77 yo female with a PMH Chronic respiratory failure (2L via NC chronically), Parkinson's disease, Depression, GERD, IBS and KHUSHI who presented with chest pain, shortness of breath. [facility] in September 2023 with no CAD. Trop minimal, EKG w/ no acute ischemic changes. Chest pain is pleuritic, reproducible on exam and able to be reproduced with palpation. Lipid Panel: Total 141, HDL 48.0, LDL 58, Triglycerides.     #Non-Cardiac Chest Pain  #HTN, Stage II  #Hypertriglyceridemia   #Hypokalemia, Hypomagnesemia    -Replete Electrolytes per protocol for goal K 4/Mg 2  -Start Irbesartan 300mg daily (Losartan 100 hospital equivalent), Amlodipine 5mg daily  -Increase Lasix to 40mg PO daily  -Start daily Kcl 40 meq daily, titrate up from there.   -No further cardiac testing required at this time  -Follow up with Dr. Queen at discharge   -Will sign  off.           Peripheral IV 01/22/24 20 G Left;Ventral Forearm (Active)   Site Assessment Clean;Intact;Dry 01/23/24 0800   Dressing Status Clean;Dry 01/23/24 0800   Number of days: 1       Code Status:  Full Code    I spent  minutes in the professional and overall care of this patient.        Parisa Aguiar PA-C

## 2024-01-23 NOTE — CONSULTS
Recommendation(s):  Encourage PO as able    Nutrition Assessment     78 y.o. female adm with Chest pain, unspecified type , abdominal pain, constipation    Energy Intake: Poor < 50 % (x1week limited PO to liquids only, usual intake only 1meal/day, drinks water)  Food and Nutrient History: Pt states chronic constipations, takes multiple laxatives.  makes a full course dinner, her only meal typically.  Vitamin/Herbal Supplement Use: States takes multiple vitamins, did not specify, states dislikes ONS.  Had a BM today and felling better      Difficulty chewing/swallowing: none noted       Per Flowsheet Percent Meal intake:  NPO  Dietary Orders (From admission, onward)       Start     Ordered    01/23/24 1121  Adult diet Regular  Diet effective now        Question:  Diet type  Answer:  Regular    01/23/24 1121    01/22/24 2224  May Participate in Room Service  Once        Question:  .  Answer:  Yes    01/22/24 2224                  Feeding assistance level:  independent     Current Facility-Administered Medications:     albuterol 90 mcg/actuation inhaler 2 puff, 2 puff, inhalation, q6h PRN, Jose Whiting DO    amLODIPine (Norvasc) tablet 5 mg, 5 mg, oral, Daily, Parisa Aguiar PA-C, 5 mg at 01/23/24 1118    buPROPion XL (Wellbutrin XL) 24 hr tablet 150 mg, 150 mg, oral, q24h, Jose Whiting DO, 150 mg at 01/22/24 2312    busPIRone (Buspar) tablet 5 mg, 5 mg, oral, BID, Jose Whiting DO, 5 mg at 01/23/24 0823    carbidopa (Lodsyn) tablet 25 mg, 25 mg, oral, TID, Jose Whiting DO    carbidopa-levodopa (Sinemet)  mg per tablet 2 tablet, 2 tablet, oral, TID with meals, Jose Whiting DO    cholecalciferol (Vitamin D-3) tablet 2,000 Units, 2,000 Units, oral, Daily, Jose Whiting DO, 2,000 Units at 01/23/24 0822    dicyclomine (Bentyl) capsule 10 mg, 10 mg, oral, q8h PRN, Jose Whiting DO, 10 mg at 01/22/24 2334    donepezil (Aricept) tablet 10 mg, 10 mg, oral, Nightly, Jose Whiting DO, 10  mg at 01/22/24 2312    enoxaparin (Lovenox) syringe 40 mg, 40 mg, subcutaneous, q24h, Jose Whiting DO, 40 mg at 01/22/24 2312    FLUoxetine (PROzac) capsule 100 mg, 100 mg, oral, Daily, Jose Whiting DO, 100 mg at 01/23/24 0823    fluticasone furoate-vilanteroL (Breo Ellipta) 100-25 mcg/dose inhaler 1 puff, 1 puff, inhalation, Daily, Jose Whiting DO    furosemide (Lasix) tablet 20 mg, 20 mg, oral, Daily, Jose Whiting DO, 20 mg at 01/23/24 0823    gabapentin (Neurontin) capsule 300 mg, 300 mg, oral, TID, Jose Whiting DO, 300 mg at 01/23/24 0822    lido-diphen-Maalox 1:1:1 Magic Mouthwash, 10 mL, Swish & Spit, q6h PRN, Jose Whiting DO    losartan (Cozaar) tablet 100 mg, 100 mg, oral, Daily, Parisa Aguiar PA-C, 100 mg at 01/23/24 1118    lubiprostone (Amitiza) capsule 24 mcg, 24 mcg, oral, BID with meals, Jose Whiting DO, 24 mcg at 01/23/24 0824    memantine (Namenda) tablet 10 mg, 10 mg, oral, BID, Joes Whiting DO, 10 mg at 01/23/24 0823    ondansetron ODT (Zofran-ODT) disintegrating tablet 4 mg, 4 mg, oral, q8h PRN **OR** ondansetron (Zofran) injection 4 mg, 4 mg, intravenous, q8h PRN, Jose Whiting DO, 4 mg at 01/23/24 1014    pantoprazole (ProtoNix) EC tablet 40 mg, 40 mg, oral, Daily before breakfast, Jose Whiting DO, 40 mg at 01/23/24 0629    potassium chloride 20 mEq in 100 mL IV premix, 20 mEq, intravenous, q2h, Madalyn Murillo MD, Last Rate: 50 mL/hr at 01/23/24 1118, 20 mEq at 01/23/24 1118    sennosides-docusate sodium (Dalia-Colace) 8.6-50 mg per tablet 2 tablet, 2 tablet, oral, BID, Jose Whiting DO, 2 tablet at 01/23/24 0823    sucralfate (Carafate) tablet 1 g, 1 g, oral, BID AC, Jose Whiting DO, 1 g at 01/23/24 0629    traZODone (Desyrel) tablet 50 mg, 50 mg, oral, BID, Jose Whiting DO, 50 mg at 01/23/24 0822  Results from last 7 days   Lab Units 01/23/24  0634 01/22/24  1404 01/22/24  1310   GLUCOSE mg/dL 99  --  130*   SODIUM mmol/L 142  --  143   POTASSIUM  "mmol/L 2.7*  --  2.8*   CHLORIDE mmol/L 100  --  98   CO2 mmol/L 31  --  32   BUN mg/dL 13  --  14   CREATININE mg/dL 0.64 0.67 0.68   EGFR mL/min/1.73m*2 >90 >90 90   CALCIUM mg/dL 8.9  --  9.8   MAGNESIUM mg/dL  --   --  1.51*     Lab Results   Component Value Date    HGBA1C 5.1 08/11/2023    HGBA1C 5.2 06/10/2022    HGBA1C 5.4 02/16/2022     Results from last 7 days   Lab Units 01/23/24  1006   POCT GLUCOSE mg/dL 114*       Per Flowsheet:  Gastrointestinal  Gastrointestinal (WDL): Exceptions to WDL  Abdomen Inspection: Distended, Other (Comment) (hernia)  Last BM Date: 01/20/24  Stool Appearance: Loose  Last bowel movement documented: 01/20/24  History reviewed. No pertinent past medical history.   Past Surgical History:   Procedure Laterality Date    MR HEAD ANGIO WO IV CONTRAST  6/10/2022    MR HEAD ANGIO WO IV CONTRAST 6/10/2022 [facility]    MR NECK ANGIO WO IV CONTRAST  6/10/2022    MR NECK ANGIO WO IV CONTRAST 6/10/2022 [facility]    OTHER SURGICAL HISTORY  12/29/2020    Back surgery    OTHER SURGICAL HISTORY  12/29/2020    Hysterectomy      Allergies: Amitriptyline, Bactrim [sulfamethoxazole-trimethoprim], Iodinated contrast media, Penicillins, Topamax [topiramate], and Zoloft [sertraline]     Anthropometrics:  Height: 162.6 cm (5' 4\")  Weight: 81.5 kg (179 lb 10.8 oz)  BMI (Calculated): 30.83         Ideal Body Weight:  54.5kg      Daily Weight  01/22/24 : 81.5 kg (179 lb 10.8 oz)  09/29/23 : 76.5 kg (168 lb 10.4 oz)         Skin: none noted (please see nursing/wound notes for further details)  Edema: none noted  Pain Score: 0 - No pain    Estimated Nutritional Needs:  Energy Needs: 2038kcal (25kcal/kg CBW, 81.5kg)  Protein Needs: 82g (1g/kg CBW)  Fluid Needs:  (1mL/kcal) or per MD                   Nutrition Focused Physical Findings: defer      Nutrition Diagnosis        Patient has Nutrition Diagnosis: Yes  New  Nutrition Diagnosis 1: Inadequate oral intake  Related to (1): " constipation/situational  As Evidenced by (1): Pt reports 1meal/day usual intake, pt reports no food intake x1week       Nutrition Interventions/Recommendations   Intervention:  Coordination of Care: Palmira RN  Encourage PO as tolerated, continued adequate hydration and small frequent meals at home vs 1meal/day for increased nutrition and decreased stool burden. Pt open to trying.     Education Documentation  Denies needs    Goals: oral intake >50%       Nutrition Monitoring and Evaluation   Weights  Labs  PO intake    Time Spent (min): 60 minutes

## 2024-01-24 LAB
ANION GAP SERPL CALC-SCNC: 11 MMOL/L (ref 10–20)
BACTERIA UR CULT: NORMAL
BUN SERPL-MCNC: 15 MG/DL (ref 6–23)
CALCIUM SERPL-MCNC: 8.9 MG/DL (ref 8.6–10.3)
CHLORIDE SERPL-SCNC: 100 MMOL/L (ref 98–107)
CO2 SERPL-SCNC: 35 MMOL/L (ref 21–32)
CREAT SERPL-MCNC: 0.82 MG/DL (ref 0.5–1.05)
EGFRCR SERPLBLD CKD-EPI 2021: 74 ML/MIN/1.73M*2
GLUCOSE SERPL-MCNC: 80 MG/DL (ref 74–99)
MAGNESIUM SERPL-MCNC: 1.77 MG/DL (ref 1.6–2.4)
POTASSIUM SERPL-SCNC: 2.7 MMOL/L (ref 3.5–5.3)
POTASSIUM SERPL-SCNC: 3.9 MMOL/L (ref 3.5–5.3)
SODIUM SERPL-SCNC: 143 MMOL/L (ref 136–145)

## 2024-01-24 PROCEDURE — 36415 COLL VENOUS BLD VENIPUNCTURE: CPT | Performed by: INTERNAL MEDICINE

## 2024-01-24 PROCEDURE — 82374 ASSAY BLOOD CARBON DIOXIDE: CPT | Performed by: INTERNAL MEDICINE

## 2024-01-24 PROCEDURE — 2500000001 HC RX 250 WO HCPCS SELF ADMINISTERED DRUGS (ALT 637 FOR MEDICARE OP)

## 2024-01-24 PROCEDURE — 1200000002 HC GENERAL ROOM WITH TELEMETRY DAILY

## 2024-01-24 PROCEDURE — 2500000001 HC RX 250 WO HCPCS SELF ADMINISTERED DRUGS (ALT 637 FOR MEDICARE OP): Performed by: INTERNAL MEDICINE

## 2024-01-24 PROCEDURE — 2500000004 HC RX 250 GENERAL PHARMACY W/ HCPCS (ALT 636 FOR OP/ED): Performed by: INTERNAL MEDICINE

## 2024-01-24 PROCEDURE — 2500000005 HC RX 250 GENERAL PHARMACY W/O HCPCS: Performed by: INTERNAL MEDICINE

## 2024-01-24 PROCEDURE — 2500000002 HC RX 250 W HCPCS SELF ADMINISTERED DRUGS (ALT 637 FOR MEDICARE OP, ALT 636 FOR OP/ED): Performed by: INTERNAL MEDICINE

## 2024-01-24 PROCEDURE — 83735 ASSAY OF MAGNESIUM: CPT | Performed by: INTERNAL MEDICINE

## 2024-01-24 PROCEDURE — 84132 ASSAY OF SERUM POTASSIUM: CPT | Performed by: INTERNAL MEDICINE

## 2024-01-24 PROCEDURE — 2500000001 HC RX 250 WO HCPCS SELF ADMINISTERED DRUGS (ALT 637 FOR MEDICARE OP): Performed by: PHYSICIAN ASSISTANT

## 2024-01-24 PROCEDURE — 2500000004 HC RX 250 GENERAL PHARMACY W/ HCPCS (ALT 636 FOR OP/ED)

## 2024-01-24 PROCEDURE — 99232 SBSQ HOSP IP/OBS MODERATE 35: CPT | Performed by: INTERNAL MEDICINE

## 2024-01-24 RX ORDER — POTASSIUM CHLORIDE 14.9 MG/ML
20 INJECTION INTRAVENOUS
Status: COMPLETED | OUTPATIENT
Start: 2024-01-24 | End: 2024-01-24

## 2024-01-24 RX ORDER — FUROSEMIDE 40 MG/1
40 TABLET ORAL DAILY
Status: DISCONTINUED | OUTPATIENT
Start: 2024-01-25 | End: 2024-01-25 | Stop reason: HOSPADM

## 2024-01-24 RX ORDER — POTASSIUM CHLORIDE 1.5 G/1.58G
40 POWDER, FOR SOLUTION ORAL ONCE
Status: COMPLETED | OUTPATIENT
Start: 2024-01-24 | End: 2024-01-24

## 2024-01-24 RX ORDER — FUROSEMIDE 40 MG/1
40 TABLET ORAL ONCE
Status: COMPLETED | OUTPATIENT
Start: 2024-01-24 | End: 2024-01-24

## 2024-01-24 RX ORDER — ALPRAZOLAM 0.5 MG/1
0.5 TABLET ORAL 2 TIMES DAILY PRN
Status: DISCONTINUED | OUTPATIENT
Start: 2024-01-24 | End: 2024-01-25 | Stop reason: HOSPADM

## 2024-01-24 RX ADMIN — DICYCLOMINE HYDROCHLORIDE 10 MG: 10 CAPSULE ORAL at 10:06

## 2024-01-24 RX ADMIN — TRAZODONE HYDROCHLORIDE 50 MG: 50 TABLET ORAL at 21:45

## 2024-01-24 RX ADMIN — MEMANTINE 10 MG: 10 TABLET ORAL at 21:45

## 2024-01-24 RX ADMIN — POTASSIUM CHLORIDE 40 MEQ: 1.5 POWDER, FOR SOLUTION ORAL at 10:07

## 2024-01-24 RX ADMIN — PANTOPRAZOLE SODIUM 40 MG: 40 TABLET, DELAYED RELEASE ORAL at 06:42

## 2024-01-24 RX ADMIN — TRAZODONE HYDROCHLORIDE 50 MG: 50 TABLET ORAL at 10:06

## 2024-01-24 RX ADMIN — CARBIDOPA AND LEVODOPA 2 TABLET: 25; 100 TABLET ORAL at 16:06

## 2024-01-24 RX ADMIN — ZOLPIDEM TARTRATE 10 MG: 5 TABLET ORAL at 22:56

## 2024-01-24 RX ADMIN — GABAPENTIN 300 MG: 300 CAPSULE ORAL at 21:45

## 2024-01-24 RX ADMIN — ALPRAZOLAM 0.5 MG: 0.5 TABLET ORAL at 18:00

## 2024-01-24 RX ADMIN — DONEPEZIL HYDROCHLORIDE 10 MG: 5 TABLET ORAL at 21:45

## 2024-01-24 RX ADMIN — GABAPENTIN 300 MG: 300 CAPSULE ORAL at 10:06

## 2024-01-24 RX ADMIN — LUBIPROSTONE 24 MCG: 24 CAPSULE, GELATIN COATED ORAL at 10:06

## 2024-01-24 RX ADMIN — Medication 2 L/MIN: at 08:52

## 2024-01-24 RX ADMIN — BUSPIRONE HYDROCHLORIDE 5 MG: 5 TABLET ORAL at 10:06

## 2024-01-24 RX ADMIN — SENNOSIDES AND DOCUSATE SODIUM 2 TABLET: 8.6; 5 TABLET ORAL at 21:45

## 2024-01-24 RX ADMIN — POTASSIUM CHLORIDE 20 MEQ: 14.9 INJECTION, SOLUTION INTRAVENOUS at 13:34

## 2024-01-24 RX ADMIN — CARBIDOPA AND LEVODOPA 2 TABLET: 25; 100 TABLET ORAL at 10:07

## 2024-01-24 RX ADMIN — BUSPIRONE HYDROCHLORIDE 5 MG: 5 TABLET ORAL at 21:45

## 2024-01-24 RX ADMIN — CARBIDOPA AND LEVODOPA 2 TABLET: 25; 100 TABLET ORAL at 13:34

## 2024-01-24 RX ADMIN — FLUOXETINE 100 MG: 20 CAPSULE ORAL at 10:07

## 2024-01-24 RX ADMIN — ALPRAZOLAM 0.5 MG: 0.5 TABLET ORAL at 11:37

## 2024-01-24 RX ADMIN — LOSARTAN POTASSIUM 100 MG: 50 TABLET, FILM COATED ORAL at 10:07

## 2024-01-24 RX ADMIN — BUPROPION HYDROCHLORIDE 150 MG: 150 TABLET, FILM COATED, EXTENDED RELEASE ORAL at 21:45

## 2024-01-24 RX ADMIN — SUCRALFATE 1 G: 1 TABLET ORAL at 16:06

## 2024-01-24 RX ADMIN — Medication 2000 UNITS: at 10:06

## 2024-01-24 RX ADMIN — ENOXAPARIN SODIUM 40 MG: 40 INJECTION SUBCUTANEOUS at 21:45

## 2024-01-24 RX ADMIN — FUROSEMIDE 40 MG: 40 TABLET ORAL at 18:00

## 2024-01-24 RX ADMIN — MEMANTINE 10 MG: 10 TABLET ORAL at 10:06

## 2024-01-24 RX ADMIN — SUCRALFATE 1 G: 1 TABLET ORAL at 06:43

## 2024-01-24 RX ADMIN — AMLODIPINE BESYLATE 5 MG: 5 TABLET ORAL at 10:07

## 2024-01-24 RX ADMIN — GABAPENTIN 300 MG: 300 CAPSULE ORAL at 16:06

## 2024-01-24 RX ADMIN — POTASSIUM CHLORIDE 20 MEQ: 14.9 INJECTION, SOLUTION INTRAVENOUS at 10:07

## 2024-01-24 RX ADMIN — LUBIPROSTONE 24 MCG: 24 CAPSULE, GELATIN COATED ORAL at 18:00

## 2024-01-24 ASSESSMENT — COGNITIVE AND FUNCTIONAL STATUS - GENERAL
MOVING TO AND FROM BED TO CHAIR: A LITTLE
MOVING FROM LYING ON BACK TO SITTING ON SIDE OF FLAT BED WITH BEDRAILS: A LITTLE
DAILY ACTIVITIY SCORE: 16
MOBILITY SCORE: 17
CLIMB 3 TO 5 STEPS WITH RAILING: A LOT
CLIMB 3 TO 5 STEPS WITH RAILING: A LOT
MOVING FROM LYING ON BACK TO SITTING ON SIDE OF FLAT BED WITH BEDRAILS: A LITTLE
HELP NEEDED FOR BATHING: A LOT
MOVING TO AND FROM BED TO CHAIR: A LITTLE
PERSONAL GROOMING: A LITTLE
DRESSING REGULAR LOWER BODY CLOTHING: A LOT
DRESSING REGULAR UPPER BODY CLOTHING: A LITTLE
STANDING UP FROM CHAIR USING ARMS: A LITTLE
WALKING IN HOSPITAL ROOM: A LITTLE
TURNING FROM BACK TO SIDE WHILE IN FLAT BAD: A LITTLE
DRESSING REGULAR LOWER BODY CLOTHING: A LOT
DRESSING REGULAR UPPER BODY CLOTHING: A LITTLE
MOBILITY SCORE: 17
PERSONAL GROOMING: A LITTLE
TURNING FROM BACK TO SIDE WHILE IN FLAT BAD: A LITTLE
TOILETING: A LOT
WALKING IN HOSPITAL ROOM: A LITTLE
DAILY ACTIVITIY SCORE: 16
HELP NEEDED FOR BATHING: A LOT
TOILETING: A LOT
STANDING UP FROM CHAIR USING ARMS: A LITTLE

## 2024-01-24 ASSESSMENT — PAIN - FUNCTIONAL ASSESSMENT
PAIN_FUNCTIONAL_ASSESSMENT: 0-10
PAIN_FUNCTIONAL_ASSESSMENT: 0-10

## 2024-01-24 ASSESSMENT — PAIN SCALES - GENERAL
PAINLEVEL_OUTOF10: 0 - NO PAIN
PAINLEVEL_OUTOF10: 0 - NO PAIN

## 2024-01-24 ASSESSMENT — ACTIVITIES OF DAILY LIVING (ADL): LACK_OF_TRANSPORTATION: NO

## 2024-01-24 NOTE — PROGRESS NOTES
Fidelia Norman is a 78 y.o. female on day 1 of admission presenting with Chest pain, unspecified type.      Subjective   The patient is seen and evaluated this morning.  She states that she is not doing well and is requesting for Lasix.  She is also requesting for her Xanax.  She states she will not go home until her son has been kicked out of her house.  Requesting to see .  The patient denies any other specific complaints at this time.       Objective     Last Recorded Vitals  /70 (BP Location: Left arm, Patient Position: Lying)   Pulse 89   Temp 36.2 °C (97.2 °F) (Temporal)   Resp 20   Wt 81.5 kg (179 lb 10.8 oz)   SpO2 96%   Intake/Output last 3 Shifts:    Intake/Output Summary (Last 24 hours) at 1/24/2024 1120  Last data filed at 1/24/2024 1055  Gross per 24 hour   Intake 480 ml   Output --   Net 480 ml         Admission Weight  Weight: 81.5 kg (179 lb 10.8 oz) (01/22/24 1158)    Daily Weight  01/22/24 : 81.5 kg (179 lb 10.8 oz)    Image Results  ECG 12 lead  Normal sinus rhythm  Nonspecific ST abnormality  Abnormal ECG  When compared with ECG of 22-JAN-2024 14:01, (unconfirmed)  QT has shortened      HENT:      Head: Normocephalic and atraumatic.   Cardiovascular:      Rate and Rhythm: Normal rate and regular rhythm.      Pulses: Normal pulses.      Heart sounds: Normal heart sounds.   Pulmonary:      Effort: Pulmonary effort is normal.      Breath sounds: Normal breath sounds.   Abdominal:      General: Bowel sounds are normal.      Tenderness: There is no abdominal tenderness     Hernia: A hernia is present.   Skin:     General: Skin is warm and dry.   Neurological:      General: No focal deficit present.      Mental Status: She is alert and oriented to person, place, and time.     Relevant Results               Assessment/Plan          Fidelia Norman is a 78 y.o. female with a past medical history significant for COPD (baseline 2L), Parkinson Disease, and HTN, Parkinson's disease, IBS,  sleep apnea, depression who presented to the hospital for abdominal pain.          Principal Problem:    Chest pain, unspecified type    Abdominal Pain, Cramping   2/2 Chronic Constipation v/s GERD  - Appears to be a chronic issue, patient was undergoing Prior Auth for Amitiza and on intensive bloating and bowel regimen   - No alarming symptoms, CT with no acute pathology.   - History of Parkinson's disease, advanced with tremors + stiffness  -Continue Bentyl 10 mg q8h PRN, Amitiza 24 mcg BID   -Senna S BID   - Zofran PRN      Chest Pain  2/2 Stable Angina vs ACS Rule Out   - Troponin 14 --> 15 --> 16  - ECHO (7/2023): Normal EF + mildly elevated RVSP  - Nuclear Stress Test (9/2023): fixed perfusion defect in the apical wall, small-moderate sized fixed perfusion defect in the inferior wall. No reversible perfusion defects seen. Calculated EF 62%.   - As per chart review, patient supposedly had a Cardiac Cath that was negative, but no record on file.  Appreciate cardiology recommendations no further workup is planned at this time and stable for discharge from their point of view.    Hypokalemia  -Potassium noted to be 2.7.  Will replace orally and IV.  Patient did refuse oral potassium yesterday but reports she has not taken it today.    Hypomagnesemia  -Magnesium was replaced as well.       Chronic Medical Issues:      # COPD: continue home inhalers  # Parkinson's Disease: Aricept 10 mg at bedtime, Namenda 10 mg BID   # Depression and anxiety: Continue Buspirone 5 mg BID, Bupropion 150 mg every day, Fluoxetine 100 mg every day, Trazodone 50 mg BID, continue with home benzodiazepine which is Xanax 0.5 twice daily as needed.  # GERD: Omeprazole 20 mg BID      Fluids: None  Electrolytes: replete as needed  Nutrition: Cardiac Diet   GI Prophylaxis: None  DVT Prophylaxis: Lovenox    Dispo: Initially presented for abdominal pain which appears chronic in nature.  The patient was requesting for rehab, the patient worked  with PT OT and does not qualify for rehab at this time.  The patient would benefit from home with home health.  Patient is refusing discharge at this time and asking for social work involvement as she wants her son to move from her house before discharge.  The patient reports she is also having troubles with her .  Appreciate ANU Murillo MD

## 2024-01-24 NOTE — CARE PLAN
Problem: Nutrition  Goal: Less than 5 days NPO/clear liquids  Outcome: Progressing  Goal: Oral intake greater than 50%  Outcome: Progressing  Goal: Oral intake greater 75%  Outcome: Progressing  Goal: Consume prescribed supplement  Outcome: Progressing  Goal: Adequate PO fluid intake  Outcome: Progressing  Goal: Nutrition support goals are met within 48 hrs  Outcome: Progressing  Goal: Nutrition support is meeting 75% of nutrient needs  Outcome: Progressing  Goal: Tube feed tolerance  Outcome: Progressing  Goal: BG  mg/dL  Outcome: Progressing  Goal: Lab values WNL  Outcome: Progressing  Goal: Electrolytes WNL  Outcome: Progressing  Goal: Promote healing  Outcome: Progressing  Goal: Maintain stable weight  Outcome: Progressing  Goal: Reduce weight from edema/fluid  Outcome: Progressing  Goal: Gradual weight gain  Outcome: Progressing  Goal: Improve ostomy output  Outcome: Progressing     Problem: Pain - Adult  Goal: Verbalizes/displays adequate comfort level or baseline comfort level  Outcome: Progressing     Problem: Safety - Adult  Goal: Free from fall injury  Outcome: Progressing     Problem: Discharge Planning  Goal: Discharge to home or other facility with appropriate resources  Outcome: Progressing     Problem: Chronic Conditions and Co-morbidities  Goal: Patient's chronic conditions and co-morbidity symptoms are monitored and maintained or improved  Outcome: Progressing     Problem: Skin  Goal: Decreased wound size/increased tissue granulation at next dressing change  Outcome: Progressing  Flowsheets (Taken 1/24/2024 0215)  Decreased wound size/increased tissue granulation at next dressing change: Promote sleep for wound healing  Goal: Participates in plan/prevention/treatment measures  Outcome: Progressing  Flowsheets (Taken 1/24/2024 0215)  Participates in plan/prevention/treatment measures: Elevate heels  Goal: Prevent/manage excess moisture  Outcome: Progressing  Flowsheets (Taken 1/24/2024  0215)  Prevent/manage excess moisture: Cleanse incontinence/protect with barrier cream  Goal: Prevent/minimize sheer/friction injuries  Outcome: Progressing  Flowsheets (Taken 1/24/2024 0215)  Prevent/minimize sheer/friction injuries: Turn/reposition every 2 hours/use positioning/transfer devices  Goal: Promote/optimize nutrition  Outcome: Progressing  Flowsheets (Taken 1/24/2024 0215)  Promote/optimize nutrition: Monitor/record intake including meals  Goal: Promote skin healing  Outcome: Progressing  Flowsheets (Taken 1/24/2024 0215)  Promote skin healing: Turn/reposition every 2 hours/use positioning/transfer devices     Problem: Fall/Injury  Goal: Not fall by end of shift  Outcome: Progressing  Goal: Be free from injury by end of the shift  Outcome: Progressing  Goal: Verbalize understanding of personal risk factors for fall in the hospital  Outcome: Progressing  Goal: Verbalize understanding of risk factor reduction measures to prevent injury from fall in the home  Outcome: Progressing  Goal: Use assistive devices by end of the shift  Outcome: Progressing  Goal: Pace activities to prevent fatigue by end of the shift  Outcome: Progressing     Problem: Dressings Lower Extremities  Goal: Patient to complete lower body dressing with FWW at mod I   Outcome: Progressing     Problem: Toileting  Goal: Patient will complete toileting tasks with FWW at mod I   Outcome: Progressing   The patient's goals for the shift include pain control and sleep    The clinical goals for the shift include Pt will have abdominal pain and nausea controlled this shift
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
## 2024-01-25 ENCOUNTER — PHARMACY VISIT (OUTPATIENT)
Dept: PHARMACY | Facility: CLINIC | Age: 79
End: 2024-01-25
Payer: MEDICARE

## 2024-01-25 VITALS
HEIGHT: 64 IN | WEIGHT: 179.68 LBS | DIASTOLIC BLOOD PRESSURE: 80 MMHG | SYSTOLIC BLOOD PRESSURE: 120 MMHG | OXYGEN SATURATION: 98 % | HEART RATE: 88 BPM | RESPIRATION RATE: 18 BRPM | TEMPERATURE: 96.8 F | BODY MASS INDEX: 30.67 KG/M2

## 2024-01-25 LAB
ANION GAP SERPL CALC-SCNC: 10 MMOL/L (ref 10–20)
BUN SERPL-MCNC: 13 MG/DL (ref 6–23)
CALCIUM SERPL-MCNC: 8.8 MG/DL (ref 8.6–10.3)
CHLORIDE SERPL-SCNC: 102 MMOL/L (ref 98–107)
CO2 SERPL-SCNC: 32 MMOL/L (ref 21–32)
CREAT SERPL-MCNC: 0.74 MG/DL (ref 0.5–1.05)
EGFRCR SERPLBLD CKD-EPI 2021: 84 ML/MIN/1.73M*2
GLUCOSE SERPL-MCNC: 88 MG/DL (ref 74–99)
POTASSIUM SERPL-SCNC: 3.1 MMOL/L (ref 3.5–5.3)
SODIUM SERPL-SCNC: 141 MMOL/L (ref 136–145)

## 2024-01-25 PROCEDURE — RXMED WILLOW AMBULATORY MEDICATION CHARGE

## 2024-01-25 PROCEDURE — 80048 BASIC METABOLIC PNL TOTAL CA: CPT | Performed by: INTERNAL MEDICINE

## 2024-01-25 PROCEDURE — 36415 COLL VENOUS BLD VENIPUNCTURE: CPT | Performed by: INTERNAL MEDICINE

## 2024-01-25 PROCEDURE — 2500000001 HC RX 250 WO HCPCS SELF ADMINISTERED DRUGS (ALT 637 FOR MEDICARE OP): Performed by: INTERNAL MEDICINE

## 2024-01-25 PROCEDURE — 2500000001 HC RX 250 WO HCPCS SELF ADMINISTERED DRUGS (ALT 637 FOR MEDICARE OP): Performed by: PHYSICIAN ASSISTANT

## 2024-01-25 PROCEDURE — 99239 HOSP IP/OBS DSCHRG MGMT >30: CPT | Performed by: INTERNAL MEDICINE

## 2024-01-25 PROCEDURE — 2500000001 HC RX 250 WO HCPCS SELF ADMINISTERED DRUGS (ALT 637 FOR MEDICARE OP)

## 2024-01-25 PROCEDURE — 2500000004 HC RX 250 GENERAL PHARMACY W/ HCPCS (ALT 636 FOR OP/ED): Performed by: INTERNAL MEDICINE

## 2024-01-25 PROCEDURE — 2500000004 HC RX 250 GENERAL PHARMACY W/ HCPCS (ALT 636 FOR OP/ED)

## 2024-01-25 RX ORDER — POTASSIUM CHLORIDE 14.9 MG/ML
20 INJECTION INTRAVENOUS
Status: COMPLETED | OUTPATIENT
Start: 2024-01-25 | End: 2024-01-25

## 2024-01-25 RX ORDER — FUROSEMIDE 40 MG/1
40 TABLET ORAL ONCE
Status: COMPLETED | OUTPATIENT
Start: 2024-01-25 | End: 2024-01-25

## 2024-01-25 RX ORDER — AMLODIPINE BESYLATE 5 MG/1
2.5 TABLET ORAL DAILY
Qty: 15 TABLET | Refills: 0 | Status: SHIPPED | OUTPATIENT
Start: 2024-01-26 | End: 2024-02-25

## 2024-01-25 RX ORDER — LOSARTAN POTASSIUM 100 MG/1
100 TABLET ORAL DAILY
Qty: 30 TABLET | Refills: 0 | Status: SHIPPED | OUTPATIENT
Start: 2024-01-26 | End: 2024-02-25

## 2024-01-25 RX ADMIN — AMLODIPINE BESYLATE 5 MG: 5 TABLET ORAL at 09:05

## 2024-01-25 RX ADMIN — POTASSIUM CHLORIDE 20 MEQ: 14.9 INJECTION, SOLUTION INTRAVENOUS at 11:00

## 2024-01-25 RX ADMIN — BUSPIRONE HYDROCHLORIDE 5 MG: 5 TABLET ORAL at 09:00

## 2024-01-25 RX ADMIN — LUBIPROSTONE 24 MCG: 24 CAPSULE, GELATIN COATED ORAL at 09:00

## 2024-01-25 RX ADMIN — FLUOXETINE 100 MG: 20 CAPSULE ORAL at 08:59

## 2024-01-25 RX ADMIN — FUROSEMIDE 40 MG: 40 TABLET ORAL at 08:59

## 2024-01-25 RX ADMIN — TRAZODONE HYDROCHLORIDE 50 MG: 50 TABLET ORAL at 09:00

## 2024-01-25 RX ADMIN — SUCRALFATE 1 G: 1 TABLET ORAL at 05:48

## 2024-01-25 RX ADMIN — BENZOCAINE AND MENTHOL 1 LOZENGE: 15; 3.6 LOZENGE ORAL at 11:46

## 2024-01-25 RX ADMIN — LOSARTAN POTASSIUM 100 MG: 50 TABLET, FILM COATED ORAL at 09:00

## 2024-01-25 RX ADMIN — SENNOSIDES AND DOCUSATE SODIUM 2 TABLET: 8.6; 5 TABLET ORAL at 08:57

## 2024-01-25 RX ADMIN — GABAPENTIN 300 MG: 300 CAPSULE ORAL at 09:00

## 2024-01-25 RX ADMIN — POTASSIUM CHLORIDE 20 MEQ: 14.9 INJECTION, SOLUTION INTRAVENOUS at 08:57

## 2024-01-25 RX ADMIN — ALPRAZOLAM 0.5 MG: 0.5 TABLET ORAL at 12:00

## 2024-01-25 RX ADMIN — FUROSEMIDE 40 MG: 20 TABLET ORAL at 09:02

## 2024-01-25 RX ADMIN — MEMANTINE 10 MG: 10 TABLET ORAL at 09:00

## 2024-01-25 RX ADMIN — CARBIDOPA AND LEVODOPA 2 TABLET: 25; 100 TABLET ORAL at 08:58

## 2024-01-25 RX ADMIN — CARBIDOPA AND LEVODOPA 2 TABLET: 25; 100 TABLET ORAL at 11:47

## 2024-01-25 RX ADMIN — PANTOPRAZOLE SODIUM 40 MG: 40 TABLET, DELAYED RELEASE ORAL at 05:48

## 2024-01-25 RX ADMIN — Medication 2000 UNITS: at 09:00

## 2024-01-25 ASSESSMENT — ACTIVITIES OF DAILY LIVING (ADL): LACK_OF_TRANSPORTATION: NO

## 2024-01-25 ASSESSMENT — COGNITIVE AND FUNCTIONAL STATUS - GENERAL
MOVING FROM LYING ON BACK TO SITTING ON SIDE OF FLAT BED WITH BEDRAILS: A LITTLE
PERSONAL GROOMING: A LITTLE
DRESSING REGULAR LOWER BODY CLOTHING: A LOT
HELP NEEDED FOR BATHING: A LOT
STANDING UP FROM CHAIR USING ARMS: A LITTLE
TURNING FROM BACK TO SIDE WHILE IN FLAT BAD: A LITTLE
CLIMB 3 TO 5 STEPS WITH RAILING: A LOT
MOBILITY SCORE: 17
MOVING TO AND FROM BED TO CHAIR: A LITTLE
WALKING IN HOSPITAL ROOM: A LITTLE
TOILETING: A LOT
DAILY ACTIVITIY SCORE: 16
DRESSING REGULAR UPPER BODY CLOTHING: A LITTLE

## 2024-01-25 ASSESSMENT — PAIN - FUNCTIONAL ASSESSMENT: PAIN_FUNCTIONAL_ASSESSMENT: 0-10

## 2024-01-25 ASSESSMENT — PAIN SCALES - GENERAL: PAINLEVEL_OUTOF10: 0 - NO PAIN

## 2024-01-25 NOTE — CARE PLAN
The patient's goals for the shift include get some sleep    The clinical goals for the shift include patient will remain comfortable throughout shift

## 2024-01-25 NOTE — DISCHARGE INSTR - APPOINTMENTS
Options for local cleaning companies:  Misty PhelanKzje-[phone]  [facility] (located in [place])-[phone]  [employer]  (located in [place])-[phone]
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
## 2024-01-27 LAB
ATRIAL RATE: 77 BPM
ATRIAL RATE: 86 BPM
ATRIAL RATE: 89 BPM
P AXIS: 19 DEGREES
P AXIS: 32 DEGREES
P AXIS: 40 DEGREES
P OFFSET: 187 MS
P OFFSET: 192 MS
P OFFSET: 192 MS
P ONSET: 142 MS
P ONSET: 143 MS
P ONSET: 154 MS
PR INTERVAL: 120 MS
PR INTERVAL: 140 MS
PR INTERVAL: 144 MS
Q ONSET: 213 MS
Q ONSET: 214 MS
Q ONSET: 214 MS
QRS COUNT: 13 BEATS
QRS COUNT: 14 BEATS
QRS COUNT: 14 BEATS
QRS DURATION: 82 MS
QT INTERVAL: 370 MS
QT INTERVAL: 518 MS
QT INTERVAL: 550 MS
QTC CALCULATION(BAZETT): 450 MS
QTC CALCULATION(BAZETT): 619 MS
QTC CALCULATION(BAZETT): 622 MS
QTC FREDERICIA: 421 MS
QTC FREDERICIA: 584 MS
QTC FREDERICIA: 597 MS
R AXIS: -20 DEGREES
R AXIS: -27 DEGREES
R AXIS: -29 DEGREES
T AXIS: 0 DEGREES
T AXIS: 12 DEGREES
T AXIS: 16 DEGREES
T OFFSET: 399 MS
T OFFSET: 472 MS
T OFFSET: 489 MS
VENTRICULAR RATE: 77 BPM
VENTRICULAR RATE: 86 BPM
VENTRICULAR RATE: 89 BPM

## 2024-01-29 NOTE — SIGNIFICANT EVENT
Son answered phone stating she was transferred to a facility on [address]. No further information.

## 2024-02-08 ENCOUNTER — PHARMACY VISIT (OUTPATIENT)
Dept: PHARMACY | Facility: CLINIC | Age: 79
End: 2024-02-08

## 2024-02-08 PROCEDURE — RXMED WILLOW AMBULATORY MEDICATION CHARGE

## 2024-02-22 ENCOUNTER — PHARMACY VISIT (OUTPATIENT)
Dept: PHARMACY | Facility: CLINIC | Age: 79
End: 2024-02-22

## 2024-02-22 PROCEDURE — RXMED WILLOW AMBULATORY MEDICATION CHARGE

## 2024-03-12 ENCOUNTER — APPOINTMENT (OUTPATIENT)
Dept: SURGERY | Facility: CLINIC | Age: 79
End: 2024-03-12

## 2024-03-14 ENCOUNTER — OFFICE VISIT (OUTPATIENT)
Dept: SURGERY | Facility: CLINIC | Age: 79
End: 2024-03-14
Payer: MEDICARE

## 2024-03-14 VITALS — DIASTOLIC BLOOD PRESSURE: 68 MMHG | HEART RATE: 88 BPM | OXYGEN SATURATION: 90 % | SYSTOLIC BLOOD PRESSURE: 104 MMHG

## 2024-03-14 DIAGNOSIS — K44.9 HIATAL HERNIA: Primary | ICD-10-CM

## 2024-03-14 PROCEDURE — 3078F DIAST BP <80 MM HG: CPT | Performed by: SURGERY

## 2024-03-14 PROCEDURE — 3074F SYST BP LT 130 MM HG: CPT | Performed by: SURGERY

## 2024-03-14 PROCEDURE — 1123F ACP DISCUSS/DSCN MKR DOCD: CPT | Performed by: SURGERY

## 2024-03-14 PROCEDURE — 99204 OFFICE O/P NEW MOD 45 MIN: CPT | Performed by: SURGERY

## 2024-03-14 PROCEDURE — 1036F TOBACCO NON-USER: CPT | Performed by: SURGERY

## 2024-03-14 PROCEDURE — 1159F MED LIST DOCD IN RCRD: CPT | Performed by: SURGERY

## 2024-03-14 NOTE — PROGRESS NOTES
General Surgery History and Physical    Referring Provider:  MD Rajwinder Sidhu, MD Amber PhD     Chief Complaint:  Chief Complaint   Patient presents with    New Patient Visit     Eval for hernia        History of Present Illness:  Fidelia Norman is a 78 y.o. female who presents with chest pain and hiatal hernia.   Has had reflux disease for many yrs.   Currently taking omeprazole 20mg bid with no good control   She also has known HH from previous EGD and CT scan   Recently, retrosternal pain/pressure  more often and could be severe   No dysphagia or regurgitation   Was sent here for further evaluation for her hiatal hernia and GERD     History of Parkinson's Disease, COPD, Depression, GERD, HTN, IBS, Sleep Apnea (No CPAP)         Past Medical History:  No past medical history on file.     Past Surgical History:  Past Surgical History:   Procedure Laterality Date    MR HEAD ANGIO WO IV CONTRAST  6/10/2022    MR HEAD ANGIO WO IV CONTRAST 6/10/2022 [facility]    MR NECK ANGIO WO IV CONTRAST  6/10/2022    MR NECK ANGIO WO IV CONTRAST 6/10/2022 [facility]    OTHER SURGICAL HISTORY  12/29/2020    Back surgery    OTHER SURGICAL HISTORY  12/29/2020    Hysterectomy        Medications:  Current Outpatient Medications   Medication Instructions    albuterol 90 mcg/actuation inhaler 2 puffs, inhalation    ALPRAZolam (Xanax) 0.5 mg tablet oral, 2 times daily    amLODIPine (NORVASC) 2.5 mg, oral, Daily    buPROPion XL (Wellbutrin XL) 150 mg 24 hr tablet oral, Every 24 hours    busPIRone (BUSPAR) 5 mg, oral, 2 times daily    carbidopa (LODSYN) 25 mg, oral, 3 times daily    cholecalciferol (Thera-D) 50 MCG (2000 UT) tablet oral    dicyclomine (BENTYL) 10 mg, oral, Every 8 hours PRN    donepezil (Aricept) 10 mg tablet 1 tablet, oral, Nightly    FLUoxetine (PROZAC) 100 mg, oral, Daily RT    fluticasone propion-salmeteroL (Advair Diskus) 100-50 mcg/dose diskus inhaler 1 puff, inhalation, 2 times daily     gabapentin (NEURONTIN) 300 mg, oral, 3 times daily    iron,carb-FA#9-vit C-D3-B6-B12 (NuFera) 125 mg-1 mg-170 mg-1,000 unit tablet 1 tablet, oral, Daily    lido-diphen-Maalox 1:1:1 Magic Mouthwash Swish and spit out as directed 5 mL by mouth every 6 hours as needed    losartan (COZAAR) 100 mg, oral, Daily    lubiprostone (AMITIZA) 24 mcg, oral, 2 times daily    memantine (Namenda) 10 mg tablet oral, 2 times daily    omeprazole (PRILOSEC) 20 mg, oral, 2 times daily    ondansetron (ZOFRAN) 8 mg, oral, Every 8 hours PRN    potassium chloride CR 20 mEq ER tablet 1 tablet, oral, Daily    sucralfate (CARAFATE) 1 g, oral, 2 times daily    traZODone (DESYREL) 50 mg, oral, 2 times daily        Allergies:  Allergies   Allergen Reactions    Amitriptyline Unknown    Bactrim [Sulfamethoxazole-Trimethoprim] Unknown    Iodinated Contrast Media Hives    Penicillins Unknown    Topamax [Topiramate] Unknown    Zoloft [Sertraline] Unknown        Family History:  No family history on file.     Social History:  Social History     Socioeconomic History    Marital status:      Spouse name: Not on file    Number of children: Not on file    Years of education: Not on file    Highest education level: Not on file   Occupational History    Not on file   Tobacco Use    Smoking status: Former     Types: Cigarettes     Quit date: 8/29/2013     Years since quitting: 10.5    Smokeless tobacco: Never   Vaping Use    Vaping Use: Never used   Substance and Sexual Activity    Alcohol use: Never    Drug use: Never    Sexual activity: Not on file   Other Topics Concern    Not on file   Social History Narrative    Not on file     Social Determinants of Health     Financial Resource Strain: Low Risk  (1/25/2024)    Overall Financial Resource Strain (CARDIA)     Difficulty of Paying Living Expenses: Not hard at all   Food Insecurity: Not on file   Transportation Needs: No Transportation Needs (1/25/2024)    PRAPARE - Transportation     Lack of  Transportation (Medical): No     Lack of Transportation (Non-Medical): No   Physical Activity: Not on file   Stress: Not on file   Social Connections: Not on file   Intimate Partner Violence: Not on file   Housing Stability: Low Risk  (1/25/2024)    Housing Stability Vital Sign     Unable to Pay for Housing in the Last Year: No     Number of Places Lived in the Last Year: 1     Unstable Housing in the Last Year: No        Review of Systems:  A complete 12 point review of systems was performed. Otherwise not remarkable other than comorbidities as above     Vital Signs:  Vitals:    03/14/24 1102   BP: 104/68   Pulse: 88   SpO2: 90%      There is no height or weight on file to calculate BMI.      Physical Exam:  General: No acute distress. Using walker due to parkionson's disease   Neuro: Alert and oriented ×3. Follows commands.  Face: Atraumatic, no visible skin lesion.   Head: Atraumatic  Eyes: Pupils equal reactive to light. Extraocular motions intact.  Ears: Hears normal speaking voice.  Mouth, Nose, Throat: Mucous membranes moist.  Normal dentition.  Neck: Supple. No visible masses.  Breast: Not examined.   Chest: No appreciable scars or masses.   Heart/Pulse: Regular  Lung: Patent airway and no labored breath.   Vascular: Palpable radial pulses bilaterally.  Abdomen: Soft, NT,ND.    Rectal and Perianal: Not examined.   Genitourinary: Not examined.   Musculoskeletal: Moves all extremities.  Normal range of motion.  Extremities: No cyanosis. No edema.   Lymphatic: No palpable lymph nodes.  Skin: No rashes or lesions.  Psychological: Normal affect      Laboratory Values:  CBC:   Lab Results   Component Value Date    WBC 9.9 01/23/2024    RBC 4.11 01/23/2024    HGB 12.8 01/23/2024    HCT 40.9 01/23/2024     01/23/2024       RFP:   Lab Results   Component Value Date     01/25/2024    K 3.1 (L) 01/25/2024     01/25/2024    CO2 32 01/25/2024    BUN 13 01/25/2024    CREATININE 0.74 01/25/2024     CALCIUM 8.8 01/25/2024    MG 1.77 01/24/2024    PHOS 2.1 (L) 07/20/2023        LFTs:   Lab Results   Component Value Date    PROT 5.6 (L) 01/23/2024    ALBUMIN 3.4 01/23/2024    BILITOT 0.7 01/23/2024    BILIDIR 0.0 06/09/2022    ALKPHOS 41 01/23/2024    AST 31 01/23/2024    ALT 18 01/23/2024            Imaging:  I have personally reviewed the images and the radiologist's report.  No results found.  CT from 1/22/24 reviewed: hiatal hernia noted, moderate in size.   Had EGD 5/2023 with [facility]: small HH noted per note.     Assessment:  This is a 78 y.o. female who presents with   Hiatal hernia and GERD   Heart burn not well controlled with PPI  Retrosternal pain/pressure     Plan:  Discussed treatment options including laparoscopic repair of HH and fundoplication vs monitoring and medical management. She is interested in fixing there hernia. She is troubled by pain. Will request further tests: UGI, pH study and manometry for further evaluation. Will discuss again after tests. She does have comorbidities as above. Will make final recommendation after tests.     Call or ED if worsening pain or any concern.   Continue PPI in the interim.       Raymond Lala MD [facility]  General Surgery  Office: [phone]  Fax:     [phone]  2:27 PM   03/14/24

## 2024-03-14 NOTE — LETTER
March 14, 2024     Amber Purdy MD PhD  [id number] [facility] [address]    Patient: Fidelia Norman   YOB: 1945   Date of Visit: 3/14/2024       Dear Dr. Amber Purdy MD PhD:    Thank you for referring Fidelia Norman to me for evaluation. Below are my notes for this consultation.  If you have questions, please do not hesitate to call me. I look forward to following your patient along with you.       Sincerely,     Raymond Lala MD      CC: No Recipients  ______________________________________________________________________________________    General Surgery History and Physical    Referring Provider:  MD Rajwinder Sidhu Yongjin, MD PhD     Chief Complaint:  Chief Complaint   Patient presents with   • New Patient Visit     Eval for hernia        History of Present Illness:  Fidelia Norman is a 78 y.o. female who presents with chest pain and hiatal hernia.   Has had reflux disease for many yrs.   Currently taking omeprazole 20mg bid with no good control   She also has known HH from previous EGD and CT scan   Recently, retrosternal pain/pressure  more often and could be severe   No dysphagia or regurgitation   Was sent here for further evaluation for her hiatal hernia and GERD     History of Parkinson's Disease, COPD, Depression, GERD, HTN, IBS, Sleep Apnea (No CPAP)         Past Medical History:  No past medical history on file.     Past Surgical History:  Past Surgical History:   Procedure Laterality Date   • MR HEAD ANGIO WO IV CONTRAST  6/10/2022    MR HEAD ANGIO WO IV CONTRAST 6/10/2022 [facility]   • MR NECK ANGIO WO IV CONTRAST  6/10/2022    MR NECK ANGIO WO IV CONTRAST 6/10/2022 [facility]   • OTHER SURGICAL HISTORY  12/29/2020    Back surgery   • OTHER SURGICAL HISTORY  12/29/2020    Hysterectomy        Medications:  Current Outpatient Medications   Medication Instructions   • albuterol 90 mcg/actuation inhaler 2 puffs, inhalation   • ALPRAZolam (Xanax) 0.5 mg tablet  oral, 2 times daily   • amLODIPine (NORVASC) 2.5 mg, oral, Daily   • buPROPion XL (Wellbutrin XL) 150 mg 24 hr tablet oral, Every 24 hours   • busPIRone (BUSPAR) 5 mg, oral, 2 times daily   • carbidopa (LODSYN) 25 mg, oral, 3 times daily   • cholecalciferol (Thera-D) 50 MCG (2000 UT) tablet oral   • dicyclomine (BENTYL) 10 mg, oral, Every 8 hours PRN   • donepezil (Aricept) 10 mg tablet 1 tablet, oral, Nightly   • FLUoxetine (PROZAC) 100 mg, oral, Daily RT   • fluticasone propion-salmeteroL (Advair Diskus) 100-50 mcg/dose diskus inhaler 1 puff, inhalation, 2 times daily   • gabapentin (NEURONTIN) 300 mg, oral, 3 times daily   • iron,carb-FA#9-vit C-D3-B6-B12 (NuFera) 125 mg-1 mg-170 mg-1,000 unit tablet 1 tablet, oral, Daily   • lido-diphen-Maalox 1:1:1 Magic Mouthwash Swish and spit out as directed 5 mL by mouth every 6 hours as needed   • losartan (COZAAR) 100 mg, oral, Daily   • lubiprostone (AMITIZA) 24 mcg, oral, 2 times daily   • memantine (Namenda) 10 mg tablet oral, 2 times daily   • omeprazole (PRILOSEC) 20 mg, oral, 2 times daily   • ondansetron (ZOFRAN) 8 mg, oral, Every 8 hours PRN   • potassium chloride CR 20 mEq ER tablet 1 tablet, oral, Daily   • sucralfate (CARAFATE) 1 g, oral, 2 times daily   • traZODone (DESYREL) 50 mg, oral, 2 times daily        Allergies:  Allergies   Allergen Reactions   • Amitriptyline Unknown   • Bactrim [Sulfamethoxazole-Trimethoprim] Unknown   • Iodinated Contrast Media Hives   • Penicillins Unknown   • Topamax [Topiramate] Unknown   • Zoloft [Sertraline] Unknown        Family History:  No family history on file.     Social History:  Social History     Socioeconomic History   • Marital status:      Spouse name: Not on file   • Number of children: Not on file   • Years of education: Not on file   • Highest education level: Not on file   Occupational History   • Not on file   Tobacco Use   • Smoking status: Former     Types: Cigarettes     Quit date: 8/29/2013     Years  since quitting: 10.5   • Smokeless tobacco: Never   Vaping Use   • Vaping Use: Never used   Substance and Sexual Activity   • Alcohol use: Never   • Drug use: Never   • Sexual activity: Not on file   Other Topics Concern   • Not on file   Social History Narrative   • Not on file     Social Determinants of Health     Financial Resource Strain: Low Risk  (1/25/2024)    Overall Financial Resource Strain (CARDIA)    • Difficulty of Paying Living Expenses: Not hard at all   Food Insecurity: Not on file   Transportation Needs: No Transportation Needs (1/25/2024)    PRAPARE - Transportation    • Lack of Transportation (Medical): No    • Lack of Transportation (Non-Medical): No   Physical Activity: Not on file   Stress: Not on file   Social Connections: Not on file   Intimate Partner Violence: Not on file   Housing Stability: Low Risk  (1/25/2024)    Housing Stability Vital Sign    • Unable to Pay for Housing in the Last Year: No    • Number of Places Lived in the Last Year: 1    • Unstable Housing in the Last Year: No        Review of Systems:  A complete 12 point review of systems was performed. Otherwise not remarkable other than comorbidities as above     Vital Signs:  Vitals:    03/14/24 1102   BP: 104/68   Pulse: 88   SpO2: 90%      There is no height or weight on file to calculate BMI.      Physical Exam:  General: No acute distress. Using walker due to parkionson's disease   Neuro: Alert and oriented ×3. Follows commands.  Face: Atraumatic, no visible skin lesion.   Head: Atraumatic  Eyes: Pupils equal reactive to light. Extraocular motions intact.  Ears: Hears normal speaking voice.  Mouth, Nose, Throat: Mucous membranes moist.  Normal dentition.  Neck: Supple. No visible masses.  Breast: Not examined.   Chest: No appreciable scars or masses.   Heart/Pulse: Regular  Lung: Patent airway and no labored breath.   Vascular: Palpable radial pulses bilaterally.  Abdomen: Soft, NT,ND.    Rectal and Perianal: Not examined.    Genitourinary: Not examined.   Musculoskeletal: Moves all extremities.  Normal range of motion.  Extremities: No cyanosis. No edema.   Lymphatic: No palpable lymph nodes.  Skin: No rashes or lesions.  Psychological: Normal affect      Laboratory Values:  CBC:   Lab Results   Component Value Date    WBC 9.9 01/23/2024    RBC 4.11 01/23/2024    HGB 12.8 01/23/2024    HCT 40.9 01/23/2024     01/23/2024       RFP:   Lab Results   Component Value Date     01/25/2024    K 3.1 (L) 01/25/2024     01/25/2024    CO2 32 01/25/2024    BUN 13 01/25/2024    CREATININE 0.74 01/25/2024    CALCIUM 8.8 01/25/2024    MG 1.77 01/24/2024    PHOS 2.1 (L) 07/20/2023        LFTs:   Lab Results   Component Value Date    PROT 5.6 (L) 01/23/2024    ALBUMIN 3.4 01/23/2024    BILITOT 0.7 01/23/2024    BILIDIR 0.0 06/09/2022    ALKPHOS 41 01/23/2024    AST 31 01/23/2024    ALT 18 01/23/2024            Imaging:  I have personally reviewed the images and the radiologist's report.  No results found.  CT from 1/22/24 reviewed: hiatal hernia noted, moderate in size.   Had EGD 5/2023 with [facility]: small HH noted per note.     Assessment:  This is a 78 y.o. female who presents with   Hiatal hernia and GERD   Heart burn not well controlled with PPI  Retrosternal pain/pressure     Plan:  Discussed treatment options including laparoscopic repair of HH and fundoplication vs monitoring and medical management. She is interested in fixing there hernia. She is troubled by pain. Will request further tests: UGI, pH study and manometry for further evaluation. Will discuss again after tests. She does have comorbidities as above. Will make final recommendation after tests.     Call or ED if worsening pain or any concern.   Continue PPI in the interim.       Raymond Lala MD [facility]  General Surgery  Office: [phone]  Fax:     [phone]  2:27 PM   03/14/24

## 2024-03-29 ENCOUNTER — PHARMACY VISIT (OUTPATIENT)
Dept: PHARMACY | Facility: CLINIC | Age: 79
End: 2024-03-29

## 2024-03-29 DIAGNOSIS — I10 ESSENTIAL (PRIMARY) HYPERTENSION: ICD-10-CM

## 2024-03-29 PROCEDURE — RXMED WILLOW AMBULATORY MEDICATION CHARGE

## 2024-03-29 RX ORDER — AMLODIPINE BESYLATE 5 MG/1
2.5 TABLET ORAL DAILY
Qty: 15 TABLET | Refills: 0 | Status: CANCELLED | OUTPATIENT
Start: 2024-03-29 | End: 2024-04-28

## 2024-03-29 RX ORDER — LOSARTAN POTASSIUM 100 MG/1
100 TABLET ORAL DAILY
Qty: 30 TABLET | Refills: 0 | Status: CANCELLED | OUTPATIENT
Start: 2024-03-29 | End: 2024-04-28

## 2024-04-08 ENCOUNTER — HOSPITAL ENCOUNTER (OUTPATIENT)
Dept: RADIOLOGY | Facility: HOSPITAL | Age: 79
Discharge: HOME | End: 2024-04-08
Payer: MEDICARE

## 2024-04-08 DIAGNOSIS — K44.9 HIATAL HERNIA: ICD-10-CM

## 2024-04-08 PROCEDURE — 2500000001 HC RX 250 WO HCPCS SELF ADMINISTERED DRUGS (ALT 637 FOR MEDICARE OP): Performed by: SURGERY

## 2024-04-08 PROCEDURE — 74240 X-RAY XM UPR GI TRC 1CNTRST: CPT

## 2024-04-08 RX ADMIN — BARIUM SULFATE 70 ML: 0.6 SUSPENSION ORAL at 08:58

## 2024-04-22 RX ORDER — SODIUM CHLORIDE, SODIUM LACTATE, POTASSIUM CHLORIDE, CALCIUM CHLORIDE 600; 310; 30; 20 MG/100ML; MG/100ML; MG/100ML; MG/100ML
100 INJECTION, SOLUTION INTRAVENOUS CONTINUOUS
Status: CANCELLED | OUTPATIENT
Start: 2024-04-22

## 2024-04-23 ENCOUNTER — HOSPITAL ENCOUNTER (OUTPATIENT)
Dept: OPERATING ROOM | Facility: HOSPITAL | Age: 79
Setting detail: OUTPATIENT SURGERY
Discharge: HOME | End: 2024-04-23
Payer: MEDICARE

## 2024-04-23 VITALS
OXYGEN SATURATION: 93 % | WEIGHT: 173.28 LBS | HEIGHT: 64 IN | BODY MASS INDEX: 29.58 KG/M2 | DIASTOLIC BLOOD PRESSURE: 80 MMHG | SYSTOLIC BLOOD PRESSURE: 132 MMHG | RESPIRATION RATE: 16 BRPM | HEART RATE: 82 BPM | TEMPERATURE: 98.2 F

## 2024-04-23 VITALS
SYSTOLIC BLOOD PRESSURE: 146 MMHG | OXYGEN SATURATION: 97 % | HEART RATE: 78 BPM | RESPIRATION RATE: 16 BRPM | DIASTOLIC BLOOD PRESSURE: 66 MMHG

## 2024-04-23 DIAGNOSIS — K44.9 HIATAL HERNIA: ICD-10-CM

## 2024-04-23 PROBLEM — R07.9 CHEST PAIN, UNSPECIFIED TYPE: Status: RESOLVED | Noted: 2024-01-22 | Resolved: 2024-04-23

## 2024-04-23 PROCEDURE — 91010 ESOPHAGUS MOTILITY STUDY: CPT

## 2024-04-23 PROCEDURE — 2500000005 HC RX 250 GENERAL PHARMACY W/O HCPCS: Performed by: SURGERY

## 2024-04-23 PROCEDURE — 91010 ESOPHAGUS MOTILITY STUDY: CPT | Performed by: SURGERY

## 2024-04-23 RX ORDER — LIDOCAINE HYDROCHLORIDE 20 MG/ML
5 JELLY TOPICAL ONCE
Status: COMPLETED | OUTPATIENT
Start: 2024-04-23 | End: 2024-04-23

## 2024-04-23 RX ORDER — SODIUM CHLORIDE, SODIUM LACTATE, POTASSIUM CHLORIDE, CALCIUM CHLORIDE 600; 310; 30; 20 MG/100ML; MG/100ML; MG/100ML; MG/100ML
100 INJECTION, SOLUTION INTRAVENOUS CONTINUOUS
Status: DISCONTINUED | OUTPATIENT
Start: 2024-04-23 | End: 2024-04-24 | Stop reason: HOSPADM

## 2024-04-23 RX ADMIN — LIDOCAINE HYDROCHLORIDE 1 APPLICATION: 20 JELLY TOPICAL at 08:09

## 2024-04-26 PROCEDURE — 91038 ESOPH IMPED FUNCT TEST > 1HR: CPT | Performed by: SURGERY

## 2024-06-17 ENCOUNTER — APPOINTMENT (OUTPATIENT)
Dept: CARDIOLOGY | Facility: HOSPITAL | Age: 79
DRG: 871 | End: 2024-06-17
Payer: MEDICARE

## 2024-06-17 ENCOUNTER — ANESTHESIA EVENT (OUTPATIENT)
Dept: OPERATING ROOM | Facility: HOSPITAL | Age: 79
End: 2024-06-17
Payer: MEDICARE

## 2024-06-17 ENCOUNTER — APPOINTMENT (OUTPATIENT)
Dept: RADIOLOGY | Facility: HOSPITAL | Age: 79
DRG: 871 | End: 2024-06-17
Payer: MEDICARE

## 2024-06-17 ENCOUNTER — HOSPITAL ENCOUNTER (INPATIENT)
Facility: HOSPITAL | Age: 79
DRG: 871 | End: 2024-06-17
Attending: EMERGENCY MEDICINE | Admitting: FAMILY MEDICINE
Payer: MEDICARE

## 2024-06-17 DIAGNOSIS — N39.0 SEPSIS DUE TO URINARY TRACT INFECTION (MULTI): ICD-10-CM

## 2024-06-17 DIAGNOSIS — N39.0 UTI (URINARY TRACT INFECTION), UNCOMPLICATED: ICD-10-CM

## 2024-06-17 DIAGNOSIS — R79.89 LACTATE BLOOD INCREASE: ICD-10-CM

## 2024-06-17 DIAGNOSIS — A41.9 SEPSIS DUE TO URINARY TRACT INFECTION (MULTI): ICD-10-CM

## 2024-06-17 DIAGNOSIS — K64.9 HEMORRHOIDS, UNSPECIFIED HEMORRHOID TYPE: ICD-10-CM

## 2024-06-17 DIAGNOSIS — R94.31 PROLONGED Q-T INTERVAL ON ECG: Primary | ICD-10-CM

## 2024-06-17 DIAGNOSIS — I10 ESSENTIAL (PRIMARY) HYPERTENSION: ICD-10-CM

## 2024-06-17 DIAGNOSIS — Z86.69 HISTORY OF PARKINSON'S DISEASE: ICD-10-CM

## 2024-06-17 DIAGNOSIS — G20.A1 PARKINSON'S DISEASE WITHOUT DYSKINESIA, UNSPECIFIED WHETHER MANIFESTATIONS FLUCTUATE (MULTI): ICD-10-CM

## 2024-06-17 DIAGNOSIS — R10.9 ABDOMINAL PAIN, UNSPECIFIED ABDOMINAL LOCATION: ICD-10-CM

## 2024-06-17 LAB
ALBUMIN SERPL BCP-MCNC: 4.5 G/DL (ref 3.4–5)
ALP SERPL-CCNC: 59 U/L (ref 33–136)
ALT SERPL W P-5'-P-CCNC: 20 U/L (ref 7–45)
ANION GAP SERPL CALC-SCNC: 20 MMOL/L (ref 10–20)
APPEARANCE UR: ABNORMAL
AST SERPL W P-5'-P-CCNC: 28 U/L (ref 9–39)
BACTERIA #/AREA URNS AUTO: ABNORMAL /HPF
BASOPHILS # BLD AUTO: 0.05 X10*3/UL (ref 0–0.1)
BASOPHILS NFR BLD AUTO: 0.3 %
BILIRUB SERPL-MCNC: 0.8 MG/DL (ref 0–1.2)
BILIRUB UR STRIP.AUTO-MCNC: NEGATIVE MG/DL
BUN SERPL-MCNC: 15 MG/DL (ref 6–23)
CALCIUM SERPL-MCNC: 9.9 MG/DL (ref 8.6–10.3)
CHLORIDE SERPL-SCNC: 100 MMOL/L (ref 98–107)
CO2 SERPL-SCNC: 26 MMOL/L (ref 21–32)
COLOR UR: YELLOW
CREAT SERPL-MCNC: 0.89 MG/DL (ref 0.5–1.05)
EGFRCR SERPLBLD CKD-EPI 2021: 67 ML/MIN/1.73M*2
EOSINOPHIL # BLD AUTO: 0 X10*3/UL (ref 0–0.4)
EOSINOPHIL NFR BLD AUTO: 0 %
ERYTHROCYTE [DISTWIDTH] IN BLOOD BY AUTOMATED COUNT: 12.8 % (ref 11.5–14.5)
GLUCOSE SERPL-MCNC: 175 MG/DL (ref 74–99)
GLUCOSE UR STRIP.AUTO-MCNC: NORMAL MG/DL
HCT VFR BLD AUTO: 47.8 % (ref 36–46)
HEMOCCULT SP1 STL QL: POSITIVE
HGB BLD-MCNC: 15.5 G/DL (ref 12–16)
HYALINE CASTS #/AREA URNS AUTO: ABNORMAL /LPF
IMM GRANULOCYTES # BLD AUTO: 0.07 X10*3/UL (ref 0–0.5)
IMM GRANULOCYTES NFR BLD AUTO: 0.5 % (ref 0–0.9)
KETONES UR STRIP.AUTO-MCNC: ABNORMAL MG/DL
LACTATE SERPL-SCNC: 1 MMOL/L (ref 0.4–2)
LACTATE SERPL-SCNC: 2.8 MMOL/L (ref 0.4–2)
LEUKOCYTE ESTERASE UR QL STRIP.AUTO: ABNORMAL
LIPASE SERPL-CCNC: 12 U/L (ref 9–82)
LYMPHOCYTES # BLD AUTO: 0.68 X10*3/UL (ref 0.8–3)
LYMPHOCYTES NFR BLD AUTO: 4.6 %
MCH RBC QN AUTO: 31.6 PG (ref 26–34)
MCHC RBC AUTO-ENTMCNC: 32.4 G/DL (ref 32–36)
MCV RBC AUTO: 98 FL (ref 80–100)
MONOCYTES # BLD AUTO: 0.88 X10*3/UL (ref 0.05–0.8)
MONOCYTES NFR BLD AUTO: 6 %
MUCOUS THREADS #/AREA URNS AUTO: ABNORMAL /LPF
NEUTROPHILS # BLD AUTO: 12.97 X10*3/UL (ref 1.6–5.5)
NEUTROPHILS NFR BLD AUTO: 88.6 %
NITRITE UR QL STRIP.AUTO: ABNORMAL
NRBC BLD-RTO: 0 /100 WBCS (ref 0–0)
PH UR STRIP.AUTO: 6 [PH]
PLATELET # BLD AUTO: 289 X10*3/UL (ref 150–450)
POTASSIUM SERPL-SCNC: 3.3 MMOL/L (ref 3.5–5.3)
PROT SERPL-MCNC: 7.9 G/DL (ref 6.4–8.2)
PROT UR STRIP.AUTO-MCNC: ABNORMAL MG/DL
RBC # BLD AUTO: 4.9 X10*6/UL (ref 4–5.2)
RBC # UR STRIP.AUTO: ABNORMAL /UL
RBC #/AREA URNS AUTO: ABNORMAL /HPF
SODIUM SERPL-SCNC: 143 MMOL/L (ref 136–145)
SP GR UR STRIP.AUTO: 1.03
SQUAMOUS #/AREA URNS AUTO: ABNORMAL /HPF
UROBILINOGEN UR STRIP.AUTO-MCNC: NORMAL MG/DL
WBC # BLD AUTO: 14.7 X10*3/UL (ref 4.4–11.3)
WBC #/AREA URNS AUTO: ABNORMAL /HPF

## 2024-06-17 PROCEDURE — 74176 CT ABD & PELVIS W/O CONTRAST: CPT

## 2024-06-17 PROCEDURE — 93005 ELECTROCARDIOGRAM TRACING: CPT

## 2024-06-17 PROCEDURE — 2500000004 HC RX 250 GENERAL PHARMACY W/ HCPCS (ALT 636 FOR OP/ED): Performed by: NURSE PRACTITIONER

## 2024-06-17 PROCEDURE — 36415 COLL VENOUS BLD VENIPUNCTURE: CPT | Performed by: NURSE PRACTITIONER

## 2024-06-17 PROCEDURE — 82270 OCCULT BLOOD FECES: CPT | Performed by: NURSE PRACTITIONER

## 2024-06-17 PROCEDURE — 81001 URINALYSIS AUTO W/SCOPE: CPT | Performed by: NURSE PRACTITIONER

## 2024-06-17 PROCEDURE — 96365 THER/PROPH/DIAG IV INF INIT: CPT | Mod: 59

## 2024-06-17 PROCEDURE — 71045 X-RAY EXAM CHEST 1 VIEW: CPT | Performed by: RADIOLOGY

## 2024-06-17 PROCEDURE — 70450 CT HEAD/BRAIN W/O DYE: CPT | Performed by: STUDENT IN AN ORGANIZED HEALTH CARE EDUCATION/TRAINING PROGRAM

## 2024-06-17 PROCEDURE — 87040 BLOOD CULTURE FOR BACTERIA: CPT | Mod: 91,GEALAB | Performed by: NURSE PRACTITIONER

## 2024-06-17 PROCEDURE — 2500000001 HC RX 250 WO HCPCS SELF ADMINISTERED DRUGS (ALT 637 FOR MEDICARE OP)

## 2024-06-17 PROCEDURE — G0378 HOSPITAL OBSERVATION PER HR: HCPCS

## 2024-06-17 PROCEDURE — 70450 CT HEAD/BRAIN W/O DYE: CPT

## 2024-06-17 PROCEDURE — 2500000004 HC RX 250 GENERAL PHARMACY W/ HCPCS (ALT 636 FOR OP/ED)

## 2024-06-17 PROCEDURE — 80053 COMPREHEN METABOLIC PANEL: CPT | Performed by: NURSE PRACTITIONER

## 2024-06-17 PROCEDURE — 80307 DRUG TEST PRSMV CHEM ANLYZR: CPT | Performed by: PHYSICIAN ASSISTANT

## 2024-06-17 PROCEDURE — 99285 EMERGENCY DEPT VISIT HI MDM: CPT

## 2024-06-17 PROCEDURE — 87075 CULTR BACTERIA EXCEPT BLOOD: CPT | Mod: GEALAB | Performed by: NURSE PRACTITIONER

## 2024-06-17 PROCEDURE — 83690 ASSAY OF LIPASE: CPT | Performed by: NURSE PRACTITIONER

## 2024-06-17 PROCEDURE — 74176 CT ABD & PELVIS W/O CONTRAST: CPT | Performed by: STUDENT IN AN ORGANIZED HEALTH CARE EDUCATION/TRAINING PROGRAM

## 2024-06-17 PROCEDURE — 96361 HYDRATE IV INFUSION ADD-ON: CPT

## 2024-06-17 PROCEDURE — 96375 TX/PRO/DX INJ NEW DRUG ADDON: CPT

## 2024-06-17 PROCEDURE — 96372 THER/PROPH/DIAG INJ SC/IM: CPT

## 2024-06-17 PROCEDURE — 99223 1ST HOSP IP/OBS HIGH 75: CPT

## 2024-06-17 PROCEDURE — C9113 INJ PANTOPRAZOLE SODIUM, VIA: HCPCS | Performed by: NURSE PRACTITIONER

## 2024-06-17 PROCEDURE — 71045 X-RAY EXAM CHEST 1 VIEW: CPT

## 2024-06-17 PROCEDURE — 83605 ASSAY OF LACTIC ACID: CPT | Mod: 91 | Performed by: NURSE PRACTITIONER

## 2024-06-17 PROCEDURE — 85025 COMPLETE CBC W/AUTO DIFF WBC: CPT | Performed by: NURSE PRACTITIONER

## 2024-06-17 PROCEDURE — 87086 URINE CULTURE/COLONY COUNT: CPT | Mod: GEALAB | Performed by: PHYSICIAN ASSISTANT

## 2024-06-17 RX ORDER — LUBIPROSTONE 24 UG/1
24 CAPSULE ORAL 2 TIMES DAILY
Status: DISCONTINUED | OUTPATIENT
Start: 2024-06-17 | End: 2024-06-24 | Stop reason: HOSPADM

## 2024-06-17 RX ORDER — LOSARTAN POTASSIUM 50 MG/1
100 TABLET ORAL DAILY
Status: DISCONTINUED | OUTPATIENT
Start: 2024-06-18 | End: 2024-06-24 | Stop reason: HOSPADM

## 2024-06-17 RX ORDER — ACETAMINOPHEN 325 MG/1
650 TABLET ORAL EVERY 4 HOURS PRN
OUTPATIENT
Start: 2024-06-17

## 2024-06-17 RX ORDER — ONDANSETRON HYDROCHLORIDE 2 MG/ML
8 INJECTION, SOLUTION INTRAVENOUS ONCE
OUTPATIENT
Start: 2024-06-17 | End: 2024-06-17

## 2024-06-17 RX ORDER — CARBIDOPA 25 MG/1
25 TABLET ORAL 3 TIMES DAILY
Status: DISCONTINUED | OUTPATIENT
Start: 2024-06-17 | End: 2024-06-17

## 2024-06-17 RX ORDER — PANTOPRAZOLE SODIUM 20 MG/1
20 TABLET, DELAYED RELEASE ORAL
Status: DISCONTINUED | OUTPATIENT
Start: 2024-06-18 | End: 2024-06-18

## 2024-06-17 RX ORDER — BUSPIRONE HYDROCHLORIDE 5 MG/1
5 TABLET ORAL 2 TIMES DAILY
Status: DISCONTINUED | OUTPATIENT
Start: 2024-06-17 | End: 2024-06-23

## 2024-06-17 RX ORDER — POTASSIUM CHLORIDE 20 MEQ/1
20 TABLET, EXTENDED RELEASE ORAL DAILY
Status: DISCONTINUED | OUTPATIENT
Start: 2024-06-18 | End: 2024-06-18

## 2024-06-17 RX ORDER — TRAZODONE HYDROCHLORIDE 50 MG/1
50 TABLET ORAL 2 TIMES DAILY
Status: DISCONTINUED | OUTPATIENT
Start: 2024-06-17 | End: 2024-06-24 | Stop reason: HOSPADM

## 2024-06-17 RX ORDER — MEMANTINE HYDROCHLORIDE 10 MG/1
10 TABLET ORAL 2 TIMES DAILY
Status: DISCONTINUED | OUTPATIENT
Start: 2024-06-17 | End: 2024-06-24 | Stop reason: HOSPADM

## 2024-06-17 RX ORDER — SUCRALFATE 1 G/1
1 TABLET ORAL
Status: DISCONTINUED | OUTPATIENT
Start: 2024-06-18 | End: 2024-06-19

## 2024-06-17 RX ORDER — CEFTRIAXONE 1 G/50ML
1 INJECTION, SOLUTION INTRAVENOUS EVERY 24 HOURS
Status: DISCONTINUED | OUTPATIENT
Start: 2024-06-18 | End: 2024-06-24 | Stop reason: HOSPADM

## 2024-06-17 RX ORDER — CARBIDOPA AND LEVODOPA 25; 100 MG/1; MG/1
2 TABLET ORAL 3 TIMES DAILY
Status: DISCONTINUED | OUTPATIENT
Start: 2024-06-17 | End: 2024-06-24 | Stop reason: HOSPADM

## 2024-06-17 RX ORDER — GABAPENTIN 300 MG/1
300 CAPSULE ORAL 3 TIMES DAILY
Status: DISCONTINUED | OUTPATIENT
Start: 2024-06-17 | End: 2024-06-24 | Stop reason: HOSPADM

## 2024-06-17 RX ORDER — DONEPEZIL HYDROCHLORIDE 5 MG/1
10 TABLET, FILM COATED ORAL NIGHTLY
Status: DISCONTINUED | OUTPATIENT
Start: 2024-06-17 | End: 2024-06-24 | Stop reason: HOSPADM

## 2024-06-17 RX ORDER — FLUOXETINE HYDROCHLORIDE 20 MG/1
100 CAPSULE ORAL
Status: DISCONTINUED | OUTPATIENT
Start: 2024-06-18 | End: 2024-06-20

## 2024-06-17 RX ORDER — CEFTRIAXONE 1 G/50ML
1 INJECTION, SOLUTION INTRAVENOUS ONCE
Status: COMPLETED | OUTPATIENT
Start: 2024-06-17 | End: 2024-06-17

## 2024-06-17 RX ORDER — HEPARIN SODIUM 5000 [USP'U]/ML
5000 INJECTION, SOLUTION INTRAVENOUS; SUBCUTANEOUS EVERY 8 HOURS
Status: DISCONTINUED | OUTPATIENT
Start: 2024-06-17 | End: 2024-06-24 | Stop reason: HOSPADM

## 2024-06-17 RX ORDER — ALPRAZOLAM 0.5 MG/1
0.5 TABLET ORAL 2 TIMES DAILY PRN
Status: DISCONTINUED | OUTPATIENT
Start: 2024-06-17 | End: 2024-06-20

## 2024-06-17 RX ORDER — PANTOPRAZOLE SODIUM 40 MG/10ML
40 INJECTION, POWDER, LYOPHILIZED, FOR SOLUTION INTRAVENOUS ONCE
Status: COMPLETED | OUTPATIENT
Start: 2024-06-17 | End: 2024-06-17

## 2024-06-17 RX ORDER — BUPROPION HYDROCHLORIDE 150 MG/1
150 TABLET ORAL EVERY 24 HOURS
Status: DISCONTINUED | OUTPATIENT
Start: 2024-06-17 | End: 2024-06-24 | Stop reason: HOSPADM

## 2024-06-17 RX ORDER — ONDANSETRON HYDROCHLORIDE 2 MG/ML
4 INJECTION, SOLUTION INTRAVENOUS ONCE
Status: COMPLETED | OUTPATIENT
Start: 2024-06-17 | End: 2024-06-17

## 2024-06-17 RX ORDER — FLUTICASONE FUROATE AND VILANTEROL 100; 25 UG/1; UG/1
1 POWDER RESPIRATORY (INHALATION)
Status: DISCONTINUED | OUTPATIENT
Start: 2024-06-18 | End: 2024-06-24 | Stop reason: HOSPADM

## 2024-06-17 RX ORDER — SODIUM CHLORIDE, SODIUM LACTATE, POTASSIUM CHLORIDE, CALCIUM CHLORIDE 600; 310; 30; 20 MG/100ML; MG/100ML; MG/100ML; MG/100ML
100 INJECTION, SOLUTION INTRAVENOUS CONTINUOUS
OUTPATIENT
Start: 2024-06-17

## 2024-06-17 RX ORDER — ALBUTEROL SULFATE 0.83 MG/ML
2.5 SOLUTION RESPIRATORY (INHALATION) ONCE AS NEEDED
OUTPATIENT
Start: 2024-06-17

## 2024-06-17 SDOH — SOCIAL STABILITY: SOCIAL INSECURITY: WERE YOU ABLE TO COMPLETE ALL THE BEHAVIORAL HEALTH SCREENINGS?: NO

## 2024-06-17 SDOH — SOCIAL STABILITY: SOCIAL INSECURITY: HAVE YOU HAD THOUGHTS OF HARMING ANYONE ELSE?: UNABLE TO ASSESS

## 2024-06-17 ASSESSMENT — COLUMBIA-SUICIDE SEVERITY RATING SCALE - C-SSRS
1. IN THE PAST MONTH, HAVE YOU WISHED YOU WERE DEAD OR WISHED YOU COULD GO TO SLEEP AND NOT WAKE UP?: NO
2. HAVE YOU ACTUALLY HAD ANY THOUGHTS OF KILLING YOURSELF?: NO
6. HAVE YOU EVER DONE ANYTHING, STARTED TO DO ANYTHING, OR PREPARED TO DO ANYTHING TO END YOUR LIFE?: NO

## 2024-06-17 ASSESSMENT — LIFESTYLE VARIABLES
SKIP TO QUESTIONS 9-10: 0
HOW OFTEN DO YOU HAVE 6 OR MORE DRINKS ON ONE OCCASION: PATIENT UNABLE TO ANSWER
HOW MANY STANDARD DRINKS CONTAINING ALCOHOL DO YOU HAVE ON A TYPICAL DAY: PATIENT UNABLE TO ANSWER
AUDIT-C TOTAL SCORE: -1
HOW OFTEN DO YOU HAVE A DRINK CONTAINING ALCOHOL: PATIENT UNABLE TO ANSWER
AUDIT-C TOTAL SCORE: -1

## 2024-06-17 ASSESSMENT — ACTIVITIES OF DAILY LIVING (ADL)
BATHING: NEEDS ASSISTANCE
TOILETING: NEEDS ASSISTANCE
ADEQUATE_TO_COMPLETE_ADL: YES
GROOMING: NEEDS ASSISTANCE
JUDGMENT_ADEQUATE_SAFELY_COMPLETE_DAILY_ACTIVITIES: UNABLE TO ASSESS
DRESSING YOURSELF: NEEDS ASSISTANCE
ASSISTIVE_DEVICE: CANE;WALKER
FEEDING YOURSELF: NEEDS ASSISTANCE
PATIENT'S MEMORY ADEQUATE TO SAFELY COMPLETE DAILY ACTIVITIES?: UNABLE TO ASSESS
HEARING - LEFT EAR: FUNCTIONAL
HEARING - RIGHT EAR: FUNCTIONAL
LACK_OF_TRANSPORTATION: PATIENT UNABLE TO ANSWER
WALKS IN HOME: NEEDS ASSISTANCE

## 2024-06-17 ASSESSMENT — COGNITIVE AND FUNCTIONAL STATUS - GENERAL
STANDING UP FROM CHAIR USING ARMS: A LITTLE
MOVING FROM LYING ON BACK TO SITTING ON SIDE OF FLAT BED WITH BEDRAILS: A LITTLE
WALKING IN HOSPITAL ROOM: A LITTLE
DAILY ACTIVITIY SCORE: 18
PERSONAL GROOMING: A LITTLE
MOVING TO AND FROM BED TO CHAIR: A LITTLE
EATING MEALS: A LITTLE
TURNING FROM BACK TO SIDE WHILE IN FLAT BAD: A LITTLE
DRESSING REGULAR LOWER BODY CLOTHING: A LITTLE
TOILETING: A LITTLE
MOBILITY SCORE: 18
PATIENT BASELINE BEDBOUND: NO
HELP NEEDED FOR BATHING: A LITTLE
CLIMB 3 TO 5 STEPS WITH RAILING: A LITTLE
DRESSING REGULAR UPPER BODY CLOTHING: A LITTLE

## 2024-06-17 ASSESSMENT — PAIN DESCRIPTION - LOCATION: LOCATION: ABDOMEN

## 2024-06-17 ASSESSMENT — PAIN SCALES - GENERAL: PAINLEVEL_OUTOF10: 10 - WORST POSSIBLE PAIN

## 2024-06-17 ASSESSMENT — PAIN - FUNCTIONAL ASSESSMENT
PAIN_FUNCTIONAL_ASSESSMENT: CPOT (CRITICAL CARE PAIN OBSERVATION TOOL)
PAIN_FUNCTIONAL_ASSESSMENT: 0-10

## 2024-06-17 ASSESSMENT — PAIN DESCRIPTION - PAIN TYPE: TYPE: ACUTE PAIN

## 2024-06-17 ASSESSMENT — PAIN DESCRIPTION - PROGRESSION: CLINICAL_PROGRESSION: RESOLVED

## 2024-06-17 NOTE — ED TRIAGE NOTES
BIB EMS, n/v/d x3 days. Pt is poor historian. H/o Parkinson's. Spouse called EMS for pt. Pt c/o generalized abd px. H/o umbilical hernia. EMS states AxOx4, . In triage pt is confused. In and out of lucidity w/ speech.

## 2024-06-17 NOTE — ED PROVIDER NOTES
HPI   Chief Complaint   Patient presents with    Abdominal Pain       79-year-old female presents today with confusion, headache, chest pain, abdominal pain, nausea and vomiting x 3 days.  Her blood glucose on the squad was 177.  Her blood pressure was 160/100.  Her heart rate was 90 bpm.  SpO2 was 95.  Patient is confused in her responses and occasionally she will return to becoming lucid and answering questions appropriately but there is obvious confusion and there is also a possible concern for vision change.  Family is not with her as yet but we will try and find a more information from family.  Patient is saying yes to every question so it is hard to discern correct answers.  She endorses a headache, chest pain, abdominal pain, nausea or vomiting.  During palpation of her abdomen she had acute rebound tenderness bilateral lower and she has a known history of hernia.  She has a known history of abdominal pain with nausea and vomiting.      History provided by:  Patient and EMS personnel   used: No                        [place] Coma Scale Score: 14         NIH Stroke Scale: 1             Patient History   Past Medical History:   Diagnosis Date    Cervical dystonia     Chronic respiratory failure with hypoxia     Class 1 obesity with body mass index (BMI) of 30.0 to 30.9 in adult 01/22/2024    COPD (chronic obstructive pulmonary disease)     Dry eyes, bilateral     GERD (gastroesophageal reflux disease)     Hiatal hernia     HTN (hypertension)     Hypothyroid     Parkinson's disease      Past Surgical History:   Procedure Laterality Date    MR HEAD ANGIO WO IV CONTRAST  6/10/2022    MR HEAD ANGIO WO IV CONTRAST 6/10/2022 [facility]    MR NECK ANGIO WO IV CONTRAST  6/10/2022    MR NECK ANGIO WO IV CONTRAST 6/10/2022 [facility]    OTHER SURGICAL HISTORY  12/29/2020    Back surgery    OTHER SURGICAL HISTORY  12/29/2020    Hysterectomy     No family history on file.  Social  History     Tobacco Use    Smoking status: Former     Current packs/day: 0.00     Types: Cigarettes     Quit date: 8/29/2013     Years since quitting: 10.8    Smokeless tobacco: Never   Vaping Use    Vaping status: Never Used   Substance Use Topics    Alcohol use: Never    Drug use: Never       Physical Exam   ED Triage Vitals [06/17/24 1741]   Temperature Heart Rate Respirations BP   36.4 °C (97.5 °F) 86 20 --      Pulse Ox Temp Source Heart Rate Source Patient Position   97 % Temporal Monitor Lying      BP Location FiO2 (%)     Right arm 28 %       Physical Exam  Constitutional:       Appearance: She is ill-appearing and toxic-appearing.   HENT:      Head: Normocephalic.      Mouth/Throat:      Mouth: Mucous membranes are moist.      Pharynx: Oropharynx is clear.   Eyes:      Extraocular Movements: Extraocular movements intact.   Cardiovascular:      Rate and Rhythm: Normal rate and regular rhythm.      Heart sounds: Murmur heard.   Pulmonary:      Effort: Pulmonary effort is normal.      Breath sounds: Normal breath sounds.   Abdominal:      General: Bowel sounds are normal. There is no distension or abdominal bruit. There are no signs of injury.      Palpations: Abdomen is soft.      Tenderness: There is abdominal tenderness in the right lower quadrant and left lower quadrant.      Hernia: A hernia is present. Hernia is present in the ventral area. There is no hernia in the umbilical area.      Comments: Patient has large hemorrhoids appreciated during rectal exam   Genitourinary:     Rectum: Guaiac result negative. Tenderness present.   Skin:     General: Skin is warm.      Capillary Refill: Capillary refill takes less than 2 seconds.   Neurological:      General: No focal deficit present.      Mental Status: She is alert and oriented to person, place, and time.         ED Course & MDM   Diagnoses as of 06/17/24 2043   Prolonged Q-T interval on ECG   Lactate blood increase   Abdominal pain, unspecified  abdominal location   Hemorrhoids, unspecified hemorrhoid type   UTI (urinary tract infection), uncomplicated   History of Parkinson's disease       Medical Decision Making  CT head ordered with confusion and possible vision cut.  Chest x-ray ordered with chest pain.  CT abdomen pelvis with acute abdominal pain to the left and right lower quadrant.  Basic labs were ordered.  Moved to a CT noncontrast as patient is allergic to contrast dye.  Vital signs rechecked with a blood pressure 154/91 respirations 24.  It appears to be in the later stages of Parkinson's.  Lipase was normal.  Metabolic panel had a potassium of 3.3 and we repleted with 20 mill equivalent liquid potassium.  Lactate was 2.8 which could be due to her shaking but I ordered follow-up lactate and patient received a liter of fluid.  Occult blood was positive and this could also be due to to her extremely large hemorrhoids appreciated during exam.  White blood cell count was 14.7 this could be stress related.  CT head, CT abdomen pelvis ordered.  Chest x-ray showed mild enlargement of the cardiac silhouette without acute abnormality.  She was seen and staffed with attending.  Patient improved after half milligram of Dilaudid and was not shaking as much seem more calm.  The urinalysis was positive for UTI and she was treated with Rocephin 1 g.  Urine culture was ordered.  She had +2 nitrates and 75 leuks and 10 white blood cells.  Lipase was normal.  CT head no acute intercranial process.  CT abdomen pelvis no acute process in the abdomen or pelvis.    Amount and/or Complexity of Data Reviewed  Labs: ordered.  Radiology: ordered.  ECG/medicine tests: ordered and independent interpretation performed.     Details: EKG is 74 bpm.  Left axis.  GA interval is normal.  QT corrected is slightly prolonged.  No ST elevation or depression.  Interpreted both by attending and myself.        Procedure  Procedures     Thierry Minor, LILIBETH-CNP  06/17/24 2043

## 2024-06-18 ENCOUNTER — APPOINTMENT (OUTPATIENT)
Dept: OPERATING ROOM | Facility: HOSPITAL | Age: 79
End: 2024-06-18
Payer: MEDICARE

## 2024-06-18 ENCOUNTER — ANESTHESIA (OUTPATIENT)
Dept: OPERATING ROOM | Facility: HOSPITAL | Age: 79
End: 2024-06-18
Payer: MEDICARE

## 2024-06-18 LAB
AMPHETAMINES UR QL SCN: ABNORMAL
ANION GAP SERPL CALC-SCNC: 16 MMOL/L (ref 10–20)
APPEARANCE UR: CLEAR
BARBITURATES UR QL SCN: ABNORMAL
BENZODIAZ UR QL SCN: ABNORMAL
BILIRUB UR STRIP.AUTO-MCNC: NEGATIVE MG/DL
BUN SERPL-MCNC: 15 MG/DL (ref 6–23)
BZE UR QL SCN: ABNORMAL
CALCIUM SERPL-MCNC: 9 MG/DL (ref 8.6–10.3)
CANNABINOIDS UR QL SCN: ABNORMAL
CARDIAC TROPONIN I PNL SERPL HS: 22 NG/L (ref 0–13)
CARDIAC TROPONIN I PNL SERPL HS: 24 NG/L (ref 0–13)
CHLORIDE SERPL-SCNC: 103 MMOL/L (ref 98–107)
CO2 SERPL-SCNC: 28 MMOL/L (ref 21–32)
COLOR UR: YELLOW
CREAT SERPL-MCNC: 0.74 MG/DL (ref 0.5–1.05)
EGFRCR SERPLBLD CKD-EPI 2021: 83 ML/MIN/1.73M*2
ERYTHROCYTE [DISTWIDTH] IN BLOOD BY AUTOMATED COUNT: 12.6 % (ref 11.5–14.5)
FENTANYL+NORFENTANYL UR QL SCN: ABNORMAL
GLUCOSE BLD MANUAL STRIP-MCNC: 131 MG/DL (ref 74–99)
GLUCOSE SERPL-MCNC: 121 MG/DL (ref 74–99)
GLUCOSE UR STRIP.AUTO-MCNC: NORMAL MG/DL
HCT VFR BLD AUTO: 44.4 % (ref 36–46)
HGB BLD-MCNC: 14.1 G/DL (ref 12–16)
KETONES UR STRIP.AUTO-MCNC: ABNORMAL MG/DL
LEUKOCYTE ESTERASE UR QL STRIP.AUTO: ABNORMAL
MAGNESIUM SERPL-MCNC: 1.35 MG/DL (ref 1.6–2.4)
MCH RBC QN AUTO: 31.1 PG (ref 26–34)
MCHC RBC AUTO-ENTMCNC: 31.8 G/DL (ref 32–36)
MCV RBC AUTO: 98 FL (ref 80–100)
METHADONE UR QL SCN: ABNORMAL
MUCOUS THREADS #/AREA URNS AUTO: ABNORMAL /LPF
NITRITE UR QL STRIP.AUTO: NEGATIVE
NRBC BLD-RTO: 0 /100 WBCS (ref 0–0)
OPIATES UR QL SCN: ABNORMAL
OXYCODONE+OXYMORPHONE UR QL SCN: ABNORMAL
PCP UR QL SCN: ABNORMAL
PH UR STRIP.AUTO: 6 [PH]
PLATELET # BLD AUTO: 238 X10*3/UL (ref 150–450)
POTASSIUM SERPL-SCNC: 3.1 MMOL/L (ref 3.5–5.3)
PROT UR STRIP.AUTO-MCNC: ABNORMAL MG/DL
RBC # BLD AUTO: 4.54 X10*6/UL (ref 4–5.2)
RBC # UR STRIP.AUTO: ABNORMAL /UL
RBC #/AREA URNS AUTO: ABNORMAL /HPF
SODIUM SERPL-SCNC: 144 MMOL/L (ref 136–145)
SP GR UR STRIP.AUTO: 1.03
SQUAMOUS #/AREA URNS AUTO: ABNORMAL /HPF
UROBILINOGEN UR STRIP.AUTO-MCNC: NORMAL MG/DL
WBC # BLD AUTO: 11.9 X10*3/UL (ref 4.4–11.3)
WBC #/AREA URNS AUTO: ABNORMAL /HPF

## 2024-06-18 PROCEDURE — 2500000001 HC RX 250 WO HCPCS SELF ADMINISTERED DRUGS (ALT 637 FOR MEDICARE OP)

## 2024-06-18 PROCEDURE — 83735 ASSAY OF MAGNESIUM: CPT | Performed by: PHYSICIAN ASSISTANT

## 2024-06-18 PROCEDURE — 97162 PT EVAL MOD COMPLEX 30 MIN: CPT | Mod: GP

## 2024-06-18 PROCEDURE — 97165 OT EVAL LOW COMPLEX 30 MIN: CPT | Mod: GO

## 2024-06-18 PROCEDURE — 36415 COLL VENOUS BLD VENIPUNCTURE: CPT

## 2024-06-18 PROCEDURE — 2500000004 HC RX 250 GENERAL PHARMACY W/ HCPCS (ALT 636 FOR OP/ED): Performed by: FAMILY MEDICINE

## 2024-06-18 PROCEDURE — 2500000004 HC RX 250 GENERAL PHARMACY W/ HCPCS (ALT 636 FOR OP/ED)

## 2024-06-18 PROCEDURE — 85027 COMPLETE CBC AUTOMATED: CPT

## 2024-06-18 PROCEDURE — 87086 URINE CULTURE/COLONY COUNT: CPT | Mod: 91,GEALAB | Performed by: FAMILY MEDICINE

## 2024-06-18 PROCEDURE — 2500000002 HC RX 250 W HCPCS SELF ADMINISTERED DRUGS (ALT 637 FOR MEDICARE OP, ALT 636 FOR OP/ED): Performed by: PHYSICIAN ASSISTANT

## 2024-06-18 PROCEDURE — 82947 ASSAY GLUCOSE BLOOD QUANT: CPT

## 2024-06-18 PROCEDURE — 2500000002 HC RX 250 W HCPCS SELF ADMINISTERED DRUGS (ALT 637 FOR MEDICARE OP, ALT 636 FOR OP/ED)

## 2024-06-18 PROCEDURE — 2500000004 HC RX 250 GENERAL PHARMACY W/ HCPCS (ALT 636 FOR OP/ED): Performed by: PHYSICIAN ASSISTANT

## 2024-06-18 PROCEDURE — 1200000002 HC GENERAL ROOM WITH TELEMETRY DAILY

## 2024-06-18 PROCEDURE — 84484 ASSAY OF TROPONIN QUANT: CPT | Mod: 91 | Performed by: PHYSICIAN ASSISTANT

## 2024-06-18 PROCEDURE — 81001 URINALYSIS AUTO W/SCOPE: CPT | Performed by: PHYSICIAN ASSISTANT

## 2024-06-18 PROCEDURE — 2500000001 HC RX 250 WO HCPCS SELF ADMINISTERED DRUGS (ALT 637 FOR MEDICARE OP): Performed by: PHYSICIAN ASSISTANT

## 2024-06-18 PROCEDURE — 96372 THER/PROPH/DIAG INJ SC/IM: CPT

## 2024-06-18 PROCEDURE — 36415 COLL VENOUS BLD VENIPUNCTURE: CPT | Performed by: PHYSICIAN ASSISTANT

## 2024-06-18 PROCEDURE — 80048 BASIC METABOLIC PNL TOTAL CA: CPT

## 2024-06-18 PROCEDURE — 87086 URINE CULTURE/COLONY COUNT: CPT | Mod: GEALAB | Performed by: PHYSICIAN ASSISTANT

## 2024-06-18 PROCEDURE — C9113 INJ PANTOPRAZOLE SODIUM, VIA: HCPCS | Performed by: PHYSICIAN ASSISTANT

## 2024-06-18 RX ORDER — LOTEPREDNOL ETABONATE 5 MG/ML
1 SUSPENSION/ DROPS OPHTHALMIC
Status: ON HOLD | COMMUNITY
End: 2024-06-21 | Stop reason: ENTERED-IN-ERROR

## 2024-06-18 RX ORDER — METOCLOPRAMIDE HYDROCHLORIDE 5 MG/ML
5 INJECTION INTRAMUSCULAR; INTRAVENOUS EVERY 6 HOURS SCHEDULED
Status: DISCONTINUED | OUTPATIENT
Start: 2024-06-18 | End: 2024-06-18

## 2024-06-18 RX ORDER — PANTOPRAZOLE SODIUM 40 MG/10ML
40 INJECTION, POWDER, LYOPHILIZED, FOR SOLUTION INTRAVENOUS 2 TIMES DAILY
Status: DISCONTINUED | OUTPATIENT
Start: 2024-06-18 | End: 2024-06-24 | Stop reason: HOSPADM

## 2024-06-18 RX ORDER — ZOLPIDEM TARTRATE 10 MG/1
10 TABLET ORAL NIGHTLY PRN
Status: ON HOLD | COMMUNITY
End: 2024-06-24

## 2024-06-18 RX ORDER — ONDANSETRON HYDROCHLORIDE 2 MG/ML
4 INJECTION, SOLUTION INTRAVENOUS EVERY 6 HOURS PRN
Status: DISCONTINUED | OUTPATIENT
Start: 2024-06-18 | End: 2024-06-24 | Stop reason: HOSPADM

## 2024-06-18 RX ORDER — POLYETHYLENE GLYCOL 3350 17 G/17G
17 POWDER, FOR SOLUTION ORAL DAILY
Status: DISCONTINUED | OUTPATIENT
Start: 2024-06-18 | End: 2024-06-24 | Stop reason: HOSPADM

## 2024-06-18 RX ORDER — PREGABALIN 100 MG/1
100 CAPSULE ORAL 2 TIMES DAILY
COMMUNITY

## 2024-06-18 RX ORDER — ALPRAZOLAM 1 MG/1
1 TABLET ORAL DAILY
COMMUNITY
End: 2024-06-24 | Stop reason: HOSPADM

## 2024-06-18 RX ORDER — ACETAMINOPHEN 325 MG/1
650 TABLET ORAL EVERY 6 HOURS PRN
Status: DISCONTINUED | OUTPATIENT
Start: 2024-06-18 | End: 2024-06-24 | Stop reason: HOSPADM

## 2024-06-18 RX ORDER — FUROSEMIDE 20 MG/1
20 TABLET ORAL DAILY PRN
COMMUNITY

## 2024-06-18 RX ORDER — TIZANIDINE HYDROCHLORIDE 4 MG/1
4 CAPSULE, GELATIN COATED ORAL 3 TIMES DAILY PRN
COMMUNITY

## 2024-06-18 RX ORDER — POTASSIUM CHLORIDE 14.9 MG/ML
20 INJECTION INTRAVENOUS
Status: COMPLETED | OUTPATIENT
Start: 2024-06-18 | End: 2024-06-18

## 2024-06-18 RX ORDER — CARBIDOPA AND LEVODOPA 25; 100 MG/1; MG/1
2 TABLET ORAL 3 TIMES DAILY
COMMUNITY

## 2024-06-18 RX ORDER — SODIUM CHLORIDE 9 MG/ML
75 INJECTION, SOLUTION INTRAVENOUS CONTINUOUS
Status: DISCONTINUED | OUTPATIENT
Start: 2024-06-18 | End: 2024-06-21

## 2024-06-18 RX ORDER — ACETAMINOPHEN 325 MG/1
650 TABLET ORAL EVERY 6 HOURS PRN
Status: DISCONTINUED | OUTPATIENT
Start: 2024-06-18 | End: 2024-06-18

## 2024-06-18 RX ORDER — ROSUVASTATIN CALCIUM 20 MG/1
20 TABLET, COATED ORAL DAILY
COMMUNITY

## 2024-06-18 RX ORDER — ROSUVASTATIN CALCIUM 20 MG/1
20 TABLET, COATED ORAL NIGHTLY
Status: DISCONTINUED | OUTPATIENT
Start: 2024-06-18 | End: 2024-06-24 | Stop reason: HOSPADM

## 2024-06-18 RX ORDER — MAGNESIUM SULFATE HEPTAHYDRATE 40 MG/ML
4 INJECTION, SOLUTION INTRAVENOUS ONCE
Status: COMPLETED | OUTPATIENT
Start: 2024-06-18 | End: 2024-06-18

## 2024-06-18 RX ORDER — VARENICLINE 0.03 MG/.05ML
1 SPRAY NASAL 2 TIMES DAILY
Status: ON HOLD | COMMUNITY
End: 2024-06-21 | Stop reason: ENTERED-IN-ERROR

## 2024-06-18 RX ORDER — DOCUSATE SODIUM 100 MG/1
100 CAPSULE, LIQUID FILLED ORAL 2 TIMES DAILY
Status: DISCONTINUED | OUTPATIENT
Start: 2024-06-18 | End: 2024-06-24 | Stop reason: HOSPADM

## 2024-06-18 RX ORDER — METOCLOPRAMIDE HYDROCHLORIDE 5 MG/ML
5 INJECTION INTRAMUSCULAR; INTRAVENOUS EVERY 8 HOURS SCHEDULED
Status: DISCONTINUED | OUTPATIENT
Start: 2024-06-18 | End: 2024-06-20

## 2024-06-18 ASSESSMENT — COGNITIVE AND FUNCTIONAL STATUS - GENERAL
MOVING TO AND FROM BED TO CHAIR: A LITTLE
EATING MEALS: A LITTLE
MOBILITY SCORE: 14
CLIMB 3 TO 5 STEPS WITH RAILING: TOTAL
WALKING IN HOSPITAL ROOM: TOTAL
WALKING IN HOSPITAL ROOM: A LITTLE
TURNING FROM BACK TO SIDE WHILE IN FLAT BAD: A LITTLE
CLIMB 3 TO 5 STEPS WITH RAILING: TOTAL
MOVING FROM LYING ON BACK TO SITTING ON SIDE OF FLAT BED WITH BEDRAILS: A LITTLE
TOILETING: A LOT
DAILY ACTIVITIY SCORE: 18
PERSONAL GROOMING: A LITTLE
PERSONAL GROOMING: A LITTLE
HELP NEEDED FOR BATHING: A LOT
STANDING UP FROM CHAIR USING ARMS: A LITTLE
MOVING TO AND FROM BED TO CHAIR: A LITTLE
DRESSING REGULAR UPPER BODY CLOTHING: A LITTLE
TURNING FROM BACK TO SIDE WHILE IN FLAT BAD: A LITTLE
TOILETING: A LITTLE
MOBILITY SCORE: 19
WALKING IN HOSPITAL ROOM: TOTAL
DRESSING REGULAR LOWER BODY CLOTHING: A LITTLE
HELP NEEDED FOR BATHING: A LOT
EATING MEALS: A LITTLE
MOBILITY SCORE: 14
MOVING TO AND FROM BED TO CHAIR: A LITTLE
HELP NEEDED FOR BATHING: A LITTLE
DAILY ACTIVITIY SCORE: 15
DRESSING REGULAR UPPER BODY CLOTHING: A LITTLE
STANDING UP FROM CHAIR USING ARMS: A LITTLE
PERSONAL GROOMING: A LITTLE
DRESSING REGULAR UPPER BODY CLOTHING: A LITTLE
DRESSING REGULAR LOWER BODY CLOTHING: A LOT
MOVING FROM LYING ON BACK TO SITTING ON SIDE OF FLAT BED WITH BEDRAILS: A LITTLE
TURNING FROM BACK TO SIDE WHILE IN FLAT BAD: A LITTLE
MOVING FROM LYING ON BACK TO SITTING ON SIDE OF FLAT BED WITH BEDRAILS: A LITTLE
DRESSING REGULAR LOWER BODY CLOTHING: A LOT
STANDING UP FROM CHAIR USING ARMS: A LITTLE
EATING MEALS: A LITTLE

## 2024-06-18 ASSESSMENT — PAIN - FUNCTIONAL ASSESSMENT
PAIN_FUNCTIONAL_ASSESSMENT: 0-10
PAIN_FUNCTIONAL_ASSESSMENT: 0-10
PAIN_FUNCTIONAL_ASSESSMENT: WONG-BAKER FACES
PAIN_FUNCTIONAL_ASSESSMENT: 0-10
PAIN_FUNCTIONAL_ASSESSMENT: WONG-BAKER FACES
PAIN_FUNCTIONAL_ASSESSMENT: 0-10

## 2024-06-18 ASSESSMENT — PAIN SCALES - WONG BAKER
WONGBAKER_NUMERICALRESPONSE: HURTS LITTLE MORE
WONGBAKER_NUMERICALRESPONSE: HURTS LITTLE BIT

## 2024-06-18 ASSESSMENT — PAIN SCALES - GENERAL
PAINLEVEL_OUTOF10: 1
PAINLEVEL_OUTOF10: 3
PAINLEVEL_OUTOF10: 6
PAINLEVEL_OUTOF10: 0 - NO PAIN
PAINLEVEL_OUTOF10: 0 - NO PAIN

## 2024-06-18 ASSESSMENT — ACTIVITIES OF DAILY LIVING (ADL)
ADL_ASSISTANCE: NEEDS ASSISTANCE
ADL_ASSISTANCE: NEEDS ASSISTANCE

## 2024-06-18 NOTE — H&P
Patient: Fidelia Norman   Age: 79 y.o.   Gender: female   Room/Bed: 219/219-B     Attending: Dwain Cerrato MD  Code Status:  Full Code    Chief Complaint:  - Confusion + Nausea/Vomiting     History of Presenting Illness  Patient unable to subjective information due to current mentation.  Majority of information was gathered via chart review.  Presents to the ED due to altered mental status, abdominal pain, nausea, vomiting for the last 3 days.  Patient appears confused but talking to her but is able to answer yes and no questions.  She mentions abdominal pain in her lower aspect of her belly.  She also mentions that she has had some nausea and vomiting.  He is unable to confirm or deny whether she is having any urinary.    Denies chest pain/pressure, headaches, fever/chills.    Past Medical History   - HTN  - Chronic Hypoxic Respiratory Failure   - COPD  - GERD  - HTN  - Hypothyroidism  - Parkinson's Disease      Past Surgical History:   - Hysterectomy  - Back Surgery     Family History:   No family history on file.  Social History:   Tobacco Use: Medium Risk (5/3/2024)    Received from [facility]    Patient History     Smoking Tobacco Use: Former     Smokeless Tobacco Use: Never     Passive Exposure: Not on file      Social History     Substance and Sexual Activity   Alcohol Use Never     Outpatient Medications:  Current Outpatient Medications   Medication Instructions    albuterol 90 mcg/actuation inhaler 2 puffs, inhalation    ALPRAZolam (Xanax) 0.5 mg tablet oral, 2 times daily    amLODIPine (NORVASC) 2.5 mg, oral, Daily    BMX ORAL SUSPENSION (1:1:1) Shake well and Refrigerate. Take 5 mL as directed by mouth every 6 hours as needed.    buPROPion XL (Wellbutrin XL) 150 mg 24 hr tablet oral, Every 24 hours    busPIRone (BUSPAR) 5 mg, oral, 2 times daily    carbidopa (LODSYN) 25 mg, oral, 3 times daily    cholecalciferol (Thera-D) 50 MCG (2000 UT) tablet oral    dicyclomine (BENTYL) 10 mg, oral, Every 8  hours PRN    donepezil (Aricept) 10 mg tablet 1 tablet, oral, Nightly    FLUoxetine (PROZAC) 100 mg, oral, Daily RT    fluticasone propion-salmeteroL (Advair Diskus) 100-50 mcg/dose diskus inhaler 1 puff, inhalation, 2 times daily    gabapentin (NEURONTIN) 300 mg, oral, 3 times daily    iron,carb-FA#9-vit C-D3-B6-B12 (NuFera) 125 mg-1 mg-170 mg-1,000 unit tablet 1 tablet, oral, Daily    lidocaine-diphenhydrAMINE-Maalox 1:1:1 Magic Mouthwash Swish and spit out as directed 5 mL by mouth every 6 hours as needed    losartan (COZAAR) 100 mg, oral, Daily    lubiprostone (AMITIZA) 24 mcg, oral, 2 times daily    memantine (Namenda) 10 mg tablet oral, 2 times daily    omeprazole (PRILOSEC) 20 mg, oral, 2 times daily    ondansetron (ZOFRAN) 8 mg, oral, Every 8 hours PRN    potassium chloride CR 20 mEq ER tablet 1 tablet, oral, Daily    sucralfate (CARAFATE) 1 g, oral, 2 times daily    traZODone (DESYREL) 50 mg, oral, 2 times daily     ED Course   Vitals -   /78   Pulse 85   Temp 37 °C (98.6 °F)   Resp 20   Wt 74 kg (163 lb 2.3 oz)   SpO2 95%     Labs:   CBC:  Recent Labs     06/17/24  1752 01/23/24  0634 01/22/24  1310   WBC 14.7* 9.9 9.5   HGB 15.5 12.8 14.5   HCT 47.8* 40.9 44.6    218 255   MCV 98 100 96     CMP:  Recent Labs     06/17/24  1752 01/25/24  0547 01/24/24  1627 01/24/24  0606    141  --  143   K 3.3* 3.1* 3.9 2.7*    102  --  100   CO2 26 32  --  35*   ANIONGAP 20 10  --  11   BUN 15 13  --  15   CREATININE 0.89 0.74  --  0.82   EGFR 67 84  --  74   GLUCOSE 175* 88  --  80     Recent Labs     06/17/24  1752 01/23/24  0634 01/22/24  1310 09/27/23  0230   ALBUMIN 4.5 3.4 4.0 3.5   ALKPHOS 59 41 47 59   ALT 20 18 16 3*   AST 28 31 21 12   BILITOT 0.8 0.7 0.8 0.2   LIPASE 12  --  13 19     Calcium/Phos:   Lab Results   Component Value Date    CALCIUM 9.9 06/17/2024    PHOS 2.1 (L) 07/20/2023      COAG:   Recent Labs     09/28/23  0337 07/16/23  1020 12/18/20  1039   INR 1.0 1.1 1.0      CRP:   Lab Results   Component Value Date    CRP 9.84 (A) 12/23/2020      ENDO:  Recent Labs     01/23/24  0634 08/11/23  1239 06/10/22  0638 06/09/22  0652 02/16/22  1300   TSH  --   --   --  0.30*  --    HGBA1C 4.9 5.1 5.2  --  5.4      CARDIAC:   Recent Labs     01/22/24  2328 01/22/24  1404 01/22/24  1310 07/19/23  2130 07/19/23  2024 07/16/23  1130 07/16/23  1020 06/10/22  0638   TROPHS 16* 15* 14*   < > 5   < > 4 4   BNP  --   --   --   --  41  --  58 22    < > = values in this interval not displayed.     Recent Labs     01/23/24  0634 06/10/22  0638 06/06/20  0818   CHOL 141 197 217*   LDLF  --  109* 124*   LDLCALC 58  --   --    HDL 48.0 56.0 47.8   TRIG 177* 160* 226*     No data recorded    Micro/ID:   No results found for the last 90 days.    Lab Results   Component Value Date    URINECULTURE No significant growth 01/22/2024    BLOODCULT  07/20/2023     No Growth at 1 days~No Growth at 2 days~No Growth at 3 days~NO GROWTH at 4 days - FINAL REPORT     Imaging:   - Chest X-Ray: mild enlargement of the cardiac silhouette without acute abnormality  - CT Head without Contrast: no acute intracranial abnormality  - CT Abdomen/Pelvis: no acute process in the abdomen or pelvis     Interventions: In the ED, the patient was given 1L of NS + Pantoprazole 40 mg + Ceftriaxone and admitted onto the floor for further management.     Objective Data  Physical Exam  HENT:      Mouth/Throat:      Mouth: Mucous membranes are moist.      Pharynx: Oropharynx is clear.   Cardiovascular:      Rate and Rhythm: Normal rate and regular rhythm.      Pulses: Normal pulses.      Heart sounds: Normal heart sounds.   Pulmonary:      Effort: Pulmonary effort is normal.      Breath sounds: Normal breath sounds.   Abdominal:      General: Abdomen is flat. Bowel sounds are normal.      Palpations: Abdomen is soft.   Musculoskeletal:      Right lower leg: No edema.      Left lower leg: No edema.   Skin:     Coloration: Skin is not  jaundiced.   Neurological:      Mental Status: She is disoriented.        FiO2 (%):  [28 %] 28 %     Assessment and Plan   Fidelia Norman is a 79 y.o. female with a past medical history significant for HTN, Parkinson's Disease, and Depression who presented to the ED for AMS + Nausea/Vomiting. Admitted onto the floor for AMS likely in the setting of a UTI.     Acute Medical Issues:    # Leukocytosis + AMS   2/2 UTI vs Progression of Dementia   - WBC: 14.7 on admission   - UA: 2+ protein, 1+ blood, 2+ nitrite, 75 LE's, 6-10 WBC's, 1+ bacteria   - Microdata:  -- Blood Culture x2: ordered and collected  -- Urine Culture: ordered and collected  -- No recent Urine Cultures  [ ] Ceftriaxone 1g (6/17 - )    Chronic Medical Issues:     # HTN: c/w Amlodipine 2.5 mg every day   # Parkinson's Disease: c/w Carbidopa 25 mg TID + Donepezil 10 mg at bedtime + Memantine 10 mg BID   # Depression/Anxiety: c/w Trazodone 50 mg BID, hold Fluoxetine 100 mg every day Bupropion 150 mg every day     Fluids: None  Electrolytes: replete as needed  Nutrition: Regular Diet  GI Prophylaxis: None  DVT Prophylaxis: Lovenox    Access: PIV  Antibiotics: Ceftriaxone 1g (6/17 - )  Oxygenation: Room Air    Dispo: Admitted on to the floor for altered mental status likely in the setting of a UTI. Expected LOS less than 48 hours.     Jose Whiting DO  PGY-3, Internal Medicine

## 2024-06-18 NOTE — SIGNIFICANT EVENT
Mentation improved by afternoon, she is currently oriented x 2.   is at the bedside and said she is less confused than she has been at home but still not back to her usual self.  He states that 3 days ago, she stopped eating, drinking and couldn't sleep. She was vomiting at home as well.     SLP recs bite sized food and thin liquids.  Continue IVF until tomorrow, monitor oral intake.    Patient has not vomited today and denies nausea.  She denies constipation.  She had a fever this afternoon.  Continue IV antibiotics and follow cultures.      She has chronic abdominal pain and was scheduled for outpt EGD this week with Dr Lala to evaluate hiatal hernia.  Explained that this will need to be evaluated as outpt.     PT/OT rec SNF, patient and  are agreeable.

## 2024-06-18 NOTE — PROGRESS NOTES
Speech-Language Pathology Clinical Swallow Evaluation    Patient Name: Fidelia Norman  MRN: [id number]  : 1945  Today's Date: 24  Start time: Start Time: 1400  Stop time: Stop Time: 1430  Time calculation (min) : Time Calculation (min): 30 min      ASSESSMENT  Impressions:  Mild oral phase and no suspected pharyngeal phase dysphagia based on clinical swallow evaluation. Pt reports mastication difficulties at baseline and tends to refrain from eating hard to chew foods.   Prognosis: Good    PLAN  Recommendations:  MBSS recommended: No; pt appears to be at or near functional baseline.  Solid consistency: Soft/bite-sized (IDDSI level 6)  Liquid consistency: Thin (IDDSI 0)  Medication administration: Crushed, in puree; pt preference is pudding  Compensatory swallow strategies: Upright positioning for all PO intake, Remain upright for >30 min after meals, Slow rate of intake, Small bites, Small sips, Single sips, and Alternate bites and sips    Recommended frequency/duration:  Skilled SLP services recommended: Yes  Frequency: as needed  Duration: x1 follow-up visit to ensure ongoing safety with current diet  Discharge recommendation: Unable to determine at this time; please see follow-up notes for DC recommendation.  Treatment/Interventions: Assess diet tolerance, Bolus trials, Diet recommendations  Strengths: N/A  Barriers to participation in tx: Family/caregiver support, Severity of symptoms, and Comorbidities    Goals (start date 24. Anticipated time frame for goal attainment: 1 week):  Pt will consume prescribed diet (current diet is soft & bite-sized/thin) without overt s/sx aspiration/penetration in 95% of observed trials.   Status: Goal initiated this date   Progress this date: N/A       SUBJECTIVE    PMHx relevant to rehab:   - HTN  - Chronic Hypoxic Respiratory Failure   - COPD  - GERD  - HTN  - Hypothyroidism  - Parkinson's Disease    Chief complaint: Pt was admitted on 24 due to confusion  and Nausea/Vomiting     Relevant imaging results:  CXR:   Mild enlargement of the cardiac silhouette without acute  abnormality    CT Head:  No acute intracranial abnormality.     General Visit Information:  SLP Received On: 06/18/24  Patient Class: Inpatient  Living Environment: Home  Ordering Physician: Kelly Quezada DO  Reason for Referral: Swallow evaluation  Prior to Session Communication: Bedside nurse    RN cleared pt to participate in session and reported pt to fail nurse swallow screening. Pt coughed on mildly thickened liquids then placed on moderately thickened liquids until SLP could evaluate. RN reported pt has reduced alertness during screening.    Pt reported recent history of swallowing difficulties and lack of appetite. Pt refrains from consuming hard to chew solids at home.     Date of Onset: 06/17/24  Date of Order: 06/18/24  [employer] Diet: Regular/thin  Current Diet : Puree/moderately thickened    Pt was alert, pleasant, and cooperative for session.  Orientation: Oriented to self, Oriented to hospital but not name of facility, and Partially oriented to situation  Ability to follow functional commands: Follows simple commands  Nutritional status: Decreased appetite (acute)    Respiratory status: Supplemental oxygen via NC  Baseline Vocal Quality: Weak  Volitional Cough: Weak  Volitional Swallow: Delayed  Patient positioning: Upright in bed      OBJECTIVE  Clinical swallow evaluation completed and consisted of interview, oral motor assessment, and PO trials (~5 oz of thin liquid, 2 bites pudding, and 2 bites of orly cracker).  ORAL PHASE: Natural dentition in fair condition. Oral mucosa were pink, moist, and free of obvious lesions. Lingual strength was WFL, however lingual ROM and coordination were impaired. Lingual tremors noted. Labial strength/ROM were diminished. Labial seal was adequate. Mastication of regular solids was partially labored. A/P transit was delayed and oral clearance  was complete.  PHARYNGEAL PHASE: Laryngeal elevation was visualized or palpated with all trials, however adequacy of hyolaryngeal elevation/excursion cannot be determined at bedside. No immediate or delayed s/sx aspiration/penetration were observed with any consistencies.  ESOPHAGEAL PHASE: Concern for esophageal dysphagia due to pt reported sensation of retention at the approximate level of the UES with PO trials. This is likely attributable to known tertiary contractions based on recent esophagram.    Was 3oz challenge administered: Yes; pt drank 3oz of thin liquid in one attempt, without breaking, with no overt s/sx aspiration/penetration observed.      Treatment/Education:  Results and recommendations were relayed to: Patient and Bedside nurse  Education provided: Yes   Learner: Patient   Barriers to learning: Acuteness of illness barrier   Method of teaching: Verbal   Topic: role of ST, results of assessment, and recommended diet modifications   Outcome of teaching: Pt demonstrated partial understanding  Treatment provided: No  Next Treatment Priority: Follow up visit to ensure diet recommendation remains appropriate

## 2024-06-18 NOTE — PROGRESS NOTES
Fidelia Norman is a 79 y.o. female on day 0 of admission presenting with UTI (urinary tract infection), uncomplicated.      Subjective   She complains of chest and abdominal pain, n/v.  She is oriented x 1.  Failed bedside swallow eval       Objective     Last Recorded Vitals  /75   Pulse 96   Temp 36.5 °C (97.7 °F)   Resp 18   Wt 76.9 kg (169 lb 8 oz)   SpO2 94%   Intake/Output last 3 Shifts:    Intake/Output Summary (Last 24 hours) at 6/18/2024 1011  Last data filed at 6/17/2024 2042  Gross per 24 hour   Intake 1050 ml   Output 15 ml   Net 1035 ml       Admission Weight  Weight: 74 kg (163 lb 2.3 oz) (06/17/24 1741)    Daily Weight  06/17/24 : 76.9 kg (169 lb 8 oz)    Image Results  ECG 12 lead  Normal sinus rhythm  Nonspecific T wave abnormality  Abnormal ECG  When compared with ECG of 22-JAN-2024 18:45,  Nonspecific T wave abnormality, worse in Inferior leads  QT has lengthened      Physical Exam  Physical Exam  Gen: NAD  Eyes:  EOM intact  ENT: MMM  Neck: No JVD  Respiratory: CTAB, no wheezes/rhonchi  Cardiac: RRR, no murmurs rubs or gallops  Abdomen: soft, +BS, +hiatal hernia  Extremities: no edema or cyanosis  Neuro: No focal deficits, alert and oriented x 1, tremors noted 2/2 PD  Psych:  appropriate mood and behavior      Assessment/Plan    Principal Problem:    UTI (urinary tract infection), uncomplicated  Active Problems:    Prolonged Q-T interval on ECG    Lactate blood increase    Abdominal pain    Hemorrhoids    History of Parkinson's disease    Sepsis due to urinary tract infection (Multi)  Acute metabolic encephalopathy 2/2 UTI and/or polypharmacy  -CT head negative  -continue rocephin  -follow urine and blood cultures  -check Utox  -unable to reach family for baseline mentation  -per EMR, she is on Ambien, Xanax, Lyrica, Buspar, Trazodone, Tizanidine  -Gabapentin recently increased to 300 TID  -hold Buspar, Ambien  -decrease trazodone  -she failed bedside swallow  -start  IVF  -PT/OT/SLP    Abdominal pain with n/v  -CT a/p without acute findings  -has known hiatal hernia  -per notes has had chronic pain for years  -PPI  -carafate  -occult stool positive however, ED notes large amount of hemorrhoids on exam  -H/H stable  -anti-emetics PRN  -IVF  -RD consult    Hypokalemia/hypomag  -replete  -recheck in AM    Chronic chest pain  -Trop 22, repeat now  -EKG without ischemic changes  -stress test last fall without acute ischemia  -echo last summer with preserved EF    HTN  -norvasc    PD  -sinemet  -aricept  -namenda  -PT/OT    Depression/anxiety  -bupropion  -decreased dose of Trazodone  -hold Xanax and monitor mentation  -check Utox    DVT prophy  -heparin    Dispo:  continue antibiotics, monitor mentation, SLP to evaluate today; do not anticipate dc today  D/w Dr Damien Marcus, PA-C

## 2024-06-18 NOTE — CONSULTS
"  Nutrition Assessment     Reason for Assessment: Admission nursing screening (unsure weight loss)    79 y.o. female adm with UTI (urinary tract infection), uncomplicated, AMS with N/V hx Parkinsons    Food and Nutrient History: Pt noted d/o x3 this date, no visitors present. Noted overall weight loss, however not significant per past medical records.  Per RD note (1/23/24) states  makes \"full course dinner, her only meal typically\".  Vitamin/Herbal Supplement Use: states dislikes ONS                   Difficulty chewing/swallowing: SLP consulted       Per Flowsheet Percent Meal intake: 0 (PT refused except 3 bites of pudding)  Dietary Orders (From admission, onward)       Start     Ordered    06/18/24 1123  Adult diet Regular; Pureed 4; Moderately thick 3  Diet effective now        Question Answer Comment   Diet type Regular    Texture Pureed 4    Fluid consistency Moderately thick 3        06/18/24 1122                  Feeding assistance level:      Current Facility-Administered Medications:     acetaminophen (Tylenol) tablet 650 mg, 650 mg, oral, q6h PRN, Elza Marcus PA-C, 650 mg at 06/18/24 1005    ALPRAZolam (Xanax) tablet 0.5 mg, 0.5 mg, oral, BID PRN, Jose Whiting DO    buPROPion XL (Wellbutrin XL) 24 hr tablet 150 mg, 150 mg, oral, q24h, Jose Whiting DO, 150 mg at 06/17/24 2259    [Held by provider] busPIRone (Buspar) tablet 5 mg, 5 mg, oral, BID, Jose Whiting DO, 5 mg at 06/17/24 2259    carbidopa-levodopa (Sinemet)  mg per tablet 2 tablet, 2 tablet, oral, TID, Jose Whiting DO, 2 tablet at 06/18/24 1005    cefTRIAXone (Rocephin) IVPB 1 g, 1 g, intravenous, q24h, Jose Whiting DO    donepezil (Aricept) tablet 10 mg, 10 mg, oral, Nightly, Jose Whiting DO, 10 mg at 06/17/24 2259    [Held by provider] FLUoxetine (PROzac) capsule 100 mg, 100 mg, oral, Daily, Joshuva T Henok, DO    fluticasone furoate-vilanteroL (Breo Ellipta) 100-25 mcg/dose inhaler 1 puff, 1 puff, inhalation, " Daily, Jose Whiting DO, 1 puff at 06/18/24 0612    gabapentin (Neurontin) capsule 300 mg, 300 mg, oral, TID, Jose Whiting DO, 300 mg at 06/18/24 1006    heparin (porcine) injection 5,000 Units, 5,000 Units, subcutaneous, q8h, Jose Whiting DO, 5,000 Units at 06/18/24 0611    losartan (Cozaar) tablet 100 mg, 100 mg, oral, Daily, Jose Whiting DO, 100 mg at 06/18/24 1005    lubiprostone (Amitiza) capsule 24 mcg, 24 mcg, oral, BID, Jose Whiting DO, 24 mcg at 06/18/24 1007    magnesium sulfate IV 4 g, 4 g, intravenous, Once, Ezla Marcus PA-C, Last Rate: 25 mL/hr at 06/18/24 1021, 4 g at 06/18/24 1021    memantine (Namenda) tablet 10 mg, 10 mg, oral, BID, Jose Whiting DO, 10 mg at 06/18/24 1005    ondansetron (Zofran) injection 4 mg, 4 mg, intravenous, q6h PRN, Elza Marcus PA-C    pantoprazole (ProtoNix) injection 40 mg, 40 mg, intravenous, BID, Elza Marcus PA-C, 40 mg at 06/18/24 1007    potassium chloride 20 mEq in 100 mL IV premix, 20 mEq, intravenous, q2h, Elza Marcus PA-C, Last Rate: 50 mL/hr at 06/18/24 1029, 20 mEq at 06/18/24 1029    rosuvastatin (Crestor) tablet 20 mg, 20 mg, oral, Nightly, Elza Marcus PA-C    sodium chloride 0.9% infusion, 75 mL/hr, intravenous, Continuous, Elza Marcus PA-C, Last Rate: 75 mL/hr at 06/18/24 1042, 75 mL/hr at 06/18/24 1042    sucralfate (Carafate) tablet 1 g, 1 g, oral, Before meals & nightly, Jose Whiting DO, 1 g at 06/18/24 0611    traZODone (Desyrel) tablet 50 mg, 50 mg, oral, BID, Jose Whiting DO, 50 mg at 06/18/24 1005  Results from last 7 days   Lab Units 06/18/24  0628 06/17/24  1752   GLUCOSE mg/dL 121* 175*   SODIUM mmol/L 144 143   POTASSIUM mmol/L 3.1* 3.3*   CHLORIDE mmol/L 103 100   CO2 mmol/L 28 26   BUN mg/dL 15 15   CREATININE mg/dL 0.74 0.89   EGFR mL/min/1.73m*2 83 67   CALCIUM mg/dL 9.0 9.9   MAGNESIUM mg/dL 1.35*  --      Lab Results   Component Value Date    HGBA1C 4.9 01/23/2024    HGBA1C 5.1 08/11/2023    HGBA1C 5.2  "06/10/2022     Results from last 7 days   Lab Units 06/18/24  1149   POCT GLUCOSE mg/dL 131*       Per Flowsheet:  Gastrointestinal  Gastrointestinal (WDL): Within Defined Limits  Abdomen Inspection: Soft, Distended, Rounded  Abdominal Tenderness: Tenderness  Bowel Sounds: All quadrants  Bowel Sounds (All Quadrants): Active  Gastrointestinal Symptoms: Diarrhea, Nausea, Vomiting, Loss of appetite  Hernia: Umbilical  Last bowel movement documented:    Past Medical History:   Diagnosis Date    Cervical dystonia     Chronic respiratory failure with hypoxia     Class 1 obesity with body mass index (BMI) of 30.0 to 30.9 in adult 01/22/2024    COPD (chronic obstructive pulmonary disease)     Dry eyes, bilateral     GERD (gastroesophageal reflux disease)     Hiatal hernia     HTN (hypertension)     Hypothyroid     Parkinson's disease       Past Surgical History:   Procedure Laterality Date    MR HEAD ANGIO WO IV CONTRAST  6/10/2022    MR HEAD ANGIO WO IV CONTRAST 6/10/2022 [facility]    MR NECK ANGIO WO IV CONTRAST  6/10/2022    MR NECK ANGIO WO IV CONTRAST 6/10/2022 [facility]    OTHER SURGICAL HISTORY  12/29/2020    Back surgery    OTHER SURGICAL HISTORY  12/29/2020    Hysterectomy      Allergies: Amitriptyline, Bactrim [sulfamethoxazole-trimethoprim], Iodinated contrast media, Penicillins, Topamax [topiramate], and Zoloft [sertraline]     Anthropometrics:  Height: 162.6 cm (5' 4\")  Weight: 76.9 kg (169 lb 8 oz)  BMI (Calculated): 29.08    Ideal Body Weight:   54.5kg    Daily Weight  06/17/24 : 76.9 kg (169 lb 8 oz)  04/23/24 : 78.6 kg (173 lb 4.5 oz)  01/22/24 : 81.5 kg (179 lb 10.8 oz)  09/29/23 : 76.5 kg (168 lb 10.4 oz)    Weight History / % Weight Change: 5.6%weight loss over 6months, 11%weight loss in 2years  Significant Weight Loss: No    Skin: none noted (please see nursing/wound notes for further details)  Edema: none noted  Pain Score: 3  Critical-Care Pain Observation Score:  [0] "     Estimated Nutritional Needs:  Energy Needs: 1364-1635kcal (25-30kcal/kg IBW)  Protein Needs: 77g (1g/kg CBW)  Fluid Needs: (1mL/kcal) or per MD                   Nutrition Focused Physical Findings: defer (6/18)                       Nutrition Diagnosis        Patient has Nutrition Diagnosis: Yes  New  Nutrition Diagnosis 1: Inadequate oral intake  Related to (1): current medical/sitautation status  As Evidenced by (1): pt reports 1meal/day, noted 5.6%weight loss over 6months.  Additional Assessment Information (1): Noted advancing age, some weight loss maybe d/t natural aging process.                        Nutrition Interventions/Recommendations   Intervention:  Coordination of Care: Elza Marcus PA-C        Individualized Nutrition Prescription Provided for : liberal diet as able, consistency per SLP recommendations. Pt previously stated dislikes ONS, limited intervention.    Education: Available if needs arise.    Goals: oral intake >50% and weight maintenance       Nutrition Monitoring and Evaluation   Weights  Labs  PO intake      Time Spent (min): 60 minutes

## 2024-06-18 NOTE — PROGRESS NOTES
Physical Therapy    Physical Therapy Evaluation    Patient Name: Fidelia Norman  MRN: [id number]  Today's Date: 6/18/2024   Time Calculation  Start Time: 1431  Stop Time: 1451  Time Calculation (min): 20 min    Assessment/Plan   PT Assessment  PT Assessment Results: Decreased strength, Decreased endurance, Impaired balance, Decreased cognition, Decreased mobility  Rehab Prognosis: Good  Barriers to Discharge: Acute deconditioning, Acute cognition impairment  Evaluation/Treatment Tolerance: Patient limited by fatigue  Medical Staff Made Aware: Yes  Strengths: Access to adaptive/assistive products, Housing layout, Support of Caregivers  Barriers to Participation: Comorbidities, Ability to acquire knowledge  End of Session Communication: Bedside nurse  Assessment Comment: Pt disoriented to time and situation. Pt has parkinsons with reported 2 falls in past month without injury. Demonstrates impaired mobility d/t abdominal pain and SOB. Following ambulation, pt reported being lightheaded and SOB w/ SpO2 at 90% on 2L of O2. Following pursed lip breathng symptoms resolved with SpO2 at 92%. Recommend mod intensity therapy to return to baseline.  End of Session Patient Position: Bed, 3 rail up, Alarm on     PT Plan  Treatment/Interventions: Bed mobility, Transfer training, Gait training, Stair training, Balance training, Strengthening, Endurance training  PT Plan: Ongoing PT  PT Frequency: 3 times per week  PT Discharge Recommendations: Moderate intensity level of continued care  Equipment Recommended upon Discharge: Wheeled walker (Owns)  PT Recommended Transfer Status: Assist x1  PT - OK to Discharge: Yes (Per PT poc)      Subjective   General Visit Information:  General  Reason for Referral: Impaired mobility, hiatal hernia, UTI  Referred By: Kelly Quezada  Past Medical History Relevant to Rehab: HTN, Chronic Hypoxic Respiratory Failure, COPD, GERD, HTN, Hypothyroidism, Parkinson's Disease  Family/Caregiver Present:  "No  Prior to Session Communication: Bedside nurse  Patient Position Received: Bed, 3 rail up, Alarm on  General Comment: Pt cooperative, pleasantly confused, and agreeable to PT eval.  Home Living:  Home Living  Type of Home: House  Lives With: Spouse, Adult children  Home Adaptive Equipment: Walker rolling or standard, Cane  Home Layout: One level  Home Access: Stairs to enter with rails  Entrance Stairs-Rails:  (Unilateral, pt couldnt recall which side)  Entrance Stairs-Number of Steps:  (\"couple\" of stairs)  Bathroom Shower/Tub: Walk-in shower  Bathroom Equipment: Grab bars in shower, Shower chair with back  Prior Level of Function:  Prior Function Per Pt/Caregiver Report  Level of [place]: Needs assistance with ADLs, Needs assistance with homemaking  Receives Help From: Family  ADL Assistance: Needs assistance  Homemaking Assistance: Needs assistance  Ambulatory Assistance: Needs assistance  Precautions:  Precautions  Medical Precautions: Fall precautions, Swallowing precautions (Puree, moderate thickness, tele, 2L O2, purewick, IV)  Vital Signs:  Vital Signs  Heart Rate: (!) 115 (Dec to 106 w/ rest)  SpO2: 90 % (Returned 92% with pursed lip breathing)  Patient Position: Sitting (Following ambulation)    Objective   Pain:  Pain Assessment  Pain Assessment: Robin-Baker FACES  Robin-Baker FACES Pain Rating: Hurts little more  Pain Type: Acute pain  Pain Location: Abdomen  Pain Interventions: Repositioned, Medication (See MAR) (Medication provided by RN)  Cognition:  Cognition  Overall Cognitive Status: Impaired  Orientation Level: Disoriented to time, Disoriented to situation, Disoriented to place    General Assessments:     Activity Tolerance  Endurance: Tolerates less than 10 min exercise with changes in vital signs    Sensation  Light Touch: No apparent deficits    Strength  Strength Comments: R Hip 3+/5, R Knee 4/5, L LE 4/5  Coordination  Movements are Fluid and Coordinated: No (Tremor)  Heel to Shin: " Impaired (Pt demonstrates difficulty crossing midline w/ foot)    Postural Control  Postural Control: Within Functional Limits    Static Sitting Balance  Static Sitting-Balance Support: Bilateral upper extremity supported, Feet supported  Static Sitting-Level of Assistance: Close supervision    Static Standing Balance  Static Standing-Balance Support: Bilateral upper extremity supported  Static Standing-Level of Assistance: Contact guard  Functional Assessments:    Bed Mobility  Bed Mobility: Yes  Bed Mobility 1  Bed Mobility 1: Supine to sitting, Sitting to supine  Level of Assistance 1: Minimum assistance    Transfers  Transfer: Yes  Transfer 1  Transfer From 1: Sit to, Stand to  Transfer to 1: Sit, Stand  Technique 1: Sit to stand, Stand to sit  Transfer Device 1: Walker  Transfer Level of Assistance 1: Minimum assistance    Ambulation/Gait Training  Ambulation/Gait Training Performed: Yes  Ambulation/Gait Training 1  Surface 1: Level tile  Device 1: Rolling walker  Assistance 1: Contact guard  Quality of Gait 1: Forward flexed posture (Dec chase)  Comments/Distance (ft) 1: 5' (lateral step to end of bed, lateral step to top of bed)    Stairs  Stairs: No    Outcome Measures:  [facility] Basic Mobility  Turning from your back to your side while in a flat bed without using bedrails: A little  Moving from lying on your back to sitting on the side of a flat bed without using bedrails: A little  Moving to and from bed to chair (including a wheelchair): A little  Standing up from a chair using your arms (e.g. wheelchair or bedside chair): A little  To walk in hospital room: Total  Climbing 3-5 steps with railing: Total  Basic Mobility - Total Score: 14    Encounter Problems       Encounter Problems (Active)       Balance       STG - Maintains dynamic standing balance with dual task with unilateral upper extremity support for 2 minutes with supervision (Progressing)       Start:  06/18/24    Expected End:  07/02/24                Mobility       STG - Patient will ambulate 50' with 2WW with supervision (Progressing)       Start:  06/18/24    Expected End:  07/02/24            STG - Patient will ascend and descend four stairs with use of unilateral rail with contact guard  (Progressing)       Start:  06/18/24    Expected End:  07/02/24               PT Transfers       STG - Patient to transfer to and from sit to supine with HOB flat w/o use of rails with supervision  (Progressing)       Start:  06/18/24    Expected End:  07/02/24            STG - Patient will transfer sit to and from stand with supervision (Progressing)       Start:  06/18/24    Expected End:  07/02/24               Pain - Adult              Education Documentation  Precautions, taught by OSCAR Chino at 6/18/2024  3:34 PM.  Learner: Patient  Readiness: Eager  Method: Explanation  Response: Verbalizes Understanding, Demonstrated Understanding    Body Mechanics, taught by OSCAR Chino at 6/18/2024  3:34 PM.  Learner: Patient  Readiness: Eager  Method: Explanation  Response: Verbalizes Understanding, Demonstrated Understanding    Mobility Training, taught by OSCAR Chino at 6/18/2024  3:34 PM.  Learner: Patient  Readiness: Eager  Method: Explanation  Response: Verbalizes Understanding, Demonstrated Understanding    Education Comments  No comments found.

## 2024-06-18 NOTE — PROGRESS NOTES
Occupational Therapy    Evaluation    Patient Name: Fidelia Norman  MRN: [id number]  Today's Date: 6/18/2024  Time Calculation  Start Time: 1306  Stop Time: 1320  Time Calculation (min): 14 min    Assessment  IP OT Assessment  OT Assessment: Pt presents with decreased cognition, balance, strength, and endurance. Pt to benefit from skilled OT services to increase independence with ADL's, transfers, and mobility  Prognosis: Good  Barriers to Discharge: None  Evaluation/Treatment Tolerance: Patient tolerated treatment well  Medical Staff Made Aware: Yes  End of Session Communication: Bedside nurse  End of Session Patient Position: Bed, 3 rail up, Alarm on  Plan:  Treatment Interventions: ADL retraining, Functional transfer training, UE strengthening/ROM, Endurance training, Cognitive reorientation, Patient/family training  OT Frequency: 3 times per week  OT Discharge Recommendations: Moderate intensity level of continued care, 24 hr supervision due to cognition  Equipment Recommended upon Discharge: Wheeled walker (pt owns)  OT Recommended Transfer Status: Minimal assist, Assist of 1  OT - OK to Discharge: Yes (per OT POC)    Subjective   Current Problem:  1. Prolonged Q-T interval on ECG        2. Lactate blood increase        3. Abdominal pain, unspecified abdominal location        4. Hemorrhoids, unspecified hemorrhoid type        5. UTI (urinary tract infection), uncomplicated        6. History of Parkinson's disease        7. Sepsis due to urinary tract infection (Multi)          General:  General  Reason for Referral: Pt is a 80 y/o woman who was admitted for AMS and nausea/vomiting  Past Medical History Relevant to Rehab: Past Medical History: HTN, Chronic Hypoxic Respiratory Failure, COPD, GERD, HTN, Hypothyroidism, Parkinson's Disease  Family/Caregiver Present: No  Prior to Session Communication: Bedside nurse  Patient Position Received: Bed, 3 rail up, Alarm on  General Comment: Pt was pleasant and agreeable to  "OT eval. Pt pleasantly confused and only oriented x 2.  Precautions:  Medical Precautions: Fall precautions, Oxygen therapy device and L/min (IV, 2 L of oxygen, telemetry, pure-wick)     Pain:  Pain Assessment  Pain Assessment: Robin-Baker FACES  Robin-Baker FACES Pain Rating: Hurts little bit  Pain Location: Abdomen    Objective   Cognition:  Overall Cognitive Status: Impaired  Orientation Level: Disoriented to time, Disoriented to situation     Home Living:  Type of Home: House  Lives With: Spouse, Adult children  Home Adaptive Equipment: Walker rolling or standard, Cane  Home Layout: One level  Home Access: Other (Comment) (pt reports a \"couple of steps\" to enter)  Bathroom Shower/Tub: Walk-in shower  Bathroom Equipment: Grab bars in shower, Shower chair with back   Prior Function:  Level of [place]: Needs assistance with ADLs, Needs assistance with homemaking  Receives Help From: Family  ADL Assistance: Needs assistance  Homemaking Assistance: Needs assistance  Ambulatory Assistance: Needs assistance (pt reports use of cane or walker and family member walks with her)     ADL:  LE Dressing Assistance: Total  LE Dressing Deficit: Don/doff R sock, Don/doff L sock  ADL Comments: Anticipate SBA-CGA for UB ADL's and min-mod A for LB ADL's based on clinical presentation  Activity Tolerance:  Endurance: Tolerates less than 10 min exercise, no significant change in vital signs  Bed Mobility/Transfers:   Bed Mobility  Bed Mobility: Yes  Bed Mobility 1  Bed Mobility 1: Supine to sitting, Sitting to supine  Level of Assistance 1: Minimum assistance, Minimal verbal cues  Bed Mobility Comments 1: use of bedrail    Transfers  Transfer: Yes  Transfer 1  Transfer From 1: Sit to, Stand to  Transfer to 1: Sit, Stand  Technique 1: Sit to stand, Stand to sit  Transfer Device 1: Walker  Transfer Level of Assistance 1: Minimum assistance, Minimal verbal cues  Trials/Comments 1: verbal cues for hand placement    Functional " Mobility:  Functional Mobility  Functional Mobility Performed: Yes  Functional Mobility 1  Surface 1: Level tile  Device 1: Rolling walker  Assistance 1: Contact guard, Minimal verbal cues  Comments 1: Pt took 2-3 side steps up towards the HOB with the FWW at CGA  Sitting Balance:  Static Sitting Balance  Static Sitting-Balance Support: Feet supported, Bilateral upper extremity supported  Static Sitting-Level of Assistance: Close supervision  Standing Balance:  Static Standing Balance  Static Standing-Balance Support: Bilateral upper extremity supported  Static Standing-Level of Assistance: Contact guard  Dynamic Standing Balance  Dynamic Standing-Balance Support: Bilateral upper extremity supported  Dynamic Standing-Comments: CGA-Min A with FWW    Sensation:  Light Touch: No apparent deficits  Strength:  Strength Comments: grossly 3+/5 bilaterally; pt has some slight tremors in UB     Extremities: RUE   RUE : Within Functional Limits and LUE   LUE: Within Functional Limits    Outcome Measures: [facility] Daily Activity  Putting on and taking off regular lower body clothing: A lot  Bathing (including washing, rinsing, drying): A lot  Putting on and taking off regular upper body clothing: A little  Toileting, which includes using toilet, bedpan or urinal: A lot  Taking care of personal grooming such as brushing teeth: A little  Eating Meals: A little  Daily Activity - Total Score: 15      Education Documentation  Body Mechanics, taught by Mora Banks, OT at 6/18/2024  2:01 PM.  Learner: Patient  Readiness: Acceptance  Method: Explanation  Response: Verbalizes Understanding, No Evidence of Learning    Education Comments  No comments found.      Goals:   Encounter Problems       Encounter Problems (Active)       OT Goals       Pt to demonstrate bed mobility at mod I        Start:  06/18/24    Expected End:  07/02/24            Pt to demonstrate transfers with FWW at SBA       Start:  06/18/24    Expected End:   07/02/24            Pt will demo increased functional mobility to tolerate tasks necessary to complete ADL routine with FWW at SBA       Start:  06/18/24    Expected End:  07/02/24            Pt will increase endurance to tolerate 15 min of activity with no more than 1 rest break in order to increase ability to engage in ADL completion.        Start:  06/18/24    Expected End:  07/02/24            Pt will complete BUE exercises, 10-15 reps 1-2 sets in all functional planes, to demo improved functional strength for increased participation in BADL and mobility        Start:  06/18/24    Expected End:  07/02/24            Pt to demonstrate LB dressing, bathing, and toileting with FWW at min A       Start:  06/18/24    Expected End:  07/02/24                Pt will demo FAIR+ static/dynamic standing balance during ADL and functional activity with FWW for improved independence and participation in ADL        Start:  06/18/24    Expected End:  07/02/24

## 2024-06-19 LAB
ANION GAP SERPL CALC-SCNC: 13 MMOL/L (ref 10–20)
BACTERIA UR CULT: NO GROWTH
BACTERIA UR CULT: NO GROWTH
BASOPHILS # BLD AUTO: 0.05 X10*3/UL (ref 0–0.1)
BASOPHILS NFR BLD AUTO: 0.5 %
BUN SERPL-MCNC: 11 MG/DL (ref 6–23)
CALCIUM SERPL-MCNC: 8 MG/DL (ref 8.6–10.3)
CHLORIDE SERPL-SCNC: 103 MMOL/L (ref 98–107)
CO2 SERPL-SCNC: 29 MMOL/L (ref 21–32)
CREAT SERPL-MCNC: 0.68 MG/DL (ref 0.5–1.05)
EGFRCR SERPLBLD CKD-EPI 2021: 90 ML/MIN/1.73M*2
EOSINOPHIL # BLD AUTO: 0.26 X10*3/UL (ref 0–0.4)
EOSINOPHIL NFR BLD AUTO: 2.5 %
ERYTHROCYTE [DISTWIDTH] IN BLOOD BY AUTOMATED COUNT: 12.5 % (ref 11.5–14.5)
GLUCOSE SERPL-MCNC: 113 MG/DL (ref 74–99)
HCT VFR BLD AUTO: 41.1 % (ref 36–46)
HGB BLD-MCNC: 12.4 G/DL (ref 12–16)
HOLD SPECIMEN: NORMAL
IMM GRANULOCYTES # BLD AUTO: 0.07 X10*3/UL (ref 0–0.5)
IMM GRANULOCYTES NFR BLD AUTO: 0.7 % (ref 0–0.9)
LYMPHOCYTES # BLD AUTO: 1.68 X10*3/UL (ref 0.8–3)
LYMPHOCYTES NFR BLD AUTO: 15.9 %
MAGNESIUM SERPL-MCNC: 2.13 MG/DL (ref 1.6–2.4)
MCH RBC QN AUTO: 30.5 PG (ref 26–34)
MCHC RBC AUTO-ENTMCNC: 30.2 G/DL (ref 32–36)
MCV RBC AUTO: 101 FL (ref 80–100)
MONOCYTES # BLD AUTO: 1.16 X10*3/UL (ref 0.05–0.8)
MONOCYTES NFR BLD AUTO: 11 %
NEUTROPHILS # BLD AUTO: 7.32 X10*3/UL (ref 1.6–5.5)
NEUTROPHILS NFR BLD AUTO: 69.4 %
NRBC BLD-RTO: 0 /100 WBCS (ref 0–0)
PLATELET # BLD AUTO: 197 X10*3/UL (ref 150–450)
POTASSIUM SERPL-SCNC: 2.9 MMOL/L (ref 3.5–5.3)
RBC # BLD AUTO: 4.07 X10*6/UL (ref 4–5.2)
SODIUM SERPL-SCNC: 142 MMOL/L (ref 136–145)
WBC # BLD AUTO: 10.5 X10*3/UL (ref 4.4–11.3)

## 2024-06-19 PROCEDURE — 36415 COLL VENOUS BLD VENIPUNCTURE: CPT | Performed by: PHYSICIAN ASSISTANT

## 2024-06-19 PROCEDURE — C9113 INJ PANTOPRAZOLE SODIUM, VIA: HCPCS | Performed by: PHYSICIAN ASSISTANT

## 2024-06-19 PROCEDURE — 97116 GAIT TRAINING THERAPY: CPT | Mod: CQ,GP

## 2024-06-19 PROCEDURE — 83735 ASSAY OF MAGNESIUM: CPT | Performed by: PHYSICIAN ASSISTANT

## 2024-06-19 PROCEDURE — 97530 THERAPEUTIC ACTIVITIES: CPT | Mod: CQ,GP

## 2024-06-19 PROCEDURE — 99232 SBSQ HOSP IP/OBS MODERATE 35: CPT | Performed by: FAMILY MEDICINE

## 2024-06-19 PROCEDURE — 2500000002 HC RX 250 W HCPCS SELF ADMINISTERED DRUGS (ALT 637 FOR MEDICARE OP, ALT 636 FOR OP/ED): Performed by: PHYSICIAN ASSISTANT

## 2024-06-19 PROCEDURE — 85025 COMPLETE CBC W/AUTO DIFF WBC: CPT | Performed by: PHYSICIAN ASSISTANT

## 2024-06-19 PROCEDURE — 2500000004 HC RX 250 GENERAL PHARMACY W/ HCPCS (ALT 636 FOR OP/ED): Performed by: PHYSICIAN ASSISTANT

## 2024-06-19 PROCEDURE — 2500000005 HC RX 250 GENERAL PHARMACY W/O HCPCS: Performed by: FAMILY MEDICINE

## 2024-06-19 PROCEDURE — 2500000001 HC RX 250 WO HCPCS SELF ADMINISTERED DRUGS (ALT 637 FOR MEDICARE OP): Performed by: PHYSICIAN ASSISTANT

## 2024-06-19 PROCEDURE — 2500000004 HC RX 250 GENERAL PHARMACY W/ HCPCS (ALT 636 FOR OP/ED)

## 2024-06-19 PROCEDURE — 2500000004 HC RX 250 GENERAL PHARMACY W/ HCPCS (ALT 636 FOR OP/ED): Performed by: FAMILY MEDICINE

## 2024-06-19 PROCEDURE — 80048 BASIC METABOLIC PNL TOTAL CA: CPT | Performed by: PHYSICIAN ASSISTANT

## 2024-06-19 PROCEDURE — 1200000002 HC GENERAL ROOM WITH TELEMETRY DAILY

## 2024-06-19 PROCEDURE — 92526 ORAL FUNCTION THERAPY: CPT | Mod: GN

## 2024-06-19 PROCEDURE — 2500000001 HC RX 250 WO HCPCS SELF ADMINISTERED DRUGS (ALT 637 FOR MEDICARE OP)

## 2024-06-19 RX ORDER — POTASSIUM CHLORIDE 14.9 MG/ML
20 INJECTION INTRAVENOUS
Status: COMPLETED | OUTPATIENT
Start: 2024-06-19 | End: 2024-06-19

## 2024-06-19 RX ORDER — SUCRALFATE 1 G/10ML
1 SUSPENSION ORAL EVERY 6 HOURS SCHEDULED
Status: DISCONTINUED | OUTPATIENT
Start: 2024-06-19 | End: 2024-06-20

## 2024-06-19 ASSESSMENT — COGNITIVE AND FUNCTIONAL STATUS - GENERAL
MOBILITY SCORE: 14
TURNING FROM BACK TO SIDE WHILE IN FLAT BAD: A LITTLE
TOILETING: A LITTLE
DRESSING REGULAR UPPER BODY CLOTHING: A LITTLE
WALKING IN HOSPITAL ROOM: A LITTLE
TURNING FROM BACK TO SIDE WHILE IN FLAT BAD: A LITTLE
TOILETING: A LOT
STANDING UP FROM CHAIR USING ARMS: A LOT
MOVING TO AND FROM BED TO CHAIR: A LITTLE
MOVING FROM LYING ON BACK TO SITTING ON SIDE OF FLAT BED WITH BEDRAILS: A LOT
DRESSING REGULAR LOWER BODY CLOTHING: A LOT
MOVING FROM LYING ON BACK TO SITTING ON SIDE OF FLAT BED WITH BEDRAILS: A LITTLE
PERSONAL GROOMING: A LITTLE
WALKING IN HOSPITAL ROOM: A LOT
EATING MEALS: A LITTLE
MOBILITY SCORE: 14
CLIMB 3 TO 5 STEPS WITH RAILING: A LITTLE
HELP NEEDED FOR BATHING: A LITTLE
CLIMB 3 TO 5 STEPS WITH RAILING: A LOT
HELP NEEDED FOR BATHING: A LOT
DAILY ACTIVITIY SCORE: 15
DAILY ACTIVITIY SCORE: 20
DRESSING REGULAR UPPER BODY CLOTHING: A LITTLE
STANDING UP FROM CHAIR USING ARMS: A LITTLE
MOVING TO AND FROM BED TO CHAIR: A LITTLE
MOBILITY SCORE: 18
MOVING FROM LYING ON BACK TO SITTING ON SIDE OF FLAT BED WITH BEDRAILS: A LITTLE
CLIMB 3 TO 5 STEPS WITH RAILING: A LOT
DRESSING REGULAR LOWER BODY CLOTHING: A LITTLE
MOVING TO AND FROM BED TO CHAIR: A LOT
WALKING IN HOSPITAL ROOM: A LOT
TURNING FROM BACK TO SIDE WHILE IN FLAT BAD: A LOT
STANDING UP FROM CHAIR USING ARMS: A LITTLE

## 2024-06-19 ASSESSMENT — PAIN - FUNCTIONAL ASSESSMENT
PAIN_FUNCTIONAL_ASSESSMENT: 0-10

## 2024-06-19 ASSESSMENT — PAIN SCALES - GENERAL
PAINLEVEL_OUTOF10: 3
PAINLEVEL_OUTOF10: 0 - NO PAIN
PAINLEVEL_OUTOF10: 1
PAINLEVEL_OUTOF10: 0 - NO PAIN

## 2024-06-19 ASSESSMENT — ACTIVITIES OF DAILY LIVING (ADL): LACK_OF_TRANSPORTATION: NO

## 2024-06-19 ASSESSMENT — PAIN DESCRIPTION - LOCATION: LOCATION: ABDOMEN

## 2024-06-19 NOTE — PROGRESS NOTES
Speech-Language Pathology    Speech-Language Pathology Clinical Swallow Treatment and Discharge    Patient Name: Fidelia Norman  MRN: [id number]  : 1945  Today's Date: 24  Start time: Start Time: 1158  Stop time: Stop Time: 1209  Time calculation (min) : Time Calculation (min): 11 min    ASSESSMENT  SLP TX Intervention Outcome: Making Progress Towards Goals  Treatment Tolerance: Patient tolerated treatment well    Impressions: Swallowing appears to be at baseline. Within normal limits.     PLAN  MBSS recommended: No; no pharyngeal dysphagia suspected.  Recommended solid consistency: Regular (IDDSI level 7)  Recommended liquid consistency: Thin (IDDSI 0)  Recommended medication administration: Whole, in thin liquid  Safe swallow strategies: Upright positioning for all PO intake  Discharge recommendation: Home with no further SLP  Inpatient/Swing Bed or Outpatient: Inpatient  SLP Frequency: Follow-up visit only  Duration: Current admission  Next Treatment Priority: Follow up visit to ensure diet recommendation remains appropriate  SUBJECTIVE  Prior to Session Communication: Bedside nurse  RN cleared pt to participate in session and reported that the patient has taken her meds and eaten breakfast without evidence of dysphagia.   Respiratory status: Supplemental oxygen via NC 2 L consistent with home status.   Positioning: Upright in chair  Pt was alert, pleasant, and cooperative for session.    Pain Assessment  Pain Assessment: 0-10  0-10 (Numeric) Pain Score: 0 - No pain  Orientation: Ox4  Ability to follow functional commands: WFL    OBJECTIVE  Therapeutic Swallow  Therapeutic Swallow Intervention : PO Trials, Caregiver Education  Swallow Comments: With improved mentation, functional eating and swallowing skills have improved to within normal limits.     Treatment/Education:  Results and recommendations were relayed to: Patient and Bedside nurse  Education provided: Yes   Learner: Patient   Barriers to  learning: None   Method of teaching: Verbal and Demonstration   Topic: Role of ST, results of assessment, risk for aspiration, recommended diet modifications, recommended safe swallow strategies   Outcome of teaching: Pt/family demonstrated good understanding    Goals:     (start date 6/18/24. Anticipated time frame for goal attainment: 1 week):  Pt will consume prescribed diet (current diet is soft & bite-sized/thin) without overt s/sx aspiration/penetration in 95% of observed trials.              Status: GOAL ACHIEVED              Progress this date: yes

## 2024-06-19 NOTE — PROGRESS NOTES
Fidelia Norman is a 79 y.o. female on day 1 of admission presenting with UTI (urinary tract infection), uncomplicated.      Subjective   Patient resting in bed, reports her eyes are very dry but that is her only complaint. Per nursing, she tolerated breakfast but had to go slowly.    Objective     Last Recorded Vitals  /70 (BP Location: Left arm, Patient Position: Lying)   Pulse 75   Temp 36.5 °C (97.7 °F) (Temporal)   Resp 20   Wt 76.9 kg (169 lb 8 oz)   SpO2 98%   Intake/Output last 3 Shifts:    Intake/Output Summary (Last 24 hours) at 6/19/2024 1605  Last data filed at 6/19/2024 1333  Gross per 24 hour   Intake 1645 ml   Output --   Net 1645 ml       Admission Weight  Weight: 74 kg (163 lb 2.3 oz) (06/17/24 1741)    Daily Weight  06/17/24 : 76.9 kg (169 lb 8 oz)        Physical Exam  Constitutional: alert and oriented x 3, awake, cooperative, no acute distress  Skin: warm and dry  Head/Neck: Normocephalic, atraumatic  Eyes: clear sclera, no signs of infection  ENMT: mucous membranes moist  Cardio: Regular rate and rhythm  Resp: CTA bilaterally, good respiratory effort  Gastrointestinal: Soft, mild epigastric tenderness, nondistended  Musculoskeletal: ROM intact, no joint swelling  Extremities: No edema, cyanosis, or clubbing  Neuro: alert and oriented x1-2, tremorous at baseline in hands  Psychological: Appropriate mood and behavior    Relevant Results  Scheduled medications  buPROPion XL, 150 mg, oral, q24h  [Held by provider] busPIRone, 5 mg, oral, BID  carbidopa-levodopa, 2 tablet, oral, TID  cefTRIAXone, 1 g, intravenous, q24h  docusate sodium, 100 mg, oral, BID  donepezil, 10 mg, oral, Nightly  [Held by provider] FLUoxetine, 100 mg, oral, Daily  fluticasone furoate-vilanteroL, 1 puff, inhalation, Daily  gabapentin, 300 mg, oral, TID  heparin (porcine), 5,000 Units, subcutaneous, q8h  losartan, 100 mg, oral, Daily  lubiprostone, 24 mcg, oral, BID  memantine, 10 mg, oral, BID  metoclopramide, 5 mg,  intravenous, q8h [place]  pantoprazole, 40 mg, intravenous, BID  polyethylene glycol, 17 g, oral, Daily  potassium chloride, 20 mEq, intravenous, q2h  rosuvastatin, 20 mg, oral, Nightly  sucralfate, 1 g, oral, q6h [place]  traZODone, 50 mg, oral, BID      Continuous medications  sodium chloride 0.9%, 75 mL/hr, Last Rate: 75 mL/hr (06/19/24 1409)      PRN medications  PRN medications: acetaminophen, ALPRAZolam, lubricating eye drops, ondansetron    Results for orders placed or performed during the hospital encounter of 06/17/24 (from the past 24 hour(s))   Extra Urine Gray Tube   Result Value Ref Range    Extra Tube Hold for add-ons.    Urinalysis with Reflex Culture and Microscopic   Result Value Ref Range    Color, Urine Yellow Light-Yellow, Yellow, Dark-Yellow    Appearance, Urine Clear Clear    Specific Gravity, Urine 1.030 1.005 - 1.035    pH, Urine 6.0 5.0, 5.5, 6.0, 6.5, 7.0, 7.5, 8.0    Protein, Urine 30 (1+) (A) NEGATIVE, 10 (TRACE), 20 (TRACE) mg/dL    Glucose, Urine Normal Normal mg/dL    Blood, Urine 0.03 (TRACE) (A) NEGATIVE    Ketones, Urine 40 (2+) (A) NEGATIVE mg/dL    Bilirubin, Urine NEGATIVE NEGATIVE    Urobilinogen, Urine Normal Normal mg/dL    Nitrite, Urine NEGATIVE NEGATIVE    Leukocyte Esterase, Urine 250 Suleman/µL (A) NEGATIVE   Microscopic Only, Urine   Result Value Ref Range    WBC, Urine 11-20 (A) 1-5, NONE /HPF    RBC, Urine 3-5 NONE, 1-2, 3-5 /HPF    Squamous Epithelial Cells, Urine 1-9 (SPARSE) Reference range not established. /HPF    Mucus, Urine FEW Reference range not established. /LPF   CBC and Auto Differential   Result Value Ref Range    WBC 10.5 4.4 - 11.3 x10*3/uL    nRBC 0.0 0.0 - 0.0 /100 WBCs    RBC 4.07 4.00 - 5.20 x10*6/uL    Hemoglobin 12.4 12.0 - 16.0 g/dL    Hematocrit 41.1 36.0 - 46.0 %     (H) 80 - 100 fL    MCH 30.5 26.0 - 34.0 pg    MCHC 30.2 (L) 32.0 - 36.0 g/dL    RDW 12.5 11.5 - 14.5 %    Platelets 197 150 - 450 x10*3/uL    Neutrophils % 69.4 40.0 - 80.0 %    Immature  Granulocytes %, Automated 0.7 0.0 - 0.9 %    Lymphocytes % 15.9 13.0 - 44.0 %    Monocytes % 11.0 2.0 - 10.0 %    Eosinophils % 2.5 0.0 - 6.0 %    Basophils % 0.5 0.0 - 2.0 %    Neutrophils Absolute 7.32 (H) 1.60 - 5.50 x10*3/uL    Immature Granulocytes Absolute, Automated 0.07 0.00 - 0.50 x10*3/uL    Lymphocytes Absolute 1.68 0.80 - 3.00 x10*3/uL    Monocytes Absolute 1.16 (H) 0.05 - 0.80 x10*3/uL    Eosinophils Absolute 0.26 0.00 - 0.40 x10*3/uL    Basophils Absolute 0.05 0.00 - 0.10 x10*3/uL   Basic metabolic panel   Result Value Ref Range    Glucose 113 (H) 74 - 99 mg/dL    Sodium 142 136 - 145 mmol/L    Potassium 2.9 (LL) 3.5 - 5.3 mmol/L    Chloride 103 98 - 107 mmol/L    Bicarbonate 29 21 - 32 mmol/L    Anion Gap 13 10 - 20 mmol/L    Urea Nitrogen 11 6 - 23 mg/dL    Creatinine 0.68 0.50 - 1.05 mg/dL    eGFR 90 >60 mL/min/1.73m*2    Calcium 8.0 (L) 8.6 - 10.3 mg/dL   Magnesium   Result Value Ref Range    Magnesium 2.13 1.60 - 2.40 mg/dL       Assessment/Plan   78yo CF with PMH of Parkinsons dementia, HTN, COPD,  GERD with hiatal hernia, and depression with anxiety that presented to the ED with vomiting and was admitted for AMS secondary to UTI.     Acute on chronic metabolic encephalopathy, improved  - in setting of Parkinsons dementia and polypharmacy  - likely secondary to UTI  - CT head negative for acute pathology on admission  - hold home ambien and buspar  - continue IV ceftriaxone  - continue to monitor    Urinary tract infection  - urine culture positive for enteric bacilli, sensitivities pending  - continue IV ceftriaxone    Hiatal hernia  - possible source of vomiting on presentation  - per , was suppose to have outpt EGD for workup prior to surgical repair  - will continue reglan for now to get EGD reschedule outpt  - continue home carafate and PPI    Hypertension  - continue home amloidpine, lasix, losartan    Parkinsons dementia  - continue home sinemet, memantine    DVT Proph: heparin  subcutaneous    Dispo: Patient requires close inpatient monitoring in setting of AMS secondary to UTI, discharge to SNF pending precert.    Principal Problem:    UTI (urinary tract infection), uncomplicated  Active Problems:    Prolonged Q-T interval on ECG    Lactate blood increase    Abdominal pain    Hemorrhoids    History of Parkinson's disease    Sepsis due to urinary tract infection (Multi)        Kelly Quezada DO

## 2024-06-19 NOTE — NURSING NOTE
1900 Nurse to Nurse bedside shift report received. Patient left in safe environment. Call light within reach.

## 2024-06-19 NOTE — PROGRESS NOTES
Physical Therapy    Physical Therapy Treatment    Patient Name: Fidelia Norman  MRN: [id number]  Today's Date: 6/19/2024  Time Calculation  Start Time: 1126  Stop Time: 1150  Time Calculation (min): 24 min    Assessment/Plan   PT Assessment  PT Assessment Results: Decreased strength, Decreased endurance, Impaired balance, Decreased cognition, Decreased mobility  Rehab Prognosis: Good  Barriers to Discharge: Acute deconditioning, Acute cognition impairment  Evaluation/Treatment Tolerance: Patient limited by fatigue  Medical Staff Made Aware: Yes  End of Session Communication: Bedside nurse  Assessment Comment: Patient tolerated session well. She begins session very excited to get out of bed and move to chair. Encouragement and education provided on the positive benefits of sitting upright in chair for a while and eating lunch. Throughout session patient denies SOB or lightheadedness. Verbal cues provided during session for safety mechanics, posture improvement, and FWW proximity. Recommend mod intesity therapy  End of Session Patient Position: Up in chair, Alarm on     PT Plan  Treatment/Interventions: Bed mobility, Transfer training, Gait training, Therapeutic exercise, Positioning, Postural re-education  PT Plan: Ongoing PT  PT Frequency: 3 times per week  PT Discharge Recommendations: Moderate intensity level of continued care  Equipment Recommended upon Discharge: Wheeled walker  PT Recommended Transfer Status: Assist x1  PT - OK to Discharge: Yes (Per PT POC)      General Visit Information:   PT  Visit  PT Received On: 06/19/24  General  Reason for Referral: Impaired mobility, hiatal hernia, UTI  Referred By: Kelly Quezada  Past Medical History Relevant to Rehab: HTN, Chronic Hypoxic Respiratory Failure, COPD, GERD, HTN, Hypothyroidism, Parkinson's Disease  Missed Visit: No  Family/Caregiver Present: No  Prior to Session Communication: Bedside nurse  Patient Position Received: Bed, 3 rail up, Alarm on  General  Comment: Patient pleasant and agreeable to therapy session this date    Subjective   Precautions:  Precautions  Medical Precautions: Fall precautions, Swallowing precautions  Vital Signs:       Objective   Pain:  Pain Assessment  Pain Assessment: 0-10  0-10 (Numeric) Pain Score:  (Pain number not given but patient notes pain in abdomen region when sitting EOB)  Pain Location: Abdomen  Pain Interventions: Ambulation/increased activity  Cognition:     Coordination:     Postural Control:  Static Sitting Balance  Static Sitting-Balance Support: Bilateral upper extremity supported, Feet supported  Static Sitting-Level of Assistance: Close supervision  Static Standing Balance  Static Standing-Balance Support: Bilateral upper extremity supported  Static Standing-Level of Assistance: Minimum assistance  Activity Tolerance:  Activity Tolerance  Endurance: Tolerates less than 10 min exercise with changes in vital signs  Treatments:  Therapeutic Exercise  Therapeutic Exercise Performed: Yes  Therapeutic Exercise Activity 1: Seated in recliner ankle pumps x12 each                             Bed Mobility  Bed Mobility: Yes  Bed Mobility 1  Bed Mobility 1: Supine to sitting, Sitting to supine  Level of Assistance 1: Minimum assistance  Bed Mobility Comments 1: HOB elevated and use of bedrail. Verbal cues for technique, posture improvement when EOB, and safety mechanics    Ambulation/Gait Training  Ambulation/Gait Training Performed: Yes  Ambulation/Gait Training 1  Surface 1: Level tile  Device 1: Rolling walker  Assistance 1: Contact guard  Quality of Gait 1: Decreased step length, Forward flexed posture  Comments/Distance (ft) 1: 5' steps and turn to recliner from bed.  Transfers  Transfer: Yes  Transfer 1  Transfer From 1: Sit to, Stand to  Transfer to 1: Sit, Stand, Chair with arms  Technique 1: Sit to stand, Stand to sit  Transfer Device 1: Walker  Transfer Level of Assistance 1: Minimum assistance  Trials/Comments 1: x1.  Verbal cues for BUE placement and ensuring safety [facility]                          Outcome Measures:  [facility] Basic Mobility  Turning from your back to your side while in a flat bed without using bedrails: A lot  Moving from lying on your back to sitting on the side of a flat bed without using bedrails: A lot  Moving to and from bed to chair (including a wheelchair): A little  Standing up from a chair using your arms (e.g. wheelchair or bedside chair): A little  To walk in hospital room: A lot  Climbing 3-5 steps with railing: A lot  Basic Mobility - Total Score: 14    Education Documentation  Precautions, taught by Cristina Tobar PTA at 6/19/2024 12:41 PM.  Learner: Patient  Readiness: Acceptance  Method: Explanation, Demonstration  Response: Needs Reinforcement, Verbalizes Understanding    Body Mechanics, taught by Cristina Tobar PTA at 6/19/2024 12:41 PM.  Learner: Patient  Readiness: Acceptance  Method: Explanation, Demonstration  Response: Needs Reinforcement, Verbalizes Understanding    Mobility Training, taught by Cristina Tobar PTA at 6/19/2024 12:41 PM.  Learner: Patient  Readiness: Acceptance  Method: Explanation, Demonstration  Response: Needs Reinforcement, Verbalizes Understanding    Education Comments  No comments found.        OP EDUCATION:       Encounter Problems       Encounter Problems (Active)       Balance       STG - Maintains dynamic standing balance with dual task with unilateral upper extremity support for 2 minutes with supervision (Progressing)       Start:  06/18/24    Expected End:  07/02/24               Mobility       STG - Patient will ambulate 50' with 2WW with supervision (Progressing)       Start:  06/18/24    Expected End:  07/02/24            STG - Patient will ascend and descend four stairs with use of unilateral rail with contact guard  (Progressing)       Start:  06/18/24    Expected End:  07/02/24               PT Transfers       STG - Patient to transfer to and from sit to  supine with HOB flat w/o use of rails with supervision  (Progressing)       Start:  06/18/24    Expected End:  07/02/24            STG - Patient will transfer sit to and from stand with supervision (Progressing)       Start:  06/18/24    Expected End:  07/02/24               Pain - Adult

## 2024-06-19 NOTE — CARE PLAN
The patient's goals for the shift include  rest comfortably    The clinical goals for the shift include Stay safe

## 2024-06-19 NOTE — PROGRESS NOTES
06/19/24 0814   Discharge Planning   Living Arrangements Spouse/significant other;Children   Support Systems Children;Spouse/significant other   Assistance Needed A&OX2-3; assist at times for ADL with cane and rollator; doesn't drive; 2L NC baseline (unk supplier) 2L NC currently   Type of Residence Private residence   Number of Stairs to Enter Residence 4   Number of Stairs Within Residence 0   Do you have animals or pets at home? No   Who is requesting discharge planning? Provider   Home or Post Acute Services Post acute facilities (Rehab/SNF/etc)   Patient expects to be discharged to: SNF likely needed. Precert for snf transition. Need to preference spouse/patient for facility   Does the patient need discharge transport arranged? Yes   RoundTrip coordination needed? Yes   Has discharge transport been arranged? No   Financial Resource Strain   How hard is it for you to pay for the very basics like food, housing, medical care, and heating? Not hard   Housing Stability   In the last 12 months, was there a time when you were not able to pay the mortgage or rent on time? N   In the last 12 months, how many places have you lived? 1   In the last 12 months, was there a time when you did not have a steady place to sleep or slept in a shelter (including now)? N   Transportation Needs   In the past 12 months, has lack of transportation kept you from medical appointments or from getting medications? no   In the past 12 months, has lack of transportation kept you from meetings, work, or from getting things needed for daily living? No        06/19/24 1300   Discharge Planning   Patient expects to be discharged to: Spoke with patient's spouse and reviewed [facility] SN[facility] list. [facility]. Referral sent via [facility] to facilities for review. Will start precert ASAP once accepting facility      06/19/24 1400   Discharge Planning   Patient expects to be discharged to: [facility] able to  accept upon precert. [facility] and [facility] could not confirm bed availability. [facility] starting precert now with [employer]   Does the patient need discharge transport arranged? Yes   RoundTrip coordination needed? Yes   Has discharge transport been arranged? No

## 2024-06-20 LAB
ANION GAP SERPL CALC-SCNC: 11 MMOL/L (ref 10–20)
ATRIAL RATE: 74 BPM
BACTERIA UR CULT: ABNORMAL
BASOPHILS # BLD AUTO: 0.04 X10*3/UL (ref 0–0.1)
BASOPHILS NFR BLD AUTO: 0.5 %
BUN SERPL-MCNC: 5 MG/DL (ref 6–23)
CALCIUM SERPL-MCNC: 8.5 MG/DL (ref 8.6–10.3)
CHLORIDE SERPL-SCNC: 102 MMOL/L (ref 98–107)
CO2 SERPL-SCNC: 31 MMOL/L (ref 21–32)
CREAT SERPL-MCNC: 0.66 MG/DL (ref 0.5–1.05)
EGFRCR SERPLBLD CKD-EPI 2021: 90 ML/MIN/1.73M*2
EOSINOPHIL # BLD AUTO: 0.22 X10*3/UL (ref 0–0.4)
EOSINOPHIL NFR BLD AUTO: 2.5 %
ERYTHROCYTE [DISTWIDTH] IN BLOOD BY AUTOMATED COUNT: 11.9 % (ref 11.5–14.5)
GLUCOSE SERPL-MCNC: 116 MG/DL (ref 74–99)
HCT VFR BLD AUTO: 40 % (ref 36–46)
HGB BLD-MCNC: 12.6 G/DL (ref 12–16)
IMM GRANULOCYTES # BLD AUTO: 0.03 X10*3/UL (ref 0–0.5)
IMM GRANULOCYTES NFR BLD AUTO: 0.3 % (ref 0–0.9)
LYMPHOCYTES # BLD AUTO: 1.04 X10*3/UL (ref 0.8–3)
LYMPHOCYTES NFR BLD AUTO: 11.8 %
MCH RBC QN AUTO: 31.2 PG (ref 26–34)
MCHC RBC AUTO-ENTMCNC: 31.5 G/DL (ref 32–36)
MCV RBC AUTO: 99 FL (ref 80–100)
MONOCYTES # BLD AUTO: 0.77 X10*3/UL (ref 0.05–0.8)
MONOCYTES NFR BLD AUTO: 8.8 %
NEUTROPHILS # BLD AUTO: 6.68 X10*3/UL (ref 1.6–5.5)
NEUTROPHILS NFR BLD AUTO: 76.1 %
NRBC BLD-RTO: 0 /100 WBCS (ref 0–0)
P AXIS: 69 DEGREES
P OFFSET: 198 MS
P ONSET: 143 MS
PLATELET # BLD AUTO: 173 X10*3/UL (ref 150–450)
POTASSIUM SERPL-SCNC: 3.1 MMOL/L (ref 3.5–5.3)
PR INTERVAL: 140 MS
Q ONSET: 213 MS
QRS COUNT: 13 BEATS
QRS DURATION: 78 MS
QT INTERVAL: 492 MS
QTC CALCULATION(BAZETT): 546 MS
QTC FREDERICIA: 528 MS
R AXIS: -15 DEGREES
RBC # BLD AUTO: 4.04 X10*6/UL (ref 4–5.2)
SODIUM SERPL-SCNC: 141 MMOL/L (ref 136–145)
T AXIS: 46 DEGREES
T OFFSET: 459 MS
VENTRICULAR RATE: 74 BPM
WBC # BLD AUTO: 8.8 X10*3/UL (ref 4.4–11.3)

## 2024-06-20 PROCEDURE — 36415 COLL VENOUS BLD VENIPUNCTURE: CPT | Performed by: PHYSICIAN ASSISTANT

## 2024-06-20 PROCEDURE — 1200000002 HC GENERAL ROOM WITH TELEMETRY DAILY

## 2024-06-20 PROCEDURE — 2500000002 HC RX 250 W HCPCS SELF ADMINISTERED DRUGS (ALT 637 FOR MEDICARE OP, ALT 636 FOR OP/ED): Performed by: FAMILY MEDICINE

## 2024-06-20 PROCEDURE — C9113 INJ PANTOPRAZOLE SODIUM, VIA: HCPCS | Performed by: PHYSICIAN ASSISTANT

## 2024-06-20 PROCEDURE — 85025 COMPLETE CBC W/AUTO DIFF WBC: CPT | Performed by: PHYSICIAN ASSISTANT

## 2024-06-20 PROCEDURE — 2500000004 HC RX 250 GENERAL PHARMACY W/ HCPCS (ALT 636 FOR OP/ED): Performed by: FAMILY MEDICINE

## 2024-06-20 PROCEDURE — 2500000002 HC RX 250 W HCPCS SELF ADMINISTERED DRUGS (ALT 637 FOR MEDICARE OP, ALT 636 FOR OP/ED): Performed by: PHYSICIAN ASSISTANT

## 2024-06-20 PROCEDURE — 99232 SBSQ HOSP IP/OBS MODERATE 35: CPT | Performed by: FAMILY MEDICINE

## 2024-06-20 PROCEDURE — 80048 BASIC METABOLIC PNL TOTAL CA: CPT | Performed by: PHYSICIAN ASSISTANT

## 2024-06-20 PROCEDURE — 2500000005 HC RX 250 GENERAL PHARMACY W/O HCPCS: Performed by: FAMILY MEDICINE

## 2024-06-20 PROCEDURE — 2500000004 HC RX 250 GENERAL PHARMACY W/ HCPCS (ALT 636 FOR OP/ED): Performed by: PHYSICIAN ASSISTANT

## 2024-06-20 PROCEDURE — 2500000001 HC RX 250 WO HCPCS SELF ADMINISTERED DRUGS (ALT 637 FOR MEDICARE OP)

## 2024-06-20 PROCEDURE — 94760 N-INVAS EAR/PLS OXIMETRY 1: CPT

## 2024-06-20 PROCEDURE — 2500000005 HC RX 250 GENERAL PHARMACY W/O HCPCS: Performed by: INTERNAL MEDICINE

## 2024-06-20 PROCEDURE — 2500000001 HC RX 250 WO HCPCS SELF ADMINISTERED DRUGS (ALT 637 FOR MEDICARE OP): Performed by: PHYSICIAN ASSISTANT

## 2024-06-20 PROCEDURE — 2500000001 HC RX 250 WO HCPCS SELF ADMINISTERED DRUGS (ALT 637 FOR MEDICARE OP): Performed by: FAMILY MEDICINE

## 2024-06-20 PROCEDURE — 2500000004 HC RX 250 GENERAL PHARMACY W/ HCPCS (ALT 636 FOR OP/ED)

## 2024-06-20 RX ORDER — POTASSIUM CHLORIDE 14.9 MG/ML
20 INJECTION INTRAVENOUS
Status: COMPLETED | OUTPATIENT
Start: 2024-06-20 | End: 2024-06-20

## 2024-06-20 RX ORDER — FLUOXETINE HYDROCHLORIDE 20 MG/1
80 CAPSULE ORAL
Status: DISCONTINUED | OUTPATIENT
Start: 2024-06-21 | End: 2024-06-24 | Stop reason: HOSPADM

## 2024-06-20 RX ORDER — ALPRAZOLAM 0.5 MG/1
0.5 TABLET ORAL 2 TIMES DAILY PRN
Status: DISCONTINUED | OUTPATIENT
Start: 2024-06-20 | End: 2024-06-21

## 2024-06-20 RX ORDER — DICYCLOMINE HYDROCHLORIDE 10 MG/1
10 CAPSULE ORAL 4 TIMES DAILY
Status: DISCONTINUED | OUTPATIENT
Start: 2024-06-20 | End: 2024-06-24 | Stop reason: HOSPADM

## 2024-06-20 RX ORDER — POTASSIUM CHLORIDE 14.9 MG/ML
20 INJECTION INTRAVENOUS
Status: ACTIVE | OUTPATIENT
Start: 2024-06-20 | End: 2024-06-20

## 2024-06-20 RX ORDER — SUCRALFATE 1 G/1
1 TABLET ORAL
Status: DISCONTINUED | OUTPATIENT
Start: 2024-06-20 | End: 2024-06-24 | Stop reason: HOSPADM

## 2024-06-20 RX ORDER — PREGABALIN 50 MG/1
50 CAPSULE ORAL 2 TIMES DAILY
Status: DISCONTINUED | OUTPATIENT
Start: 2024-06-20 | End: 2024-06-24 | Stop reason: HOSPADM

## 2024-06-20 ASSESSMENT — COGNITIVE AND FUNCTIONAL STATUS - GENERAL
CLIMB 3 TO 5 STEPS WITH RAILING: A LITTLE
DAILY ACTIVITIY SCORE: 20
WALKING IN HOSPITAL ROOM: A LITTLE
TOILETING: A LITTLE
DRESSING REGULAR UPPER BODY CLOTHING: A LITTLE
MOBILITY SCORE: 18
WALKING IN HOSPITAL ROOM: A LITTLE
TURNING FROM BACK TO SIDE WHILE IN FLAT BAD: A LITTLE
DAILY ACTIVITIY SCORE: 19
TOILETING: A LITTLE
DRESSING REGULAR LOWER BODY CLOTHING: A LITTLE
MOVING FROM LYING ON BACK TO SITTING ON SIDE OF FLAT BED WITH BEDRAILS: A LITTLE
DRESSING REGULAR LOWER BODY CLOTHING: A LITTLE
HELP NEEDED FOR BATHING: A LITTLE
STANDING UP FROM CHAIR USING ARMS: A LITTLE
MOVING TO AND FROM BED TO CHAIR: A LITTLE
CLIMB 3 TO 5 STEPS WITH RAILING: A LITTLE
MOVING FROM LYING ON BACK TO SITTING ON SIDE OF FLAT BED WITH BEDRAILS: A LITTLE
HELP NEEDED FOR BATHING: A LITTLE
STANDING UP FROM CHAIR USING ARMS: A LITTLE
PERSONAL GROOMING: A LITTLE
MOBILITY SCORE: 18
TURNING FROM BACK TO SIDE WHILE IN FLAT BAD: A LITTLE
MOVING TO AND FROM BED TO CHAIR: A LITTLE
DRESSING REGULAR UPPER BODY CLOTHING: A LITTLE

## 2024-06-20 ASSESSMENT — PAIN - FUNCTIONAL ASSESSMENT
PAIN_FUNCTIONAL_ASSESSMENT: 0-10
PAIN_FUNCTIONAL_ASSESSMENT: 0-10

## 2024-06-20 ASSESSMENT — PAIN SCALES - GENERAL
PAINLEVEL_OUTOF10: 0 - NO PAIN
PAINLEVEL_OUTOF10: 3

## 2024-06-20 NOTE — NURSING NOTE
1930: assumed pt care    6-20-24/0046: pt is on 2L oxygen NC chronic, let DR. Leonard know    6-20-24/0627: pt states the doctor cut off needed meds for her mental health issues such as prozac, lyrica, ambien, wants to know why, pt states she will leave AMA if she does not get these meds, letting Dr. Leonard know.

## 2024-06-20 NOTE — PROGRESS NOTES
Fidelia Norman is a 79 y.o. female on day 2 of admission presenting with UTI (urinary tract infection), uncomplicated.      Subjective   Patient resting in bed, she is upset that her medications were held initially when she was admitted but does not recall this being discussed with her however  does remember this discussion and was agreeable. She also does not remember that she was vomiting prior to admission which  again clarified for her at bedside as well. Per patient, her mind is off with Parkinsons but she is concerned that all medications were held, however does admit she feels better.    Objective     Last Recorded Vitals  BP (!) 166/92 (BP Location: Left arm, Patient Position: Lying)   Pulse 73   Temp 36.7 °C (98.1 °F) (Temporal)   Resp 20   Wt 76.9 kg (169 lb 8 oz)   SpO2 97%   Intake/Output last 3 Shifts:    Intake/Output Summary (Last 24 hours) at 6/20/2024 1113  Last data filed at 6/20/2024 0907  Gross per 24 hour   Intake 2297.5 ml   Output --   Net 2297.5 ml       Admission Weight  Weight: 74 kg (163 lb 2.3 oz) (06/17/24 1741)    Daily Weight  06/17/24 : 76.9 kg (169 lb 8 oz)        Physical Exam  Constitutional: alert and oriented x1-2, awake, cooperative, no acute distress  Skin: warm and dry  Head/Neck: Normocephalic, atraumatic  Eyes: clear sclera, no signs of infection  ENMT: mucous membranes moist  Cardio: Regular rate and rhythm  Resp: CTA bilaterally, good respiratory effort  Gastrointestinal: Soft, mild epigastric tenderness, nondistended  Musculoskeletal: generalized weakness  Extremities: No edema, cyanosis, or clubbing  Neuro: alert and oriented x1-2, tremorous at baseline in hands  Psychological: Appropriate mood and behavior    Relevant Results  Scheduled medications  buPROPion XL, 150 mg, oral, q24h  [Held by provider] busPIRone, 5 mg, oral, BID  carbidopa-levodopa, 2 tablet, oral, TID  cefTRIAXone, 1 g, intravenous, q24h  dicyclomine, 10 mg, oral, 4x daily  docusate  sodium, 100 mg, oral, BID  donepezil, 10 mg, oral, Nightly  [START ON 6/21/2024] FLUoxetine, 80 mg, oral, Daily  fluticasone furoate-vilanteroL, 1 puff, inhalation, Daily  gabapentin, 300 mg, oral, TID  heparin (porcine), 5,000 Units, subcutaneous, q8h  losartan, 100 mg, oral, Daily  lubiprostone, 24 mcg, oral, BID  memantine, 10 mg, oral, BID  oxygen, , inhalation, Continuous - Inhalation  pantoprazole, 40 mg, intravenous, BID  polyethylene glycol, 17 g, oral, Daily  [Held by provider] potassium chloride, 20 mEq, intravenous, q2h  pregabalin, 50 mg, oral, BID  rosuvastatin, 20 mg, oral, Nightly  sucralfate, 1 g, oral, q6h [place]  traZODone, 50 mg, oral, BID      Continuous medications  sodium chloride 0.9%, 75 mL/hr, Last Rate: 75 mL/hr (06/20/24 0157)      PRN medications  PRN medications: acetaminophen, ALPRAZolam, lubricating eye drops, ondansetron    Results for orders placed or performed during the hospital encounter of 06/17/24 (from the past 24 hour(s))   CBC and Auto Differential   Result Value Ref Range    WBC 8.8 4.4 - 11.3 x10*3/uL    nRBC 0.0 0.0 - 0.0 /100 WBCs    RBC 4.04 4.00 - 5.20 x10*6/uL    Hemoglobin 12.6 12.0 - 16.0 g/dL    Hematocrit 40.0 36.0 - 46.0 %    MCV 99 80 - 100 fL    MCH 31.2 26.0 - 34.0 pg    MCHC 31.5 (L) 32.0 - 36.0 g/dL    RDW 11.9 11.5 - 14.5 %    Platelets 173 150 - 450 x10*3/uL    Neutrophils % 76.1 40.0 - 80.0 %    Immature Granulocytes %, Automated 0.3 0.0 - 0.9 %    Lymphocytes % 11.8 13.0 - 44.0 %    Monocytes % 8.8 2.0 - 10.0 %    Eosinophils % 2.5 0.0 - 6.0 %    Basophils % 0.5 0.0 - 2.0 %    Neutrophils Absolute 6.68 (H) 1.60 - 5.50 x10*3/uL    Immature Granulocytes Absolute, Automated 0.03 0.00 - 0.50 x10*3/uL    Lymphocytes Absolute 1.04 0.80 - 3.00 x10*3/uL    Monocytes Absolute 0.77 0.05 - 0.80 x10*3/uL    Eosinophils Absolute 0.22 0.00 - 0.40 x10*3/uL    Basophils Absolute 0.04 0.00 - 0.10 x10*3/uL   Basic metabolic panel   Result Value Ref Range    Glucose 116 (H) 74  - 99 mg/dL    Sodium 141 136 - 145 mmol/L    Potassium 3.1 (L) 3.5 - 5.3 mmol/L    Chloride 102 98 - 107 mmol/L    Bicarbonate 31 21 - 32 mmol/L    Anion Gap 11 10 - 20 mmol/L    Urea Nitrogen 5 (L) 6 - 23 mg/dL    Creatinine 0.66 0.50 - 1.05 mg/dL    eGFR 90 >60 mL/min/1.73m*2    Calcium 8.5 (L) 8.6 - 10.3 mg/dL       Assessment/Plan   78yo CF with PMH of Parkinsons dementia, HTN, COPD,  GERD with hiatal hernia, and depression with anxiety that presented to the ED with vomiting and was admitted for AMS secondary to UTI.     Acute on chronic metabolic encephalopathy, improved  - in setting of Parkinsons dementia and polypharmacy  - likely secondary to UTI  - CT head negative for acute pathology on admission  - hold home ambien and buspar  - continue IV ceftriaxone  - continue to monitor    Urinary tract infection  - urine culture positive for pansensitive E.coli  - continue IV ceftriaxone, transition to PO keflex at discharge    Hiatal hernia  - possible source of vomiting on presentation  - per , was suppose to have outpt EGD for workup prior to surgical repair  - will restart home bentyl for now to get EGD reschedule outpt  - continue home carafate and PPI    Hypertension  - continue home amloidpine, lasix, losartan    Parkinsons dementia  - continue home sinemet, memantine, and donepezil    Depression with anxiety  - restart prozac at 80mg since home dose above recommended and has interactions  - restart home buproprion  - hold home buspirone  - continue PRN xanax for anxiety  - discussed not restarting ambien as it can cause drowsiness and nurses have been reporting pt is sleeping at night    DVT Proph: heparin subcutaneous    Dispo: Patient requires close inpatient monitoring in setting of AMS secondary to UTI, discharge to SNF pending precert, likely tomorrow.    Principal Problem:    UTI (urinary tract infection), uncomplicated  Active Problems:    Prolonged Q-T interval on ECG    Lactate blood  increase    Abdominal pain    Hemorrhoids    History of Parkinson's disease    Sepsis due to urinary tract infection (Multi)        Kelly Quezada DO

## 2024-06-21 LAB
ANION GAP SERPL CALC-SCNC: 9 MMOL/L (ref 10–20)
BASOPHILS # BLD AUTO: 0.02 X10*3/UL (ref 0–0.1)
BASOPHILS NFR BLD AUTO: 0.3 %
BUN SERPL-MCNC: 3 MG/DL (ref 6–23)
CALCIUM SERPL-MCNC: 8.1 MG/DL (ref 8.6–10.3)
CHLORIDE SERPL-SCNC: 104 MMOL/L (ref 98–107)
CO2 SERPL-SCNC: 32 MMOL/L (ref 21–32)
CREAT SERPL-MCNC: 0.71 MG/DL (ref 0.5–1.05)
EGFRCR SERPLBLD CKD-EPI 2021: 88 ML/MIN/1.73M*2
EOSINOPHIL # BLD AUTO: 0.3 X10*3/UL (ref 0–0.4)
EOSINOPHIL NFR BLD AUTO: 4.2 %
ERYTHROCYTE [DISTWIDTH] IN BLOOD BY AUTOMATED COUNT: 11.9 % (ref 11.5–14.5)
GLUCOSE SERPL-MCNC: 99 MG/DL (ref 74–99)
HCT VFR BLD AUTO: 37.3 % (ref 36–46)
HGB BLD-MCNC: 11.6 G/DL (ref 12–16)
IMM GRANULOCYTES # BLD AUTO: 0.03 X10*3/UL (ref 0–0.5)
IMM GRANULOCYTES NFR BLD AUTO: 0.4 % (ref 0–0.9)
LYMPHOCYTES # BLD AUTO: 1.21 X10*3/UL (ref 0.8–3)
LYMPHOCYTES NFR BLD AUTO: 16.9 %
MCH RBC QN AUTO: 31.4 PG (ref 26–34)
MCHC RBC AUTO-ENTMCNC: 31.1 G/DL (ref 32–36)
MCV RBC AUTO: 101 FL (ref 80–100)
MONOCYTES # BLD AUTO: 0.75 X10*3/UL (ref 0.05–0.8)
MONOCYTES NFR BLD AUTO: 10.5 %
NEUTROPHILS # BLD AUTO: 4.84 X10*3/UL (ref 1.6–5.5)
NEUTROPHILS NFR BLD AUTO: 67.7 %
NRBC BLD-RTO: 0 /100 WBCS (ref 0–0)
PLATELET # BLD AUTO: 157 X10*3/UL (ref 150–450)
POTASSIUM SERPL-SCNC: 2.8 MMOL/L (ref 3.5–5.3)
RBC # BLD AUTO: 3.69 X10*6/UL (ref 4–5.2)
SODIUM SERPL-SCNC: 142 MMOL/L (ref 136–145)
WBC # BLD AUTO: 7.2 X10*3/UL (ref 4.4–11.3)

## 2024-06-21 PROCEDURE — 1200000002 HC GENERAL ROOM WITH TELEMETRY DAILY

## 2024-06-21 PROCEDURE — 2500000005 HC RX 250 GENERAL PHARMACY W/O HCPCS: Performed by: FAMILY MEDICINE

## 2024-06-21 PROCEDURE — 99232 SBSQ HOSP IP/OBS MODERATE 35: CPT | Performed by: FAMILY MEDICINE

## 2024-06-21 PROCEDURE — 2500000001 HC RX 250 WO HCPCS SELF ADMINISTERED DRUGS (ALT 637 FOR MEDICARE OP)

## 2024-06-21 PROCEDURE — C9113 INJ PANTOPRAZOLE SODIUM, VIA: HCPCS | Performed by: PHYSICIAN ASSISTANT

## 2024-06-21 PROCEDURE — 36415 COLL VENOUS BLD VENIPUNCTURE: CPT | Performed by: FAMILY MEDICINE

## 2024-06-21 PROCEDURE — 94760 N-INVAS EAR/PLS OXIMETRY 1: CPT

## 2024-06-21 PROCEDURE — 97530 THERAPEUTIC ACTIVITIES: CPT | Mod: CQ,GP

## 2024-06-21 PROCEDURE — 97110 THERAPEUTIC EXERCISES: CPT | Mod: CQ,GP

## 2024-06-21 PROCEDURE — 85025 COMPLETE CBC W/AUTO DIFF WBC: CPT | Performed by: FAMILY MEDICINE

## 2024-06-21 PROCEDURE — 2500000004 HC RX 250 GENERAL PHARMACY W/ HCPCS (ALT 636 FOR OP/ED)

## 2024-06-21 PROCEDURE — 2500000004 HC RX 250 GENERAL PHARMACY W/ HCPCS (ALT 636 FOR OP/ED): Performed by: PHYSICIAN ASSISTANT

## 2024-06-21 PROCEDURE — 2500000001 HC RX 250 WO HCPCS SELF ADMINISTERED DRUGS (ALT 637 FOR MEDICARE OP): Performed by: FAMILY MEDICINE

## 2024-06-21 PROCEDURE — 2500000002 HC RX 250 W HCPCS SELF ADMINISTERED DRUGS (ALT 637 FOR MEDICARE OP, ALT 636 FOR OP/ED): Mod: MUE | Performed by: FAMILY MEDICINE

## 2024-06-21 PROCEDURE — 2500000002 HC RX 250 W HCPCS SELF ADMINISTERED DRUGS (ALT 637 FOR MEDICARE OP, ALT 636 FOR OP/ED): Performed by: PHYSICIAN ASSISTANT

## 2024-06-21 PROCEDURE — 2500000005 HC RX 250 GENERAL PHARMACY W/O HCPCS: Performed by: INTERNAL MEDICINE

## 2024-06-21 PROCEDURE — 2500000001 HC RX 250 WO HCPCS SELF ADMINISTERED DRUGS (ALT 637 FOR MEDICARE OP): Performed by: PHYSICIAN ASSISTANT

## 2024-06-21 PROCEDURE — 80048 BASIC METABOLIC PNL TOTAL CA: CPT | Performed by: FAMILY MEDICINE

## 2024-06-21 PROCEDURE — 2500000004 HC RX 250 GENERAL PHARMACY W/ HCPCS (ALT 636 FOR OP/ED): Performed by: FAMILY MEDICINE

## 2024-06-21 RX ORDER — POTASSIUM CHLORIDE 14.9 MG/ML
20 INJECTION INTRAVENOUS
Status: COMPLETED | OUTPATIENT
Start: 2024-06-21 | End: 2024-06-21

## 2024-06-21 RX ORDER — ZOLPIDEM TARTRATE 5 MG/1
5 TABLET ORAL NIGHTLY PRN
Status: DISCONTINUED | OUTPATIENT
Start: 2024-06-21 | End: 2024-06-24 | Stop reason: HOSPADM

## 2024-06-21 RX ORDER — POTASSIUM CHLORIDE 20 MEQ/1
20 TABLET, EXTENDED RELEASE ORAL ONCE
Status: COMPLETED | OUTPATIENT
Start: 2024-06-21 | End: 2024-06-21

## 2024-06-21 RX ORDER — TRAZODONE HYDROCHLORIDE 150 MG/1
50 TABLET ORAL 3 TIMES DAILY
COMMUNITY
End: 2024-06-24 | Stop reason: HOSPADM

## 2024-06-21 RX ORDER — ALPRAZOLAM 0.5 MG/1
0.5 TABLET ORAL 2 TIMES DAILY PRN
Status: DISCONTINUED | OUTPATIENT
Start: 2024-06-21 | End: 2024-06-24 | Stop reason: HOSPADM

## 2024-06-21 RX ORDER — BUSPIRONE HYDROCHLORIDE 10 MG/1
10 TABLET ORAL
COMMUNITY
End: 2024-06-24 | Stop reason: HOSPADM

## 2024-06-21 RX ORDER — ACETAMINOPHEN 500 MG
5 TABLET ORAL NIGHTLY
Status: DISCONTINUED | OUTPATIENT
Start: 2024-06-21 | End: 2024-06-24 | Stop reason: HOSPADM

## 2024-06-21 ASSESSMENT — PAIN SCALES - GENERAL
PAINLEVEL_OUTOF10: 0 - NO PAIN

## 2024-06-21 ASSESSMENT — COGNITIVE AND FUNCTIONAL STATUS - GENERAL
TURNING FROM BACK TO SIDE WHILE IN FLAT BAD: A LOT
DRESSING REGULAR UPPER BODY CLOTHING: A LITTLE
MOVING FROM LYING ON BACK TO SITTING ON SIDE OF FLAT BED WITH BEDRAILS: A LITTLE
CLIMB 3 TO 5 STEPS WITH RAILING: TOTAL
STANDING UP FROM CHAIR USING ARMS: A LOT
MOBILITY SCORE: 14
CLIMB 3 TO 5 STEPS WITH RAILING: A LOT
TURNING FROM BACK TO SIDE WHILE IN FLAT BAD: A LOT
MOVING FROM LYING ON BACK TO SITTING ON SIDE OF FLAT BED WITH BEDRAILS: A LITTLE
DAILY ACTIVITIY SCORE: 16
WALKING IN HOSPITAL ROOM: A LITTLE
HELP NEEDED FOR BATHING: A LOT
CLIMB 3 TO 5 STEPS WITH RAILING: TOTAL
DAILY ACTIVITIY SCORE: 17
MOBILITY SCORE: 16
MOVING TO AND FROM BED TO CHAIR: A LITTLE
STANDING UP FROM CHAIR USING ARMS: A LITTLE
TOILETING: A LITTLE
DRESSING REGULAR UPPER BODY CLOTHING: A LITTLE
WALKING IN HOSPITAL ROOM: A LOT
TOILETING: A LOT
MOVING FROM LYING ON BACK TO SITTING ON SIDE OF FLAT BED WITH BEDRAILS: A LITTLE
STANDING UP FROM CHAIR USING ARMS: A LITTLE
WALKING IN HOSPITAL ROOM: TOTAL
PERSONAL GROOMING: A LITTLE
MOBILITY SCORE: 12
DRESSING REGULAR LOWER BODY CLOTHING: A LOT
DRESSING REGULAR LOWER BODY CLOTHING: A LOT
MOVING TO AND FROM BED TO CHAIR: A LITTLE
TURNING FROM BACK TO SIDE WHILE IN FLAT BAD: A LITTLE
PERSONAL GROOMING: A LITTLE
MOVING TO AND FROM BED TO CHAIR: A LOT
HELP NEEDED FOR BATHING: A LOT

## 2024-06-21 ASSESSMENT — PAIN - FUNCTIONAL ASSESSMENT
PAIN_FUNCTIONAL_ASSESSMENT: 0-10
PAIN_FUNCTIONAL_ASSESSMENT: 0-10

## 2024-06-21 NOTE — NURSING NOTE
1930: assumed pt care    2100: pt upset, states hospitalist in dayshift stated she can have ambien for sleep, pt requested I get ahold of Dr. Quezada to confirm. No further order from Dr. Quezada.

## 2024-06-21 NOTE — PROGRESS NOTES
06/21/24 1043   Discharge Planning   Living Arrangements Spouse/significant other;Children   Support Systems Children;Spouse/significant other   Assistance Needed A&OX2-3; assist at times for ADL with cane and rollator; doesn't drive; 2L NC baseline (unk supplier) 2L NC currently   Type of Residence Private residence   Number of Stairs to Enter Residence 4   Number of Stairs Within Residence 0   Do you have animals or pets at home? No   Who is requesting discharge planning? Provider   Home or Post Acute Services Post acute facilities (Rehab/SNF/etc)   Patient expects to be discharged to: [facility] escalated by [facility]   Does the patient need discharge transport arranged? Yes   RoundTrip coordination needed? Yes   Has discharge transport been arranged? No

## 2024-06-21 NOTE — CARE PLAN
The patient's goals for the shift include  to sleep.     The clinical goals for the shift include to sleep.     Over the shift, the patient did not make progress toward the following goals. Barriers to progression include pt thought medications she takes at home should not have been stopped. Recommendations to address these barriers include review again with the patient the meds she is on.

## 2024-06-21 NOTE — PROGRESS NOTES
Physical Therapy    Physical Therapy Treatment    Patient Name: Fidelia Norman  MRN: [id number]  Today's Date: 6/21/2024  Time Calculation  Start Time: 1036  Stop Time: 1100  Time Calculation (min): 24 min    Assessment/Plan   PT Assessment  PT Assessment Results: Decreased strength, Decreased range of motion, Decreased endurance, Impaired balance, Decreased mobility, Decreased safety awareness  Rehab Prognosis: Good  Barriers to Discharge: Acute deconditioning, Acute cognition impairment  Evaluation/Treatment Tolerance: Patient tolerated treatment well, Patient limited by fatigue  Medical Staff Made Aware: Yes  Strengths: Support of Caregivers, Rehab experience  Barriers to Participation: Comorbidities, Ability to acquire knowledge  End of Session Communication: Bedside nurse  Assessment Comment: Patient tolerated treatment fair. She is fearful of becoming lightheaded and declines ambulation to the chair. Patient notes SOB when sitting EOB, SpO2 95% on 2L O2. SOB decreases and SpO2 increases to 98% with rest and proper breathing technique. Patient requires verbal cues throughout session to ensure posture improvement and safety techniques.  End of Session Patient Position: Bed, 3 rail up, Alarm on     PT Plan  Treatment/Interventions: Bed mobility, Transfer training, Gait training, Range of motion, Endurance training, Therapeutic exercise  PT Plan: Ongoing PT  PT Frequency: 3 times per week  PT Discharge Recommendations: Moderate intensity level of continued care  Equipment Recommended upon Discharge: Wheeled walker  PT Recommended Transfer Status: Assist x1  PT - OK to Discharge: Yes (Per PT POC)      General Visit Information:   PT  Visit  PT Received On: 06/21/24  General  Reason for Referral: Impaired mobility, hiatal hernia, UTI  Referred By: Kelly Quezada  Past Medical History Relevant to Rehab: HTN, Chronic Hypoxic Respiratory Failure, COPD, GERD, HTN, Hypothyroidism, Parkinson's Disease  Missed Visit:  No  Family/Caregiver Present: No  Prior to Session Communication: Bedside nurse  Patient Position Received: Bed, 4 rail up, Alarm off, not on at start of session  General Comment: Patient pleasant and agreeable to therapy session.    Subjective   Precautions:  Precautions  Medical Precautions: Fall precautions, Swallowing precautions (IV, 2 L of oxygen, telemetry, pure-wick)  Vital Signs:  Vital Signs  SpO2: 95 %    Objective   Pain:  Pain Assessment  Pain Assessment: 0-10  0-10 (Numeric) Pain Score: 0 - No pain  Cognition:  Cognition  Orientation Level: Oriented X4  Coordination:     Postural Control:  Static Sitting Balance  Static Sitting-Balance Support: Bilateral upper extremity supported, Feet supported  Static Sitting-Level of Assistance: Contact guard  Static Sitting-Comment/Number of Minutes: fwd flexed posture, slight dizziness reported but this decreased with proper breathing techniques and rest break. Vitals WNL  Static Standing Balance  Static Standing-Balance Support: Bilateral upper extremity supported (FWW)  Static Standing-Level of Assistance: Minimum assistance  Static Standing-Comment/Number of Minutes: fwd flexed posture    Activity Tolerance:  Activity Tolerance  Endurance: Tolerates less than 10 min exercise with changes in vital signs  Treatments:  Therapeutic Exercise  Therapeutic Exercise Performed: Yes  Therapeutic Exercise Activity 1: Supine PF/DF, Quad sets, and glute squeezes x10 each                             Bed Mobility  Bed Mobility: Yes  Bed Mobility 1  Bed Mobility 1: Supine to sitting, Sitting to supine, Scooting  Level of Assistance 1: Minimum assistance, Moderate verbal cues  Bed Mobility Comments 1: HOB elevated, use of rails, and Verbal cues for technique, UE placement, and safety    Ambulation/Gait Training  Ambulation/Gait Training Performed: Yes  Ambulation/Gait Training 1  Surface 1: Level tile  Device 1: Rolling walker  Assistance 1: Minimum assistance, Minimal verbal  cues  Quality of Gait 1: Shuffling gait, Decreased step length, Forward flexed posture  Comments/Distance (ft) 1: 5' x1 lateral steps to HOB with FWW. Verbal cues for technique.  Transfers  Transfer: Yes  Transfer 1  Transfer From 1: Sit to, Stand to  Transfer to 1: Stand, Sit  Technique 1: Sit to stand, Stand to sit  Transfer Device 1: Walker  Transfer Level of Assistance 1: Minimum assistance, Minimal verbal cues  Trials/Comments 1: Verbal cues for UE placement and safe technique for transfer                          Outcome Measures:  [facility] Basic Mobility  Turning from your back to your side while in a flat bed without using bedrails: A little  Moving from lying on your back to sitting on the side of a flat bed without using bedrails: A lot  Moving to and from bed to chair (including a wheelchair): A little  Standing up from a chair using your arms (e.g. wheelchair or bedside chair): A little  To walk in hospital room: A lot  Climbing 3-5 steps with railing: Total  Basic Mobility - Total Score: 14    Education Documentation  Precautions, taught by Cristina Tobar PTA at 6/21/2024 11:31 AM.  Learner: Patient  Readiness: Acceptance  Method: Explanation, Demonstration  Response: Demonstrated Understanding, Needs Reinforcement    Body Mechanics, taught by Cristina Tobar PTA at 6/21/2024 11:31 AM.  Learner: Patient  Readiness: Acceptance  Method: Explanation, Demonstration  Response: Demonstrated Understanding, Needs Reinforcement    Mobility Training, taught by Cristina Tobar PTA at 6/21/2024 11:31 AM.  Learner: Patient  Readiness: Acceptance  Method: Explanation, Demonstration  Response: Demonstrated Understanding, Needs Reinforcement    Education Comments  No comments found.        OP EDUCATION:       Encounter Problems       Encounter Problems (Active)       Balance       STG - Maintains dynamic standing balance with dual task with unilateral upper extremity support for 2 minutes with supervision (Progressing)        Start:  06/18/24    Expected End:  07/02/24               Mobility       STG - Patient will ambulate 50' with 2WW with supervision (Progressing)       Start:  06/18/24    Expected End:  07/02/24            STG - Patient will ascend and descend four stairs with use of unilateral rail with contact guard  (Not Progressing)       Start:  06/18/24    Expected End:  07/02/24               PT Transfers       STG - Patient to transfer to and from sit to supine with HOB flat w/o use of rails with supervision  (Progressing)       Start:  06/18/24    Expected End:  07/02/24            STG - Patient will transfer sit to and from stand with supervision (Progressing)       Start:  06/18/24    Expected End:  07/02/24               Pain - Adult

## 2024-06-21 NOTE — CONSULTS
Pharmacy Consult- Medication History/Drug Interactions/Patient Education    Patient medication list reviewed with patient and compared to pharmacy fill history. Patient presented with altered mental status so concern for polypharmacy. Upon review of medications, patient is taking high doses of xanax, zolpidem, and trazodone as well as taking both gabapentin and lyrica. Discussed with Dr. DYKES about stopping bedtime xanax of 1 mg and giving zolpidem 5 mg for sleep. Recommend starting melatonin 5 mg at bedtime to help with sleep. Recommend stopping either gabapentin or lyrica as both could be titrated up and no need to be on both. Discussed keeping gabapentin with Dr. DYKES. Plan to decrease trazodone from 200 mg nightly to 150 mg nightly and plan to decrease as an outpatient. Patient was counseled about these changes and she was agreeable. She was told to take fluoxetine in the morning to help prevent it causing insomnia as it can be activating. Patient was counseled on plan and that she was on duplicate therapy of xanax and ambien and gabapentin and lyrica. Patient was okay with stopping nightly xanax and doing lower dose of ambien for sleep. Patient was okay taking melatonin at night. Patient was counseled that gabapentin and lyrica are similar medications and she shouldn't be on both and that both medications could be titrated up to max dose as tolerated. Discussed stopping lyrica which patient helped with IBS pain. Discussed her continuing gabapentin and titrating up or discussing with outpatient provider about switching to lyrica but not being on both medications.     Bacilio Ruiz, Slick, BCPS, BCCCP  Clinical Pharmacy Specialist- Internal Medicine  Available via EPIC Secure Chat  Phone: [phone]

## 2024-06-21 NOTE — PROGRESS NOTES
Fidelia Norman is a 79 y.o. female on day 3 of admission presenting with UTI (urinary tract infection), uncomplicated.      Subjective   Patient resting in bed, denies any complaints this morning. Per nursing,s he was upset last night that meds were not given again but states that she did sleep and didn't realize the meds were timed for this morning.     Objective     Last Recorded Vitals  /70 (BP Location: Right arm, Patient Position: Lying)   Pulse 78   Temp 36.7 °C (98.1 °F) (Temporal)   Resp 18   Wt 76.9 kg (169 lb 8 oz)   SpO2 96%   Intake/Output last 3 Shifts:    Intake/Output Summary (Last 24 hours) at 6/21/2024 1718  Last data filed at 6/21/2024 1400  Gross per 24 hour   Intake 2785 ml   Output 1800 ml   Net 985 ml       Admission Weight  Weight: 74 kg (163 lb 2.3 oz) (06/17/24 1741)    Daily Weight  06/17/24 : 76.9 kg (169 lb 8 oz)        Physical Exam  Constitutional: alert and oriented x2-3, awake, cooperative, no acute distress  Skin: warm and dry  Head/Neck: Normocephalic, atraumatic  Cardio: Regular rate and rhythm  Resp: Unlabored breathing  Gastrointestinal: Soft, mild epigastric tenderness, nondistended  Musculoskeletal: generalized weakness  Extremities: No edema, cyanosis, or clubbing  Neuro: alert and oriented x2-3, tremorous hands improved  Psychological: Appropriate mood and behavior    Relevant Results  Scheduled medications  buPROPion XL, 150 mg, oral, q24h  [Held by provider] busPIRone, 5 mg, oral, BID  carbidopa-levodopa, 2 tablet, oral, TID  cefTRIAXone, 1 g, intravenous, q24h  dicyclomine, 10 mg, oral, 4x daily  docusate sodium, 100 mg, oral, BID  donepezil, 10 mg, oral, Nightly  FLUoxetine, 80 mg, oral, Daily  fluticasone furoate-vilanteroL, 1 puff, inhalation, Daily  gabapentin, 300 mg, oral, TID  heparin (porcine), 5,000 Units, subcutaneous, q8h  losartan, 100 mg, oral, Daily  lubiprostone, 24 mcg, oral, BID  memantine, 10 mg, oral, BID  oxygen, , inhalation, Continuous -  Inhalation  pantoprazole, 40 mg, intravenous, BID  polyethylene glycol, 17 g, oral, Daily  pregabalin, 50 mg, oral, BID  rosuvastatin, 20 mg, oral, Nightly  sucralfate, 1 g, oral, Before meals & nightly  traZODone, 50 mg, oral, BID      Continuous medications       PRN medications  PRN medications: acetaminophen, ALPRAZolam, lubricating eye drops, ondansetron, zolpidem    Results for orders placed or performed during the hospital encounter of 06/17/24 (from the past 24 hour(s))   Basic metabolic panel   Result Value Ref Range    Glucose 99 74 - 99 mg/dL    Sodium 142 136 - 145 mmol/L    Potassium 2.8 (LL) 3.5 - 5.3 mmol/L    Chloride 104 98 - 107 mmol/L    Bicarbonate 32 21 - 32 mmol/L    Anion Gap 9 (L) 10 - 20 mmol/L    Urea Nitrogen 3 (L) 6 - 23 mg/dL    Creatinine 0.71 0.50 - 1.05 mg/dL    eGFR 88 >60 mL/min/1.73m*2    Calcium 8.1 (L) 8.6 - 10.3 mg/dL   CBC and Auto Differential   Result Value Ref Range    WBC 7.2 4.4 - 11.3 x10*3/uL    nRBC 0.0 0.0 - 0.0 /100 WBCs    RBC 3.69 (L) 4.00 - 5.20 x10*6/uL    Hemoglobin 11.6 (L) 12.0 - 16.0 g/dL    Hematocrit 37.3 36.0 - 46.0 %     (H) 80 - 100 fL    MCH 31.4 26.0 - 34.0 pg    MCHC 31.1 (L) 32.0 - 36.0 g/dL    RDW 11.9 11.5 - 14.5 %    Platelets 157 150 - 450 x10*3/uL    Neutrophils % 67.7 40.0 - 80.0 %    Immature Granulocytes %, Automated 0.4 0.0 - 0.9 %    Lymphocytes % 16.9 13.0 - 44.0 %    Monocytes % 10.5 2.0 - 10.0 %    Eosinophils % 4.2 0.0 - 6.0 %    Basophils % 0.3 0.0 - 2.0 %    Neutrophils Absolute 4.84 1.60 - 5.50 x10*3/uL    Immature Granulocytes Absolute, Automated 0.03 0.00 - 0.50 x10*3/uL    Lymphocytes Absolute 1.21 0.80 - 3.00 x10*3/uL    Monocytes Absolute 0.75 0.05 - 0.80 x10*3/uL    Eosinophils Absolute 0.30 0.00 - 0.40 x10*3/uL    Basophils Absolute 0.02 0.00 - 0.10 x10*3/uL       Assessment/Plan   80yo CF with PMH of Parkinsons dementia, HTN, COPD,  GERD with hiatal hernia, and depression with anxiety that presented to the ED with  vomiting and was admitted for AMS secondary to UTI.     Acute on chronic metabolic encephalopathy, improved  - in setting of Parkinsons dementia and polypharmacy  - likely secondary to UTI  - CT head negative for acute pathology on admission  - pharmacy consulted, agree polypharmacy is concern as she is on many redundant medications  - continue IV ceftriaxone  - continue to monitor    Urinary tract infection  - urine culture positive for pansensitive E.coli  - continue IV ceftriaxone, transition to PO keflex at discharge    Hiatal hernia  - possible source of vomiting on presentation  - per , was suppose to have outpt EGD for workup prior to surgical repair  - continue home bentyl, carafate and PPI    Hypertension  - continue home amloidpine, lasix, losartan    Parkinsons dementia  - continue home sinemet, memantine, and donepezil    Depression with anxiety  - restart prozac at 80mg since home dose above recommended and has interactions  - restart home buproprion  - hold home buspirone  - continue PRN xanax for anxiety  - decrease home ambien to 5mg with recommended outpt plan to discontinue.    DVT Proph: heparin subcutaneous    Dispo: Patient requires close inpatient monitoring in setting of AMS secondary to UTI, discharge to SNF pending precert.    Principal Problem:    UTI (urinary tract infection), uncomplicated  Active Problems:    Prolonged Q-T interval on ECG    Lactate blood increase    Abdominal pain    Hemorrhoids    History of Parkinson's disease    Sepsis due to urinary tract infection (Multi)        Kelly Quezada DO

## 2024-06-22 LAB
ANION GAP SERPL CALC-SCNC: 9 MMOL/L (ref 10–20)
BACTERIA BLD CULT: NORMAL
BACTERIA BLD CULT: NORMAL
BASOPHILS # BLD AUTO: 0.03 X10*3/UL (ref 0–0.1)
BASOPHILS NFR BLD AUTO: 0.4 %
BUN SERPL-MCNC: 5 MG/DL (ref 6–23)
CALCIUM SERPL-MCNC: 8.4 MG/DL (ref 8.6–10.3)
CHLORIDE SERPL-SCNC: 102 MMOL/L (ref 98–107)
CO2 SERPL-SCNC: 33 MMOL/L (ref 21–32)
CREAT SERPL-MCNC: 0.74 MG/DL (ref 0.5–1.05)
EGFRCR SERPLBLD CKD-EPI 2021: 83 ML/MIN/1.73M*2
EOSINOPHIL # BLD AUTO: 0.34 X10*3/UL (ref 0–0.4)
EOSINOPHIL NFR BLD AUTO: 4.7 %
ERYTHROCYTE [DISTWIDTH] IN BLOOD BY AUTOMATED COUNT: 11.8 % (ref 11.5–14.5)
GLUCOSE SERPL-MCNC: 95 MG/DL (ref 74–99)
HCT VFR BLD AUTO: 37.4 % (ref 36–46)
HGB BLD-MCNC: 11.7 G/DL (ref 12–16)
IMM GRANULOCYTES # BLD AUTO: 0.04 X10*3/UL (ref 0–0.5)
IMM GRANULOCYTES NFR BLD AUTO: 0.6 % (ref 0–0.9)
LYMPHOCYTES # BLD AUTO: 1.57 X10*3/UL (ref 0.8–3)
LYMPHOCYTES NFR BLD AUTO: 21.8 %
MCH RBC QN AUTO: 31.2 PG (ref 26–34)
MCHC RBC AUTO-ENTMCNC: 31.3 G/DL (ref 32–36)
MCV RBC AUTO: 100 FL (ref 80–100)
MONOCYTES # BLD AUTO: 0.75 X10*3/UL (ref 0.05–0.8)
MONOCYTES NFR BLD AUTO: 10.4 %
NEUTROPHILS # BLD AUTO: 4.47 X10*3/UL (ref 1.6–5.5)
NEUTROPHILS NFR BLD AUTO: 62.1 %
NRBC BLD-RTO: 0 /100 WBCS (ref 0–0)
PLATELET # BLD AUTO: 168 X10*3/UL (ref 150–450)
POTASSIUM SERPL-SCNC: 3.3 MMOL/L (ref 3.5–5.3)
RBC # BLD AUTO: 3.75 X10*6/UL (ref 4–5.2)
SODIUM SERPL-SCNC: 141 MMOL/L (ref 136–145)
WBC # BLD AUTO: 7.2 X10*3/UL (ref 4.4–11.3)

## 2024-06-22 PROCEDURE — 97535 SELF CARE MNGMENT TRAINING: CPT | Mod: GO | Performed by: OCCUPATIONAL THERAPIST

## 2024-06-22 PROCEDURE — 1200000002 HC GENERAL ROOM WITH TELEMETRY DAILY

## 2024-06-22 PROCEDURE — 2500000005 HC RX 250 GENERAL PHARMACY W/O HCPCS: Performed by: FAMILY MEDICINE

## 2024-06-22 PROCEDURE — 80048 BASIC METABOLIC PNL TOTAL CA: CPT | Performed by: FAMILY MEDICINE

## 2024-06-22 PROCEDURE — 2500000002 HC RX 250 W HCPCS SELF ADMINISTERED DRUGS (ALT 637 FOR MEDICARE OP, ALT 636 FOR OP/ED): Mod: MUE | Performed by: FAMILY MEDICINE

## 2024-06-22 PROCEDURE — 2500000004 HC RX 250 GENERAL PHARMACY W/ HCPCS (ALT 636 FOR OP/ED)

## 2024-06-22 PROCEDURE — 2500000001 HC RX 250 WO HCPCS SELF ADMINISTERED DRUGS (ALT 637 FOR MEDICARE OP): Performed by: PHYSICIAN ASSISTANT

## 2024-06-22 PROCEDURE — 2500000004 HC RX 250 GENERAL PHARMACY W/ HCPCS (ALT 636 FOR OP/ED): Performed by: PHYSICIAN ASSISTANT

## 2024-06-22 PROCEDURE — 2500000001 HC RX 250 WO HCPCS SELF ADMINISTERED DRUGS (ALT 637 FOR MEDICARE OP): Performed by: FAMILY MEDICINE

## 2024-06-22 PROCEDURE — 99232 SBSQ HOSP IP/OBS MODERATE 35: CPT | Performed by: FAMILY MEDICINE

## 2024-06-22 PROCEDURE — 2500000002 HC RX 250 W HCPCS SELF ADMINISTERED DRUGS (ALT 637 FOR MEDICARE OP, ALT 636 FOR OP/ED): Performed by: PHYSICIAN ASSISTANT

## 2024-06-22 PROCEDURE — 94760 N-INVAS EAR/PLS OXIMETRY 1: CPT

## 2024-06-22 PROCEDURE — 2500000001 HC RX 250 WO HCPCS SELF ADMINISTERED DRUGS (ALT 637 FOR MEDICARE OP)

## 2024-06-22 PROCEDURE — 2500000004 HC RX 250 GENERAL PHARMACY W/ HCPCS (ALT 636 FOR OP/ED): Performed by: FAMILY MEDICINE

## 2024-06-22 PROCEDURE — 2500000005 HC RX 250 GENERAL PHARMACY W/O HCPCS: Performed by: INTERNAL MEDICINE

## 2024-06-22 PROCEDURE — 85025 COMPLETE CBC W/AUTO DIFF WBC: CPT | Performed by: FAMILY MEDICINE

## 2024-06-22 PROCEDURE — C9113 INJ PANTOPRAZOLE SODIUM, VIA: HCPCS | Performed by: PHYSICIAN ASSISTANT

## 2024-06-22 PROCEDURE — 36415 COLL VENOUS BLD VENIPUNCTURE: CPT | Performed by: FAMILY MEDICINE

## 2024-06-22 RX ORDER — POTASSIUM CHLORIDE 14.9 MG/ML
20 INJECTION INTRAVENOUS
Status: COMPLETED | OUTPATIENT
Start: 2024-06-22 | End: 2024-06-22

## 2024-06-22 RX ORDER — POTASSIUM CHLORIDE 20 MEQ/1
40 TABLET, EXTENDED RELEASE ORAL ONCE
Status: COMPLETED | OUTPATIENT
Start: 2024-06-22 | End: 2024-06-22

## 2024-06-22 ASSESSMENT — COGNITIVE AND FUNCTIONAL STATUS - GENERAL
HELP NEEDED FOR BATHING: A LOT
TOILETING: A LOT
HELP NEEDED FOR BATHING: A LOT
PERSONAL GROOMING: A LITTLE
CLIMB 3 TO 5 STEPS WITH RAILING: A LOT
MOVING TO AND FROM BED TO CHAIR: A LITTLE
DRESSING REGULAR LOWER BODY CLOTHING: A LOT
CLIMB 3 TO 5 STEPS WITH RAILING: A LOT
DAILY ACTIVITIY SCORE: 15
WALKING IN HOSPITAL ROOM: A LITTLE
MOVING TO AND FROM BED TO CHAIR: A LITTLE
DRESSING REGULAR LOWER BODY CLOTHING: A LOT
STANDING UP FROM CHAIR USING ARMS: A LITTLE
DAILY ACTIVITIY SCORE: 16
DAILY ACTIVITIY SCORE: 16
STANDING UP FROM CHAIR USING ARMS: A LITTLE
PERSONAL GROOMING: A LITTLE
MOVING FROM LYING ON BACK TO SITTING ON SIDE OF FLAT BED WITH BEDRAILS: A LITTLE
DRESSING REGULAR UPPER BODY CLOTHING: A LITTLE
MOVING FROM LYING ON BACK TO SITTING ON SIDE OF FLAT BED WITH BEDRAILS: A LITTLE
PERSONAL GROOMING: A LITTLE
DRESSING REGULAR UPPER BODY CLOTHING: A LITTLE
TURNING FROM BACK TO SIDE WHILE IN FLAT BAD: A LITTLE
MOBILITY SCORE: 17
TOILETING: A LOT
WALKING IN HOSPITAL ROOM: A LITTLE
HELP NEEDED FOR BATHING: A LOT
TURNING FROM BACK TO SIDE WHILE IN FLAT BAD: A LITTLE
MOBILITY SCORE: 17
TOILETING: A LOT
DRESSING REGULAR LOWER BODY CLOTHING: A LOT
DRESSING REGULAR UPPER BODY CLOTHING: A LOT

## 2024-06-22 ASSESSMENT — PAIN SCALES - GENERAL
PAINLEVEL_OUTOF10: 3
PAINLEVEL_OUTOF10: 0 - NO PAIN
PAINLEVEL_OUTOF10: 3
PAINLEVEL_OUTOF10: 0 - NO PAIN

## 2024-06-22 ASSESSMENT — PAIN DESCRIPTION - LOCATION: LOCATION: NECK

## 2024-06-22 ASSESSMENT — PAIN - FUNCTIONAL ASSESSMENT
PAIN_FUNCTIONAL_ASSESSMENT: 0-10

## 2024-06-22 ASSESSMENT — ACTIVITIES OF DAILY LIVING (ADL): HOME_MANAGEMENT_TIME_ENTRY: 25

## 2024-06-22 NOTE — PROGRESS NOTES
Occupational Therapy    Occupational Therapy Treatment    Name: Fidelia Norman  MRN: [id number]  : 1945  Date: 24  Time Calculation  Start Time: 847  Stop Time: 912  Time Calculation (min): 25 min    Assessment:  OT Assessment: This 80 y/o female presents with decreased cognition, balance, strength, and endurance. transfers, and mobility. Pt requested to brush teeth and use the bathroom during the session. Pt demonstrated no SOB or dizziness throughout the session. NA present throughout session, assisting with making pt bed. Pt to benefit from skilled OT services to increase independence with ADL's, tranfers, and endurance.  End of Session Communication: Bedside nurse  End of Session Patient Position: Bed, 3 rail up, Alarm on  Plan:  Treatment Interventions: ADL retraining, Functional transfer training, UE strengthening/ROM, Endurance training, Cognitive reorientation, Patient/family training  OT Frequency: 3 times per week  OT Discharge Recommendations: Moderate intensity level of continued care  Equipment Recommended upon Discharge: Wheeled walker  OT Recommended Transfer Status: Assist of 1  OT - OK to Discharge: Yes    Subjective   Previous Visit Info:  OT Last Visit  OT Received On: 24  General:  General  Reason for Referral:  (Pt is a 80 y/o woman who was admitted for AMS and nausea/vomiting. Pt found to have Impaired mobility, hiatal hernia, UTI)  Referred By: Kelly Quezada  Past Medical History Relevant to Rehab: HTN, Chronic Hypoxic Respiratory Failure, COPD, GERD, HTN, Hypothyroidism, Parkinson's Disease  Missed Visit: No  Family/Caregiver Present: No  Prior to Session Communication: Bedside nurse  Patient Position Received: Bed, 3 rail up, Alarm on  General Comment: Patient pleasant and agreeable to therapy session.  Precautions:  Medical Precautions: Fall precautions  Vitals:  Vital Signs  Heart Rate:  (95)  Pain Assessment:  Pain Assessment  Pain Assessment: 0-10  0-10 (Numeric)  Pain Score: 0 - No pain     Objective   Cognition:  Orientation Level: Oriented X4  Activities of Daily Living: Grooming  Grooming Level of Assistance: Contact guard  Grooming Where Assessed: Standing sinkside  Grooming Comments: Pt brushed teeth standing at sink with set-up. Pt demonstrated no LOB or SOB    Toileting  Toileting Level of Assistance:  (min A toilet transfer, max A hygiene, mod A clothing management)  Where Assessed: Toilet  Toileting Comments: verbal cues to increase independence and safety    Functional Standing Tolerance:  Functional Standing Tolerance  Time: 5  Activity: standing at sink brushing teeth with SBA and FWW. Pt demonnstrated no dizziness or SOB  Bed Mobility/Transfers: Bed Mobility 1  Bed Mobility 1: Supine to sitting, Sitting to supine, Scooting  Level of Assistance 1: Minimum assistance, Moderate verbal cues  Bed Mobility Comments 1:  (HOB elevated, use of rails, and Verbal cues for technique, UE placement, and safety)    Transfers  Transfer: Yes  Transfer 1  Transfer From 1: Bed to  Transfer to 1: Stand  Technique 1: Sit to stand, Stand to sit  Transfer Device 1: Walker  Transfer Level of Assistance 1: Minimum assistance, Minimal verbal cues  Transfers 2  Transfer From 2: Stand to  Transfer to 2: Commode-standard  Technique 2: Sit to stand, Stand to sit  Transfer Device 2: Walker  Transfer Level of Assistance 2: Minimum assistance, Moderate verbal cues (use of grab bars)      Functional Mobility:  Functional Mobility 1  Surface 1: Level tile  Device 1: Rolling walker  Assistance 1: Contact guard  Comments 1: Pt ambulated bed<> bathroom with CGA and LOB x 1 due to threshold. Pt able to regain balance independently. VC for safety awareness  Sitting Balance:  Static Sitting Balance  Static Sitting-Balance Support: Feet supported, Bilateral upper extremity supported  Static Sitting-Level of Assistance: Close supervision  Standing Balance:  Static Standing Balance  Static  Standing-Balance Support: Bilateral upper extremity supported  Static Standing-Level of Assistance: Contact guard       Outcome Measures:  [facility] Daily Activity  Putting on and taking off regular lower body clothing: A lot  Bathing (including washing, rinsing, drying): A lot  Putting on and taking off regular upper body clothing: A little  Toileting, which includes using toilet, bedpan or urinal: A lot  Taking care of personal grooming such as brushing teeth: A little  Eating Meals: None  Daily Activity - Total Score: 16        Education Documentation  Body Mechanics, taught by Michaelle Laguerre OT at 6/22/2024 10:18 AM.  Learner: Patient  Readiness: Acceptance  Method: Explanation  Response: Verbalizes Understanding    Precautions, taught by Michaelle Laguerre OT at 6/22/2024 10:18 AM.  Learner: Patient  Readiness: Acceptance  Method: Explanation  Response: Verbalizes Understanding    ADL Training, taught by Michaelle Laguerre OT at 6/22/2024 10:18 AM.  Learner: Patient  Readiness: Acceptance  Method: Explanation  Response: Verbalizes Understanding    Education Comments  No comments found.      Goals:  Encounter Problems       Encounter Problems (Active)       OT Goals       Pt to demonstrate bed mobility at mod I  (Progressing)       Start:  06/18/24    Expected End:  07/02/24            Pt to demonstrate transfers with FWW at [facility] (Progressing)       Start:  06/18/24    Expected End:  07/02/24            Pt will demo increased functional mobility to tolerate tasks necessary to complete ADL routine with FWW at [facility] (Progressing)       Start:  06/18/24    Expected End:  07/02/24            Pt will increase endurance to tolerate 15 min of activity with no more than 1 rest break in order to increase ability to engage in ADL completion.  (Progressing)       Start:  06/18/24    Expected End:  07/02/24            Pt will complete BUE exercises, 10-15 reps 1-2 sets in all functional planes, to demo  improved functional strength for increased participation in BADL and mobility  (Progressing)       Start:  06/18/24    Expected End:  07/02/24            Pt to demonstrate LB dressing, bathing, and toileting with FWW at min A (Progressing)       Start:  06/18/24    Expected End:  07/02/24                Pt will demo FAIR+ static/dynamic standing balance during ADL and functional activity with FWW for improved independence and participation in ADL  (Progressing)       Start:  06/18/24    Expected End:  07/02/24

## 2024-06-22 NOTE — PROGRESS NOTES
Fidelia Norman is a 79 y.o. female on day 4 of admission presenting with UTI (urinary tract infection), uncomplicated.      Subjective   Patient resting in bed, denies any complaints. She reports frustration that insurance is taking so long to respond about precert.    Objective     Last Recorded Vitals  /77 (BP Location: Left arm, Patient Position: Lying)   Pulse 83   Temp 36.9 °C (98.4 °F) (Temporal)   Resp 20   Wt 76.9 kg (169 lb 8 oz)   SpO2 97%   Intake/Output last 3 Shifts:    Intake/Output Summary (Last 24 hours) at 6/22/2024 1240  Last data filed at 6/22/2024 0832  Gross per 24 hour   Intake 450 ml   Output 1300 ml   Net -850 ml       Admission Weight  Weight: 74 kg (163 lb 2.3 oz) (06/17/24 1741)    Daily Weight  06/17/24 : 76.9 kg (169 lb 8 oz)        Physical Exam  Constitutional: alert and oriented x3, awake, cooperative, no acute distress  Skin: warm and dry  Head/Neck: Normocephalic, atraumatic  Cardio: Regular rate and rhythm  Resp: Unlabored breathing  Gastrointestinal: Soft, mild epigastric tenderness, nondistended  Musculoskeletal: generalized weakness  Extremities: No edema, cyanosis, or clubbing  Neuro: alert and oriented x3, tremorous hands resolved  Psychological: Appropriate mood and behavior    Relevant Results  Scheduled medications  buPROPion XL, 150 mg, oral, q24h  [Held by provider] busPIRone, 5 mg, oral, BID  carbidopa-levodopa, 2 tablet, oral, TID  cefTRIAXone, 1 g, intravenous, q24h  dicyclomine, 10 mg, oral, 4x daily  docusate sodium, 100 mg, oral, BID  donepezil, 10 mg, oral, Nightly  FLUoxetine, 80 mg, oral, Daily  fluticasone furoate-vilanteroL, 1 puff, inhalation, Daily  gabapentin, 300 mg, oral, TID  heparin (porcine), 5,000 Units, subcutaneous, q8h  losartan, 100 mg, oral, Daily  lubiprostone, 24 mcg, oral, BID  melatonin, 5 mg, oral, Nightly  memantine, 10 mg, oral, BID  oxygen, , inhalation, Continuous - Inhalation  pantoprazole, 40 mg, intravenous, BID  polyethylene  glycol, 17 g, oral, Daily  pregabalin, 50 mg, oral, BID  rosuvastatin, 20 mg, oral, Nightly  sucralfate, 1 g, oral, Before meals & nightly  traZODone, 50 mg, oral, BID      Continuous medications       PRN medications  PRN medications: acetaminophen, ALPRAZolam, lubricating eye drops, ondansetron, zolpidem    Results for orders placed or performed during the hospital encounter of 06/17/24 (from the past 24 hour(s))   Basic metabolic panel   Result Value Ref Range    Glucose 95 74 - 99 mg/dL    Sodium 141 136 - 145 mmol/L    Potassium 3.3 (L) 3.5 - 5.3 mmol/L    Chloride 102 98 - 107 mmol/L    Bicarbonate 33 (H) 21 - 32 mmol/L    Anion Gap 9 (L) 10 - 20 mmol/L    Urea Nitrogen 5 (L) 6 - 23 mg/dL    Creatinine 0.74 0.50 - 1.05 mg/dL    eGFR 83 >60 mL/min/1.73m*2    Calcium 8.4 (L) 8.6 - 10.3 mg/dL   CBC and Auto Differential   Result Value Ref Range    WBC 7.2 4.4 - 11.3 x10*3/uL    nRBC 0.0 0.0 - 0.0 /100 WBCs    RBC 3.75 (L) 4.00 - 5.20 x10*6/uL    Hemoglobin 11.7 (L) 12.0 - 16.0 g/dL    Hematocrit 37.4 36.0 - 46.0 %     80 - 100 fL    MCH 31.2 26.0 - 34.0 pg    MCHC 31.3 (L) 32.0 - 36.0 g/dL    RDW 11.8 11.5 - 14.5 %    Platelets 168 150 - 450 x10*3/uL    Neutrophils % 62.1 40.0 - 80.0 %    Immature Granulocytes %, Automated 0.6 0.0 - 0.9 %    Lymphocytes % 21.8 13.0 - 44.0 %    Monocytes % 10.4 2.0 - 10.0 %    Eosinophils % 4.7 0.0 - 6.0 %    Basophils % 0.4 0.0 - 2.0 %    Neutrophils Absolute 4.47 1.60 - 5.50 x10*3/uL    Immature Granulocytes Absolute, Automated 0.04 0.00 - 0.50 x10*3/uL    Lymphocytes Absolute 1.57 0.80 - 3.00 x10*3/uL    Monocytes Absolute 0.75 0.05 - 0.80 x10*3/uL    Eosinophils Absolute 0.34 0.00 - 0.40 x10*3/uL    Basophils Absolute 0.03 0.00 - 0.10 x10*3/uL       Assessment/Plan   80yo CF with PMH of Parkinsons dementia, HTN, COPD,  GERD with hiatal hernia, and depression with anxiety that presented to the ED with vomiting and was admitted for AMS secondary to UTI.     Urinary tract  infection  - urine culture positive for pansensitive E.coli  - continue IV ceftriaxone, transition to PO keflex at discharge if not done with 7day course    Acute on chronic metabolic encephalopathy, resolved  - in setting of Parkinsons dementia and polypharmacy  - likely secondary to UTI  - CT head negative for acute pathology on admission  - pharmacy consulted, agree polypharmacy is concern as she is on many redundant medications  - continue IV ceftriaxone  - continue to monitor    Hiatal hernia  - possible source of vomiting on presentation  - per , was suppose to have outpt EGD for workup prior to surgical repair  - continue home bentyl, carafate, and PPI    Hypertension  - continue home amloidpine, lasix, losartan    Parkinsons dementia  - continue home sinemet, memantine, and donepezil    Depression with anxiety  - restart prozac at 80mg since home dose above recommended and has interactions  - restart home buproprion  - hold home buspirone  - continue PRN xanax for anxiety  - decrease home ambien to 5mg with recommended outpt plan to discontinue.    DVT Proph: heparin subcutaneous    Dispo: Patient appears medically stable for discharge just waiting for precert to SNF.    Principal Problem:    UTI (urinary tract infection), uncomplicated  Active Problems:    Prolonged Q-T interval on ECG    Lactate blood increase    Abdominal pain    Hemorrhoids    History of Parkinson's disease    Sepsis due to urinary tract infection (Multi)        Kelly Quezada DO

## 2024-06-23 VITALS
BODY MASS INDEX: 28.94 KG/M2 | OXYGEN SATURATION: 97 % | DIASTOLIC BLOOD PRESSURE: 79 MMHG | TEMPERATURE: 97.6 F | WEIGHT: 169.5 LBS | HEIGHT: 64 IN | RESPIRATION RATE: 20 BRPM | SYSTOLIC BLOOD PRESSURE: 145 MMHG | HEART RATE: 74 BPM

## 2024-06-23 LAB
ANION GAP SERPL CALC-SCNC: 12 MMOL/L (ref 10–20)
BASOPHILS # BLD AUTO: 0.03 X10*3/UL (ref 0–0.1)
BASOPHILS NFR BLD AUTO: 0.5 %
BUN SERPL-MCNC: 8 MG/DL (ref 6–23)
CALCIUM SERPL-MCNC: 9 MG/DL (ref 8.6–10.3)
CHLORIDE SERPL-SCNC: 102 MMOL/L (ref 98–107)
CO2 SERPL-SCNC: 32 MMOL/L (ref 21–32)
CREAT SERPL-MCNC: 0.73 MG/DL (ref 0.5–1.05)
EGFRCR SERPLBLD CKD-EPI 2021: 85 ML/MIN/1.73M*2
EOSINOPHIL # BLD AUTO: 0.41 X10*3/UL (ref 0–0.4)
EOSINOPHIL NFR BLD AUTO: 6.7 %
ERYTHROCYTE [DISTWIDTH] IN BLOOD BY AUTOMATED COUNT: 11.9 % (ref 11.5–14.5)
GLUCOSE SERPL-MCNC: 92 MG/DL (ref 74–99)
HCT VFR BLD AUTO: 38.9 % (ref 36–46)
HGB BLD-MCNC: 12 G/DL (ref 12–16)
IMM GRANULOCYTES # BLD AUTO: 0.03 X10*3/UL (ref 0–0.5)
IMM GRANULOCYTES NFR BLD AUTO: 0.5 % (ref 0–0.9)
LYMPHOCYTES # BLD AUTO: 1.52 X10*3/UL (ref 0.8–3)
LYMPHOCYTES NFR BLD AUTO: 24.8 %
MCH RBC QN AUTO: 30.7 PG (ref 26–34)
MCHC RBC AUTO-ENTMCNC: 30.8 G/DL (ref 32–36)
MCV RBC AUTO: 100 FL (ref 80–100)
MONOCYTES # BLD AUTO: 0.65 X10*3/UL (ref 0.05–0.8)
MONOCYTES NFR BLD AUTO: 10.6 %
NEUTROPHILS # BLD AUTO: 3.48 X10*3/UL (ref 1.6–5.5)
NEUTROPHILS NFR BLD AUTO: 56.9 %
NRBC BLD-RTO: 0 /100 WBCS (ref 0–0)
PLATELET # BLD AUTO: 175 X10*3/UL (ref 150–450)
POTASSIUM SERPL-SCNC: 3.8 MMOL/L (ref 3.5–5.3)
RBC # BLD AUTO: 3.91 X10*6/UL (ref 4–5.2)
SODIUM SERPL-SCNC: 142 MMOL/L (ref 136–145)
WBC # BLD AUTO: 6.1 X10*3/UL (ref 4.4–11.3)

## 2024-06-23 PROCEDURE — 2500000002 HC RX 250 W HCPCS SELF ADMINISTERED DRUGS (ALT 637 FOR MEDICARE OP, ALT 636 FOR OP/ED): Performed by: PHYSICIAN ASSISTANT

## 2024-06-23 PROCEDURE — 2500000005 HC RX 250 GENERAL PHARMACY W/O HCPCS: Performed by: FAMILY MEDICINE

## 2024-06-23 PROCEDURE — 1200000002 HC GENERAL ROOM WITH TELEMETRY DAILY

## 2024-06-23 PROCEDURE — 2500000001 HC RX 250 WO HCPCS SELF ADMINISTERED DRUGS (ALT 637 FOR MEDICARE OP): Performed by: FAMILY MEDICINE

## 2024-06-23 PROCEDURE — 2500000004 HC RX 250 GENERAL PHARMACY W/ HCPCS (ALT 636 FOR OP/ED): Performed by: PHYSICIAN ASSISTANT

## 2024-06-23 PROCEDURE — 94760 N-INVAS EAR/PLS OXIMETRY 1: CPT

## 2024-06-23 PROCEDURE — 2500000001 HC RX 250 WO HCPCS SELF ADMINISTERED DRUGS (ALT 637 FOR MEDICARE OP): Performed by: PHYSICIAN ASSISTANT

## 2024-06-23 PROCEDURE — 2500000004 HC RX 250 GENERAL PHARMACY W/ HCPCS (ALT 636 FOR OP/ED)

## 2024-06-23 PROCEDURE — 2500000005 HC RX 250 GENERAL PHARMACY W/O HCPCS: Performed by: INTERNAL MEDICINE

## 2024-06-23 PROCEDURE — 80048 BASIC METABOLIC PNL TOTAL CA: CPT | Performed by: FAMILY MEDICINE

## 2024-06-23 PROCEDURE — 2500000001 HC RX 250 WO HCPCS SELF ADMINISTERED DRUGS (ALT 637 FOR MEDICARE OP)

## 2024-06-23 PROCEDURE — 85025 COMPLETE CBC W/AUTO DIFF WBC: CPT | Performed by: FAMILY MEDICINE

## 2024-06-23 PROCEDURE — C9113 INJ PANTOPRAZOLE SODIUM, VIA: HCPCS | Performed by: PHYSICIAN ASSISTANT

## 2024-06-23 PROCEDURE — 36415 COLL VENOUS BLD VENIPUNCTURE: CPT | Performed by: FAMILY MEDICINE

## 2024-06-23 PROCEDURE — 99232 SBSQ HOSP IP/OBS MODERATE 35: CPT | Performed by: FAMILY MEDICINE

## 2024-06-23 ASSESSMENT — COGNITIVE AND FUNCTIONAL STATUS - GENERAL
MOVING FROM LYING ON BACK TO SITTING ON SIDE OF FLAT BED WITH BEDRAILS: TOTAL
STANDING UP FROM CHAIR USING ARMS: A LITTLE
TURNING FROM BACK TO SIDE WHILE IN FLAT BAD: A LITTLE
MOVING TO AND FROM BED TO CHAIR: A LITTLE
TOILETING: A LOT
WALKING IN HOSPITAL ROOM: A LITTLE
MOBILITY SCORE: 15
CLIMB 3 TO 5 STEPS WITH RAILING: A LOT
WALKING IN HOSPITAL ROOM: A LITTLE
TURNING FROM BACK TO SIDE WHILE IN FLAT BAD: A LITTLE
CLIMB 3 TO 5 STEPS WITH RAILING: A LOT
MOVING FROM LYING ON BACK TO SITTING ON SIDE OF FLAT BED WITH BEDRAILS: A LOT
STANDING UP FROM CHAIR USING ARMS: A LITTLE
DRESSING REGULAR UPPER BODY CLOTHING: A LITTLE
HELP NEEDED FOR BATHING: A LOT
MOVING TO AND FROM BED TO CHAIR: A LITTLE
PERSONAL GROOMING: A LITTLE
MOBILITY SCORE: 16

## 2024-06-23 ASSESSMENT — PAIN SCALES - GENERAL
PAINLEVEL_OUTOF10: 0 - NO PAIN
PAINLEVEL_OUTOF10: 0 - NO PAIN

## 2024-06-23 ASSESSMENT — PAIN SCALES - WONG BAKER: WONGBAKER_NUMERICALRESPONSE: NO HURT

## 2024-06-23 NOTE — CARE PLAN
The patient's goals for the shift include      The clinical goals for the shift include pt will have sit in chair this shift.

## 2024-06-23 NOTE — PROGRESS NOTES
06/21/24 1043   Discharge Planning   Living Arrangements Spouse/significant other;Children   Support Systems Children;Spouse/significant other   Assistance Needed A&OX2-3; assist at times for ADL with cane and rollator; doesn't drive; 2L NC baseline (unk supplier) 2L NC currently   Type of Residence Private residence   Number of Stairs to Enter Residence 4   Number of Stairs Within Residence 0   Do you have animals or pets at home? No   Who is requesting discharge planning? Provider   Home or Post Acute Services Post acute facilities (Rehab/SNF/etc)   Patient expects to be discharged to: [facility]- precert escalated by [facility]   Does the patient need discharge transport arranged? Yes   RoundTrip coordination needed? Yes   Has discharge transport been arranged? No     6/23/2024: Reached out to [facility] in regards to precert- still pending at this time.

## 2024-06-23 NOTE — PROGRESS NOTES
"Fidelia Norman is a 79 y.o. female on day 5 of admission presenting with UTI (urinary tract infection), uncomplicated.      Subjective   Patient resting in bed, denies any complaints. She reports frustration that insurance is taking so long to respond about precert and wants to go home but had to call 911 to see check on her  last night because he was  \"freaking out.\" She would not elaborate more but states she would prefer to go home if no precert obtained tomorrow.    Objective     Last Recorded Vitals  /79 (BP Location: Left arm, Patient Position: Lying)   Pulse 84   Temp 36.5 °C (97.7 °F) (Temporal)   Resp 16   Wt 76.9 kg (169 lb 8 oz)   SpO2 96%   Intake/Output last 3 Shifts:    Intake/Output Summary (Last 24 hours) at 6/23/2024 1327  Last data filed at 6/23/2024 1014  Gross per 24 hour   Intake --   Output 1650 ml   Net -1650 ml       Admission Weight  Weight: 74 kg (163 lb 2.3 oz) (06/17/24 1741)    Daily Weight  06/17/24 : 76.9 kg (169 lb 8 oz)        Physical Exam  Constitutional: alert and oriented x3, awake, cooperative, no acute distress  Skin: warm and dry  Head/Neck: Normocephalic, atraumatic  Cardio: Regular rate and rhythm  Resp: Unlabored breathing  Gastrointestinal: Soft, nontender, nondistended  Musculoskeletal: generalized weakness  Extremities: No edema, cyanosis, or clubbing  Neuro: alert and oriented x3, tremorous hands resolved  Psychological: Appropriate mood and behavior    Relevant Results  Scheduled medications  buPROPion XL, 150 mg, oral, q24h  [Held by provider] busPIRone, 5 mg, oral, BID  carbidopa-levodopa, 2 tablet, oral, TID  cefTRIAXone, 1 g, intravenous, q24h  dicyclomine, 10 mg, oral, 4x daily  docusate sodium, 100 mg, oral, BID  donepezil, 10 mg, oral, Nightly  FLUoxetine, 80 mg, oral, Daily  fluticasone furoate-vilanteroL, 1 puff, inhalation, Daily  gabapentin, 300 mg, oral, TID  heparin (porcine), 5,000 Units, subcutaneous, q8h  losartan, 100 mg, oral, " Daily  lubiprostone, 24 mcg, oral, BID  melatonin, 5 mg, oral, Nightly  memantine, 10 mg, oral, BID  oxygen, , inhalation, Continuous - Inhalation  pantoprazole, 40 mg, intravenous, BID  polyethylene glycol, 17 g, oral, Daily  pregabalin, 50 mg, oral, BID  rosuvastatin, 20 mg, oral, Nightly  sucralfate, 1 g, oral, Before meals & nightly  traZODone, 50 mg, oral, BID      Continuous medications       PRN medications  PRN medications: acetaminophen, ALPRAZolam, lubricating eye drops, ondansetron, zolpidem    Results for orders placed or performed during the hospital encounter of 06/17/24 (from the past 24 hour(s))   CBC and Auto Differential   Result Value Ref Range    WBC 6.1 4.4 - 11.3 x10*3/uL    nRBC 0.0 0.0 - 0.0 /100 WBCs    RBC 3.91 (L) 4.00 - 5.20 x10*6/uL    Hemoglobin 12.0 12.0 - 16.0 g/dL    Hematocrit 38.9 36.0 - 46.0 %     80 - 100 fL    MCH 30.7 26.0 - 34.0 pg    MCHC 30.8 (L) 32.0 - 36.0 g/dL    RDW 11.9 11.5 - 14.5 %    Platelets 175 150 - 450 x10*3/uL    Neutrophils % 56.9 40.0 - 80.0 %    Immature Granulocytes %, Automated 0.5 0.0 - 0.9 %    Lymphocytes % 24.8 13.0 - 44.0 %    Monocytes % 10.6 2.0 - 10.0 %    Eosinophils % 6.7 0.0 - 6.0 %    Basophils % 0.5 0.0 - 2.0 %    Neutrophils Absolute 3.48 1.60 - 5.50 x10*3/uL    Immature Granulocytes Absolute, Automated 0.03 0.00 - 0.50 x10*3/uL    Lymphocytes Absolute 1.52 0.80 - 3.00 x10*3/uL    Monocytes Absolute 0.65 0.05 - 0.80 x10*3/uL    Eosinophils Absolute 0.41 (H) 0.00 - 0.40 x10*3/uL    Basophils Absolute 0.03 0.00 - 0.10 x10*3/uL   Basic metabolic panel   Result Value Ref Range    Glucose 92 74 - 99 mg/dL    Sodium 142 136 - 145 mmol/L    Potassium 3.8 3.5 - 5.3 mmol/L    Chloride 102 98 - 107 mmol/L    Bicarbonate 32 21 - 32 mmol/L    Anion Gap 12 10 - 20 mmol/L    Urea Nitrogen 8 6 - 23 mg/dL    Creatinine 0.73 0.50 - 1.05 mg/dL    eGFR 85 >60 mL/min/1.73m*2    Calcium 9.0 8.6 - 10.3 mg/dL       Assessment/Plan   78yo CF with PMH of  Parkinsons dementia, HTN, COPD,  GERD with hiatal hernia, and depression with anxiety that presented to the ED with vomiting and was admitted for AMS secondary to UTI.     Urinary tract infection  - urine culture positive for pansensitive E.coli  - continue IV ceftriaxone, transition to PO keflex at discharge if not done with 7day course (6/24)    Acute on chronic metabolic encephalopathy, resolved  - in setting of Parkinsons dementia and polypharmacy  - likely secondary to UTI  - CT head negative for acute pathology on admission  - pharmacy consulted, agree polypharmacy is concern as she is on many redundant medications  - continue IV ceftriaxone  - continue to monitor    Hiatal hernia  - possible source of vomiting on presentation  - per , was suppose to have outpt EGD for workup prior to surgical repair  - continue home bentyl, carafate, and PPI    Hypertension  - continue home amloidpine, lasix, losartan    Parkinsons dementia  - continue home sinemet, memantine, and donepezil    Depression with anxiety  - restart prozac at 80mg since home dose above recommended and has interactions  - restart home buproprion 150 BID  - discontinue home buspirone  - continue PRN xanax 0.5mg BID for anxiety  - decrease home ambien to 5mg with recommended outpt plan to discontinue.    DVT Proph: heparin subcutaneous    Dispo: Patient appears medically stable for discharge just waiting for precert to SNF.    Principal Problem:    UTI (urinary tract infection), uncomplicated  Active Problems:    Prolonged Q-T interval on ECG    Lactate blood increase    Abdominal pain    Hemorrhoids    History of Parkinson's disease    Sepsis due to urinary tract infection (Multi)        Kelly Quezada DO

## 2024-06-24 ENCOUNTER — PHARMACY VISIT (OUTPATIENT)
Dept: PHARMACY | Facility: CLINIC | Age: 79
End: 2024-06-24
Payer: COMMERCIAL

## 2024-06-24 VITALS
TEMPERATURE: 97.3 F | HEIGHT: 64 IN | SYSTOLIC BLOOD PRESSURE: 117 MMHG | DIASTOLIC BLOOD PRESSURE: 72 MMHG | HEART RATE: 76 BPM | BODY MASS INDEX: 28.94 KG/M2 | WEIGHT: 169.5 LBS | RESPIRATION RATE: 18 BRPM | OXYGEN SATURATION: 98 %

## 2024-06-24 PROBLEM — G20.A1 PARKINSON'S DISEASE (MULTI): Status: ACTIVE | Noted: 2023-07-22

## 2024-06-24 PROCEDURE — 2500000001 HC RX 250 WO HCPCS SELF ADMINISTERED DRUGS (ALT 637 FOR MEDICARE OP): Performed by: FAMILY MEDICINE

## 2024-06-24 PROCEDURE — 2500000004 HC RX 250 GENERAL PHARMACY W/ HCPCS (ALT 636 FOR OP/ED): Performed by: PHYSICIAN ASSISTANT

## 2024-06-24 PROCEDURE — 2500000001 HC RX 250 WO HCPCS SELF ADMINISTERED DRUGS (ALT 637 FOR MEDICARE OP)

## 2024-06-24 PROCEDURE — 97116 GAIT TRAINING THERAPY: CPT | Mod: GP,CQ

## 2024-06-24 PROCEDURE — 2500000005 HC RX 250 GENERAL PHARMACY W/O HCPCS: Performed by: FAMILY MEDICINE

## 2024-06-24 PROCEDURE — 97530 THERAPEUTIC ACTIVITIES: CPT | Mod: GP,CQ

## 2024-06-24 PROCEDURE — 97110 THERAPEUTIC EXERCISES: CPT | Mod: GP,CQ

## 2024-06-24 PROCEDURE — 2500000004 HC RX 250 GENERAL PHARMACY W/ HCPCS (ALT 636 FOR OP/ED)

## 2024-06-24 PROCEDURE — 99239 HOSP IP/OBS DSCHRG MGMT >30: CPT | Performed by: STUDENT IN AN ORGANIZED HEALTH CARE EDUCATION/TRAINING PROGRAM

## 2024-06-24 PROCEDURE — C9113 INJ PANTOPRAZOLE SODIUM, VIA: HCPCS | Performed by: PHYSICIAN ASSISTANT

## 2024-06-24 PROCEDURE — RXMED WILLOW AMBULATORY MEDICATION CHARGE

## 2024-06-24 PROCEDURE — 2500000001 HC RX 250 WO HCPCS SELF ADMINISTERED DRUGS (ALT 637 FOR MEDICARE OP): Performed by: PHYSICIAN ASSISTANT

## 2024-06-24 RX ORDER — ZOLPIDEM TARTRATE 5 MG/1
5 TABLET ORAL NIGHTLY PRN
Qty: 30 TABLET | Refills: 0 | Status: SHIPPED | OUTPATIENT
Start: 2024-06-24 | End: 2024-06-24

## 2024-06-24 RX ORDER — FLUOXETINE HYDROCHLORIDE 20 MG/1
80 CAPSULE ORAL DAILY
Start: 2024-06-24

## 2024-06-24 RX ORDER — TRAZODONE HYDROCHLORIDE 50 MG/1
50 TABLET ORAL NIGHTLY
Qty: 30 TABLET | Refills: 1 | Status: SHIPPED | OUTPATIENT
Start: 2024-06-24 | End: 2024-08-23

## 2024-06-24 RX ORDER — ZOLPIDEM TARTRATE 5 MG/1
5 TABLET ORAL NIGHTLY PRN
Qty: 30 TABLET | Refills: 0 | Status: SHIPPED | OUTPATIENT
Start: 2024-06-24 | End: 2024-07-24

## 2024-06-24 RX ORDER — DOCUSATE SODIUM 100 MG/1
100 CAPSULE, LIQUID FILLED ORAL 2 TIMES DAILY
Qty: 60 CAPSULE | Refills: 1 | Status: SHIPPED | OUTPATIENT
Start: 2024-06-24 | End: 2024-08-23

## 2024-06-24 RX ORDER — LOSARTAN POTASSIUM 50 MG/1
50 TABLET ORAL DAILY
Qty: 30 TABLET | Refills: 1 | Status: SHIPPED | OUTPATIENT
Start: 2024-06-24 | End: 2024-08-23

## 2024-06-24 RX ORDER — ACETAMINOPHEN 500 MG
5 TABLET ORAL NIGHTLY
Qty: 30 TABLET | Refills: 1 | Status: SHIPPED | OUTPATIENT
Start: 2024-06-24 | End: 2024-08-23

## 2024-06-24 ASSESSMENT — PAIN DESCRIPTION - LOCATION: LOCATION: NECK

## 2024-06-24 ASSESSMENT — PAIN SCALES - GENERAL
PAINLEVEL_OUTOF10: 2
PAINLEVEL_OUTOF10: 0 - NO PAIN
PAINLEVEL_OUTOF10: 5 - MODERATE PAIN

## 2024-06-24 ASSESSMENT — PAIN DESCRIPTION - DESCRIPTORS: DESCRIPTORS: DISCOMFORT

## 2024-06-24 ASSESSMENT — COGNITIVE AND FUNCTIONAL STATUS - GENERAL
MOVING FROM LYING ON BACK TO SITTING ON SIDE OF FLAT BED WITH BEDRAILS: A LITTLE
CLIMB 3 TO 5 STEPS WITH RAILING: A LOT
MOVING TO AND FROM BED TO CHAIR: A LITTLE
TURNING FROM BACK TO SIDE WHILE IN FLAT BAD: A LITTLE
WALKING IN HOSPITAL ROOM: A LITTLE
STANDING UP FROM CHAIR USING ARMS: A LITTLE
MOBILITY SCORE: 17

## 2024-06-24 ASSESSMENT — PAIN SCALES - WONG BAKER: WONGBAKER_NUMERICALRESPONSE: NO HURT

## 2024-06-24 NOTE — CARE PLAN
The patient's goals for the shift include  will be able to go home    The clinical goals for the shift include Patient will remain free from injury through out the shift.    Over the shift, the patient did not make progress toward the following goals. Barriers to progression include acute illness. Recommendations to address these barriers include medications.

## 2024-06-24 NOTE — PROGRESS NOTES
Physical Therapy    Physical Therapy Treatment    Patient Name: Fidelia Norman  MRN: [id number]  Today's Date: 6/24/2024  Time Calculation  Start Time: 1121  Stop Time: 1149  Time Calculation (min): 28 min    Assessment/Plan   PT Assessment  PT Assessment Results: Decreased strength, Decreased endurance  Rehab Prognosis: Good  Evaluation/Treatment Tolerance: Patient tolerated treatment well  End of Session Communication: Bedside nurse  End of Session Patient Position: Up in chair, Alarm on     PT Plan  Treatment/Interventions: Bed mobility, Transfer training, Gait training, Range of motion, Endurance training, Therapeutic exercise  PT Plan: Ongoing PT  PT Frequency: 3 times per week  PT Discharge Recommendations: Moderate intensity level of continued care  Equipment Recommended upon Discharge: Wheeled walker  PT Recommended Transfer Status: Assist x1  PT - OK to Discharge: Yes (Per PT POC)      General Visit Information:   PT  Visit  PT Received On: 06/24/24  Response to Previous Treatment: Patient with no complaints from previous session.  General  Reason for Referral: Impaired mobility, hiatal hernia, UTI  Referred By: Kelly Quezada  Past Medical History Relevant to Rehab: HTN, Chronic Hypoxic Respiratory Failure, COPD, GERD, HTN, Hypothyroidism, Parkinson's Disease  Family/Caregiver Present: No  Prior to Session Communication: Bedside nurse  Patient Position Received: Bed, 3 rail up, Alarm off, not on at start of session  Preferred Learning Style: verbal, visual  General Comment: Patient pleasant and agreeable to therapy session. (Patient presents with extreme forward cervical flexion)    Subjective   Precautions:  Precautions  Medical Precautions: Fall precautions  Precautions Comment: 2L O2 via NC, Purewick (patient can ambulate to bathroom).  Vital Signs:       Objective   Pain:  Pain Assessment  0-10 (Numeric) Pain Score: 2  Robin-Baker FACES Pain Rating: No hurt  Pain Type: Acute pain  Pain Location:  Neck  Pain Descriptors: Discomfort  Pain Frequency: Intermittent  Clinical Progression: Resolved  Pain Interventions: Heat applied, Rest  Response to Interventions: Eating lunch comfortably in chair  Cognition:  Cognition  Overall Cognitive Status: Within Functional Limits  Coordination:     Postural Control:  Postural Control  Postural Control: Impaired  Head Control: Extreme cervical flexion  Static Sitting Balance  Static Sitting-Balance Support: Bilateral upper extremity supported  Static Sitting-Level of Assistance: Close supervision  Static Sitting-Comment/Number of Minutes: Foward flexed posture  Dynamic Sitting Balance  Dynamic Sitting-Balance Support: Bilateral upper extremity supported  Dynamic Sitting-Balance: Forward lean  Static Standing Balance  Static Standing-Balance Support: Bilateral upper extremity supported  Static Standing-Level of Assistance: Close supervision  Static Standing-Comment/Number of Minutes: Forward flexed posture. Patient uses elbows to balance against sink while brushing teeth (Patient static standing for over five minutes while washing hands and brusing teeth.)  Extremity/Trunk Assessments:     Activity Tolerance:  Activity Tolerance  Endurance: Tolerates 30 min exercise with multiple rests  Treatments:  Therapeutic Exercise  Therapeutic Exercise Performed: Yes  Therapeutic Exercise Activity 1: Seated ther ex x 15 each B LE (HR/TR, HS, LAQ, ABD/ADD (lightly resisted), Hip flex ()         Bed Mobility  Bed Mobility: Yes  Bed Mobility 1  Bed Mobility 1: Supine to sitting  Level of Assistance 1: Minimum assistance    Ambulation/Gait Training  Ambulation/Gait Training Performed: Yes  Ambulation/Gait Training 1  Surface 1: Level tile  Device 1: Rolling walker  Assistance 1: Minimum assistance  Quality of Gait 1: Decreased step length, Shuffling gait, Forward flexed posture  Comments/Distance (ft) 1: 15' bed> toilet. 10' toilet > sink, 15' sink > chair (Patient motivated to ambulate.  Patient tolerated ambulation well.)  Transfers  Transfer: Yes  Transfer 1  Transfer From 1: Bed to  Transfer to 1: Stand  Technique 1: Sit to stand  Transfer Device 1: Walker  Transfer Level of Assistance 1: Minimum assistance  Trials/Comments 1: Vc for hand placement  Transfers 2  Transfer From 2: Stand to, Sit to  Transfer to 2: Toilet  Technique 2: Sit to stand, Stand to sit  Transfer Device 2: Walker  Transfer Level of Assistance 2: Minimum assistance  Trials/Comments 2: Vc for hand placment and using hand rail to assist  Transfers 3  Transfer From 3: Chair with arms to  Transfer to 3: Sit  Technique 3: Stand to sit  Transfer Device 3: Walker  Transfer Level of Assistance 3: Close supervision         Outcome Measures:  [facility] Basic Mobility  Turning from your back to your side while in a flat bed without using bedrails: A little  Moving from lying on your back to sitting on the side of a flat bed without using bedrails: A little  Moving to and from bed to chair (including a wheelchair): A little  Standing up from a chair using your arms (e.g. wheelchair or bedside chair): A little  To walk in hospital room: A little  Climbing 3-5 steps with railing: A lot  Basic Mobility - Total Score: 17    Education Documentation  Home Exercise Program, taught by Zohreh Koroma PTA at 6/24/2024 12:27 PM.  Learner: Patient  Readiness: Eager  Method: Explanation, Demonstration  Response: Verbalizes Understanding, Demonstrated Understanding    Precautions, taught by Zohreh Koroma PTA at 6/24/2024 12:27 PM.  Learner: Patient  Readiness: Eager  Method: Explanation, Demonstration  Response: Verbalizes Understanding, Demonstrated Understanding    Body Mechanics, taught by Zohreh Koroma PTA at 6/24/2024 12:27 PM.  Learner: Patient  Readiness: Eager  Method: Explanation, Demonstration  Response: Verbalizes Understanding, Demonstrated Understanding    Mobility Training, taught by Zohreh Koroma PTA at 6/24/2024 12:27  PM.  Learner: Patient  Readiness: Eager  Method: Explanation, Demonstration  Response: Verbalizes Understanding, Demonstrated Understanding    Education Comments  No comments found.        OP EDUCATION:       Encounter Problems       Encounter Problems (Active)       Balance       STG - Maintains dynamic standing balance with dual task with unilateral upper extremity support for 2 minutes with supervision (Progressing)       Start:  06/18/24    Expected End:  07/02/24               Mobility       STG - Patient will ambulate 50' with 2WW with supervision (Progressing)       Start:  06/18/24    Expected End:  07/02/24            STG - Patient will ascend and descend four stairs with use of unilateral rail with contact guard  (Not Progressing)       Start:  06/18/24    Expected End:  07/02/24               PT Transfers       STG - Patient to transfer to and from sit to supine with HOB flat w/o use of rails with supervision  (Progressing)       Start:  06/18/24    Expected End:  07/02/24            STG - Patient will transfer sit to and from stand with supervision (Progressing)       Start:  06/18/24    Expected End:  07/02/24               Pain - Adult

## 2024-06-24 NOTE — DISCHARGE SUMMARY
Discharge Diagnosis  UTI (urinary tract infection), uncomplicated  Encephalopathy  Polypharmacy    Issues Requiring Follow-Up  Discontinuing either gabapentin or Lyrica  Adjustment of alprazolam, zolpidem, trazodone, fluoxetine; these were reduced during the admission    Discharge Meds     Your medication list        START taking these medications        Instructions Last Dose Given Next Dose Due   docusate sodium 100 mg capsule  Commonly known as: Colace      Take 1 capsule (100 mg) by mouth 2 times a day.       lubricating eye drops ophthalmic solution      Administer 1 drop into both eyes if needed for dry eyes.       melatonin 5 mg tablet      Take 1 tablet (5 mg) by mouth once daily at bedtime.              CHANGE how you take these medications        Instructions Last Dose Given Next Dose Due   ALPRAZolam 0.5 mg tablet  Commonly known as: Xanax  What changed: Another medication with the same name was removed. Continue taking this medication, and follow the directions you see here.           FLUoxetine 20 mg capsule  Commonly known as: PROzac  What changed:   how much to take  when to take this      Take 4 capsules (80 mg) by mouth once daily.       losartan 50 mg tablet  Commonly known as: Cozaar  What changed:   medication strength  how much to take      Take 1 tablet (50 mg) by mouth once daily.       traZODone 50 mg tablet  Commonly known as: Desyrel  What changed:   medication strength  how much to take  Another medication with the same name was removed. Continue taking this medication, and follow the directions you see here.      Take 1 tablet (50 mg) by mouth once daily at bedtime.       zolpidem 5 mg tablet  Commonly known as: Ambien  What changed:   medication strength  how much to take      Take 1 tablet (5 mg) by mouth as needed at bedtime for sleep. Do not crush, chew, or split.              CONTINUE taking these medications        Instructions Last Dose Given Next Dose Due   albuterol 90  mcg/actuation inhaler           buPROPion  mg 24 hr tablet  Commonly known as: Wellbutrin XL           carbidopa-levodopa  mg tablet  Commonly known as: Sinemet           dicyclomine 10 mg capsule  Commonly known as: Bentyl           donepezil 10 mg tablet  Commonly known as: Aricept           furosemide 20 mg tablet  Commonly known as: Lasix           gabapentin 300 mg capsule  Commonly known as: Neurontin           lubiprostone 24 mcg capsule  Commonly known as: Amitiza      Take 1 capsule (24 mcg) by mouth 2 times a day.       omeprazole 20 mg DR capsule  Commonly known as: PriLOSEC           ondansetron 8 mg tablet  Commonly known as: Zofran           potassium chloride CR 20 mEq ER tablet  Commonly known as: Klor-Con M20           pregabalin 100 mg capsule  Commonly known as: Lyrica           rosuvastatin 20 mg tablet  Commonly known as: Crestor           sucralfate 1 gram tablet  Commonly known as: Carafate           tiZANidine 4 mg capsule  Commonly known as: Zanaflex                  STOP taking these medications      BMX ORAL SUSPENSION (1:1:1)        busPIRone 10 mg tablet  Commonly known as: Buspar        busPIRone 15 mg tablet  Commonly known as: Buspar        lidocaine-diphenhydrAMINE-Maalox 1:1:1 882--40 mg/30 mL liquid mouthwash  Commonly known as: Magic Mouthwash                  Where to Get Your Medications        These medications were sent to [facility]  [address] [place] [id number]      Hours: 8AM to 6PM Mon-Fri, 8AM to 4PM Sat-Sun Phone: [phone]   docusate sodium 100 mg capsule  losartan 50 mg tablet  lubricating eye drops ophthalmic solution  melatonin 5 mg tablet  traZODone 50 mg tablet  zolpidem 5 mg tablet       Information about where to get these medications is not yet available    Ask your nurse or doctor about these medications  FLUoxetine 20 mg capsule         Test Results Pending At Discharge  Pending Labs       No current pending labs.             Hospital Course   78yo CF with PMH of Parkinsons dementia, HTN, COPD, GERD with hiatal hernia, and depression with anxiety that presented to the ED with vomiting and was admitted for AMS secondary to UTI.  Urine culture grew pansensitive E. coli.  She completed 7 days of ceftriaxone.  PT/OT recommended SNF.  Pre-CERT took many days to come back, at which time patient opted to go home with home health care.    Due to confusion on admission, the following medication changes were made:  -The extra bedtime dose of alprazolam was replaced with melatonin  -Zolpidem was decreased to 5 mg at bedtime, with a recommendation to eventually discontinue it  -Trazodone was decreased to 50 mg at bedtime  -Fluoxetine was reduced to 80 mg every morning  -Buspirone was discontinued    She was also noted to be on both gabapentin and Lyrica, but did not want to discontinue either of these.  Recommended addressing this as outpatient.    Exam:   Con: awake, alert; no distress;   Eyes: conjunctiva wnl; EOMI;   ENMT: hearing intact; MMM;   MSK: ROM wnl; digits wnl;   Resp: normal work of breathing; no cyanosis;   Neuro: moving all extremities; no abnormal movements  Psych: oriented to situation; affect wnl;     Outpatient Follow-Up  No future appointments.    Time spent on discharge was greater than 30 minutes.      Darci Wilkes MD

## 2024-06-24 NOTE — CARE PLAN
The patient's goals for the shift include      The clinical goals for the shift include pt will have sit in chair this shift.    Over the shift, the patient did not make progress toward the following goals. Barriers to progression include . Recommendations to address these barriers include .  Problem: Pain - Adult  Goal: Verbalizes/displays adequate comfort level or baseline comfort level  Outcome: Progressing     Problem: Safety - Adult  Goal: Free from fall injury  Outcome: Progressing

## 2024-06-24 NOTE — PROGRESS NOTES
06/24/24 1358   Discharge Planning   Living Arrangements Spouse/significant other;Children   Support Systems Children;Spouse/significant other   Assistance Needed A&OX2-3; assist at times for ADL with cane and rollator; doesn't drive; 2L NC baseline (unk supplier) 2L NC currently   Type of Residence Private residence   Number of Stairs to Enter Residence 4   Number of Stairs Within Residence 0   Do you have animals or pets at home? No   Who is requesting discharge planning? Provider   Home or Post Acute Services Post acute facilities (Rehab/SNF/etc)   Patient expects to be discharged to: Auth approved for [facility]- patient no longer wanting to transition to SNF. Prefers to return home with new homecare   Does the patient need discharge transport arranged? Yes   RoundTrip coordination needed? Yes   Has discharge transport been arranged? No     6/24/2024 1230: Spoke to patient at bedside who confirms agency of choice for [facility] is [facility]. Provider made aware that outgoing referral will need placed at time of discharge.

## 2024-06-24 NOTE — DISCHARGE INSTRUCTIONS
Due to confusion on admission, the following medication changes were made:  -The extra bedtime dose of alprazolam was replaced with melatonin  -Zolpidem was decreased to 5 mg at bedtime, with a recommendation to eventually discontinue it  -Trazodone was decreased to 50 mg at bedtime  -Fluoxetine was reduced to 80 mg every morning  -Buspirone was discontinued    -Docusate was prescribed to help with constipation  -Eyedrops were prescribed for dry eyes  -Losartan was resumed for high blood pressure    Gabapentin and Lyrica are usually not prescribed together.  Recommend discussing with your outpatient providers and discontinuing 1 of these.

## 2024-06-24 NOTE — PROGRESS NOTES
"Occupational Therapy                 Therapy Communication Note    Patient Name: Fidelia Norman  MRN: [id number]  Today's Date: 6/24/2024     Discipline: Occupational Therapy    Missed Visit Reason: Missed Visit Reason: Other (Comment) (Pt refused therapy and stated, \"I am burnt out from today and I just want to leave this hospital\". RN notified)    Missed Time: Attempt    Time: 13:50  "

## 2024-06-26 NOTE — SIGNIFICANT EVENT
Follow Up Phone Call    Outgoing phone call    Spoke to: Fidelia Norman Relationship:self   Phone number: [phone]      Outcome: contacted patient/ family   Chief Complaint   Patient presents with   • Abdominal Pain          Diagnosis:Not applicable    States she is feeling better.  No further questions or concerns.

## 2025-06-15 ENCOUNTER — APPOINTMENT (OUTPATIENT)
Dept: RADIOLOGY | Facility: HOSPITAL | Age: 80
DRG: 871 | End: 2025-06-15
Payer: MEDICARE

## 2025-06-15 ENCOUNTER — HOSPITAL ENCOUNTER (INPATIENT)
Facility: HOSPITAL | Age: 80
DRG: 871 | End: 2025-06-15
Attending: STUDENT IN AN ORGANIZED HEALTH CARE EDUCATION/TRAINING PROGRAM | Admitting: INTERNAL MEDICINE
Payer: MEDICARE

## 2025-06-15 ENCOUNTER — APPOINTMENT (OUTPATIENT)
Dept: CARDIOLOGY | Facility: HOSPITAL | Age: 80
DRG: 871 | End: 2025-06-15
Payer: MEDICARE

## 2025-06-15 VITALS
TEMPERATURE: 99.5 F | RESPIRATION RATE: 22 BRPM | OXYGEN SATURATION: 93 % | SYSTOLIC BLOOD PRESSURE: 133 MMHG | WEIGHT: 174.2 LBS | BODY MASS INDEX: 29.74 KG/M2 | DIASTOLIC BLOOD PRESSURE: 84 MMHG | HEIGHT: 64 IN | HEART RATE: 94 BPM

## 2025-06-15 DIAGNOSIS — R10.84 GENERALIZED ABDOMINAL PAIN: ICD-10-CM

## 2025-06-15 DIAGNOSIS — A41.9 SEPSIS, DUE TO UNSPECIFIED ORGANISM, UNSPECIFIED WHETHER ACUTE ORGAN DYSFUNCTION PRESENT (MULTI): Primary | ICD-10-CM

## 2025-06-15 LAB
ALBUMIN SERPL BCP-MCNC: 4.8 G/DL (ref 3.4–5)
ALP SERPL-CCNC: 66 U/L (ref 33–136)
ALT SERPL W P-5'-P-CCNC: 18 U/L (ref 7–45)
AMORPH CRY #/AREA UR COMP ASSIST: ABNORMAL /HPF
ANION GAP BLDV CALCULATED.4IONS-SCNC: 20 MMOL/L (ref 10–25)
ANION GAP SERPL CALC-SCNC: 29 MMOL/L (ref 10–20)
APPEARANCE UR: ABNORMAL
AST SERPL W P-5'-P-CCNC: 20 U/L (ref 9–39)
BACTERIA #/AREA URNS AUTO: ABNORMAL /HPF
BACTERIA BLD CULT: NORMAL
BACTERIA BLD CULT: NORMAL
BASE EXCESS BLDV CALC-SCNC: -2.2 MMOL/L (ref -2–3)
BASOPHILS # BLD AUTO: 0.03 X10*3/UL (ref 0–0.1)
BASOPHILS NFR BLD AUTO: 0.1 %
BILIRUB SERPL-MCNC: 0.8 MG/DL (ref 0–1.2)
BILIRUB UR STRIP.AUTO-MCNC: NEGATIVE MG/DL
BODY TEMPERATURE: ABNORMAL
BUN SERPL-MCNC: 16 MG/DL (ref 6–23)
CA-I BLDV-SCNC: 1.2 MMOL/L (ref 1.1–1.33)
CALCIUM SERPL-MCNC: 10 MG/DL (ref 8.6–10.3)
CARDIAC TROPONIN I PNL SERPL HS: 19 NG/L (ref 0–13)
CHLORIDE BLDV-SCNC: 100 MMOL/L (ref 98–107)
CHLORIDE SERPL-SCNC: 95 MMOL/L (ref 98–107)
CO2 SERPL-SCNC: 20 MMOL/L (ref 21–32)
COLOR UR: YELLOW
CREAT SERPL-MCNC: 1.06 MG/DL (ref 0.5–1.05)
EGFRCR SERPLBLD CKD-EPI 2021: 54 ML/MIN/1.73M*2
EOSINOPHIL # BLD AUTO: 0.18 X10*3/UL (ref 0–0.4)
EOSINOPHIL NFR BLD AUTO: 0.6 %
ERYTHROCYTE [DISTWIDTH] IN BLOOD BY AUTOMATED COUNT: 13.6 % (ref 11.5–14.5)
GLUCOSE BLD MANUAL STRIP-MCNC: 151 MG/DL (ref 74–99)
GLUCOSE BLD MANUAL STRIP-MCNC: 228 MG/DL (ref 74–99)
GLUCOSE BLDV-MCNC: 270 MG/DL (ref 74–99)
GLUCOSE SERPL-MCNC: 248 MG/DL (ref 74–99)
GLUCOSE UR STRIP.AUTO-MCNC: ABNORMAL MG/DL
HCO3 BLDV-SCNC: 22.5 MMOL/L (ref 22–26)
HCT VFR BLD AUTO: 50.5 % (ref 36–46)
HCT VFR BLD EST: 51 % (ref 36–46)
HGB BLD-MCNC: 16.4 G/DL (ref 12–16)
HGB BLDV-MCNC: 17.1 G/DL (ref 12–16)
HOLD SPECIMEN: NORMAL
IMM GRANULOCYTES # BLD AUTO: 0.2 X10*3/UL (ref 0–0.5)
IMM GRANULOCYTES NFR BLD AUTO: 0.7 % (ref 0–0.9)
INHALED O2 CONCENTRATION: 26 %
KETONES UR STRIP.AUTO-MCNC: ABNORMAL MG/DL
LACTATE BLDV-SCNC: 6.7 MMOL/L (ref 0.4–2)
LACTATE BLDV-SCNC: 9.7 MMOL/L (ref 0.4–2)
LACTATE SERPL-SCNC: 1.9 MMOL/L (ref 0.4–2)
LACTATE SERPL-SCNC: 3.5 MMOL/L (ref 0.4–2)
LACTATE SERPL-SCNC: 6.1 MMOL/L (ref 0.4–2)
LACTATE SERPL-SCNC: 8.4 MMOL/L (ref 0.4–2)
LEUKOCYTE ESTERASE UR QL STRIP.AUTO: NEGATIVE
LIPASE SERPL-CCNC: 12 U/L (ref 9–82)
LYMPHOCYTES # BLD AUTO: 0.6 X10*3/UL (ref 0.8–3)
LYMPHOCYTES NFR BLD AUTO: 2.1 %
MCH RBC QN AUTO: 30.5 PG (ref 26–34)
MCHC RBC AUTO-ENTMCNC: 32.5 G/DL (ref 32–36)
MCV RBC AUTO: 94 FL (ref 80–100)
MONOCYTES # BLD AUTO: 2.2 X10*3/UL (ref 0.05–0.8)
MONOCYTES NFR BLD AUTO: 7.7 %
MUCOUS THREADS #/AREA URNS AUTO: ABNORMAL /LPF
NEUTROPHILS # BLD AUTO: 25.42 X10*3/UL (ref 1.6–5.5)
NEUTROPHILS NFR BLD AUTO: 88.8 %
NITRITE UR QL STRIP.AUTO: NEGATIVE
NRBC BLD-RTO: 0 /100 WBCS (ref 0–0)
OXYHGB MFR BLDV: 72.7 % (ref 45–75)
PCO2 BLDV: 38 MM HG (ref 41–51)
PH BLDV: 7.38 PH (ref 7.33–7.43)
PH UR STRIP.AUTO: 6 [PH]
PLATELET # BLD AUTO: 318 X10*3/UL (ref 150–450)
PO2 BLDV: 47 MM HG (ref 35–45)
POTASSIUM BLDV-SCNC: 3.8 MMOL/L (ref 3.5–5.3)
POTASSIUM SERPL-SCNC: 3.3 MMOL/L (ref 3.5–5.3)
PROT SERPL-MCNC: 8.4 G/DL (ref 6.4–8.2)
PROT UR STRIP.AUTO-MCNC: ABNORMAL MG/DL
RBC # BLD AUTO: 5.37 X10*6/UL (ref 4–5.2)
RBC # UR STRIP.AUTO: ABNORMAL MG/DL
RBC #/AREA URNS AUTO: ABNORMAL /HPF
RBC MORPH BLD: NORMAL
SAO2 % BLDV: 74 % (ref 45–75)
SODIUM BLDV-SCNC: 139 MMOL/L (ref 136–145)
SODIUM SERPL-SCNC: 141 MMOL/L (ref 136–145)
SP GR UR STRIP.AUTO: 1.03
SQUAMOUS #/AREA URNS AUTO: ABNORMAL /HPF
UROBILINOGEN UR STRIP.AUTO-MCNC: NORMAL MG/DL
WBC # BLD AUTO: 28.6 X10*3/UL (ref 4.4–11.3)
WBC #/AREA URNS AUTO: ABNORMAL /HPF
WBC CLUMPS #/AREA URNS AUTO: ABNORMAL /HPF

## 2025-06-15 PROCEDURE — 71045 X-RAY EXAM CHEST 1 VIEW: CPT | Performed by: RADIOLOGY

## 2025-06-15 PROCEDURE — 94640 AIRWAY INHALATION TREATMENT: CPT

## 2025-06-15 PROCEDURE — 70450 CT HEAD/BRAIN W/O DYE: CPT | Performed by: RADIOLOGY

## 2025-06-15 PROCEDURE — 74176 CT ABD & PELVIS W/O CONTRAST: CPT | Performed by: RADIOLOGY

## 2025-06-15 PROCEDURE — 2500000004 HC RX 250 GENERAL PHARMACY W/ HCPCS (ALT 636 FOR OP/ED): Performed by: EMERGENCY MEDICINE

## 2025-06-15 PROCEDURE — 36415 COLL VENOUS BLD VENIPUNCTURE: CPT | Performed by: STUDENT IN AN ORGANIZED HEALTH CARE EDUCATION/TRAINING PROGRAM

## 2025-06-15 PROCEDURE — 83690 ASSAY OF LIPASE: CPT | Performed by: STUDENT IN AN ORGANIZED HEALTH CARE EDUCATION/TRAINING PROGRAM

## 2025-06-15 PROCEDURE — 94760 N-INVAS EAR/PLS OXIMETRY 1: CPT

## 2025-06-15 PROCEDURE — 83605 ASSAY OF LACTIC ACID: CPT | Performed by: STUDENT IN AN ORGANIZED HEALTH CARE EDUCATION/TRAINING PROGRAM

## 2025-06-15 PROCEDURE — 87086 URINE CULTURE/COLONY COUNT: CPT | Mod: GEALAB | Performed by: STUDENT IN AN ORGANIZED HEALTH CARE EDUCATION/TRAINING PROGRAM

## 2025-06-15 PROCEDURE — 94664 DEMO&/EVAL PT USE INHALER: CPT

## 2025-06-15 PROCEDURE — 2500000001 HC RX 250 WO HCPCS SELF ADMINISTERED DRUGS (ALT 637 FOR MEDICARE OP): Performed by: INTERNAL MEDICINE

## 2025-06-15 PROCEDURE — 82947 ASSAY GLUCOSE BLOOD QUANT: CPT

## 2025-06-15 PROCEDURE — 81001 URINALYSIS AUTO W/SCOPE: CPT | Performed by: STUDENT IN AN ORGANIZED HEALTH CARE EDUCATION/TRAINING PROGRAM

## 2025-06-15 PROCEDURE — 82810 BLOOD GASES O2 SAT ONLY: CPT | Performed by: STUDENT IN AN ORGANIZED HEALTH CARE EDUCATION/TRAINING PROGRAM

## 2025-06-15 PROCEDURE — 96366 THER/PROPH/DIAG IV INF ADDON: CPT

## 2025-06-15 PROCEDURE — 2500000005 HC RX 250 GENERAL PHARMACY W/O HCPCS: Performed by: INTERNAL MEDICINE

## 2025-06-15 PROCEDURE — 84484 ASSAY OF TROPONIN QUANT: CPT | Performed by: STUDENT IN AN ORGANIZED HEALTH CARE EDUCATION/TRAINING PROGRAM

## 2025-06-15 PROCEDURE — 96367 TX/PROPH/DG ADDL SEQ IV INF: CPT

## 2025-06-15 PROCEDURE — 87040 BLOOD CULTURE FOR BACTERIA: CPT | Mod: GEALAB | Performed by: STUDENT IN AN ORGANIZED HEALTH CARE EDUCATION/TRAINING PROGRAM

## 2025-06-15 PROCEDURE — 2500000004 HC RX 250 GENERAL PHARMACY W/ HCPCS (ALT 636 FOR OP/ED): Performed by: STUDENT IN AN ORGANIZED HEALTH CARE EDUCATION/TRAINING PROGRAM

## 2025-06-15 PROCEDURE — 85025 COMPLETE CBC W/AUTO DIFF WBC: CPT | Performed by: STUDENT IN AN ORGANIZED HEALTH CARE EDUCATION/TRAINING PROGRAM

## 2025-06-15 PROCEDURE — 93005 ELECTROCARDIOGRAM TRACING: CPT

## 2025-06-15 PROCEDURE — 2500000002 HC RX 250 W HCPCS SELF ADMINISTERED DRUGS (ALT 637 FOR MEDICARE OP, ALT 636 FOR OP/ED): Performed by: INTERNAL MEDICINE

## 2025-06-15 PROCEDURE — 70450 CT HEAD/BRAIN W/O DYE: CPT

## 2025-06-15 PROCEDURE — 71045 X-RAY EXAM CHEST 1 VIEW: CPT

## 2025-06-15 PROCEDURE — 96375 TX/PRO/DX INJ NEW DRUG ADDON: CPT

## 2025-06-15 PROCEDURE — 9420000001 HC RT PATIENT EDUCATION 5 MIN

## 2025-06-15 PROCEDURE — 83605 ASSAY OF LACTIC ACID: CPT | Performed by: INTERNAL MEDICINE

## 2025-06-15 PROCEDURE — 2500000005 HC RX 250 GENERAL PHARMACY W/O HCPCS: Performed by: STUDENT IN AN ORGANIZED HEALTH CARE EDUCATION/TRAINING PROGRAM

## 2025-06-15 PROCEDURE — 99223 1ST HOSP IP/OBS HIGH 75: CPT | Performed by: INTERNAL MEDICINE

## 2025-06-15 PROCEDURE — 2500000004 HC RX 250 GENERAL PHARMACY W/ HCPCS (ALT 636 FOR OP/ED): Performed by: INTERNAL MEDICINE

## 2025-06-15 PROCEDURE — 82435 ASSAY OF BLOOD CHLORIDE: CPT | Performed by: STUDENT IN AN ORGANIZED HEALTH CARE EDUCATION/TRAINING PROGRAM

## 2025-06-15 PROCEDURE — 1200000002 HC GENERAL ROOM WITH TELEMETRY DAILY

## 2025-06-15 PROCEDURE — 99291 CRITICAL CARE FIRST HOUR: CPT | Mod: 25 | Performed by: STUDENT IN AN ORGANIZED HEALTH CARE EDUCATION/TRAINING PROGRAM

## 2025-06-15 PROCEDURE — 96365 THER/PROPH/DIAG IV INF INIT: CPT

## 2025-06-15 PROCEDURE — 71250 CT THORAX DX C-: CPT | Performed by: RADIOLOGY

## 2025-06-15 PROCEDURE — 71250 CT THORAX DX C-: CPT

## 2025-06-15 RX ORDER — ALBUTEROL SULFATE 0.83 MG/ML
2.5 SOLUTION RESPIRATORY (INHALATION) EVERY 6 HOURS PRN
Status: DISCONTINUED | OUTPATIENT
Start: 2025-06-15 | End: 2025-06-15

## 2025-06-15 RX ORDER — CEFEPIME HYDROCHLORIDE 2 G/50ML
2 INJECTION, SOLUTION INTRAVENOUS ONCE
Status: COMPLETED | OUTPATIENT
Start: 2025-06-15 | End: 2025-06-15

## 2025-06-15 RX ORDER — DEXTROMETHORPHAN HYDROBROMIDE, GUAIFENESIN 5; 100 MG/5ML; MG/5ML
1300 LIQUID ORAL DAILY
COMMUNITY

## 2025-06-15 RX ORDER — PANTOPRAZOLE SODIUM 40 MG/10ML
40 INJECTION, POWDER, LYOPHILIZED, FOR SOLUTION INTRAVENOUS 2 TIMES DAILY
Status: DISCONTINUED | OUTPATIENT
Start: 2025-06-15 | End: 2025-06-22 | Stop reason: HOSPADM

## 2025-06-15 RX ORDER — NYSTATIN 100000 [USP'U]/G
1 POWDER TOPICAL 2 TIMES DAILY
Status: DISCONTINUED | OUTPATIENT
Start: 2025-06-15 | End: 2025-06-22 | Stop reason: HOSPADM

## 2025-06-15 RX ORDER — LEVETIRACETAM 15 MG/ML
1500 INJECTION INTRAVASCULAR ONCE
Status: COMPLETED | OUTPATIENT
Start: 2025-06-15 | End: 2025-06-15

## 2025-06-15 RX ORDER — FLUOXETINE 20 MG/1
80 CAPSULE ORAL DAILY
Status: DISCONTINUED | OUTPATIENT
Start: 2025-06-15 | End: 2025-06-22 | Stop reason: HOSPADM

## 2025-06-15 RX ORDER — TIZANIDINE 4 MG/1
4 TABLET ORAL EVERY 8 HOURS PRN
Status: DISCONTINUED | OUTPATIENT
Start: 2025-06-15 | End: 2025-06-22 | Stop reason: HOSPADM

## 2025-06-15 RX ORDER — BUSPIRONE HYDROCHLORIDE 15 MG/1
15 TABLET ORAL 2 TIMES DAILY
Status: DISCONTINUED | OUTPATIENT
Start: 2025-06-15 | End: 2025-06-22 | Stop reason: HOSPADM

## 2025-06-15 RX ORDER — ACETAMINOPHEN 325 MG/1
650 TABLET ORAL EVERY 6 HOURS PRN
Status: DISCONTINUED | OUTPATIENT
Start: 2025-06-15 | End: 2025-06-20

## 2025-06-15 RX ORDER — ZOLPIDEM TARTRATE 5 MG/1
5 TABLET ORAL NIGHTLY PRN
Status: DISCONTINUED | OUTPATIENT
Start: 2025-06-15 | End: 2025-06-18

## 2025-06-15 RX ORDER — DONEPEZIL HYDROCHLORIDE 5 MG/1
10 TABLET, FILM COATED ORAL NIGHTLY
Status: DISCONTINUED | OUTPATIENT
Start: 2025-06-15 | End: 2025-06-22 | Stop reason: HOSPADM

## 2025-06-15 RX ORDER — CEFEPIME HYDROCHLORIDE 1 G/50ML
1 INJECTION, SOLUTION INTRAVENOUS EVERY 8 HOURS
Status: DISCONTINUED | OUTPATIENT
Start: 2025-06-15 | End: 2025-06-18

## 2025-06-15 RX ORDER — IPRATROPIUM BROMIDE AND ALBUTEROL SULFATE 2.5; .5 MG/3ML; MG/3ML
3 SOLUTION RESPIRATORY (INHALATION)
Status: DISCONTINUED | OUTPATIENT
Start: 2025-06-15 | End: 2025-06-22 | Stop reason: HOSPADM

## 2025-06-15 RX ORDER — BUSPIRONE HYDROCHLORIDE 10 MG/1
10 TABLET ORAL DAILY
COMMUNITY

## 2025-06-15 RX ORDER — METRONIDAZOLE 500 MG/100ML
500 INJECTION, SOLUTION INTRAVENOUS ONCE
Status: COMPLETED | OUTPATIENT
Start: 2025-06-15 | End: 2025-06-15

## 2025-06-15 RX ORDER — VANCOMYCIN HYDROCHLORIDE 1 G/20ML
INJECTION, POWDER, LYOPHILIZED, FOR SOLUTION INTRAVENOUS DAILY PRN
Status: DISCONTINUED | OUTPATIENT
Start: 2025-06-15 | End: 2025-06-18

## 2025-06-15 RX ORDER — ALBUTEROL SULFATE 0.83 MG/ML
2.5 SOLUTION RESPIRATORY (INHALATION) EVERY 2 HOUR PRN
Status: DISCONTINUED | OUTPATIENT
Start: 2025-06-15 | End: 2025-06-22 | Stop reason: HOSPADM

## 2025-06-15 RX ORDER — BUPROPION HYDROCHLORIDE 150 MG/1
150 TABLET ORAL EVERY 24 HOURS
Status: DISCONTINUED | OUTPATIENT
Start: 2025-06-15 | End: 2025-06-22 | Stop reason: HOSPADM

## 2025-06-15 RX ORDER — CARBIDOPA AND LEVODOPA 25; 100 MG/1; MG/1
2 TABLET ORAL 3 TIMES DAILY
Status: DISCONTINUED | OUTPATIENT
Start: 2025-06-15 | End: 2025-06-22 | Stop reason: HOSPADM

## 2025-06-15 RX ORDER — ONDANSETRON HYDROCHLORIDE 2 MG/ML
4 INJECTION, SOLUTION INTRAVENOUS ONCE
Status: COMPLETED | OUTPATIENT
Start: 2025-06-15 | End: 2025-06-15

## 2025-06-15 RX ORDER — ACETAMINOPHEN, DIPHENHYDRAMINE HCL, PHENYLEPHRINE HCL 325; 25; 5 MG/1; MG/1; MG/1
70 TABLET ORAL NIGHTLY
COMMUNITY

## 2025-06-15 RX ORDER — ACETAMINOPHEN 500 MG
1000 TABLET ORAL DAILY
COMMUNITY

## 2025-06-15 RX ORDER — ENOXAPARIN SODIUM 100 MG/ML
40 INJECTION SUBCUTANEOUS DAILY
Status: DISCONTINUED | OUTPATIENT
Start: 2025-06-15 | End: 2025-06-22 | Stop reason: HOSPADM

## 2025-06-15 RX ORDER — TRAZODONE HYDROCHLORIDE 50 MG/1
50 TABLET ORAL NIGHTLY
Status: DISCONTINUED | OUTPATIENT
Start: 2025-06-15 | End: 2025-06-22 | Stop reason: HOSPADM

## 2025-06-15 RX ORDER — BUDESONIDE 0.25 MG/2ML
0.25 INHALANT ORAL
Status: DISCONTINUED | OUTPATIENT
Start: 2025-06-15 | End: 2025-06-15

## 2025-06-15 RX ORDER — PREGABALIN 50 MG/1
100 CAPSULE ORAL 2 TIMES DAILY
Status: DISCONTINUED | OUTPATIENT
Start: 2025-06-15 | End: 2025-06-22 | Stop reason: HOSPADM

## 2025-06-15 RX ORDER — BUSPIRONE HYDROCHLORIDE 15 MG/1
15 TABLET ORAL 2 TIMES DAILY
COMMUNITY

## 2025-06-15 RX ORDER — DEXTROSE MONOHYDRATE, SODIUM CHLORIDE, AND POTASSIUM CHLORIDE 50; 1.49; 9 G/1000ML; G/1000ML; G/1000ML
75 INJECTION, SOLUTION INTRAVENOUS CONTINUOUS
Status: DISCONTINUED | OUTPATIENT
Start: 2025-06-15 | End: 2025-06-19

## 2025-06-15 RX ORDER — MORPHINE SULFATE 4 MG/ML
4 INJECTION INTRAVENOUS ONCE
Status: COMPLETED | OUTPATIENT
Start: 2025-06-15 | End: 2025-06-15

## 2025-06-15 RX ORDER — CEFTRIAXONE 1 G/50ML
1 INJECTION, SOLUTION INTRAVENOUS EVERY 24 HOURS
Status: DISCONTINUED | OUTPATIENT
Start: 2025-06-15 | End: 2025-06-15

## 2025-06-15 RX ORDER — BUDESONIDE 0.25 MG/2ML
0.25 INHALANT ORAL
Status: DISCONTINUED | OUTPATIENT
Start: 2025-06-15 | End: 2025-06-22 | Stop reason: HOSPADM

## 2025-06-15 RX ORDER — IPRATROPIUM BROMIDE AND ALBUTEROL SULFATE 2.5; .5 MG/3ML; MG/3ML
3 SOLUTION RESPIRATORY (INHALATION)
Status: DISCONTINUED | OUTPATIENT
Start: 2025-06-15 | End: 2025-06-15

## 2025-06-15 RX ORDER — ALPRAZOLAM 0.5 MG/1
0.5 TABLET ORAL 3 TIMES DAILY
Status: DISCONTINUED | OUTPATIENT
Start: 2025-06-15 | End: 2025-06-22 | Stop reason: HOSPADM

## 2025-06-15 RX ORDER — ACETAMINOPHEN 650 MG/1
650 SUPPOSITORY RECTAL EVERY 4 HOURS PRN
Status: DISCONTINUED | OUTPATIENT
Start: 2025-06-15 | End: 2025-06-20

## 2025-06-15 RX ORDER — SUCRALFATE 1 G/10ML
1 SUSPENSION ORAL EVERY 6 HOURS SCHEDULED
Status: DISCONTINUED | OUTPATIENT
Start: 2025-06-15 | End: 2025-06-22 | Stop reason: HOSPADM

## 2025-06-15 RX ORDER — LUBIPROSTONE 8 UG/1
8 CAPSULE ORAL 2 TIMES DAILY
Status: DISCONTINUED | OUTPATIENT
Start: 2025-06-15 | End: 2025-06-22 | Stop reason: HOSPADM

## 2025-06-15 RX ORDER — BUSPIRONE HYDROCHLORIDE 10 MG/1
10 TABLET ORAL DAILY
Status: DISCONTINUED | OUTPATIENT
Start: 2025-06-15 | End: 2025-06-22 | Stop reason: HOSPADM

## 2025-06-15 RX ADMIN — MORPHINE SULFATE 4 MG: 4 INJECTION INTRAVENOUS at 07:25

## 2025-06-15 RX ADMIN — PANTOPRAZOLE SODIUM 40 MG: 40 INJECTION, POWDER, FOR SOLUTION INTRAVENOUS at 12:37

## 2025-06-15 RX ADMIN — BUDESONIDE 0.25 MG: 0.25 INHALANT RESPIRATORY (INHALATION) at 18:58

## 2025-06-15 RX ADMIN — CEFEPIME HYDROCHLORIDE 1 G: 1 INJECTION, SOLUTION INTRAVENOUS at 22:09

## 2025-06-15 RX ADMIN — DONEPEZIL HYDROCHLORIDE 10 MG: 5 TABLET ORAL at 21:23

## 2025-06-15 RX ADMIN — NYSTATIN 1 APPLICATION: 100000 POWDER TOPICAL at 12:37

## 2025-06-15 RX ADMIN — CARBIDOPA AND LEVODOPA 2 TABLET: 25; 100 TABLET ORAL at 21:05

## 2025-06-15 RX ADMIN — CEFEPIME HYDROCHLORIDE 2 G: 2 INJECTION, SOLUTION INTRAVENOUS at 05:38

## 2025-06-15 RX ADMIN — PANTOPRAZOLE SODIUM 40 MG: 40 INJECTION, POWDER, FOR SOLUTION INTRAVENOUS at 21:16

## 2025-06-15 RX ADMIN — POTASSIUM CHLORIDE, DEXTROSE MONOHYDRATE AND SODIUM CHLORIDE 100 ML/HR: 150; 5; 900 INJECTION, SOLUTION INTRAVENOUS at 12:38

## 2025-06-15 RX ADMIN — SODIUM CHLORIDE, SODIUM LACTATE, POTASSIUM CHLORIDE, AND CALCIUM CHLORIDE 1000 ML: .6; .31; .03; .02 INJECTION, SOLUTION INTRAVENOUS at 05:57

## 2025-06-15 RX ADMIN — SODIUM CHLORIDE, POTASSIUM CHLORIDE, SODIUM LACTATE AND CALCIUM CHLORIDE 1000 ML: 600; 310; 30; 20 INJECTION, SOLUTION INTRAVENOUS at 05:35

## 2025-06-15 RX ADMIN — ACETAMINOPHEN 650 MG: 650 SUPPOSITORY RECTAL at 18:47

## 2025-06-15 RX ADMIN — Medication 2 L/MIN: at 19:01

## 2025-06-15 RX ADMIN — POTASSIUM CHLORIDE, DEXTROSE MONOHYDRATE AND SODIUM CHLORIDE 100 ML/HR: 150; 5; 900 INJECTION, SOLUTION INTRAVENOUS at 22:39

## 2025-06-15 RX ADMIN — ENOXAPARIN SODIUM 40 MG: 100 INJECTION SUBCUTANEOUS at 12:37

## 2025-06-15 RX ADMIN — METRONIDAZOLE 500 MG: 500 INJECTION, SOLUTION INTRAVENOUS at 05:57

## 2025-06-15 RX ADMIN — ONDANSETRON 4 MG: 2 INJECTION, SOLUTION INTRAMUSCULAR; INTRAVENOUS at 05:37

## 2025-06-15 RX ADMIN — NYSTATIN 1 APPLICATION: 100000 POWDER TOPICAL at 21:30

## 2025-06-15 RX ADMIN — ALPRAZOLAM 0.5 MG: 0.5 TABLET ORAL at 21:23

## 2025-06-15 RX ADMIN — CEFTRIAXONE 1 G: 1 INJECTION, SOLUTION INTRAVENOUS at 14:29

## 2025-06-15 RX ADMIN — IPRATROPIUM BROMIDE AND ALBUTEROL SULFATE 3 ML: .5; 3 SOLUTION RESPIRATORY (INHALATION) at 15:00

## 2025-06-15 RX ADMIN — LEVETIRACETAM 1500 MG: 15 INJECTION IONTOPHORESIS at 19:20

## 2025-06-15 RX ADMIN — IPRATROPIUM BROMIDE AND ALBUTEROL SULFATE 3 ML: 2.5; .5 SOLUTION RESPIRATORY (INHALATION) at 18:58

## 2025-06-15 RX ADMIN — VANCOMYCIN HYDROCHLORIDE 2 G: 10 INJECTION, POWDER, LYOPHILIZED, FOR SOLUTION INTRAVENOUS at 07:25

## 2025-06-15 SDOH — ECONOMIC STABILITY: FOOD INSECURITY: WITHIN THE PAST 12 MONTHS, THE FOOD YOU BOUGHT JUST DIDN'T LAST AND YOU DIDN'T HAVE MONEY TO GET MORE.: NEVER TRUE

## 2025-06-15 SDOH — SOCIAL STABILITY: SOCIAL INSECURITY: WITHIN THE LAST YEAR, HAVE YOU BEEN AFRAID OF YOUR PARTNER OR EX-PARTNER?: PATIENT UNABLE TO ANSWER

## 2025-06-15 SDOH — SOCIAL STABILITY: SOCIAL INSECURITY
WITHIN THE LAST YEAR, HAVE YOU BEEN RAPED OR FORCED TO HAVE ANY KIND OF SEXUAL ACTIVITY BY YOUR PARTNER OR EX-PARTNER?: PATIENT UNABLE TO ANSWER

## 2025-06-15 SDOH — SOCIAL STABILITY: SOCIAL INSECURITY: DOES ANYONE TRY TO KEEP YOU FROM HAVING/CONTACTING OTHER FRIENDS OR DOING THINGS OUTSIDE YOUR HOME?: NO

## 2025-06-15 SDOH — SOCIAL STABILITY: SOCIAL INSECURITY
WITHIN THE LAST YEAR, HAVE YOU BEEN HUMILIATED OR EMOTIONALLY ABUSED IN OTHER WAYS BY YOUR PARTNER OR EX-PARTNER?: PATIENT UNABLE TO ANSWER

## 2025-06-15 SDOH — SOCIAL STABILITY: SOCIAL INSECURITY: ABUSE: ADULT

## 2025-06-15 SDOH — SOCIAL STABILITY: SOCIAL INSECURITY
WITHIN THE LAST YEAR, HAVE YOU BEEN KICKED, HIT, SLAPPED, OR OTHERWISE PHYSICALLY HURT BY YOUR PARTNER OR EX-PARTNER?: PATIENT UNABLE TO ANSWER

## 2025-06-15 SDOH — ECONOMIC STABILITY: FOOD INSECURITY: WITHIN THE PAST 12 MONTHS, YOU WORRIED THAT YOUR FOOD WOULD RUN OUT BEFORE YOU GOT THE MONEY TO BUY MORE.: NEVER TRUE

## 2025-06-15 SDOH — SOCIAL STABILITY: SOCIAL INSECURITY: ARE THERE ANY APPARENT SIGNS OF INJURIES/BEHAVIORS THAT COULD BE RELATED TO ABUSE/NEGLECT?: NO

## 2025-06-15 SDOH — SOCIAL STABILITY: SOCIAL INSECURITY: DO YOU FEEL ANYONE HAS EXPLOITED OR TAKEN ADVANTAGE OF YOU FINANCIALLY OR OF YOUR PERSONAL PROPERTY?: NO

## 2025-06-15 SDOH — SOCIAL STABILITY: SOCIAL INSECURITY: DO YOU FEEL UNSAFE GOING BACK TO THE PLACE WHERE YOU ARE LIVING?: NO

## 2025-06-15 SDOH — ECONOMIC STABILITY: INCOME INSECURITY
IN THE PAST 12 MONTHS HAS THE ELECTRIC, GAS, OIL, OR WATER COMPANY THREATENED TO SHUT OFF SERVICES IN YOUR HOME?: PATIENT UNABLE TO ANSWER

## 2025-06-15 SDOH — SOCIAL STABILITY: SOCIAL INSECURITY: WERE YOU ABLE TO COMPLETE ALL THE BEHAVIORAL HEALTH SCREENINGS?: NO

## 2025-06-15 SDOH — SOCIAL STABILITY: SOCIAL INSECURITY: HAVE YOU HAD ANY THOUGHTS OF HARMING ANYONE ELSE?: NO

## 2025-06-15 SDOH — SOCIAL STABILITY: SOCIAL INSECURITY: HAS ANYONE EVER THREATENED TO HURT YOUR FAMILY OR YOUR PETS?: NO

## 2025-06-15 SDOH — SOCIAL STABILITY: SOCIAL INSECURITY: ARE YOU OR HAVE YOU BEEN THREATENED OR ABUSED PHYSICALLY, EMOTIONALLY, OR SEXUALLY BY ANYONE?: NO

## 2025-06-15 ASSESSMENT — PAIN SCALES - GENERAL
PAINLEVEL_OUTOF10: 0 - NO PAIN
PAINLEVEL_OUTOF10: 0 - NO PAIN
PAINLEVEL_OUTOF10: 5 - MODERATE PAIN

## 2025-06-15 ASSESSMENT — LIFESTYLE VARIABLES
AUDIT-C TOTAL SCORE: 0
SKIP TO QUESTIONS 9-10: 1
HAVE YOU EVER FELT YOU SHOULD CUT DOWN ON YOUR DRINKING: NO
TOTAL SCORE: 0
HOW OFTEN DO YOU HAVE A DRINK CONTAINING ALCOHOL: NEVER
AUDIT-C TOTAL SCORE: 0
HAVE PEOPLE ANNOYED YOU BY CRITICIZING YOUR DRINKING: NO
HOW OFTEN DO YOU HAVE 6 OR MORE DRINKS ON ONE OCCASION: NEVER
EVER HAD A DRINK FIRST THING IN THE MORNING TO STEADY YOUR NERVES TO GET RID OF A HANGOVER: NO
HOW MANY STANDARD DRINKS CONTAINING ALCOHOL DO YOU HAVE ON A TYPICAL DAY: PATIENT DOES NOT DRINK
EVER FELT BAD OR GUILTY ABOUT YOUR DRINKING: NO

## 2025-06-15 ASSESSMENT — ENCOUNTER SYMPTOMS
ABDOMINAL PAIN: 1
COUGH: 1
TREMORS: 1
MYALGIAS: 1
BACK PAIN: 1
ARTHRALGIAS: 1
NECK STIFFNESS: 1
NERVOUS/ANXIOUS: 1
VOMITING: 1
NECK PAIN: 1
CHILLS: 1
ACTIVITY CHANGE: 1
SHORTNESS OF BREATH: 1
EYES NEGATIVE: 1
NAUSEA: 1
WEAKNESS: 1
APPETITE CHANGE: 1
DIARRHEA: 1

## 2025-06-15 ASSESSMENT — COGNITIVE AND FUNCTIONAL STATUS - GENERAL
PERSONAL GROOMING: A LOT
HELP NEEDED FOR BATHING: A LOT
WALKING IN HOSPITAL ROOM: A LITTLE
CLIMB 3 TO 5 STEPS WITH RAILING: A LITTLE
CLIMB 3 TO 5 STEPS WITH RAILING: TOTAL
WALKING IN HOSPITAL ROOM: A LOT
TURNING FROM BACK TO SIDE WHILE IN FLAT BAD: A LITTLE
DAILY ACTIVITIY SCORE: 13
DRESSING REGULAR UPPER BODY CLOTHING: A LOT
TOILETING: A LOT
DAILY ACTIVITIY SCORE: 24
DRESSING REGULAR LOWER BODY CLOTHING: A LOT
MOBILITY SCORE: 22
STANDING UP FROM CHAIR USING ARMS: A LOT
MOVING TO AND FROM BED TO CHAIR: A LOT
EATING MEALS: A LITTLE
MOVING FROM LYING ON BACK TO SITTING ON SIDE OF FLAT BED WITH BEDRAILS: A LITTLE
PATIENT BASELINE BEDBOUND: NO
MOBILITY SCORE: 13

## 2025-06-15 ASSESSMENT — PAIN SCALES - PAIN ASSESSMENT IN ADVANCED DEMENTIA (PAINAD)
BODYLANGUAGE: TENSE, DISTRESSED PACING, FIDGETING
TOTALSCORE: 1
CONSOLABILITY: NO NEED TO CONSOLE
BREATHING: NORMAL
FACIALEXPRESSION: SMILING OR INEXPRESSIVE

## 2025-06-15 ASSESSMENT — ACTIVITIES OF DAILY LIVING (ADL)
PATIENT'S MEMORY ADEQUATE TO SAFELY COMPLETE DAILY ACTIVITIES?: UNABLE TO ASSESS
FEEDING YOURSELF: INDEPENDENT
ADEQUATE_TO_COMPLETE_ADL: YES
WALKS IN HOME: INDEPENDENT
DRESSING YOURSELF: INDEPENDENT
BATHING: INDEPENDENT
LACK_OF_TRANSPORTATION: NO
ASSISTIVE_DEVICE: CANE;WALKER
HEARING - RIGHT EAR: FUNCTIONAL
HEARING - LEFT EAR: FUNCTIONAL
TOILETING: INDEPENDENT
GROOMING: INDEPENDENT
JUDGMENT_ADEQUATE_SAFELY_COMPLETE_DAILY_ACTIVITIES: UNABLE TO ASSESS

## 2025-06-15 ASSESSMENT — PAIN DESCRIPTION - LOCATION: LOCATION: ABDOMEN

## 2025-06-15 ASSESSMENT — PAIN - FUNCTIONAL ASSESSMENT
PAIN_FUNCTIONAL_ASSESSMENT: 0-10
PAIN_FUNCTIONAL_ASSESSMENT: PAINAD (PAIN ASSESSMENT IN ADVANCED DEMENTIA SCALE)

## 2025-06-15 ASSESSMENT — PAIN DESCRIPTION - PAIN TYPE: TYPE: ACUTE PAIN

## 2025-06-15 NOTE — PROGRESS NOTES
Patient was signed out to me by the outgoing ED provider.  Was complaining of abdominal pain nausea vomiting and diarrhea.  On my reevaluation patient appears stable.  She has history of Parkinson's and has a baseline tremor.  Per  at bedside patient has chronic issues with her stomach.  Over last 2 days she has been having worsening nausea and vomiting is unable to tolerate any oral intake including medications.  On my exam abdomen soft.  CT was obtained which is negative for acute pathology.  She has a leukocytosis.  Suspect part of this is reactive to nausea and vomiting.  She still feeling nauseous.  Has chronic back pain.  Going to recommend admission for dehydration, intractable nausea and vomiting.

## 2025-06-15 NOTE — H&P
History Of Present Illness  Fidelia Norman is a 80 y.o. female presenting with nausea, vomiting , sob, abdominal pain, which started a couple days ago. When she presented she had a marked lactic acidosis, improving with fluids. She has a hx of IBS, bowel issues, is on several medications. Slight cough noted as well.  cares for her at home. She states she thinks she will be able to tolerate some clear liquids at this time.   Chart reviewed     Past Medical History  Parkinsons  COPD/asthma, chronic respiratory failure, wears 2 liters oxygen at night  Hypertension  Hyperlipidemia  Chronic back pain  Cervical dystonia  Hypothyroid  IBS    Surgical History  Hysterectomy  Back surgery     Social History  She reports that she quit smoking about 11 years ago. Her smoking use included cigarettes. She has never used smokeless tobacco. She reports that she does not drink alcohol and does not use drugs.    Family History  Non contributory     Allergies  Amantadine, Amitriptyline, Baclofen, Bactrim [sulfamethoxazole-trimethoprim], Gadolinium-containing contrast media, Iodinated contrast media, Penicillins, Topamax [topiramate], Valdecoxib, Zoloft [sertraline], and Rivastigmine    Review of Systems   Constitutional:  Positive for activity change, appetite change and chills.   HENT: Negative.     Eyes: Negative.    Respiratory:  Positive for cough and shortness of breath.    Gastrointestinal:  Positive for abdominal pain, diarrhea, nausea and vomiting.   Musculoskeletal:  Positive for arthralgias, back pain, myalgias, neck pain and neck stiffness.   Skin:  Positive for rash.   Neurological:  Positive for tremors and weakness.   Psychiatric/Behavioral:  The patient is nervous/anxious.         Physical Exam  Constitutional:       Appearance: She is ill-appearing.   HENT:      Head: Normocephalic.      Nose: Nose normal.      Mouth/Throat:      Mouth: Mucous membranes are dry.   Eyes:      Extraocular Movements: Extraocular  "movements intact.      Pupils: Pupils are equal, round, and reactive to light.   Neck:      Comments: No adenopathy  Cardiovascular:      Rate and Rhythm: Regular rhythm. Tachycardia present.      Pulses: Normal pulses.   Pulmonary:      Comments: She is breathing slightly above normal  Clear, equal air exchange. No rhonchi or wheezing  Abdominal:      Comments: Slightly distended. Decreased bowel sounds  Mild diffuse tenderness on exam   Skin:     General: Skin is warm.      Findings: Rash present.      Comments: Candida in both groins, slightly damp, reddened   Neurological:      Mental Status: She is alert.      Comments: Oriented x 1.   Flailing of RUE  Rigidity of other extremities.   She is able to answer some questions.   Psychiatric:         Mood and Affect: Mood normal.          Last Recorded Vitals  Blood pressure 136/88, pulse 91, temperature 36.9 °C (98.4 °F), temperature source Temporal, resp. rate 20, height 1.626 m (5' 4\"), weight 79 kg (174 lb 3.2 oz), SpO2 94%.    Relevant Results    Results for orders placed or performed during the hospital encounter of 06/15/25 (from the past 24 hours)   POCT GLUCOSE   Result Value Ref Range    POCT Glucose 228 (H) 74 - 99 mg/dL   CBC and Auto Differential   Result Value Ref Range    WBC 28.6 (H) 4.4 - 11.3 x10*3/uL    nRBC 0.0 0.0 - 0.0 /100 WBCs    RBC 5.37 (H) 4.00 - 5.20 x10*6/uL    Hemoglobin 16.4 (H) 12.0 - 16.0 g/dL    Hematocrit 50.5 (H) 36.0 - 46.0 %    MCV 94 80 - 100 fL    MCH 30.5 26.0 - 34.0 pg    MCHC 32.5 32.0 - 36.0 g/dL    RDW 13.6 11.5 - 14.5 %    Platelets 318 150 - 450 x10*3/uL    Neutrophils % 88.8 40.0 - 80.0 %    Immature Granulocytes %, Automated 0.7 0.0 - 0.9 %    Lymphocytes % 2.1 13.0 - 44.0 %    Monocytes % 7.7 2.0 - 10.0 %    Eosinophils % 0.6 0.0 - 6.0 %    Basophils % 0.1 0.0 - 2.0 %    Neutrophils Absolute 25.42 (H) 1.60 - 5.50 x10*3/uL    Immature Granulocytes Absolute, Automated 0.20 0.00 - 0.50 x10*3/uL    Lymphocytes Absolute " 0.60 (L) 0.80 - 3.00 x10*3/uL    Monocytes Absolute 2.20 (H) 0.05 - 0.80 x10*3/uL    Eosinophils Absolute 0.18 0.00 - 0.40 x10*3/uL    Basophils Absolute 0.03 0.00 - 0.10 x10*3/uL   Comprehensive Metabolic Panel   Result Value Ref Range    Glucose 248 (H) 74 - 99 mg/dL    Sodium 141 136 - 145 mmol/L    Potassium 3.3 (L) 3.5 - 5.3 mmol/L    Chloride 95 (L) 98 - 107 mmol/L    Bicarbonate 20 (L) 21 - 32 mmol/L    Anion Gap 29 (H) 10 - 20 mmol/L    Urea Nitrogen 16 6 - 23 mg/dL    Creatinine 1.06 (H) 0.50 - 1.05 mg/dL    eGFR 54 (L) >60 mL/min/1.73m*2    Calcium 10.0 8.6 - 10.3 mg/dL    Albumin 4.8 3.4 - 5.0 g/dL    Alkaline Phosphatase 66 33 - 136 U/L    Total Protein 8.4 (H) 6.4 - 8.2 g/dL    AST 20 9 - 39 U/L    Bilirubin, Total 0.8 0.0 - 1.2 mg/dL    ALT 18 7 - 45 U/L   Lactate   Result Value Ref Range    Lactate 8.4 (HH) 0.4 - 2.0 mmol/L   Blood Culture    Specimen: Peripheral Venipuncture; Blood culture   Result Value Ref Range    Blood Culture Loaded on Instrument - Culture in progress    Blood Culture    Specimen: Peripheral Venipuncture; Blood culture   Result Value Ref Range    Blood Culture Loaded on Instrument - Culture in progress    Troponin I, High Sensitivity   Result Value Ref Range    Troponin I, High Sensitivity 19 (H) 0 - 13 ng/L   Blood Gas Venous Full Panel   Result Value Ref Range    POCT pH, Venous 7.38 7.33 - 7.43 pH    POCT pCO2, Venous 38 (L) 41 - 51 mm Hg    POCT pO2, Venous 47 (H) 35 - 45 mm Hg    POCT SO2, Venous 74 45 - 75 %    POCT Oxy Hemoglobin, Venous 72.7 45.0 - 75.0 %    POCT Hematocrit Calculated, Venous 51.0 (H) 36.0 - 46.0 %    POCT Sodium, Venous 139 136 - 145 mmol/L    POCT Potassium, Venous 3.8 3.5 - 5.3 mmol/L    POCT Chloride, Venous 100 98 - 107 mmol/L    POCT Ionized Calicum, Venous 1.20 1.10 - 1.33 mmol/L    POCT Glucose, Venous 270 (H) 74 - 99 mg/dL    POCT Lactate, Venous 9.7 (HH) 0.4 - 2.0 mmol/L    POCT Base Excess, Venous -2.2 (L) -2.0 - 3.0 mmol/L    POCT HCO3  Calculated, Venous 22.5 22.0 - 26.0 mmol/L    POCT Hemoglobin, Venous 17.1 (H) 12.0 - 16.0 g/dL    POCT Anion Gap, Venous 20.0 10.0 - 25.0 mmol/L    Patient Temperature      FiO2 26 %   Lipase   Result Value Ref Range    Lipase 12 9 - 82 U/L   Morphology   Result Value Ref Range    RBC Morphology No significant RBC morphology present    Urinalysis with Reflex Culture and Microscopic   Result Value Ref Range    Color, Urine Yellow Light-Yellow, Yellow, Dark-Yellow    Appearance, Urine Turbid (N) Clear    Specific Gravity, Urine 1.031 1.005 - 1.035    pH, Urine 6.0 5.0, 5.5, 6.0, 6.5, 7.0, 7.5, 8.0    Protein, Urine 300 (3+) (A) NEGATIVE, 10 (TRACE), 20 (TRACE) mg/dL    Glucose, Urine 300 (3+) (A) Normal mg/dL    Blood, Urine OVER (3+) (A) NEGATIVE mg/dL    Ketones, Urine 60 (2+) (A) NEGATIVE mg/dL    Bilirubin, Urine NEGATIVE NEGATIVE mg/dL    Urobilinogen, Urine Normal Normal mg/dL    Nitrite, Urine NEGATIVE NEGATIVE    Leukocyte Esterase, Urine NEGATIVE NEGATIVE   Urinalysis Microscopic   Result Value Ref Range    WBC, Urine 11-20 (A) 1-5, NONE /HPF    WBC Clumps, Urine OCCASIONAL Reference range not established. /HPF    RBC, Urine 3-5 NONE, 1-2, 3-5 /HPF    Squamous Epithelial Cells, Urine 1-9 (SPARSE) Reference range not established. /HPF    Bacteria, Urine 1+ (A) NONE SEEN /HPF    Mucus, Urine FEW Reference range not established. /LPF    Amorphous Crystals, Urine 1+ NONE, 1+, 2+ /HPF   Blood Gas Lactic Acid, Venous   Result Value Ref Range    POCT Lactate, Venous 6.7 (HH) 0.4 - 2.0 mmol/L   Lactate   Result Value Ref Range    Lactate 6.1 (HH) 0.4 - 2.0 mmol/L   Lactate   Result Value Ref Range    Lactate 3.5 (H) 0.4 - 2.0 mmol/L       Assessment & Plan    SIRS, with lactic acidosis. No obvious etiology currently, but partly due to ketosis from her gi distress. Continue with fluids, monitor lactate. May need to repeat cxr, it is possible she may have aspirated with the vomiting. Trial of clear  liquids  Parkinsons. Continue usual med regimen  Dehydration, secondary to intract vomiting/poor oral intake. Monitor labs closely  Chronic respiratory failure. Nebs as needed, oxygen at night etc.  GERD. Continue ppi and carafate  Chronic back pain/cervical dystonia  Hypertension  DVT prophylaxis    I spent 37 minutes in the professional and overall care of this patient.      Lexii Bullock MD

## 2025-06-15 NOTE — SIGNIFICANT EVENT
Patient educated on the ordered Bronchodilator and the SVN. All questions answered appropriately.

## 2025-06-15 NOTE — CARE PLAN
The patient's goals for the shift include      The clinical goals for the shift include patient to feel better

## 2025-06-15 NOTE — PROGRESS NOTES
Pharmacy Medication History Review    Fidelia Norman is a 80 y.o. female admitted for Sepsis, due to unspecified organism, unspecified whether acute organ dysfunction present (Multi). Pharmacy reviewed the patient's [id number] medications and allergies for accuracy.    The list below reflectives the updated PTA list. Please review each medication in order reconciliation for additional clarification and justification.  Prior to Admission Medications   Prescriptions Last Dose Informant Patient Reported? Taking?   ALPRAZolam (Xanax) 0.5 mg tablet Unknown  Yes No   Sig: Take 1 tablet (0.5 mg) by mouth 3 times a day.   FLUoxetine (PROzac) 20 mg capsule Unknown  No No   Sig: Take 4 capsules (80 mg) by mouth once daily.   albuterol 90 mcg/actuation inhaler Unknown  Yes No   Sig: Inhale 2 puffs every 4 hours if needed for wheezing or shortness of breath.   buPROPion XL (Wellbutrin XL) 150 mg 24 hr tablet Unknown  Yes No   Sig: Take by mouth once every 24 hours.   busPIRone (Buspar) 10 mg tablet Unknown  Yes No   Sig: Take 1 tablet (10 mg) by mouth once daily.   busPIRone (Buspar) 15 mg tablet Unknown  Yes No   Sig: Take 1 tablet (15 mg) by mouth 2 times a day.   carbidopa-levodopa (Sinemet)  mg tablet Unknown  Yes No   Sig: Take 2 tablets by mouth 3 times a day.   dicyclomine (Bentyl) 10 mg capsule Unknown  Yes No   Sig: Take 1 capsule (10 mg) by mouth every 8 hours if needed.   donepezil (Aricept) 10 mg tablet Unknown  Yes No   Sig: Take 1 tablet (10 mg) by mouth once daily at bedtime.   fluticasone-umeclidin-vilanter (TRELEGY-ELLIPTA) 200-62.5-25 mcg blister with device Unknown  Yes No   Sig: Inhale 1 puff once daily.   furosemide (Lasix) 20 mg tablet Unknown  Yes No   Sig: Take 1 tablet (20 mg) by mouth once daily as needed.   lidocaine-diphenhydrAMINE-Maalox 1:1:1 Magic Mouthwash Not Taking  No No   Sig: Take 5mL by mouth every 6 hours as needed   Patient not taking: Reported on 6/15/2025   losartan  (Cozaar) 50 mg tablet Not Taking  No No   Sig: Take 1 tablet (50 mg) by mouth once daily.   Patient not taking: Reported on 6/15/2025   lubiprostone (Amitiza) 24 mcg capsule Unknown  No No   Sig: Take 1 capsule (24 mcg) by mouth 2 times a day.   Patient taking differently: Take 8 mcg by mouth 2 times a day.   lubricating eye drops ophthalmic solution Unknown  No No   Sig: Administer 1 drop into both eyes if needed for dry eyes.   omeprazole (PriLOSEC) 20 mg DR capsule Unknown  Yes No   Sig: Take 1 capsule (20 mg) by mouth twice a day.   ondansetron (Zofran) 8 mg tablet Unknown  Yes No   Sig: Take 1 tablet (8 mg) by mouth every 8 hours if needed.   potassium chloride CR 20 mEq ER tablet Unknown  Yes No   Sig: Take 1 tablet (20 mEq) by mouth once daily.   pregabalin (Lyrica) 100 mg capsule Unknown  Yes No   Sig: Take 1 capsule (100 mg) by mouth 2 times a day.   rosuvastatin (Crestor) 20 mg tablet Unknown  Yes No   Sig: Take 1 tablet (20 mg) by mouth once daily.   sucralfate (Carafate) 1 gram tablet Unknown  Yes No   Sig: Take 1 tablet (1 g) by mouth twice a day.   suvorexant (Belsomra) 10 mg tablet Unknown  Yes No   Sig: Take 1 tablet (10 mg) by mouth once daily at bedtime.   tiZANidine (Zanaflex) 4 mg capsule Unknown  Yes No   Sig: Take 1 capsule (4 mg) by mouth 3 times a day as needed for muscle spasms.   traZODone (Desyrel) 50 mg tablet Unknown  No No   Sig: Take 1 tablet (50 mg) by mouth once daily at bedtime.   zolpidem (Ambien) 5 mg tablet Unknown  No No   Sig: Take 1 tablet (5 mg) by mouth as needed at bedtime for sleep. Do not crush, chew, or split.      Facility-Administered Medications: None            The list below reflectives the updated allergy list. Please review each documented allergy for additional clarification and justification.  Allergies  Reviewed by Elza Richey RN on 6/15/2025        Severity Reactions Comments    Amantadine High Other Changes mental status    Amitriptyline Not Specified  Unknown     Baclofen Not Specified Other     Bactrim [sulfamethoxazole-trimethoprim] Not Specified Unknown     Gadolinium-containing Contrast Media Not Specified Unknown     Iodinated Contrast Media Not Specified Hives     Penicillins Not Specified Unknown     Topamax [topiramate] Not Specified Unknown     Valdecoxib Not Specified Other Bextra    Zoloft [sertraline] Not Specified Unknown     Rivastigmine Low Itching, Rash Only to the patch, not necessarily to the drug component            Below are additional concerns with the patient's PTA list.    Patient unsure of current medications, last dose, and allergies. Medication history based on dispense report and notes.    SUZI VALENCIA

## 2025-06-15 NOTE — SIGNIFICANT EVENT
Called to assess patient for fever, rigors, rhythmic movements.    Unclear if this is seizure, uncontrolled Parkinson's, sepsis.  Will load with Keppra and broaden antibiotics.  Repeat blood cultures.

## 2025-06-16 LAB
ANION GAP SERPL CALC-SCNC: 12 MMOL/L (ref 10–20)
BACTERIA UR CULT: NO GROWTH
BASOPHILS # BLD AUTO: 0.02 X10*3/UL (ref 0–0.1)
BASOPHILS NFR BLD AUTO: 0.2 %
BUN SERPL-MCNC: 14 MG/DL (ref 6–23)
CALCIUM SERPL-MCNC: 8.2 MG/DL (ref 8.6–10.3)
CHLORIDE SERPL-SCNC: 107 MMOL/L (ref 98–107)
CO2 SERPL-SCNC: 28 MMOL/L (ref 21–32)
CREAT SERPL-MCNC: 0.8 MG/DL (ref 0.5–1.05)
EGFRCR SERPLBLD CKD-EPI 2021: 76 ML/MIN/1.73M*2
EOSINOPHIL # BLD AUTO: 0.03 X10*3/UL (ref 0–0.4)
EOSINOPHIL NFR BLD AUTO: 0.2 %
ERYTHROCYTE [DISTWIDTH] IN BLOOD BY AUTOMATED COUNT: 13.5 % (ref 11.5–14.5)
GLUCOSE SERPL-MCNC: 146 MG/DL (ref 74–99)
HCT VFR BLD AUTO: 35.8 % (ref 36–46)
HGB BLD-MCNC: 12.2 G/DL (ref 12–16)
HOLD SPECIMEN: NORMAL
IMM GRANULOCYTES # BLD AUTO: 0.08 X10*3/UL (ref 0–0.5)
IMM GRANULOCYTES NFR BLD AUTO: 0.6 % (ref 0–0.9)
LYMPHOCYTES # BLD AUTO: 1.21 X10*3/UL (ref 0.8–3)
LYMPHOCYTES NFR BLD AUTO: 9.2 %
MAGNESIUM SERPL-MCNC: 1.24 MG/DL (ref 1.6–2.4)
MCH RBC QN AUTO: 31 PG (ref 26–34)
MCHC RBC AUTO-ENTMCNC: 34.1 G/DL (ref 32–36)
MCV RBC AUTO: 91 FL (ref 80–100)
MONOCYTES # BLD AUTO: 1.29 X10*3/UL (ref 0.05–0.8)
MONOCYTES NFR BLD AUTO: 9.8 %
NEUTROPHILS # BLD AUTO: 10.52 X10*3/UL (ref 1.6–5.5)
NEUTROPHILS NFR BLD AUTO: 80 %
NRBC BLD-RTO: 0 /100 WBCS (ref 0–0)
PLATELET # BLD AUTO: 161 X10*3/UL (ref 150–450)
POTASSIUM SERPL-SCNC: 3.2 MMOL/L (ref 3.5–5.3)
RBC # BLD AUTO: 3.93 X10*6/UL (ref 4–5.2)
SODIUM SERPL-SCNC: 144 MMOL/L (ref 136–145)
VANCOMYCIN SERPL-MCNC: 5.9 UG/ML (ref 5–20)
WBC # BLD AUTO: 13.2 X10*3/UL (ref 4.4–11.3)

## 2025-06-16 PROCEDURE — 2500000005 HC RX 250 GENERAL PHARMACY W/O HCPCS: Performed by: INTERNAL MEDICINE

## 2025-06-16 PROCEDURE — 2500000001 HC RX 250 WO HCPCS SELF ADMINISTERED DRUGS (ALT 637 FOR MEDICARE OP): Performed by: NURSE PRACTITIONER

## 2025-06-16 PROCEDURE — 85025 COMPLETE CBC W/AUTO DIFF WBC: CPT | Performed by: INTERNAL MEDICINE

## 2025-06-16 PROCEDURE — 83735 ASSAY OF MAGNESIUM: CPT | Performed by: INTERNAL MEDICINE

## 2025-06-16 PROCEDURE — 80048 BASIC METABOLIC PNL TOTAL CA: CPT | Performed by: INTERNAL MEDICINE

## 2025-06-16 PROCEDURE — 2500000004 HC RX 250 GENERAL PHARMACY W/ HCPCS (ALT 636 FOR OP/ED): Performed by: STUDENT IN AN ORGANIZED HEALTH CARE EDUCATION/TRAINING PROGRAM

## 2025-06-16 PROCEDURE — 2500000004 HC RX 250 GENERAL PHARMACY W/ HCPCS (ALT 636 FOR OP/ED): Performed by: INTERNAL MEDICINE

## 2025-06-16 PROCEDURE — 2500000004 HC RX 250 GENERAL PHARMACY W/ HCPCS (ALT 636 FOR OP/ED): Performed by: NURSE PRACTITIONER

## 2025-06-16 PROCEDURE — 94760 N-INVAS EAR/PLS OXIMETRY 1: CPT

## 2025-06-16 PROCEDURE — 2500000002 HC RX 250 W HCPCS SELF ADMINISTERED DRUGS (ALT 637 FOR MEDICARE OP, ALT 636 FOR OP/ED): Performed by: NURSE PRACTITIONER

## 2025-06-16 PROCEDURE — 36415 COLL VENOUS BLD VENIPUNCTURE: CPT | Performed by: INTERNAL MEDICINE

## 2025-06-16 PROCEDURE — 80202 ASSAY OF VANCOMYCIN: CPT | Performed by: STUDENT IN AN ORGANIZED HEALTH CARE EDUCATION/TRAINING PROGRAM

## 2025-06-16 PROCEDURE — 2500000001 HC RX 250 WO HCPCS SELF ADMINISTERED DRUGS (ALT 637 FOR MEDICARE OP): Performed by: INTERNAL MEDICINE

## 2025-06-16 PROCEDURE — 94640 AIRWAY INHALATION TREATMENT: CPT

## 2025-06-16 PROCEDURE — 2500000002 HC RX 250 W HCPCS SELF ADMINISTERED DRUGS (ALT 637 FOR MEDICARE OP, ALT 636 FOR OP/ED): Performed by: INTERNAL MEDICINE

## 2025-06-16 PROCEDURE — 1200000002 HC GENERAL ROOM WITH TELEMETRY DAILY

## 2025-06-16 PROCEDURE — 99233 SBSQ HOSP IP/OBS HIGH 50: CPT | Performed by: INTERNAL MEDICINE

## 2025-06-16 RX ORDER — POTASSIUM CHLORIDE 1.5 G/1.58G
40 POWDER, FOR SOLUTION ORAL ONCE
Status: COMPLETED | OUTPATIENT
Start: 2025-06-16 | End: 2025-06-16

## 2025-06-16 RX ORDER — MAGNESIUM SULFATE HEPTAHYDRATE 40 MG/ML
2 INJECTION, SOLUTION INTRAVENOUS ONCE
Status: COMPLETED | OUTPATIENT
Start: 2025-06-16 | End: 2025-06-16

## 2025-06-16 RX ORDER — POTASSIUM CHLORIDE 20 MEQ/1
40 TABLET, EXTENDED RELEASE ORAL ONCE
Status: DISCONTINUED | OUTPATIENT
Start: 2025-06-16 | End: 2025-06-16

## 2025-06-16 RX ADMIN — MAGNESIUM SULFATE HEPTAHYDRATE 2 G: 2 INJECTION, SOLUTION INTRAVENOUS at 09:10

## 2025-06-16 RX ADMIN — TRAZODONE HYDROCHLORIDE 50 MG: 50 TABLET ORAL at 20:52

## 2025-06-16 RX ADMIN — NYSTATIN 1 APPLICATION: 100000 POWDER TOPICAL at 09:10

## 2025-06-16 RX ADMIN — SUCRALFATE 1 G: 1 SUSPENSION ORAL at 06:19

## 2025-06-16 RX ADMIN — BUSPIRONE HYDROCHLORIDE 10 MG: 15 TABLET ORAL at 09:09

## 2025-06-16 RX ADMIN — FLUOXETINE HYDROCHLORIDE 80 MG: 20 CAPSULE ORAL at 09:10

## 2025-06-16 RX ADMIN — CEFEPIME HYDROCHLORIDE 1 G: 1 INJECTION, SOLUTION INTRAVENOUS at 13:29

## 2025-06-16 RX ADMIN — VANCOMYCIN HYDROCHLORIDE 1250 MG: 1.25 INJECTION, POWDER, LYOPHILIZED, FOR SOLUTION INTRAVENOUS at 09:08

## 2025-06-16 RX ADMIN — CEFEPIME HYDROCHLORIDE 1 G: 1 INJECTION, SOLUTION INTRAVENOUS at 05:54

## 2025-06-16 RX ADMIN — ENOXAPARIN SODIUM 40 MG: 100 INJECTION SUBCUTANEOUS at 09:10

## 2025-06-16 RX ADMIN — PREGABALIN 100 MG: 50 CAPSULE ORAL at 09:09

## 2025-06-16 RX ADMIN — ALPRAZOLAM 0.5 MG: 0.5 TABLET ORAL at 20:53

## 2025-06-16 RX ADMIN — NYSTATIN 1 APPLICATION: 100000 POWDER TOPICAL at 20:53

## 2025-06-16 RX ADMIN — SUCRALFATE 1 G: 1 SUSPENSION ORAL at 23:04

## 2025-06-16 RX ADMIN — ALPRAZOLAM 0.5 MG: 0.5 TABLET ORAL at 09:09

## 2025-06-16 RX ADMIN — CARBIDOPA AND LEVODOPA 2 TABLET: 25; 100 TABLET ORAL at 17:46

## 2025-06-16 RX ADMIN — Medication 2 L/MIN: at 14:41

## 2025-06-16 RX ADMIN — PANTOPRAZOLE SODIUM 40 MG: 40 INJECTION, POWDER, FOR SOLUTION INTRAVENOUS at 09:10

## 2025-06-16 RX ADMIN — TRAZODONE HYDROCHLORIDE 50 MG: 50 TABLET ORAL at 00:31

## 2025-06-16 RX ADMIN — IPRATROPIUM BROMIDE AND ALBUTEROL SULFATE 3 ML: 2.5; .5 SOLUTION RESPIRATORY (INHALATION) at 14:41

## 2025-06-16 RX ADMIN — Medication 2 L/MIN: at 07:54

## 2025-06-16 RX ADMIN — LUBIPROSTONE 8 MCG: 8 CAPSULE, GELATIN COATED ORAL at 20:52

## 2025-06-16 RX ADMIN — PANTOPRAZOLE SODIUM 40 MG: 40 INJECTION, POWDER, FOR SOLUTION INTRAVENOUS at 20:52

## 2025-06-16 RX ADMIN — POTASSIUM CHLORIDE, DEXTROSE MONOHYDRATE AND SODIUM CHLORIDE 100 ML/HR: 150; 5; 900 INJECTION, SOLUTION INTRAVENOUS at 07:06

## 2025-06-16 RX ADMIN — DONEPEZIL HYDROCHLORIDE 10 MG: 5 TABLET ORAL at 20:53

## 2025-06-16 RX ADMIN — BUSPIRONE HYDROCHLORIDE 15 MG: 15 TABLET ORAL at 20:52

## 2025-06-16 RX ADMIN — BUPROPION HYDROCHLORIDE 150 MG: 150 TABLET, EXTENDED RELEASE ORAL at 13:29

## 2025-06-16 RX ADMIN — PREGABALIN 100 MG: 50 CAPSULE ORAL at 00:31

## 2025-06-16 RX ADMIN — CEFEPIME HYDROCHLORIDE 1 G: 1 INJECTION, SOLUTION INTRAVENOUS at 22:11

## 2025-06-16 RX ADMIN — CARBIDOPA AND LEVODOPA 2 TABLET: 25; 100 TABLET ORAL at 09:09

## 2025-06-16 RX ADMIN — VANCOMYCIN HYDROCHLORIDE 1250 MG: 1.25 INJECTION, POWDER, LYOPHILIZED, FOR SOLUTION INTRAVENOUS at 20:46

## 2025-06-16 RX ADMIN — PREGABALIN 100 MG: 50 CAPSULE ORAL at 20:53

## 2025-06-16 RX ADMIN — BUSPIRONE HYDROCHLORIDE 15 MG: 15 TABLET ORAL at 09:09

## 2025-06-16 RX ADMIN — BUDESONIDE 0.25 MG: 0.25 INHALANT RESPIRATORY (INHALATION) at 07:55

## 2025-06-16 RX ADMIN — CARBIDOPA AND LEVODOPA 2 TABLET: 25; 100 TABLET ORAL at 20:53

## 2025-06-16 RX ADMIN — ZOLPIDEM TARTRATE 5 MG: 5 TABLET ORAL at 20:52

## 2025-06-16 RX ADMIN — IPRATROPIUM BROMIDE AND ALBUTEROL SULFATE 3 ML: 2.5; .5 SOLUTION RESPIRATORY (INHALATION) at 07:54

## 2025-06-16 RX ADMIN — ALPRAZOLAM 0.5 MG: 0.5 TABLET ORAL at 17:46

## 2025-06-16 RX ADMIN — POTASSIUM CHLORIDE 40 MEQ: 1.5 POWDER, FOR SOLUTION ORAL at 13:29

## 2025-06-16 ASSESSMENT — COGNITIVE AND FUNCTIONAL STATUS - GENERAL
EATING MEALS: A LITTLE
STANDING UP FROM CHAIR USING ARMS: A LOT
DRESSING REGULAR LOWER BODY CLOTHING: A LOT
STANDING UP FROM CHAIR USING ARMS: A LOT
DAILY ACTIVITIY SCORE: 13
PERSONAL GROOMING: A LOT
DRESSING REGULAR UPPER BODY CLOTHING: A LOT
WALKING IN HOSPITAL ROOM: A LOT
MOBILITY SCORE: 13
TURNING FROM BACK TO SIDE WHILE IN FLAT BAD: A LOT
CLIMB 3 TO 5 STEPS WITH RAILING: A LOT
DRESSING REGULAR LOWER BODY CLOTHING: A LOT
MOVING FROM LYING ON BACK TO SITTING ON SIDE OF FLAT BED WITH BEDRAILS: A LITTLE
TOILETING: A LOT
TOILETING: A LOT
MOVING FROM LYING ON BACK TO SITTING ON SIDE OF FLAT BED WITH BEDRAILS: A LITTLE
TURNING FROM BACK TO SIDE WHILE IN FLAT BAD: A LOT
PERSONAL GROOMING: A LOT
HELP NEEDED FOR BATHING: A LOT
HELP NEEDED FOR BATHING: A LOT
WALKING IN HOSPITAL ROOM: A LOT
MOVING TO AND FROM BED TO CHAIR: A LOT
DAILY ACTIVITIY SCORE: 13
EATING MEALS: A LITTLE
MOVING TO AND FROM BED TO CHAIR: A LOT
DRESSING REGULAR UPPER BODY CLOTHING: A LOT
MOBILITY SCORE: 13
CLIMB 3 TO 5 STEPS WITH RAILING: A LOT

## 2025-06-16 ASSESSMENT — ACTIVITIES OF DAILY LIVING (ADL): LACK_OF_TRANSPORTATION: NO

## 2025-06-16 ASSESSMENT — PAIN SCALES - GENERAL
PAINLEVEL_OUTOF10: 0 - NO PAIN
PAINLEVEL_OUTOF10: 0 - NO PAIN

## 2025-06-16 NOTE — PROGRESS NOTES
06/16/25 0855   Discharge Planning   Living Arrangements Spouse/significant other   Support Systems Children;Spouse/significant other   Assistance Needed (per spouse patient normally not confused)Currently pleasant but confused - alert but oriented to first name only and dependent for assist for ADLs and ambulates with cane; doesn't drive; 2L NC @ HS (need new O2 supplier-was [facility] but they are now out of network with insurance; PCP Dr Abdirahman Montana   Type of Residence Private residence   Number of Stairs to Enter Residence 4   Number of Stairs Within Residence 0   Do you have animals or pets at home? No   Who is requesting discharge planning? Provider   Type of Post Acute Facility Services Skilled nursing   Expected Discharge Disposition SNF  (DC dispo penting workup and PT OT evals. May need snf. Patient will need new home O2 eval (sleep?) Pt home O2 supplier recently out of network with insurance)   Does the patient need discharge transport arranged? Yes   RoundTrip coordination needed? Yes   Has discharge transport been arranged? No   Financial Resource Strain   How hard is it for you to pay for the very basics like food, housing, medical care, and heating? Not hard   Housing Stability   In the last 12 months, was there a time when you were not able to pay the mortgage or rent on time? N   In the past 12 months, how many times have you moved where you were living? 0   At any time in the past 12 months, were you homeless or living in a shelter (including now)? N   Transportation Needs   In the past 12 months, has lack of transportation kept you from medical appointments or from getting medications? no   In the past 12 months, has lack of transportation kept you from meetings, work, or from getting things needed for daily living? No

## 2025-06-16 NOTE — PROGRESS NOTES
Patient: Fidelia Norman  Room/bed: 134/134-A  Admitted on: 6/15/2025    Age: 80 y.o.   Gender: female  Code Status:  Full Code   Admitting Dx: Sepsis, due to unspecified organism, unspecified whether acute organ dysfunction present (Multi) [A41.9]    MRN: [id number]  PCP: Bogdan Montana MD       Subjective   Seen and examined in her room this AM. Feels better but is still feeling short of breath and tremulous.     Objective    Physical Exam   Constitutional: A&O x 3; NAD; calm and cooperative; looks ill.   Eyes: EOM's intact  HEENT: Normocephalic, Atraumatic. Oral mucosa moist.   Neck: Supple. No JVD, lymphadenopathy.   Lungs: Fair air movement with rhonchi throughout. Mild conversational dyspnea on 2 liters.   Heart: RRR  Abdomen: Soft, non-tender, non-distended, +BS. Obese.   MS/Extremities: DELVALLE equally x 4. No significant edema. Peripheral pulses intact bilaterally. Rigidity.   Neuro: A&O x2-3; no focal deficits; gross motor and sensation intact.   Skin: Warm and dry. No rashes or lesions  Psych: Normal affect.      Temp:  [36.5 °C (97.7 °F)-38.3 °C (100.9 °F)] 37.2 °C (99 °F)  Heart Rate:  [76-94] 80  Resp:  [16-22] 16  BP: (133-161)/(70-84) 141/73    Vitals:    06/15/25 1040   Weight: 79 kg (174 lb 3.2 oz)             I/Os    Intake/Output Summary (Last 24 hours) at 6/16/2025 1517  Last data filed at 6/16/2025 1329  Gross per 24 hour   Intake 2485.01 ml   Output 200 ml   Net 2285.01 ml       Labs:   Results from last 72 hours   Lab Units 06/16/25  0713 06/15/25  0512   SODIUM mmol/L 144 141   POTASSIUM mmol/L 3.2* 3.3*   CHLORIDE mmol/L 107 95*   CO2 mmol/L 28 20*   BUN mg/dL 14 16   CREATININE mg/dL 0.80 1.06*   GLUCOSE mg/dL 146* 248*   CALCIUM mg/dL 8.2* 10.0   ANION GAP mmol/L 12 29*   EGFR mL/min/1.73m*2 76 54*      Results from last 72 hours   Lab Units 06/16/25  0713 06/15/25  0512   WBC AUTO x10*3/uL 13.2* 28.6*   HEMOGLOBIN g/dL 12.2 16.4*   HEMATOCRIT % 35.8* 50.5*   PLATELETS AUTO x10*3/uL 161 318  "  NEUTROS PCT AUTO % 80.0 88.8   LYMPHS PCT AUTO % 9.2 2.1   MONOS PCT AUTO % 9.8 7.7   EOS PCT AUTO % 0.2 0.6      Lab Results   Component Value Date    CALCIUM 8.2 (L) 06/16/2025    PHOS 2.1 (L) 07/20/2023      Lab Results   Component Value Date    CRP 9.84 (A) 12/23/2020      [unfilled]     Micro/ID:   No results found for the last 90 days.                   No lab exists for component: \"AGALPCRNB\"   .ID  Lab Results   Component Value Date    URINECULTURE No growth 06/15/2025    BLOODCULT Loaded on Instrument - Culture in progress 06/15/2025       Images:  CT Chest/Abdomen/Pelvis:  Impression: No acute abnormality of the chest, abdomen and pelvis.    CXR:   Impression: No acute cardiopulmonary process.      CT Head:   Impression: No acute intracranial abnormality.     Meds    Scheduled medications  Scheduled Medications[1]  Continuous medications  Continuous Medications[2]  PRN medications  PRN Medications[3]     Assessment and Plan    Fidelia Norman is a 80 y.o. female with a medical history of HTN, COPD/Asthma, and Parkinson's Disease, who presented to ED with c/o N/V, abdominal pain and shortness of breath. In the ED, found to have marked lactic acidosis with SIRS.     SIRS/Lactic  Acidosis  -Unclear etiology but likely related to GI losses and/or aspiration  -Lactate normalized after IV fluids given  -Treating with antibiotics (Cefepime, Vancomycin) for possible aspiration pneumonia  -Will have SLP do evaluation to r/o aspiration in setting of PD  -Continue with bronchodilators, bronchial hygiene  -Follow cultures  -T. Max 100.9    Chronic Hypoxic Respiratory Failure  -H/O COPD/Asthma  -Maintain bronchial hygiene    Hypertension  -Medical therapy: Losartan, Lasix  -Hold Lasix due to acute dehydration on admission    Parkinson's Disease  -Carbidopa-Levadopa    GERD  -On PPI, Sucralfate    Chronic Back Pain/Cervical Dystonia  -On Lyrica, Tizanidine  -Supportive care    DVT Prophylaxis  -On " Lovenox    Fluids/Electrolytes/Nutrition  -Laboratory data reviewed: CBC/BMP  -Electrolytes: Hypokalemia/hypomagnesia - replaced.   -No nutritional concerns at this time.      Disposition  -Plan of care discussed with attending, Dr. Bullock.       LILIBETH Oconnor-CNP        [1] ALPRAZolam, 0.5 mg, oral, TID  budesonide, 0.25 mg, nebulization, BID   And  ipratropium-albuteroL, 3 mL, nebulization, TID  buPROPion XL, 150 mg, oral, q24h  busPIRone, 10 mg, oral, Daily  busPIRone, 15 mg, oral, BID  carbidopa-levodopa, 2 tablet, oral, TID  cefepime, 1 g, intravenous, q8h  donepezil, 10 mg, oral, Nightly  enoxaparin, 40 mg, subcutaneous, Daily  FLUoxetine, 80 mg, oral, Daily  lubiprostone, 8 mcg, oral, BID  nystatin, 1 Application, Topical, BID  pantoprazole, 40 mg, intravenous, BID  pregabalin, 100 mg, oral, BID  sucralfate, 1 g, oral, q6h [place]  traZODone, 50 mg, oral, Nightly  vancomycin, 1,250 mg, intravenous, q12h  [2] potassium chloride-D5-0.9%NaCl, 100 mL/hr, Last Rate: 100 mL/hr (06/16/25 0706)  [3] PRN medications: acetaminophen, acetaminophen, albuterol, oxygen, promethazine, tiZANidine, vancomycin, zolpidem

## 2025-06-16 NOTE — PROGRESS NOTES
"Vancomycin Dosing by Pharmacy- INITIAL    Fidelia Norman  80YF  Ht 5'4\" Wt 174# (79 Kg)      Hospital Day #0/Vancomycin Day #0  Admission dx-- r/o sepsis  Pharmacy has been consulted for vancomycin dosing.   Based on the patient's indication and renal status this patient will be dosed based on a goal AUC of 500-600.     Renal function Clcr 45 mL/min.  Base line Scr 45% over baseline. Pt is receiving IVF at 100 mL/hr    Visit Vitals  /80 (BP Location: Right arm, Patient Position: Lying)   Pulse 88   Temp 37.3 °C (99.1 °F)   Resp 20        Lab Results   Component Value Date    CREATININE 1.06 (H) 06/15/2025    CREATININE 0.73 06/23/2024    CREATININE 0.74 06/22/2024    CREATININE 0.71 06/21/2024        Patient weight is as follows:   Vitals:    06/15/25 1040   Weight: 79 kg (174 lb 3.2 oz)       Cultures:  6/15 BC x2 pending  6/15 UC pending    ABX include cefepime 1g IV q8h        Assessment/Plan     Patient has already been given a loading dose of 2000 mg in ED today at 0725  Will initiate vancomycin maintenance, 1250 mg every 24 hours starting tomorrow at 0900    This dosing regimen is predicted by [employer] to result in the following pharmacokinetic parameters:  Regimen: 1250 mg IV every 24 hours.  Start time: 0900 on 06/16/2025  Exposure target: AUC24 (range) 400-600 mg/L.hr   [id number]: 494 mg/L.hr  AUC24,ss: 517 mg/L.hr  Probability of AUC24 > 400: 77 %  Ctrough,ss: 15.7 mg/L  Probability of Ctrough,ss > 20: 30 %      Follow-up level will be ordered after the 1st dose on 6/16 w/ am labs to assess Clvanco due to baseline increase in Scr  Will continue to monitor renal function daily while on vancomycin and order serum creatinine at least every 48 hours if not already ordered.  Follow for continued vancomycin needs, clinical response, and signs/symptoms of toxicity.       Antwan Bhatia, PharmD       "

## 2025-06-16 NOTE — CARE PLAN
The patient's goals for the shift include      The clinical goals for the shift include pt will remain alert during shift

## 2025-06-16 NOTE — CARE PLAN
The patient's goals for the shift include      The clinical goals for the shift include Patients mentation will improive through shift    Over the shift, the patient did not make progress toward the following goals. Barriers to progression include . Recommendations to address these barriers include   Problem: Chronic Conditions and Co-morbidities  Goal: Patient's chronic conditions and co-morbidity symptoms are monitored and maintained or improved  Outcome: Progressing  Flowsheets (Taken 6/15/2025 2205)  Care Plan - Patient's Chronic Conditions and Co-Morbidity Symptoms are Monitored and Maintained or Improved:   Monitor and assess patient's chronic conditions and comorbid symptoms for stability, deterioration, or improvement   Collaborate with multidisciplinary team to address chronic and comorbid conditions and prevent exacerbation or deterioration     Problem: Skin  Goal: Decreased wound size/increased tissue granulation at next dressing change  Outcome: Progressing  Flowsheets (Taken 6/15/2025 2205)  Decreased wound size/increased tissue granulation at next dressing change: Promote sleep for wound healing   .

## 2025-06-16 NOTE — PROGRESS NOTES
Vancomycin Dosing by Pharmacy- FOLLOW UP  Fidelia Norman is a 80 y.o. year old female who Pharmacy has been consulted for vancomycin dosing for other UTI/Sepsis. Based on the patient's indication and renal status this patient is being dosed based on a goal AUC of 500-600.     Renal function is currently stable.    Current vancomycin dose: 1250 mg given every 24 hours    Estimated vancomycin AUC on current dose: 306 mg/L.hr     Visit Vitals  /73 (BP Location: Right arm, Patient Position: Lying)   Pulse 83   Temp 37.2 °C (99 °F) (Temporal)   Resp 16        Lab Results   Component Value Date    CREATININE 0.80 2025    CREATININE 1.06 (H) 06/15/2025    CREATININE 0.73 2024    CREATININE 0.74 2024        Patient weight is as follows:   Vitals:    06/15/25 1040   Weight: 79 kg (174 lb 3.2 oz)       Cultures:  No results found for the encounter in last 14 days.       I/O last 3 completed shifts:  In:  (26.3 mL/kg) [IV Piggyback:]  Out: 200 (2.5 mL/kg) [Urine:200 (0.1 mL/kg/hr)]  Weight: 79 kg   I/O during current shift:  I/O this shift:  In: 250 [IV Piggyback:250]  Out: -     Temp (24hrs), Av.4 °C (99.3 °F), Min:36.5 °C (97.7 °F), Max:38.3 °C (100.9 °F)      Assessment/Plan  Below goal AUC. Orders placed for new vancomcyin regimen of 1250mg every 12 hours to begin at 21:00.     This dosing regimen is predicted by [employer] to result in the following pharmacokinetic parameters:  Regimen: 1250 mg IV every 12 hours.  Start time: 21:00 on 2025  Exposure target: AUC24 (range) 400-600 mg/L.hr   BCO40-83: 530 mg/L.hr  AUC24,ss: 602 mg/L.hr  Probability of AUC24 > 400: 99 %  Ctrough,ss: 18.7 mg/L  Probability of Ctrough,ss > 20: 41 %    Better regimens, but below goal:   Regimen: 2000 mg IV every 24 hours  HLL67-17: 457 mg/L.hr  AUC24,ss: 486 mg/L.hr    Regimen: 1000 mg IV every 12 hours  MSH82-62: 436 mg/L.hr  AUC24,ss: 484 mg/L.hr    The next level will be obtained on  at 05:00. May  be obtained sooner if clinically indicated.   Will continue to monitor renal function daily while on vancomycin and order serum creatinine at least every 48 hours if not already ordered.  Follow for continued vancomycin needs, clinical response, and signs/symptoms of toxicity.       Lashay Ordaz, PharmD

## 2025-06-17 ENCOUNTER — APPOINTMENT (OUTPATIENT)
Dept: RADIOLOGY | Facility: HOSPITAL | Age: 80
DRG: 871 | End: 2025-06-17
Payer: MEDICARE

## 2025-06-17 LAB
ANION GAP SERPL CALC-SCNC: 11 MMOL/L (ref 10–20)
BUN SERPL-MCNC: 9 MG/DL (ref 6–23)
CALCIUM SERPL-MCNC: 8.4 MG/DL (ref 8.6–10.3)
CHLORIDE SERPL-SCNC: 109 MMOL/L (ref 98–107)
CO2 SERPL-SCNC: 27 MMOL/L (ref 21–32)
CREAT SERPL-MCNC: 0.84 MG/DL (ref 0.5–1.05)
EGFRCR SERPLBLD CKD-EPI 2021: 71 ML/MIN/1.73M*2
ERYTHROCYTE [DISTWIDTH] IN BLOOD BY AUTOMATED COUNT: 13.4 % (ref 11.5–14.5)
GLUCOSE SERPL-MCNC: 108 MG/DL (ref 74–99)
HCT VFR BLD AUTO: 39 % (ref 36–46)
HGB BLD-MCNC: 12.2 G/DL (ref 12–16)
MAGNESIUM SERPL-MCNC: 1.83 MG/DL (ref 1.6–2.4)
MCH RBC QN AUTO: 30.9 PG (ref 26–34)
MCHC RBC AUTO-ENTMCNC: 31.3 G/DL (ref 32–36)
MCV RBC AUTO: 99 FL (ref 80–100)
NRBC BLD-RTO: 0 /100 WBCS (ref 0–0)
P OFFSET: 178 MS
P ONSET: 158 MS
PLATELET # BLD AUTO: 149 X10*3/UL (ref 150–450)
POTASSIUM SERPL-SCNC: 3.3 MMOL/L (ref 3.5–5.3)
PROCALCITONIN SERPL-MCNC: 0.16 NG/ML
Q ONSET: 205 MS
QRS COUNT: 13 BEATS
QRS DURATION: 88 MS
QT INTERVAL: 412 MS
QTC CALCULATION(BAZETT): 481 MS
QTC FREDERICIA: 457 MS
R AXIS: -19 DEGREES
RBC # BLD AUTO: 3.95 X10*6/UL (ref 4–5.2)
SODIUM SERPL-SCNC: 144 MMOL/L (ref 136–145)
T AXIS: 29 DEGREES
T OFFSET: 411 MS
VENTRICULAR RATE: 82 BPM
WBC # BLD AUTO: 8.7 X10*3/UL (ref 4.4–11.3)

## 2025-06-17 PROCEDURE — 94640 AIRWAY INHALATION TREATMENT: CPT

## 2025-06-17 PROCEDURE — 71045 X-RAY EXAM CHEST 1 VIEW: CPT

## 2025-06-17 PROCEDURE — 2500000002 HC RX 250 W HCPCS SELF ADMINISTERED DRUGS (ALT 637 FOR MEDICARE OP, ALT 636 FOR OP/ED): Performed by: INTERNAL MEDICINE

## 2025-06-17 PROCEDURE — 2500000001 HC RX 250 WO HCPCS SELF ADMINISTERED DRUGS (ALT 637 FOR MEDICARE OP): Performed by: INTERNAL MEDICINE

## 2025-06-17 PROCEDURE — 2500000004 HC RX 250 GENERAL PHARMACY W/ HCPCS (ALT 636 FOR OP/ED): Performed by: NURSE PRACTITIONER

## 2025-06-17 PROCEDURE — 71045 X-RAY EXAM CHEST 1 VIEW: CPT | Performed by: STUDENT IN AN ORGANIZED HEALTH CARE EDUCATION/TRAINING PROGRAM

## 2025-06-17 PROCEDURE — 80048 BASIC METABOLIC PNL TOTAL CA: CPT | Performed by: NURSE PRACTITIONER

## 2025-06-17 PROCEDURE — 97161 PT EVAL LOW COMPLEX 20 MIN: CPT | Mod: GP

## 2025-06-17 PROCEDURE — 2500000004 HC RX 250 GENERAL PHARMACY W/ HCPCS (ALT 636 FOR OP/ED): Performed by: INTERNAL MEDICINE

## 2025-06-17 PROCEDURE — 36415 COLL VENOUS BLD VENIPUNCTURE: CPT | Performed by: NURSE PRACTITIONER

## 2025-06-17 PROCEDURE — 92610 EVALUATE SWALLOWING FUNCTION: CPT | Mod: GN | Performed by: PHARMACIST

## 2025-06-17 PROCEDURE — 97165 OT EVAL LOW COMPLEX 30 MIN: CPT | Mod: GO

## 2025-06-17 PROCEDURE — 1200000002 HC GENERAL ROOM WITH TELEMETRY DAILY

## 2025-06-17 PROCEDURE — 84145 PROCALCITONIN (PCT): CPT | Mod: GEALAB | Performed by: NURSE PRACTITIONER

## 2025-06-17 PROCEDURE — 9420000001 HC RT PATIENT EDUCATION 5 MIN

## 2025-06-17 PROCEDURE — 99233 SBSQ HOSP IP/OBS HIGH 50: CPT | Performed by: NURSE PRACTITIONER

## 2025-06-17 PROCEDURE — 2500000004 HC RX 250 GENERAL PHARMACY W/ HCPCS (ALT 636 FOR OP/ED): Performed by: STUDENT IN AN ORGANIZED HEALTH CARE EDUCATION/TRAINING PROGRAM

## 2025-06-17 PROCEDURE — 85027 COMPLETE CBC AUTOMATED: CPT | Performed by: NURSE PRACTITIONER

## 2025-06-17 PROCEDURE — 2500000001 HC RX 250 WO HCPCS SELF ADMINISTERED DRUGS (ALT 637 FOR MEDICARE OP): Performed by: NURSE PRACTITIONER

## 2025-06-17 PROCEDURE — 83735 ASSAY OF MAGNESIUM: CPT | Performed by: NURSE PRACTITIONER

## 2025-06-17 PROCEDURE — 94760 N-INVAS EAR/PLS OXIMETRY 1: CPT

## 2025-06-17 RX ORDER — ONDANSETRON HYDROCHLORIDE 2 MG/ML
4 INJECTION, SOLUTION INTRAVENOUS EVERY 8 HOURS PRN
Status: DISCONTINUED | OUTPATIENT
Start: 2025-06-17 | End: 2025-06-22 | Stop reason: HOSPADM

## 2025-06-17 RX ORDER — POTASSIUM CHLORIDE 1.5 G/1.58G
40 POWDER, FOR SOLUTION ORAL ONCE
Status: COMPLETED | OUTPATIENT
Start: 2025-06-17 | End: 2025-06-17

## 2025-06-17 RX ADMIN — CEFEPIME HYDROCHLORIDE 1 G: 1 INJECTION, SOLUTION INTRAVENOUS at 13:17

## 2025-06-17 RX ADMIN — PANTOPRAZOLE SODIUM 40 MG: 40 INJECTION, POWDER, FOR SOLUTION INTRAVENOUS at 20:27

## 2025-06-17 RX ADMIN — IPRATROPIUM BROMIDE AND ALBUTEROL SULFATE 3 ML: 2.5; .5 SOLUTION RESPIRATORY (INHALATION) at 19:30

## 2025-06-17 RX ADMIN — IPRATROPIUM BROMIDE AND ALBUTEROL SULFATE 3 ML: 2.5; .5 SOLUTION RESPIRATORY (INHALATION) at 14:12

## 2025-06-17 RX ADMIN — VANCOMYCIN HYDROCHLORIDE 1250 MG: 1.25 INJECTION, POWDER, LYOPHILIZED, FOR SOLUTION INTRAVENOUS at 20:27

## 2025-06-17 RX ADMIN — ALPRAZOLAM 0.5 MG: 0.5 TABLET ORAL at 14:19

## 2025-06-17 RX ADMIN — NYSTATIN 1 APPLICATION: 100000 POWDER TOPICAL at 08:50

## 2025-06-17 RX ADMIN — SUCRALFATE 1 G: 1 SUSPENSION ORAL at 18:15

## 2025-06-17 RX ADMIN — BUSPIRONE HYDROCHLORIDE 15 MG: 15 TABLET ORAL at 20:27

## 2025-06-17 RX ADMIN — POTASSIUM CHLORIDE, DEXTROSE MONOHYDRATE AND SODIUM CHLORIDE 75 ML/HR: 150; 5; 900 INJECTION, SOLUTION INTRAVENOUS at 06:40

## 2025-06-17 RX ADMIN — ONDANSETRON 4 MG: 2 INJECTION, SOLUTION INTRAMUSCULAR; INTRAVENOUS at 14:19

## 2025-06-17 RX ADMIN — BUPROPION HYDROCHLORIDE 150 MG: 150 TABLET, EXTENDED RELEASE ORAL at 10:44

## 2025-06-17 RX ADMIN — BUSPIRONE HYDROCHLORIDE 10 MG: 15 TABLET ORAL at 08:49

## 2025-06-17 RX ADMIN — FLUOXETINE HYDROCHLORIDE 80 MG: 20 CAPSULE ORAL at 08:49

## 2025-06-17 RX ADMIN — TRAZODONE HYDROCHLORIDE 50 MG: 50 TABLET ORAL at 20:27

## 2025-06-17 RX ADMIN — CARBIDOPA AND LEVODOPA 2 TABLET: 25; 100 TABLET ORAL at 08:49

## 2025-06-17 RX ADMIN — PREGABALIN 100 MG: 50 CAPSULE ORAL at 08:50

## 2025-06-17 RX ADMIN — BUDESONIDE 0.25 MG: 0.25 INHALANT RESPIRATORY (INHALATION) at 08:16

## 2025-06-17 RX ADMIN — POTASSIUM CHLORIDE 40 MEQ: 1.5 POWDER, FOR SOLUTION ORAL at 13:10

## 2025-06-17 RX ADMIN — SUCRALFATE 1 G: 1 SUSPENSION ORAL at 23:09

## 2025-06-17 RX ADMIN — NYSTATIN 1 APPLICATION: 100000 POWDER TOPICAL at 20:26

## 2025-06-17 RX ADMIN — PANTOPRAZOLE SODIUM 40 MG: 40 INJECTION, POWDER, FOR SOLUTION INTRAVENOUS at 08:50

## 2025-06-17 RX ADMIN — ENOXAPARIN SODIUM 40 MG: 100 INJECTION SUBCUTANEOUS at 08:48

## 2025-06-17 RX ADMIN — IPRATROPIUM BROMIDE AND ALBUTEROL SULFATE 3 ML: 2.5; .5 SOLUTION RESPIRATORY (INHALATION) at 08:16

## 2025-06-17 RX ADMIN — SUCRALFATE 1 G: 1 SUSPENSION ORAL at 13:10

## 2025-06-17 RX ADMIN — BUDESONIDE 0.25 MG: 0.25 INHALANT RESPIRATORY (INHALATION) at 19:30

## 2025-06-17 RX ADMIN — CARBIDOPA AND LEVODOPA 2 TABLET: 25; 100 TABLET ORAL at 14:19

## 2025-06-17 RX ADMIN — ZOLPIDEM TARTRATE 5 MG: 5 TABLET ORAL at 23:09

## 2025-06-17 RX ADMIN — DONEPEZIL HYDROCHLORIDE 10 MG: 5 TABLET ORAL at 20:27

## 2025-06-17 RX ADMIN — SUCRALFATE 1 G: 1 SUSPENSION ORAL at 05:31

## 2025-06-17 RX ADMIN — CEFEPIME HYDROCHLORIDE 1 G: 1 INJECTION, SOLUTION INTRAVENOUS at 21:42

## 2025-06-17 RX ADMIN — ALPRAZOLAM 0.5 MG: 0.5 TABLET ORAL at 20:26

## 2025-06-17 RX ADMIN — CEFEPIME HYDROCHLORIDE 1 G: 1 INJECTION, SOLUTION INTRAVENOUS at 05:31

## 2025-06-17 RX ADMIN — BUSPIRONE HYDROCHLORIDE 15 MG: 15 TABLET ORAL at 08:49

## 2025-06-17 RX ADMIN — LUBIPROSTONE 8 MCG: 8 CAPSULE, GELATIN COATED ORAL at 08:49

## 2025-06-17 RX ADMIN — PREGABALIN 100 MG: 50 CAPSULE ORAL at 20:27

## 2025-06-17 RX ADMIN — ALPRAZOLAM 0.5 MG: 0.5 TABLET ORAL at 08:50

## 2025-06-17 RX ADMIN — LUBIPROSTONE 8 MCG: 8 CAPSULE, GELATIN COATED ORAL at 20:27

## 2025-06-17 RX ADMIN — VANCOMYCIN HYDROCHLORIDE 1250 MG: 1.25 INJECTION, POWDER, LYOPHILIZED, FOR SOLUTION INTRAVENOUS at 10:43

## 2025-06-17 RX ADMIN — CARBIDOPA AND LEVODOPA 2 TABLET: 25; 100 TABLET ORAL at 20:27

## 2025-06-17 ASSESSMENT — ACTIVITIES OF DAILY LIVING (ADL)
BATHING_ASSISTANCE: MODERATE
LACK_OF_TRANSPORTATION: NO
ADL_ASSISTANCE: NEEDS ASSISTANCE

## 2025-06-17 ASSESSMENT — COGNITIVE AND FUNCTIONAL STATUS - GENERAL
HELP NEEDED FOR BATHING: A LOT
WALKING IN HOSPITAL ROOM: A LOT
WALKING IN HOSPITAL ROOM: A LOT
TOILETING: TOTAL
MOBILITY SCORE: 14
PERSONAL GROOMING: A LITTLE
HELP NEEDED FOR BATHING: A LOT
DAILY ACTIVITIY SCORE: 15
MOVING TO AND FROM BED TO CHAIR: A LOT
DAILY ACTIVITIY SCORE: 15
WALKING IN HOSPITAL ROOM: A LOT
MOBILITY SCORE: 14
CLIMB 3 TO 5 STEPS WITH RAILING: A LOT
DRESSING REGULAR UPPER BODY CLOTHING: A LITTLE
DAILY ACTIVITIY SCORE: 14
TURNING FROM BACK TO SIDE WHILE IN FLAT BAD: A LITTLE
STANDING UP FROM CHAIR USING ARMS: A LOT
DRESSING REGULAR UPPER BODY CLOTHING: A LOT
TOILETING: A LOT
MOVING FROM LYING ON BACK TO SITTING ON SIDE OF FLAT BED WITH BEDRAILS: A LITTLE
HELP NEEDED FOR BATHING: A LOT
STANDING UP FROM CHAIR USING ARMS: A LOT
MOVING TO AND FROM BED TO CHAIR: A LOT
TURNING FROM BACK TO SIDE WHILE IN FLAT BAD: A LITTLE
MOVING TO AND FROM BED TO CHAIR: A LOT
TURNING FROM BACK TO SIDE WHILE IN FLAT BAD: A LITTLE
CLIMB 3 TO 5 STEPS WITH RAILING: A LOT
PERSONAL GROOMING: A LITTLE
MOBILITY SCORE: 14
DRESSING REGULAR LOWER BODY CLOTHING: TOTAL
STANDING UP FROM CHAIR USING ARMS: A LOT
CLIMB 3 TO 5 STEPS WITH RAILING: A LOT
PERSONAL GROOMING: A LITTLE
MOVING FROM LYING ON BACK TO SITTING ON SIDE OF FLAT BED WITH BEDRAILS: A LITTLE
DRESSING REGULAR LOWER BODY CLOTHING: A LOT
MOVING FROM LYING ON BACK TO SITTING ON SIDE OF FLAT BED WITH BEDRAILS: A LITTLE
DRESSING REGULAR UPPER BODY CLOTHING: A LOT
TOILETING: A LOT
DRESSING REGULAR LOWER BODY CLOTHING: A LOT

## 2025-06-17 ASSESSMENT — PAIN - FUNCTIONAL ASSESSMENT
PAIN_FUNCTIONAL_ASSESSMENT: 0-10

## 2025-06-17 ASSESSMENT — PAIN SCALES - GENERAL
PAINLEVEL_OUTOF10: 8
PAINLEVEL_OUTOF10: 0 - NO PAIN
PAINLEVEL_OUTOF10: 0 - NO PAIN
PAINLEVEL_OUTOF10: 8

## 2025-06-17 NOTE — NURSING NOTE
0730 Assumed care of pt.     0850 Medications given and assessment completed on pt. Minor complaints of chronic pain to abdomen due to IBS-C. Vitals stable.

## 2025-06-17 NOTE — PROGRESS NOTES
Physical Therapy    Physical Therapy Evaluation    Patient Name: Fidelia Norman  MRN: [id number]  Department: [address]  Room: [address] [place] [address]  Today's Date: 6/17/2025   Time Calculation  Start Time: 0913  Stop Time: 0930  Time Calculation (min): 17 min    Assessment/Plan   PT Assessment  PT Assessment Results: Decreased mobility, Obesity, Pain (deconditionig)  Rehab Prognosis: Good  Barriers to Discharge Home: Caregiver assistance, Physical needs  Caregiver Assistance: Caregiver assistance needed per identified barriers - however, level of patient's required assistance exceeds assistance available at home  Physical Needs: 24hr mobility assistance needed, High falls risk due to function or environment  Evaluation/Treatment Tolerance: Patient limited by fatigue, Patient limited by pain  Medical Staff Made Aware: Yes  Strengths: Ability to acquire knowledge  Barriers to Participation: Comorbidities  End of Session Communication: Bedside nurse  End of Session Patient Position: Up in chair, Alarm on  IP OR SWING BED PT PLAN  Inpatient or Swing Bed: Inpatient  PT Plan  Treatment/Interventions: Bed mobility, Transfer training, Gait training, Strengthening, Therapeutic exercise  PT Plan: Ongoing PT  PT Frequency: 3 times per week  PT Discharge Recommendations: Moderate intensity level of continued care  Equipment Recommended upon Discharge: Wheeled walker  PT Recommended Transfer Status: Assist x1  PT - OK to Discharge: Yes (Per PT POC)    Subjective     PT Visit Info:  PT Received On: 06/17/25  General Visit Information:  General  Reason for Referral: 79 yo female admitted 2' to Nausea/vomiting, SOB, abdominal pain, bowel issues  Referred By: Tammy MCELROY-CNP  Past Medical History Relevant to Rehab: Parkinsons, asthma, chronic respiratory failure 2L O2 at night, HTN, HLD, hypothyroidism, chronic back pain.  Family/Caregiver Present: No  Co-Treatment: OT  Co-Treatment Reason: to maximize Pt's outcome and safety  Prior to Session  Communication: Bedside nurse  Patient Position Received: Bed, 3 rail up, Alarm on  General Comment: Pt is pleasant and agreeable to work with therapy. She is a little lethargic but gave good effort. Based on Pt's current status, recommend MODERATE follow up services  Home Living:  Home Living  Type of Home: House  Lives With: Spouse  Home Adaptive Equipment: Walker rolling or standard, Cane  Home Layout: One level  Home Access: Stairs to enter with rails  Entrance Stairs-Rails: Both  Entrance Stairs-Number of Steps: 4  Bathroom Shower/Tub: Walk-in shower  Bathroom Equipment: Grab bars in shower, Raised toilet seat without rails  Prior Level of Function:  Prior Function Per Pt/Caregiver Report  Level of [place]: Independent with ADLs and functional transfers, Independent with homemaking with ambulation (with cane)  Receives Help From: Family (spouse)  Ambulatory Assistance: Independent  Precautions:  Precautions  Medical Precautions: Fall precautions (telemetry, masimo)      Date/Time Vitals Session Patient Position Pulse Resp SpO2 BP MAP (mmHg)    06/17/25 0850 --  --  86  14  92 %  122/69  87                 Objective   Pain:  Pain Assessment  Pain Assessment: 0-10  0-10 (Numeric) Pain Score: 8  Pain Type: Acute pain  Pain Location: Abdomen  Cognition:  Cognition  Overall Cognitive Status: Within Functional Limits  Orientation Level: Disoriented to time (Pt knew year but not month/date)    General Assessments:  General Observation  General Observation: Pt is moving cautiously when she is up on her feet, taking small almost festinating steps               Activity Tolerance  Endurance: Tolerates less than 10 min exercise, no significant change in vital signs    Sensation  Light Touch: No apparent deficits    Strength  Strength Comments: B LE are 4 of 5  Static Sitting Balance  Static Sitting-Balance Support: Bilateral upper extremity supported, Feet supported  Static Sitting-Level of Assistance: Close  supervision    Static Standing Balance  Static Standing-Balance Support: Bilateral upper extremity supported (FWW)  Static Standing-Level of Assistance: Minimum assistance (x 2)  Dynamic Standing Balance  Dynamic Standing-Balance Support: Bilateral upper extremity supported (FWW)  Dynamic Standing-Level of Assistance: Minimum assistance (x 2)  Functional Assessments:  Bed Mobility  Bed Mobility: Yes  Bed Mobility 1  Bed Mobility 1: Supine to sitting  Level of Assistance 1: Close supervision    Transfers  Transfer: Yes  Transfer 1  Transfer From 1: Bed to  Transfer to 1: Stand  Technique 1: Sit to stand, Stand to sit  Transfer Device 1:  (FWW)  Transfer Level of Assistance 1: Minimum assistance (x 2)    Ambulation/Gait Training  Ambulation/Gait Training Performed: Yes  Ambulation/Gait Training 1  Surface 1: Level tile  Device 1: Rolling walker  Assistance 1: Minimum assistance (x 2)  Quality of Gait 1: Decreased step length (Decreased chase)  Comments/Distance (ft) 1: 5-6 steps bed to chair    Stairs  Stairs: No  Extremity/Trunk Assessments:  RLE   RLE : Within Functional Limits     Outcome Measures:  [facility] Basic Mobility  Turning from your back to your side while in a flat bed without using bedrails: A little  Moving from lying on your back to sitting on the side of a flat bed without using bedrails: A little  Moving to and from bed to chair (including a wheelchair): A lot  Standing up from a chair using your arms (e.g. wheelchair or bedside chair): A lot  To walk in hospital room: A lot  Climbing 3-5 steps with railing: A lot  Basic Mobility - Total Score: 14    Encounter Problems       Encounter Problems (Active)       Mobility       STG - Patient will ambulate 50-80 feet MOD I with FWW (Progressing)       Start:  06/17/25    Expected End:  07/01/25            STG - Patient will ascend and descend four to six stairs MOD I with B rails (Progressing)       Start:  06/17/25    Expected End:  07/01/25                PT Transfers       STG - Patient to transfer to and from sit to supine independently (Progressing)       Start:  06/17/25    Expected End:  07/01/25            STG - Patient will transfer sit to and from stand MOD I with FWW (Progressing)       Start:  06/17/25    Expected End:  07/01/25               Pain - Adult              Education Documentation  No documentation found.  Education Comments  No comments found.

## 2025-06-17 NOTE — PROGRESS NOTES
Speech-Language Pathology Clinical Swallow Evaluation    Patient Name: Fidelia Norman  MRN: [id number]  : 1945  Today's Date: 25  Start Time: 844  Stop Time: 903  Time Calculation (min): 19 min      ASSESSMENT  Impressions:  Oral phase WFL and no suspected pharyngeal phase dysphagia based on clinical swallow evaluation. Pt did not show s/sx of penetration or aspiration. Pt would benefit from skilled ST for one follow-up to minimize aspiration risk and ensure ongoing safety with the least restrictive diet.    Solid PO trials unable to be assessed due to diet restrictions. Next session assess intake of PO trials and continue to ensure safe consumption of thin liquids.   Prognosis: Good    PLAN    RECOMMENDATIONS:  Is MBSS recommended? Not at this time; if concerns for pharyngeal dysphagia persist, pt may benefit from MBSS in future.  Solid consistency: Soft/bite-sized (IDDSI level 6), when cleared by medical team  Liquid consistency: Thin (IDDSI 0)  Medication administration: Crushed in puree    Compensatory swallow strategies:  - Upright positioning for all PO intake  - Slow rate of intake  - Small/SINGLE sips/bites   - One bite/sip at a time    Recommended frequency/duration:  Skilled SLP services recommended: Yes  Frequency: 1x/week  Duration: x1 follow-up visit to ensure ongoing safety with current diet  Discharge recommendation: Recommend LOW intensity ST upon DC in order to ensure safety with least restrictive diet.  Treatment/Interventions: Assess diet tolerance  Strengths: Cognition, Motivation, Family/caregiver support, and Anticipated fair or good medical prognosis  Barriers to participation in tx: Comorbidities    Goals (start date 2025):  - Pt will consume prescribed diet (current diet is soft-bite size/thin) without overt s/sx aspiration/penetration in 95% of observed trials.    SUBJECTIVE    PMHx relevant to rehab: Parkinsons, COPD/asthma, chronic respiratory failure, wears 2 liters  oxygen at night, Hypertension, Hyperlipidemia, Chronic back pain, Cervical dystonia, Hypothyroid, IBS      Chief complaint: Pt was admitted on 6/15/25 due to   Chief Complaint   Patient presents with    Altered Mental Status     EMS states that they were called for AMS. EMS states that they could not get a clear time frame as to when it started. Pt states that she is having SOB and abd pain. Pt is unable to elaborate any further on it.     Abdominal Pain    Shortness of Breath   . She was found to have Sepsis, due to unspecified organism, unspecified whether acute organ dysfunction present (Multi).    Relevant imaging results:  Chest CT 6/15/25: IMPRESSION:  No acute abnormality of the chest, abdomen and pelvis.      General Visit Information:  SLP Received On: 06/17/25  Patient Class: Inpatient  Living Environment: Home  Ordering Physician: ROMEO Oconnor  Reason for Referral: Rule out aspiration  Prior to Session Communication: Bedside nurse    RN cleared pt to participate in session and reported that she was lethargic a few days ago and is currently doing much better.     Pt reported that she does not have any prior swallowing difficulties.     Date of Onset: 06/15/25  Date of Order: 06/16/25  [employer] Diet: Regular/thin  Current Diet : Adult diet clear liquid    Status at time of evaluation:  Pain Assessment  Pain Assessment: 0-10  0-10 (Numeric) Pain Score: 0 - No pain  Pain Type: Chronic pain  Pain Location: Abdomen  Pain Interventions:  (reported to RN)    Pt was alert, cooperative, and attentive for session.  Orientation: Oriented to self, Oriented to situation, and Did not assess orientation to place or time  Ability to follow functional commands: WFL  Nutritional status: Appears well-nourished/no concerns    Respiratory status: Room air  Baseline Vocal Quality: Weak  Volitional Cough: Strong  Volitional Swallow: Within Functional Limits  Patient positioning: Upright in bed      OBJECTIVE  Clinical  swallow evaluation completed and consisted of interview, oral motor assessment, and limited PO trials (x2-4 oz of water and x5 bites of applesauce).    ORAL PHASE: Natural dentition in adequate condition. Oral mucosa were pink, moist, and free of obvious lesions. Lingual/labial strength and ROM were adequate. Labial seal was adequate. A/P transit and oral clearance were prompt. No solid PO trials were given so mastication was unable to be assessed.    PHARYNGEAL PHASE: Laryngeal elevation was visualized or palpated with all trials, however adequacy of hyolaryngeal elevation/excursion cannot be determined at bedside. No immediate or delayed s/sx aspiration/penetration were observed with any consistencies.    Was 3oz challenge administered: Yes; pt unable to successfully complete due to taking breaks. No overt s/sx aspiration were observed.     Treatment/Education:  Results and recommendations were relayed to: Patient and Bedside nurse  Education provided: Yes   Learner: Patient   Barriers to learning: None   Method of teaching: Verbal   Topic: role of ST, results of assessment, risk for aspiration, and recommended diet modifications   Outcome of teaching: Pt verbalized understanding  Treatment provided: No  Next Treatment Priority: Ensure safe consumption of diet    Session was conducted and note was written by ANDRES Emerson-SLP. Supervising SLP was present for the entire session, made all clinical decisions, and agrees with the note as written.

## 2025-06-17 NOTE — PROGRESS NOTES
06/17/25 1606   Discharge Planning   Living Arrangements Spouse/significant other   Support Systems Children;Spouse/significant other   Assistance Needed A&OX4 and dependent for assist for ADLs and ambulates with cane; doesn't drive; 2L NC @ HS (need new O2 supplier-was [employer] but they are now out of network with insurance; PCP Dr Abdirahman [place]   Type of Residence Private residence   Number of Stairs to Enter Residence 4   Number of Stairs Within Residence 0   Do you have animals or pets at home? No   Who is requesting discharge planning? Provider   Type of Post Acute Facility Services Skilled nursing   Expected Discharge Disposition SNF  (Therapy recommended snf and patient agreeable and preference is 1)[facility] 2)[facility]-precert needed for snt. (Pt home O2 supplier recently out of network with insurance))   Does the patient need discharge transport arranged? Yes   RoundTrip coordination needed? Yes   Has discharge transport been arranged? No   Financial Resource Strain   How hard is it for you to pay for the very basics like food, housing, medical care, and heating? Not hard   Housing Stability   In the last 12 months, was there a time when you were not able to pay the mortgage or rent on time? N   In the past 12 months, how many times have you moved where you were living? 0   At any time in the past 12 months, were you homeless or living in a shelter (including now)? N   Transportation Needs   In the past 12 months, has lack of transportation kept you from medical appointments or from getting medications? no   In the past 12 months, has lack of transportation kept you from meetings, work, or from getting things needed for daily living? No

## 2025-06-17 NOTE — CARE PLAN
The patient's goals for the shift include      The clinical goals for the shift include pt will remain henmodynamically stable throughout shift    Over the shift, the patient did not make progress toward the following goals. Barriers to progression include none. Recommendations to address these barriers include none.

## 2025-06-17 NOTE — CARE PLAN
The patient's goals for the shift include      The clinical goals for the shift include Patients mentation will continue to improve, Patient will remain comfortable through shift    Over the shift, the patient did not make progress toward the following goals. Barriers to progression include . Recommendations to address these barriers include   Problem: Pain - Adult  Goal: Verbalizes/displays adequate comfort level or baseline comfort level  Outcome: Progressing  Flowsheets (Taken 6/16/2025 2210)  Verbalizes/displays adequate comfort level or baseline comfort level:   Encourage patient to monitor pain and request assistance   Assess pain using appropriate pain scale     Problem: Safety - Adult  Goal: Free from fall injury  Outcome: Progressing  Flowsheets (Taken 6/16/2025 2210)  Free from fall injury:   Instruct family/caregiver on patient safety   Based on caregiver fall risk screen, instruct family/caregiver to ask for assistance with transferring infant if caregiver noted to have fall risk factors     Problem: Chronic Conditions and Co-morbidities  Goal: Patient's chronic conditions and co-morbidity symptoms are monitored and maintained or improved  Outcome: Progressing  Flowsheets (Taken 6/16/2025 2210)  Care Plan - Patient's Chronic Conditions and Co-Morbidity Symptoms are Monitored and Maintained or Improved:   Monitor and assess patient's chronic conditions and comorbid symptoms for stability, deterioration, or improvement   Collaborate with multidisciplinary team to address chronic and comorbid conditions and prevent exacerbation or deterioration     Problem: Skin  Goal: Prevent/minimize sheer/friction injuries  Outcome: Progressing  Flowsheets (Taken 6/16/2025 2210)  Prevent/minimize sheer/friction injuries:   Turn/reposition every 2 hours/use positioning/transfer devices   HOB 30 degrees or less   .

## 2025-06-17 NOTE — PROGRESS NOTES
Occupational Therapy    Evaluation    Patient Name: Fidelia Norman  MRN: [id number]  Department: [address]  Room: [address] [place] [address]  Today's Date: 6/17/2025  Time Calculation  Start Time: 0912  Stop Time: 0929  Time Calculation (min): 17 min    Assessment  IP OT Assessment  OT Assessment: Pt presents with decreased ADL performance, decreased functional mobility, decreased endurance. Recommend continued skilled OT at a mod intensity to maximize pt safety and independence after discharge.  Prognosis: Good  Barriers to Discharge Home: Caregiver assistance, Physical needs  Caregiver Assistance: Caregiver assistance needed per identified barriers - however, level of patient's required assistance exceeds assistance available at home  Physical Needs: 24hr mobility assistance needed, 24hr ADL assistance needed, High falls risk due to function or environment  Evaluation/Treatment Tolerance: Patient limited by fatigue  Medical Staff Made Aware: Yes  End of Session Communication: Bedside nurse  End of Session Patient Position: Up in chair, Alarm on  Plan:  Treatment Interventions: ADL retraining, Functional transfer training, Endurance training, UE strengthening/ROM  OT Frequency: 3 times per week  OT Discharge Recommendations: Moderate intensity level of continued care  Equipment Recommended upon Discharge: Wheeled walker (owns)  OT Recommended Transfer Status: Assist of 2  OT - OK to Discharge: Yes (per OT POC)    Subjective   Current Problem:  1. Sepsis, due to unspecified organism, unspecified whether acute organ dysfunction present (Multi)          OT Visit Info:  OT Received On: 06/17/25  General Visit Info:  General  Reason for Referral: 81 yo female referred to OT for impaired ADLs/mobility  Referred By: LILIBETH Oconnor-CNP  Past Medical History Relevant to Rehab: Parkinsons, asthma, chronic respiratory failure 2L O2 at night, HTN, HLD, hypothyroidism, chronic back pain.  Co-Treatment: PT  Co-Treatment Reason: maximize pt  safety and outcomes  Prior to Session Communication: Bedside nurse  Patient Position Received: Bed, 3 rail up, Alarm on  General Comment: Pt pleasant, cooperative with OT evaluations  Precautions:  Medical Precautions: Fall precautions (tele)     Date/Time Vitals Session Patient Position Pulse Resp SpO2 BP MAP (mmHg)    06/17/25 0850 --  --  86  14  92 %  122/69  87                Pain:  Pain Assessment  Pain Assessment: 0-10  0-10 (Numeric) Pain Score: 8  Pain Type: Acute pain  Pain Location: Abdomen  Pain Interventions: Repositioned, Ambulation/increased activity  Response to Interventions: No change in pain (RN aware)    Objective   Cognition:  Overall Cognitive Status: Impaired  Arousal/Alertness: Appropriate responses to stimuli  Orientation Level: Disoriented to time, Disoriented to situation           Home Living:  Type of Home: House  Lives With: Spouse  Home Adaptive Equipment: Walker rolling or standard, Cane (rollator)  Home Layout: One level  Home Access: Stairs to enter with rails  Entrance Stairs-Rails: Both  Entrance Stairs-Number of Steps: 4  Bathroom Shower/Tub: Walk-in shower  Bathroom Toilet: Handicapped height  Bathroom Equipment: Grab bars in shower   Prior Function:  Level of [place]: Needs assistance with ADLs, Needs assistance with homemaking  Receives Help From: Family  ADL Assistance: Needs assistance (spouse provides min A)  Homemaking Assistance: Needs assistance (spouse assists)  Ambulatory Assistance: Independent (with cane)     ADL:  Eating Assistance: Independent  Grooming Assistance: Stand by  Bathing Assistance: Moderate  UE Dressing Assistance: Minimal  LE Dressing Assistance: Maximal  Toileting Assistance with Device: Maximal  Functional Assistance: Moderate  ADL Comments: ADL performance anticipated  Activity Tolerance:  Endurance: Tolerates less than 10 min exercise, no significant change in vital signs  Bed Mobility/Transfers: Bed Mobility  Bed Mobility: Yes  Bed Mobility  1  Bed Mobility 1: Supine to sitting  Level of Assistance 1: Minimum assistance  Bed Mobility Comments 1: HOB elevated, increased time    Transfers  Transfer: Yes  Transfer 1  Transfer From 1: Sit to  Transfer to 1: Stand  Technique 1: Sit to stand, Stand to sit  Transfer Device 1: Walker  Transfer Level of Assistance 1: Minimum assistance, +2  Trials/Comments 1: Completed 2 trials      Functional Mobility:  Functional Mobility  Functional Mobility Performed: Yes  Functional Mobility 1  Surface 1: Level tile  Device 1: Rolling walker  Assistance 1: Moderate assistance  Comments 1: Sidestepped from bed to chair with assist for balance, safety, walker management  Sitting Balance:  Static Sitting Balance  Static Sitting-Balance Support: Feet supported  Static Sitting-Level of Assistance: Close supervision  Standing Balance:  Static Standing Balance  Static Standing-Balance Support: Bilateral upper extremity supported  Static Standing-Level of Assistance: Contact guard     Strength:  Strength Comments: BRUNO 3/5     Coordination:  Movements are Fluid and Coordinated: Yes   Hand Function:  Hand Function  Gross Grasp: Functional  Coordination: Functional  Extremities: RUE   RUE : Within Functional Limits and LUE   LUE: Within Functional Limits    Outcome Measures: [facility] Daily Activity  Putting on and taking off regular lower body clothing: Total  Bathing (including washing, rinsing, drying): A lot  Putting on and taking off regular upper body clothing: A little  Toileting, which includes using toilet, bedpan or urinal: Total  Taking care of personal grooming such as brushing teeth: A little  Eating Meals: None  Daily Activity - Total Score: 14      Education Documentation  Body Mechanics, taught by Suraj Llanes OT at 6/17/2025 10:33 AM.  Learner: Patient  Readiness: Acceptance  Method: Explanation  Response: Verbalizes Understanding    Precautions, taught by Suraj Llanes OT at 6/17/2025 10:33 AM.  Learner:  Patient  Readiness: Acceptance  Method: Explanation  Response: Verbalizes Understanding    ADL Training, taught by Suraj Llanes OT at 6/17/2025 10:33 AM.  Learner: Patient  Readiness: Acceptance  Method: Explanation  Response: Verbalizes Understanding    Education Comments  No comments found.      Goals:   Encounter Problems       Encounter Problems (Active)       OT Goals       Pt will complete [id number] transfers using LRD in preparation for ADLs with SBA        Start:  06/17/25    Expected End:  07/01/25            Pt will demo LE ADL completion with min, using AE if needed.        Start:  06/17/25    Expected End:  07/01/25            Pt will increase endurance to tolerate 15min of OOB activity with no more than 1 rest break in order to increase ability to engage in ADL completion.        Start:  06/17/25    Expected End:  07/01/25            Pt will demo Grooming/UE ADL completion with setup, using adaptive strategies and energy conservation techniques as applicable.        Start:  06/17/25    Expected End:  07/01/25            Pt will complete mary hygiene and clothing management during toileting ADL with min A, using DME and adaptive strategies as neccesary        Start:  06/17/25    Expected End:  07/01/25

## 2025-06-17 NOTE — PROGRESS NOTES
Fidelia Norman is a 80 y.o. female on day 2 of admission presenting with Sepsis, due to unspecified organism, unspecified whether acute organ dysfunction present (Multi).      Subjective   The patient is nauseous this morning. She is convinced that she has pneumonia.        Objective     Last Recorded Vitals  /69 (BP Location: Right arm)   Pulse 86   Temp 37.1 °C (98.8 °F) (Temporal)   Resp 14   Wt 79 kg (174 lb 3.2 oz)   SpO2 92%   Intake/Output last 3 Shifts:    Intake/Output Summary (Last 24 hours) at 6/17/2025 1440  Last data filed at 6/17/2025 1348  Gross per 24 hour   Intake 3610.41 ml   Output 350 ml   Net 3260.41 ml       Admission Weight  Weight: 77.1 kg (170 lb) (06/15/25 0516)    Daily Weight  06/15/25 : 79 kg (174 lb 3.2 oz)    Image Results  ECG 12 lead  Accelerated Junctional rhythm  Nonspecific ST and T wave abnormality  Abnormal ECG  When compared with ECG of 17-JUN-2024 19:10,  Junctional rhythm has replaced Sinus rhythm  ST more depressed in Inferior leads  Nonspecific T wave abnormality, improved in Lateral leads  QT has shortened      Physical Exam  Constitutional:       Appearance: Normal appearance.   HENT:      Mouth/Throat:      Mouth: Mucous membranes are dry.   Eyes:      Extraocular Movements: Extraocular movements intact.   Cardiovascular:      Rate and Rhythm: Normal rate.   Pulmonary:      Breath sounds: Wheezing present.   Abdominal:      Palpations: Abdomen is soft.   Musculoskeletal:      Right lower leg: Edema present.      Left lower leg: Edema present.   Neurological:      Mental Status: She is oriented to person, place, and time.   Psychiatric:         Mood and Affect: Mood is anxious.         Relevant Results      Results for orders placed or performed during the hospital encounter of 06/15/25 (from the past 24 hours)   CBC   Result Value Ref Range    WBC 8.7 4.4 - 11.3 x10*3/uL    nRBC 0.0 0.0 - 0.0 /100 WBCs    RBC 3.95 (L) 4.00 - 5.20 x10*6/uL    Hemoglobin 12.2  12.0 - 16.0 g/dL    Hematocrit 39.0 36.0 - 46.0 %    MCV 99 80 - 100 fL    MCH 30.9 26.0 - 34.0 pg    MCHC 31.3 (L) 32.0 - 36.0 g/dL    RDW 13.4 11.5 - 14.5 %    Platelets 149 (L) 150 - 450 x10*3/uL   Basic Metabolic Panel   Result Value Ref Range    Glucose 108 (H) 74 - 99 mg/dL    Sodium 144 136 - 145 mmol/L    Potassium 3.3 (L) 3.5 - 5.3 mmol/L    Chloride 109 (H) 98 - 107 mmol/L    Bicarbonate 27 21 - 32 mmol/L    Anion Gap 11 10 - 20 mmol/L    Urea Nitrogen 9 6 - 23 mg/dL    Creatinine 0.84 0.50 - 1.05 mg/dL    eGFR 71 >60 mL/min/1.73m*2    Calcium 8.4 (L) 8.6 - 10.3 mg/dL                             Assessment & Plan    Fidelia Norman is a 80 y.o. female with a medical history of HTN, COPD/Asthma, and Parkinson's Disease, who presented to ED with c/o N/V, abdominal pain and shortness of breath. In the ED, found to have marked lactic acidosis with SIRS.      SIRS/Lactic  Acidosis  -Unclear etiology but likely related to GI losses and/or aspiration  -Lactate normalized after IV fluids given  -Treating with antibiotics (Cefepime, Vancomycin) for possible aspiration pneumonia  -Will discontinue the antibiotics if the repeat chest x-ray is negative  -SLP advanced diet to solids   -Continue with bronchodilators, bronchial hygiene  -Follow cultures  -T. Max 100.9  -ID consult  -PT/ OT rec moderate intensity therapy     Chronic Hypoxic Respiratory Failure  COPD   Asthma  -Maintain bronchial hygiene    Hypomagnesemia   Hypokalemia  -Due to GI losses  -Replaced will repeat levels     Hypertension  -Medical therapy: Losartan, Lasix  -Hold Lasix due to acute dehydration on admission     Parkinson's Disease  -Carbidopa-Levadopa     GERD  -On PPI, Sucralfate     Chronic Back Pain/Cervical Dystonia  -On Lyrica, Tizanidine  -Supportive care     DVT Prophylaxis  -On Lovenox     Dispo: Will discharge to SNF, when the patient is medically ready for discharge.          Doris Blake, APRN-CNP

## 2025-06-18 LAB
ANION GAP SERPL CALC-SCNC: 10 MMOL/L (ref 10–20)
BUN SERPL-MCNC: 9 MG/DL (ref 6–23)
CALCIUM SERPL-MCNC: 8.7 MG/DL (ref 8.6–10.3)
CHLORIDE SERPL-SCNC: 107 MMOL/L (ref 98–107)
CO2 SERPL-SCNC: 29 MMOL/L (ref 21–32)
CREAT SERPL-MCNC: 0.82 MG/DL (ref 0.5–1.05)
EGFRCR SERPLBLD CKD-EPI 2021: 73 ML/MIN/1.73M*2
ERYTHROCYTE [DISTWIDTH] IN BLOOD BY AUTOMATED COUNT: 13.2 % (ref 11.5–14.5)
GLUCOSE SERPL-MCNC: 86 MG/DL (ref 74–99)
HCT VFR BLD AUTO: 36.4 % (ref 36–46)
HGB BLD-MCNC: 11.3 G/DL (ref 12–16)
MCH RBC QN AUTO: 30.3 PG (ref 26–34)
MCHC RBC AUTO-ENTMCNC: 31 G/DL (ref 32–36)
MCV RBC AUTO: 98 FL (ref 80–100)
NRBC BLD-RTO: 0 /100 WBCS (ref 0–0)
PLATELET # BLD AUTO: 168 X10*3/UL (ref 150–450)
POTASSIUM SERPL-SCNC: 4.2 MMOL/L (ref 3.5–5.3)
RBC # BLD AUTO: 3.73 X10*6/UL (ref 4–5.2)
SODIUM SERPL-SCNC: 142 MMOL/L (ref 136–145)
VANCOMYCIN SERPL-MCNC: 29.3 UG/ML (ref 5–20)
WBC # BLD AUTO: 9.1 X10*3/UL (ref 4.4–11.3)

## 2025-06-18 PROCEDURE — 2500000005 HC RX 250 GENERAL PHARMACY W/O HCPCS: Performed by: INTERNAL MEDICINE

## 2025-06-18 PROCEDURE — 2500000002 HC RX 250 W HCPCS SELF ADMINISTERED DRUGS (ALT 637 FOR MEDICARE OP, ALT 636 FOR OP/ED): Performed by: NURSE PRACTITIONER

## 2025-06-18 PROCEDURE — 99233 SBSQ HOSP IP/OBS HIGH 50: CPT | Performed by: NURSE PRACTITIONER

## 2025-06-18 PROCEDURE — 2500000002 HC RX 250 W HCPCS SELF ADMINISTERED DRUGS (ALT 637 FOR MEDICARE OP, ALT 636 FOR OP/ED): Performed by: INTERNAL MEDICINE

## 2025-06-18 PROCEDURE — 94760 N-INVAS EAR/PLS OXIMETRY 1: CPT

## 2025-06-18 PROCEDURE — 2500000001 HC RX 250 WO HCPCS SELF ADMINISTERED DRUGS (ALT 637 FOR MEDICARE OP): Performed by: INTERNAL MEDICINE

## 2025-06-18 PROCEDURE — 85027 COMPLETE CBC AUTOMATED: CPT | Performed by: FAMILY MEDICINE

## 2025-06-18 PROCEDURE — 1200000002 HC GENERAL ROOM WITH TELEMETRY DAILY

## 2025-06-18 PROCEDURE — 94640 AIRWAY INHALATION TREATMENT: CPT

## 2025-06-18 PROCEDURE — 2500000004 HC RX 250 GENERAL PHARMACY W/ HCPCS (ALT 636 FOR OP/ED): Performed by: INTERNAL MEDICINE

## 2025-06-18 PROCEDURE — 82374 ASSAY BLOOD CARBON DIOXIDE: CPT | Performed by: FAMILY MEDICINE

## 2025-06-18 PROCEDURE — 36415 COLL VENOUS BLD VENIPUNCTURE: CPT | Performed by: FAMILY MEDICINE

## 2025-06-18 PROCEDURE — 80202 ASSAY OF VANCOMYCIN: CPT | Performed by: INTERNAL MEDICINE

## 2025-06-18 PROCEDURE — 2500000001 HC RX 250 WO HCPCS SELF ADMINISTERED DRUGS (ALT 637 FOR MEDICARE OP): Performed by: NURSE PRACTITIONER

## 2025-06-18 PROCEDURE — 2500000004 HC RX 250 GENERAL PHARMACY W/ HCPCS (ALT 636 FOR OP/ED): Performed by: STUDENT IN AN ORGANIZED HEALTH CARE EDUCATION/TRAINING PROGRAM

## 2025-06-18 RX ORDER — BISACODYL 5 MG
10 TABLET, DELAYED RELEASE (ENTERIC COATED) ORAL DAILY PRN
Status: DISCONTINUED | OUTPATIENT
Start: 2025-06-18 | End: 2025-06-22 | Stop reason: HOSPADM

## 2025-06-18 RX ORDER — BISACODYL 10 MG/1
10 SUPPOSITORY RECTAL DAILY PRN
Status: DISCONTINUED | OUTPATIENT
Start: 2025-06-18 | End: 2025-06-22 | Stop reason: HOSPADM

## 2025-06-18 RX ORDER — AMOXICILLIN 250 MG
2 CAPSULE ORAL 2 TIMES DAILY
Status: DISCONTINUED | OUTPATIENT
Start: 2025-06-18 | End: 2025-06-22 | Stop reason: HOSPADM

## 2025-06-18 RX ORDER — ZOLPIDEM TARTRATE 5 MG/1
10 TABLET ORAL NIGHTLY
Status: DISCONTINUED | OUTPATIENT
Start: 2025-06-18 | End: 2025-06-22 | Stop reason: HOSPADM

## 2025-06-18 RX ADMIN — SUCRALFATE 1 G: 1 SUSPENSION ORAL at 23:18

## 2025-06-18 RX ADMIN — SENNOSIDES AND DOCUSATE SODIUM 2 TABLET: 50; 8.6 TABLET ORAL at 14:23

## 2025-06-18 RX ADMIN — PANTOPRAZOLE SODIUM 40 MG: 40 INJECTION, POWDER, FOR SOLUTION INTRAVENOUS at 08:15

## 2025-06-18 RX ADMIN — ENOXAPARIN SODIUM 40 MG: 100 INJECTION SUBCUTANEOUS at 08:15

## 2025-06-18 RX ADMIN — BUDESONIDE 0.25 MG: 0.25 INHALANT RESPIRATORY (INHALATION) at 08:52

## 2025-06-18 RX ADMIN — IPRATROPIUM BROMIDE AND ALBUTEROL SULFATE 3 ML: 2.5; .5 SOLUTION RESPIRATORY (INHALATION) at 19:33

## 2025-06-18 RX ADMIN — SENNOSIDES AND DOCUSATE SODIUM 2 TABLET: 50; 8.6 TABLET ORAL at 21:17

## 2025-06-18 RX ADMIN — TRAZODONE HYDROCHLORIDE 50 MG: 50 TABLET ORAL at 21:18

## 2025-06-18 RX ADMIN — PREGABALIN 100 MG: 50 CAPSULE ORAL at 21:18

## 2025-06-18 RX ADMIN — FLUOXETINE HYDROCHLORIDE 80 MG: 20 CAPSULE ORAL at 08:15

## 2025-06-18 RX ADMIN — NYSTATIN 1 APPLICATION: 100000 POWDER TOPICAL at 21:17

## 2025-06-18 RX ADMIN — BUDESONIDE 0.25 MG: 0.25 INHALANT RESPIRATORY (INHALATION) at 19:32

## 2025-06-18 RX ADMIN — CARBIDOPA AND LEVODOPA 2 TABLET: 25; 100 TABLET ORAL at 08:15

## 2025-06-18 RX ADMIN — Medication 2 L/MIN: at 08:52

## 2025-06-18 RX ADMIN — POTASSIUM CHLORIDE, DEXTROSE MONOHYDRATE AND SODIUM CHLORIDE 75 ML/HR: 150; 5; 900 INJECTION, SOLUTION INTRAVENOUS at 16:25

## 2025-06-18 RX ADMIN — Medication 2 L/MIN: at 12:39

## 2025-06-18 RX ADMIN — CARBIDOPA AND LEVODOPA 2 TABLET: 25; 100 TABLET ORAL at 14:23

## 2025-06-18 RX ADMIN — BUSPIRONE HYDROCHLORIDE 15 MG: 15 TABLET ORAL at 08:15

## 2025-06-18 RX ADMIN — SUCRALFATE 1 G: 1 SUSPENSION ORAL at 11:12

## 2025-06-18 RX ADMIN — IPRATROPIUM BROMIDE AND ALBUTEROL SULFATE 3 ML: 2.5; .5 SOLUTION RESPIRATORY (INHALATION) at 12:39

## 2025-06-18 RX ADMIN — CEFEPIME HYDROCHLORIDE 1 G: 1 INJECTION, SOLUTION INTRAVENOUS at 05:33

## 2025-06-18 RX ADMIN — POTASSIUM CHLORIDE, DEXTROSE MONOHYDRATE AND SODIUM CHLORIDE 75 ML/HR: 150; 5; 900 INJECTION, SOLUTION INTRAVENOUS at 02:04

## 2025-06-18 RX ADMIN — ALPRAZOLAM 0.5 MG: 0.5 TABLET ORAL at 08:15

## 2025-06-18 RX ADMIN — LUBIPROSTONE 8 MCG: 8 CAPSULE, GELATIN COATED ORAL at 21:18

## 2025-06-18 RX ADMIN — ALPRAZOLAM 0.5 MG: 0.5 TABLET ORAL at 14:23

## 2025-06-18 RX ADMIN — BUSPIRONE HYDROCHLORIDE 15 MG: 15 TABLET ORAL at 21:18

## 2025-06-18 RX ADMIN — DONEPEZIL HYDROCHLORIDE 10 MG: 5 TABLET ORAL at 21:18

## 2025-06-18 RX ADMIN — LUBIPROSTONE 8 MCG: 8 CAPSULE, GELATIN COATED ORAL at 08:21

## 2025-06-18 RX ADMIN — BUSPIRONE HYDROCHLORIDE 10 MG: 15 TABLET ORAL at 08:15

## 2025-06-18 RX ADMIN — PANTOPRAZOLE SODIUM 40 MG: 40 INJECTION, POWDER, FOR SOLUTION INTRAVENOUS at 21:17

## 2025-06-18 RX ADMIN — CARBIDOPA AND LEVODOPA 2 TABLET: 25; 100 TABLET ORAL at 21:18

## 2025-06-18 RX ADMIN — IPRATROPIUM BROMIDE AND ALBUTEROL SULFATE 3 ML: 2.5; .5 SOLUTION RESPIRATORY (INHALATION) at 08:52

## 2025-06-18 RX ADMIN — ALPRAZOLAM 0.5 MG: 0.5 TABLET ORAL at 21:18

## 2025-06-18 RX ADMIN — PREGABALIN 100 MG: 50 CAPSULE ORAL at 08:15

## 2025-06-18 RX ADMIN — SUCRALFATE 1 G: 1 SUSPENSION ORAL at 18:40

## 2025-06-18 RX ADMIN — SUCRALFATE 1 G: 1 SUSPENSION ORAL at 05:33

## 2025-06-18 RX ADMIN — BUPROPION HYDROCHLORIDE 150 MG: 150 TABLET, EXTENDED RELEASE ORAL at 11:12

## 2025-06-18 RX ADMIN — NYSTATIN 1 APPLICATION: 100000 POWDER TOPICAL at 08:15

## 2025-06-18 RX ADMIN — ZOLPIDEM TARTRATE 10 MG: 5 TABLET ORAL at 21:18

## 2025-06-18 ASSESSMENT — PAIN - FUNCTIONAL ASSESSMENT
PAIN_FUNCTIONAL_ASSESSMENT: 0-10
PAIN_FUNCTIONAL_ASSESSMENT: 0-10

## 2025-06-18 ASSESSMENT — COGNITIVE AND FUNCTIONAL STATUS - GENERAL
DRESSING REGULAR LOWER BODY CLOTHING: A LITTLE
MOVING TO AND FROM BED TO CHAIR: A LITTLE
MOBILITY SCORE: 18
DAILY ACTIVITIY SCORE: 20
DRESSING REGULAR UPPER BODY CLOTHING: A LITTLE
WALKING IN HOSPITAL ROOM: A LITTLE
HELP NEEDED FOR BATHING: A LITTLE
TURNING FROM BACK TO SIDE WHILE IN FLAT BAD: A LITTLE
CLIMB 3 TO 5 STEPS WITH RAILING: A LITTLE
STANDING UP FROM CHAIR USING ARMS: A LITTLE
TOILETING: A LITTLE
MOVING FROM LYING ON BACK TO SITTING ON SIDE OF FLAT BED WITH BEDRAILS: A LITTLE

## 2025-06-18 ASSESSMENT — PAIN SCALES - GENERAL
PAINLEVEL_OUTOF10: 0 - NO PAIN
PAINLEVEL_OUTOF10: 7
PAINLEVEL_OUTOF10: 0 - NO PAIN

## 2025-06-18 ASSESSMENT — ACTIVITIES OF DAILY LIVING (ADL): LACK_OF_TRANSPORTATION: NO

## 2025-06-18 ASSESSMENT — PAIN DESCRIPTION - DESCRIPTORS: DESCRIPTORS: ACHING;SHARP

## 2025-06-18 NOTE — CONSULTS
Consults  Referred by Vu Redding MD: Bogdan Montana MD    Reason For Consult  SIRS    History Of Present Illness  Fidelia Norman is a 80 y.o. female, hx of COPD, hx of HTN, hx of parkinsonism, she was admitted for abdominal pain, sob, diarrhea, not eating well, altered mentation, the WBC and lactate were up, the chest / abdomen without acute changes, the hx from the patient is limited, no fever, no emesis, no chest pain, no sputum, no dysuria     Past Medical History  She has a past medical history of Cervical dystonia, Chronic respiratory failure with hypoxia, Class 1 obesity with body mass index (BMI) of 30.0 to 30.9 in adult (2024), COPD (chronic obstructive pulmonary disease), Dry eyes, bilateral, GERD (gastroesophageal reflux disease), Hiatal hernia, HTN (hypertension), Hypothyroid, and Parkinson's disease.    Surgical History  She has a past surgical history that includes Other surgical history (2020); Other surgical history (2020); MR angio head wo IV contrast (06/10/2022); MR angio neck wo IV contrast (06/10/2022); Elbow surgery (Right, 06/15/2020); and Wrist surgery (Right).     Social History     Occupational History    Not on file   Tobacco Use    Smoking status: Former     Current packs/day: 0.00     Types: Cigarettes     Quit date: 2013     Years since quittin.8    Smokeless tobacco: Never   Vaping Use    Vaping status: Never Used   Substance and Sexual Activity    Alcohol use: Never    Drug use: Never    Sexual activity: Not on file     Travel History   Travel since 27    No documented travel since 27            Family History  Family History[1], no immunodeficiency  Allergies  Amantadine, Amitriptyline, Baclofen, Bactrim [sulfamethoxazole-trimethoprim], Gadolinium-containing contrast media, Iodinated contrast media, Penicillins, Topamax [topiramate], Valdecoxib, Zoloft [sertraline], and Rivastigmine     Immunization History   Administered  Date(s) Administered    COVID-19, mRNA, LNP-S, PF, 30 mcg/0.3 mL dose 03/25/2021, 04/17/2021, 11/01/2021    Pfizer COVID-19 vaccine, 12 years and older, (30mcg/0.3mL) (Comirnaty) 11/04/2024    Pfizer COVID-19 vaccine, bivalent, age 12 years and older (30 mcg/0.3 mL) 02/14/2023     Medications  Home medications:  Prescriptions Prior to Admission[2]  Current medications:  Scheduled medications  Scheduled Medications[3]  Continuous medications  Continuous Medications[4]  PRN medications  PRN Medications[5]    Review of Systems   All other systems reviewed and are negative.       Objective  Range of Vitals (last 24 hours)  Heart Rate:  [77-86]   Temp:  [36.1 °C (97 °F)-37.1 °C (98.8 °F)]   Resp:  [14-20]   BP: (104-148)/(60-76)   SpO2:  [85 %-98 %]   Daily Weight  06/15/25 : 79 kg (174 lb 3.2 oz)    Body mass index is 29.9 kg/m².     Physical Exam  Constitutional:       Appearance: Normal appearance.   HENT:      Head: Normocephalic and atraumatic.      Mouth/Throat:      Mouth: Mucous membranes are moist.      Pharynx: Oropharynx is clear.   Eyes:      Pupils: Pupils are equal, round, and reactive to light.   Cardiovascular:      Rate and Rhythm: Normal rate and regular rhythm.      Heart sounds: Normal heart sounds.   Pulmonary:      Effort: Pulmonary effort is normal.      Breath sounds: Normal breath sounds.   Abdominal:      General: Abdomen is flat. Bowel sounds are normal.      Palpations: Abdomen is soft.   Musculoskeletal:      Cervical back: Normal range of motion.   Neurological:      Mental Status: She is alert.          Relevant Results  Outside Hospital Results  reviewed  Labs  Results from last 72 hours   Lab Units 06/17/25  0720 06/16/25  0713 06/15/25  0512   WBC AUTO x10*3/uL 8.7 13.2* 28.6*   HEMOGLOBIN g/dL 12.2 12.2 16.4*   HEMATOCRIT % 39.0 35.8* 50.5*   PLATELETS AUTO x10*3/uL 149* 161 318   NEUTROS PCT AUTO %  --  80.0 88.8   LYMPHS PCT AUTO %  --  9.2 2.1   MONOS PCT AUTO %  --  9.8 7.7   EOS  "PCT AUTO %  --  0.2 0.6     Results from last 72 hours   Lab Units 06/17/25  0720 06/16/25  0713 06/15/25  0512   SODIUM mmol/L 144 144 141   POTASSIUM mmol/L 3.3* 3.2* 3.3*   CHLORIDE mmol/L 109* 107 95*   CO2 mmol/L 27 28 20*   BUN mg/dL 9 14 16   CREATININE mg/dL 0.84 0.80 1.06*   GLUCOSE mg/dL 108* 146* 248*   CALCIUM mg/dL 8.4* 8.2* 10.0   ANION GAP mmol/L 11 12 29*   EGFR mL/min/1.73m*2 71 76 54*     Results from last 72 hours   Lab Units 06/15/25  0512   ALK PHOS U/L 66   BILIRUBIN TOTAL mg/dL 0.8   PROTEIN TOTAL g/dL 8.4*   ALT U/L 18   AST U/L 20   ALBUMIN g/dL 4.8     Estimated Creatinine Clearance: 56.1 mL/min (by C-G formula based on SCr of 0.84 mg/dL).  CRP   Date Value Ref Range Status   12/23/2020 9.84 (A) mg/dL Final     Comment:     REF VALUE  < 1.00     12/20/2020 4.29 (A) mg/dL Final     Comment:     REF VALUE  < 1.00       No results found for: \"HIV1X2\", \"HIVCONF\", \"CUVJRS1EG\"  No results found for: \"HEPCABINIT\", \"HEPCAB\", \"HCVPCRQUANT\"  Microbiology  Reviewed  Imaging  Reviewed      Assessment/Plan   Abdominal pain  Leukocytosis  Elevated lactate  Encephalopathy    Recommendations :  Unlikely that she needs antibiotics  Repeat the lactate  Hydration  Repeat cxr  Follow the inflammatory markers and WBC  May need mesenteric US  May need a repeat CT with contrast if she does not improve    I spent minutes in the professional and overall care of this patient  The cultures and susceptibilities were reviewed and discussed with the micro lab, the antibiotics stewardship guidelines were reviewed, the infection control protocols and recommendations were reviewed with the patient and the staff            Omega Carballo MD       [1] No family history on file.  [2]   Medications Prior to Admission   Medication Sig Dispense Refill Last Dose/Taking    acetaminophen (Tylenol 8 HOUR) 650 mg ER tablet Take 2 tablets (1,300 mg) by mouth once daily. Do not crush, chew, or split.   Past Week    acetaminophen " (Tylenol) 500 mg tablet Take 2 tablets (1,000 mg) by mouth once daily.   Past Week    albuterol 90 mcg/actuation inhaler Inhale 2 puffs every 4 hours if needed for wheezing or shortness of breath.   Past Week    ALPRAZolam (Xanax) 0.5 mg tablet Take 1 tablet (0.5 mg) by mouth 3 times a day. Takes 1.5 mg at HS   Past Week    busPIRone (Buspar) 10 mg tablet Take 1 tablet (10 mg) by mouth once daily.   Past Week    busPIRone (Buspar) 15 mg tablet Take 1 tablet (15 mg) by mouth 2 times a day.   Past Week    carbidopa-levodopa (Sinemet)  mg tablet Take 2 tablets by mouth 3 times a day.   Past Week    diphenhydrAMINE-acetaminophen (Tylenol PM)  mg per tablet Take 2 tablets by mouth as needed at bedtime for sleep.   Past Week    donepezil (Aricept) 10 mg tablet Take 1 tablet (10 mg) by mouth once daily at bedtime.   Past Week    FLUoxetine (PROzac) 20 mg capsule Take 4 capsules (80 mg) by mouth once daily. (Patient taking differently: Take 5 capsules (100 mg) by mouth once daily.)   Past Week    fluticasone-umeclidin-vilanter (TRELEGY-ELLIPTA) 200-62.5-25 mcg blister with device Inhale 1 puff once daily.   Past Week    furosemide (Lasix) 20 mg tablet Take 1 tablet (20 mg) by mouth once daily as needed.   Past Month    lubiprostone (Amitiza) 24 mcg capsule Take 1 capsule (24 mcg) by mouth 2 times a day. (Patient taking differently: Take 8 mcg by mouth 2 times a day. Spouse unsure) 30 capsule 0 Past Week    lubricating eye drops ophthalmic solution Administer 1 drop into both eyes if needed for dry eyes. 30 each 0 Past Week    melatonin 10 mg tablet Take 7 tablets (70 mg) by mouth once daily at bedtime.   Past Week    ondansetron (Zofran) 8 mg tablet Take 1 tablet (8 mg) by mouth every 8 hours if needed.   Past Week    potassium chloride CR 20 mEq ER tablet Take 1 tablet (20 mEq) by mouth once daily.   Past Week    pregabalin (Lyrica) 100 mg capsule Take 1 capsule (100 mg) by mouth 2 times a day.   Past Week     rosuvastatin (Crestor) 20 mg tablet Take 1 tablet (20 mg) by mouth once daily.   Past Week    sucralfate (Carafate) 1 gram tablet Take 1 tablet (1 g) by mouth twice a day.   Past Week    tiZANidine (Zanaflex) 4 mg capsule Take 1 capsule (4 mg) by mouth 2 times a day.   Past Week    traZODone (Desyrel) 50 mg tablet Take 1 tablet (50 mg) by mouth once daily at bedtime. 30 tablet 1 Past Week    zolpidem (Ambien) 5 mg tablet Take 1 tablet (5 mg) by mouth as needed at bedtime for sleep. Do not crush, chew, or split. 30 tablet 0 Past Week    lidocaine-diphenhydrAMINE-Maalox 1:1:1 Magic Mouthwash Take 5mL by mouth every 6 hours as needed (Patient not taking: Reported on 6/15/2025) 360 mL 3 Not Taking    losartan (Cozaar) 50 mg tablet Take 1 tablet (50 mg) by mouth once daily. (Patient not taking: Reported on 6/15/2025) 30 tablet 1 Not Taking   [3] ALPRAZolam, 0.5 mg, oral, TID  budesonide, 0.25 mg, nebulization, BID   And  ipratropium-albuteroL, 3 mL, nebulization, TID  buPROPion XL, 150 mg, oral, q24h  busPIRone, 10 mg, oral, Daily  busPIRone, 15 mg, oral, BID  carbidopa-levodopa, 2 tablet, oral, TID  cefepime, 1 g, intravenous, q8h  donepezil, 10 mg, oral, Nightly  enoxaparin, 40 mg, subcutaneous, Daily  FLUoxetine, 80 mg, oral, Daily  lubiprostone, 8 mcg, oral, BID  nystatin, 1 Application, Topical, BID  pantoprazole, 40 mg, intravenous, BID  pregabalin, 100 mg, oral, BID  sucralfate, 1 g, oral, q6h [place]  traZODone, 50 mg, oral, Nightly  vancomycin, 1,250 mg, intravenous, q12h    [4] potassium chloride-D5-0.9%NaCl, 75 mL/hr, Last Rate: 75 mL/hr (06/17/25 0640)    [5] PRN medications: acetaminophen, acetaminophen, albuterol, ondansetron, oxygen, promethazine, tiZANidine, vancomycin, zolpidem

## 2025-06-18 NOTE — PROGRESS NOTES
Fidelia Norman is a 80 y.o. female on day 3 of admission presenting with Sepsis, due to unspecified organism, unspecified whether acute organ dysfunction present (Multi).      Subjective   Patient complained of abdominal pain and constipation this morning.        Objective     Last Recorded Vitals  BP (!) 185/82 (BP Location: Left arm)   Pulse 89   Temp 36.7 °C (98.1 °F) (Temporal)   Resp 20   Wt 79 kg (174 lb 3.2 oz)   SpO2 95%   Intake/Output last 3 Shifts:    Intake/Output Summary (Last 24 hours) at 6/18/2025 1240  Last data filed at 6/18/2025 0937  Gross per 24 hour   Intake 270 ml   Output 1300 ml   Net -1030 ml       Admission Weight  Weight: 77.1 kg (170 lb) (06/15/25 0516)    Daily Weight  06/15/25 : 79 kg (174 lb 3.2 oz)    Image Results  ECG 12 lead  Accelerated Junctional rhythm  Nonspecific ST and T wave abnormality  Abnormal ECG  When compared with ECG of 17-JUN-2024 19:10,  Junctional rhythm has replaced Sinus rhythm  ST more depressed in Inferior leads  Nonspecific T wave abnormality, improved in Lateral leads  QT has shortened      Physical Exam  Constitutional:       Appearance: Normal appearance.   HENT:      Mouth/Throat:      Mouth: Mucous membranes are dry.   Eyes:      Extraocular Movements: Extraocular movements intact.   Cardiovascular:      Rate and Rhythm: Normal rate.   Pulmonary:      Breath sounds: Wheezing present.   Abdominal:      General: There is distension.      Palpations: Abdomen is soft.   Musculoskeletal:      Right lower leg: Edema present.      Left lower leg: Edema present.   Neurological:      Mental Status: She is oriented to person, place, and time.   Psychiatric:         Mood and Affect: Mood is anxious.         Relevant Results      Results for orders placed or performed during the hospital encounter of 06/15/25 (from the past 24 hours)   Vancomycin   Result Value Ref Range    Vancomycin 29.3 (H) 5.0 - 20.0 ug/mL   CBC   Result Value Ref Range    WBC 9.1 4.4 - 11.3  x10*3/uL    nRBC 0.0 0.0 - 0.0 /100 WBCs    RBC 3.73 (L) 4.00 - 5.20 x10*6/uL    Hemoglobin 11.3 (L) 12.0 - 16.0 g/dL    Hematocrit 36.4 36.0 - 46.0 %    MCV 98 80 - 100 fL    MCH 30.3 26.0 - 34.0 pg    MCHC 31.0 (L) 32.0 - 36.0 g/dL    RDW 13.2 11.5 - 14.5 %    Platelets 168 150 - 450 x10*3/uL   Basic Metabolic Panel   Result Value Ref Range    Glucose 86 74 - 99 mg/dL    Sodium 142 136 - 145 mmol/L    Potassium 4.2 3.5 - 5.3 mmol/L    Chloride 107 98 - 107 mmol/L    Bicarbonate 29 21 - 32 mmol/L    Anion Gap 10 10 - 20 mmol/L    Urea Nitrogen 9 6 - 23 mg/dL    Creatinine 0.82 0.50 - 1.05 mg/dL    eGFR 73 >60 mL/min/1.73m*2    Calcium 8.7 8.6 - 10.3 mg/dL                             Assessment & Plan    Fidelia Norman is a 80 y.o. female with a medical history of HTN, COPD/Asthma, and Parkinson's Disease, who presented to ED with c/o N/V, abdominal pain and shortness of breath. In the ED, found to have marked lactic acidosis with SIRS.      SIRS/Lactic  Acidosis  -Unclear etiology but likely related to GI losses and/or aspiration  -Lactate normalized after IV fluids given  -Treating with antibiotics (Cefepime, Vancomycin) for possible aspiration pneumonia discontinued on 6/18/2025  -SLP advanced diet to solids   -Continue with bronchodilators, bronchial hygiene  - BC negative pending  -T. Max 100.9  -ID following  -Procalcitonin level 0.16  -PT/ OT rec moderate intensity therapy    Abdominal pain  IBS/constipation  -Mesenteric ultrasound in the am  -Bowel regimen added    Chronic Hypoxic Respiratory Failure on 2-3 L  COPD   Asthma  -Maintain bronchial hygiene    Hypomagnesemia-resolved  Hypokalemia-resolved  -Due to GI losses  -Replaced will repeat levels     Hypertension  -Medical therapy: Losartan, Lasix  -Hold Lasix due to acute dehydration on admission     Parkinson's Disease  -Carbidopa-Levadopa     GERD  -On PPI, Sucralfate     Chronic Back Pain/Cervical Dystonia  -On Lyrica, Tizanidine  -Supportive care      DVT Prophylaxis  -On Lovenox     Dispo: Will discharge to SNF, when the patient is medically ready for discharge.           Doris Blake, APRN-CNP

## 2025-06-18 NOTE — PROGRESS NOTES
Vancomycin Dosing by Pharmacy- Cessation of Therapy    Consult to pharmacy for vancomycin dosing has been discontinued by the prescriber, pharmacy will sign off at this time.    Please call pharmacy if there are further questions or re-enter a consult if vancomycin is resumed.     Anand Bah, PharmD

## 2025-06-18 NOTE — PROGRESS NOTES
Speech-Language Pathology                 Therapy Communication Note    Patient Name: Fidelia Norman  MRN: [id number]  Department: [address]  Room: [address] [place] [address]  Today's Date: 6/18/2025     Discipline: Speech Language Pathology    Missed Visit:  swallowing therapy    Missed Visit Reason: pt out of the room    Missed Time: Attempt

## 2025-06-18 NOTE — CARE PLAN
The patient's goals for the shift include      The clinical goals for the shift include keep patient safe      Problem: Pain - Adult  Goal: Verbalizes/displays adequate comfort level or baseline comfort level  Outcome: Progressing     Problem: Safety - Adult  Goal: Free from fall injury  Outcome: Progressing     Problem: Discharge Planning  Goal: Discharge to home or other facility with appropriate resources  Outcome: Progressing     Problem: Chronic Conditions and Co-morbidities  Goal: Patient's chronic conditions and co-morbidity symptoms are monitored and maintained or improved  Outcome: Progressing     Problem: Nutrition  Goal: Nutrient intake appropriate for maintaining nutritional needs  Outcome: Progressing     Problem: Skin  Goal: Decreased wound size/increased tissue granulation at next dressing change  Outcome: Progressing  Goal: Participates in plan/prevention/treatment measures  Outcome: Progressing  Goal: Prevent/manage excess moisture  Outcome: Progressing  Flowsheets (Taken 6/17/2025 2332)  Prevent/manage excess moisture: Cleanse incontinence/protect with barrier cream  Goal: Prevent/minimize sheer/friction injuries  Outcome: Progressing  Goal: Promote/optimize nutrition  Outcome: Progressing  Goal: Promote skin healing  Outcome: Progressing

## 2025-06-18 NOTE — PROGRESS NOTES
Fidelia Norman is a 80 y.o. female on day 3 of admission presenting with Sepsis, due to unspecified organism, unspecified whether acute organ dysfunction present (Multi).    Subjective   Interval History: no fever, no new complaints        Review of Systems    Objective   Range of Vitals (last 24 hours)  Heart Rate:  [73-91]   Temp:  [36.1 °C (97 °F)-37.5 °C (99.5 °F)]   Resp:  [20]   BP: (144-185)/(68-86)   SpO2:  [85 %-99 %]   Daily Weight  06/15/25 : 79 kg (174 lb 3.2 oz)    Body mass index is 29.9 kg/m².    Physical Exam  Constitutional:       Appearance: Normal appearance.   HENT:      Head: Normocephalic and atraumatic.      Mouth/Throat:      Mouth: Mucous membranes are moist.      Pharynx: Oropharynx is clear.   Eyes:      Pupils: Pupils are equal, round, and reactive to light.   Cardiovascular:      Rate and Rhythm: Normal rate and regular rhythm.      Heart sounds: Normal heart sounds.   Pulmonary:      Effort: Pulmonary effort is normal.      Breath sounds: Normal breath sounds.   Abdominal:      General: Abdomen is flat. Bowel sounds are normal.      Palpations: Abdomen is soft.   Musculoskeletal:      Cervical back: Normal range of motion.   Neurological:      Mental Status: She is alert.         Antibiotics  This patient does not have an active medication from one of the medication groupers.    Relevant Results  Labs  Results from last 72 hours   Lab Units 06/18/25 0523 06/17/25 0720 06/16/25  0713   WBC AUTO x10*3/uL 9.1 8.7 13.2*   HEMOGLOBIN g/dL 11.3* 12.2 12.2   HEMATOCRIT % 36.4 39.0 35.8*   PLATELETS AUTO x10*3/uL 168 149* 161   NEUTROS PCT AUTO %  --   --  80.0   LYMPHS PCT AUTO %  --   --  9.2   MONOS PCT AUTO %  --   --  9.8   EOS PCT AUTO %  --   --  0.2     Results from last 72 hours   Lab Units 06/18/25 0523 06/17/25 0720 06/16/25  0713   SODIUM mmol/L 142 144 144   POTASSIUM mmol/L 4.2 3.3* 3.2*   CHLORIDE mmol/L 107 109* 107   CO2 mmol/L 29 27 28   BUN mg/dL 9 9 14   CREATININE mg/dL  0.82 0.84 0.80   GLUCOSE mg/dL 86 108* 146*   CALCIUM mg/dL 8.7 8.4* 8.2*   ANION GAP mmol/L 10 11 12   EGFR mL/min/1.73m*2 73 71 76         Estimated Creatinine Clearance: 57.5 mL/min (by C-G formula based on SCr of 0.82 mg/dL).  CRP   Date Value Ref Range Status   12/23/2020 9.84 (A) mg/dL Final     Comment:     REF VALUE  < 1.00     12/20/2020 4.29 (A) mg/dL Final     Comment:     REF VALUE  < 1.00       Microbiology  Reviewed  Imaging  Reviewed       Assessment/Plan   Abdominal pain  Leukocytosis  Elevated lactate  Encephalopathy, likely metabolic     Recommendations :  Supportive care  Discussed with the medical team     I spent minutes in the professional and overall care of this patient  The cultures and susceptibilities were reviewed and discussed with the micro lab, the antibiotics stewardship guidelines were reviewed, the infection control protocols and recommendations were reviewed with the patient and the staff                 Omega Carballo MD

## 2025-06-18 NOTE — CARE PLAN
The patient's goals for the shift include      The clinical goals for the shift include Manage patient comfort

## 2025-06-18 NOTE — PROGRESS NOTES
06/18/25 0922   Discharge Planning   Living Arrangements Spouse/significant other   Support Systems Children;Spouse/significant other   Assistance Needed A&OX4 and dependent for assist for ADLs and ambulates with cane; doesn't drive; 2L NC @ HS (need new O2 supplier-was [employer] but they are now out of network with insurance; PCP Dr Abdirahman [place]   Type of Residence Private residence   Number of Stairs to Enter Residence 4   Number of Stairs Within Residence 0   Do you have animals or pets at home? No   Who is requesting discharge planning? Provider   Type of Post Acute Facility Services Skilled nursing   Expected Discharge Disposition SNF  (Therapy recommended snf and patient agreeable and preference is [facility] and they can accept upon precert. Precert NOT started at this time (Pt home O2 supplier recently out of network with insurance))   Does the patient need discharge transport arranged? Yes   RoundTrip coordination needed? Yes   Has discharge transport been arranged? No   Financial Resource Strain   How hard is it for you to pay for the very basics like food, housing, medical care, and heating? Not hard   Housing Stability   In the last 12 months, was there a time when you were not able to pay the mortgage or rent on time? N   In the past 12 months, how many times have you moved where you were living? 0   At any time in the past 12 months, were you homeless or living in a shelter (including now)? N   Transportation Needs   In the past 12 months, has lack of transportation kept you from medical appointments or from getting medications? no   In the past 12 months, has lack of transportation kept you from meetings, work, or from getting things needed for daily living? No        06/18/25 1200   Discharge Planning   Expected Discharge Disposition SNF  (DSC is started precert today for [facility] for ADOD Thursday 6/19)      06/18/25 1500   Discharge Planning   Expected Discharge  Disposition SNF  (AUTH received and is good from 6/18/25 to 6/22/25 (5 days) for transition to [facility])

## 2025-06-18 NOTE — PROGRESS NOTES
Vancomycin Dosing by Pharmacy- FOLLOW UP     Fidelia Norman is a 80 y.o. year old female who Pharmacy has been consulted for vancomycin dosing for UTI sepsis. Based on the patient's indication and renal status this patient is being dosed based on a goal AUC of 400-600.      Renal function is currently stable.     Current vancomycin dose: 1250 mg given every 12 hours     Estimated vancomycin AUC on current dose: 808 mg/L.hr      Visit Vitals  /68 (BP Location: Right arm, Patient Position: Lying)   Pulse 73   Temp 36.7 °C (98.1 °F) (Temporal)   Resp 20               Lab Results   Component Value Date     CREATININE 0.82 2025     CREATININE 0.84 2025     CREATININE 0.80 2025     CREATININE 1.06 (H) 06/15/2025         Patient weight is as follows:   Vitals       Vitals:     06/15/25 1040   Weight: 79 kg (174 lb 3.2 oz)            Cultures:  No results found for the encounter in last 14 days.        I/O last 3 completed shifts:  In: 4190.4 (53 mL/kg) [P.O.:1360; I.V.:149.6 (1.9 mL/kg); IV Piggyback:2680.8]  Out: 350 (4.4 mL/kg) [Urine:350 (0.1 mL/kg/hr)]  Weight: 79 kg   I/O during current shift:  I/O this shift:  In: -   Out: 700 [Urine:700]     Temp (24hrs), Av.7 °C (98.1 °F), Min:36.1 °C (97 °F), Max:37.1 °C (98.8 °F)        Assessment/Plan     Above goal AUC. Orders placed for new vancomcyin regimen of 1250mg every 24 hours to begin on @0700.     This dosing regimen is predicted by [employer] to result in the following pharmacokinetic parameters:     Regimen: 1250 mg IV every 24 hours.  Start time: 20:27 on 2025  Exposure target: AUC24 (range) 400-600 mg/L.hr   MOY60-46: 544 mg/L.hr  AUC24,ss: 463 mg/L.hr  Probability of AUC24 > 400: 76 %  Ctrough,ss: 13.7 mg/L  Probability of Ctrough,ss > 20: 14 %     The next level will be obtained on  at 5a. May be obtained sooner if clinically indicated.   Will continue to monitor renal function daily while on vancomycin and order serum  creatinine at least every 48 hours if not already ordered.  Follow for continued vancomycin needs, clinical response, and signs/symptoms of toxicity.         Kin Blake, PharmD

## 2025-06-18 NOTE — PROGRESS NOTES
Vancomycin Dosing by Pharmacy- FOLLOW UP    Fidelia Norman is a 80 y.o. year old female who Pharmacy has been consulted for vancomycin dosing for UTI sepsis. Based on the patient's indication and renal status this patient is being dosed based on a goal AUC of 400-600.     Renal function is currently stable.    Current vancomycin dose: 1250 mg given every 12 hours    Estimated vancomycin AUC on current dose: 808 mg/L.hr     Visit Vitals  /68 (BP Location: Right arm, Patient Position: Lying)   Pulse 73   Temp 36.7 °C (98.1 °F) (Temporal)   Resp 20        Lab Results   Component Value Date    CREATININE 0.82 2025    CREATININE 0.84 2025    CREATININE 0.80 2025    CREATININE 1.06 (H) 06/15/2025        Patient weight is as follows:   Vitals:    06/15/25 1040   Weight: 79 kg (174 lb 3.2 oz)       Cultures:  No results found for the encounter in last 14 days.       I/O last 3 completed shifts:  In: 4190.4 (53 mL/kg) [P.O.:1360; I.V.:149.6 (1.9 mL/kg); IV Piggyback:2680.8]  Out: 350 (4.4 mL/kg) [Urine:350 (0.1 mL/kg/hr)]  Weight: 79 kg   I/O during current shift:  I/O this shift:  In: -   Out: 700 [Urine:700]    Temp (24hrs), Av.7 °C (98.1 °F), Min:36.1 °C (97 °F), Max:37.1 °C (98.8 °F)      Assessment/Plan    Above goal AUC. Orders placed for new vancomcyin regimen of 1250mg every 24 hours to begin at 2200.    This dosing regimen is predicted by [employer] to result in the following pharmacokinetic parameters:    Regimen: 1250 mg IV every 24 hours.  Start time: 20:27 on 2025  Exposure target: AUC24 (range) 400-600 mg/L.hr   IYR60-10: 544 mg/L.hr  AUC24,ss: 463 mg/L.hr  Probability of AUC24 > 400: 76 %  Ctrough,ss: 13.7 mg/L  Probability of Ctrough,ss > 20: 14 %    The next level will be obtained on  at 5a. May be obtained sooner if clinically indicated.   Will continue to monitor renal function daily while on vancomycin and order serum creatinine at least every 48 hours if not already  ordered.  Follow for continued vancomycin needs, clinical response, and signs/symptoms of toxicity.       Kin Blake, PharmD

## 2025-06-19 ENCOUNTER — APPOINTMENT (OUTPATIENT)
Dept: VASCULAR MEDICINE | Facility: HOSPITAL | Age: 80
DRG: 871 | End: 2025-06-19
Payer: MEDICARE

## 2025-06-19 LAB
ANION GAP SERPL CALC-SCNC: 12 MMOL/L (ref 10–20)
BACTERIA BLD CULT: NORMAL
BACTERIA BLD CULT: NORMAL
BUN SERPL-MCNC: 6 MG/DL (ref 6–23)
CALCIUM SERPL-MCNC: 8.9 MG/DL (ref 8.6–10.3)
CHLORIDE SERPL-SCNC: 106 MMOL/L (ref 98–107)
CO2 SERPL-SCNC: 28 MMOL/L (ref 21–32)
CREAT SERPL-MCNC: 0.79 MG/DL (ref 0.5–1.05)
CRP SERPL-MCNC: 2.4 MG/DL
EGFRCR SERPLBLD CKD-EPI 2021: 77 ML/MIN/1.73M*2
ERYTHROCYTE [DISTWIDTH] IN BLOOD BY AUTOMATED COUNT: 13.1 % (ref 11.5–14.5)
GLUCOSE SERPL-MCNC: 99 MG/DL (ref 74–99)
HCT VFR BLD AUTO: 36.7 % (ref 36–46)
HGB BLD-MCNC: 11.6 G/DL (ref 12–16)
MCH RBC QN AUTO: 30.6 PG (ref 26–34)
MCHC RBC AUTO-ENTMCNC: 31.6 G/DL (ref 32–36)
MCV RBC AUTO: 97 FL (ref 80–100)
NRBC BLD-RTO: 0 /100 WBCS (ref 0–0)
PLATELET # BLD AUTO: 159 X10*3/UL (ref 150–450)
POTASSIUM SERPL-SCNC: 4.2 MMOL/L (ref 3.5–5.3)
RBC # BLD AUTO: 3.79 X10*6/UL (ref 4–5.2)
SODIUM SERPL-SCNC: 142 MMOL/L (ref 136–145)
VANCOMYCIN SERPL-MCNC: 11.4 UG/ML (ref 5–20)
WBC # BLD AUTO: 6.8 X10*3/UL (ref 4.4–11.3)

## 2025-06-19 PROCEDURE — 92526 ORAL FUNCTION THERAPY: CPT | Mod: GN

## 2025-06-19 PROCEDURE — 99233 SBSQ HOSP IP/OBS HIGH 50: CPT | Performed by: NURSE PRACTITIONER

## 2025-06-19 PROCEDURE — 93975 VASCULAR STUDY: CPT

## 2025-06-19 PROCEDURE — 36415 COLL VENOUS BLD VENIPUNCTURE: CPT | Performed by: NURSE PRACTITIONER

## 2025-06-19 PROCEDURE — 86140 C-REACTIVE PROTEIN: CPT | Performed by: INTERNAL MEDICINE

## 2025-06-19 PROCEDURE — 2500000004 HC RX 250 GENERAL PHARMACY W/ HCPCS (ALT 636 FOR OP/ED): Performed by: INTERNAL MEDICINE

## 2025-06-19 PROCEDURE — 1200000002 HC GENERAL ROOM WITH TELEMETRY DAILY

## 2025-06-19 PROCEDURE — 94640 AIRWAY INHALATION TREATMENT: CPT

## 2025-06-19 PROCEDURE — 2500000001 HC RX 250 WO HCPCS SELF ADMINISTERED DRUGS (ALT 637 FOR MEDICARE OP): Performed by: INTERNAL MEDICINE

## 2025-06-19 PROCEDURE — 2500000004 HC RX 250 GENERAL PHARMACY W/ HCPCS (ALT 636 FOR OP/ED): Performed by: NURSE PRACTITIONER

## 2025-06-19 PROCEDURE — 2500000005 HC RX 250 GENERAL PHARMACY W/O HCPCS: Performed by: INTERNAL MEDICINE

## 2025-06-19 PROCEDURE — 80048 BASIC METABOLIC PNL TOTAL CA: CPT | Performed by: NURSE PRACTITIONER

## 2025-06-19 PROCEDURE — 94760 N-INVAS EAR/PLS OXIMETRY 1: CPT

## 2025-06-19 PROCEDURE — 85027 COMPLETE CBC AUTOMATED: CPT | Performed by: NURSE PRACTITIONER

## 2025-06-19 PROCEDURE — 2500000001 HC RX 250 WO HCPCS SELF ADMINISTERED DRUGS (ALT 637 FOR MEDICARE OP): Performed by: NURSE PRACTITIONER

## 2025-06-19 PROCEDURE — 93975 VASCULAR STUDY: CPT | Performed by: SURGERY

## 2025-06-19 PROCEDURE — 2500000002 HC RX 250 W HCPCS SELF ADMINISTERED DRUGS (ALT 637 FOR MEDICARE OP, ALT 636 FOR OP/ED): Performed by: INTERNAL MEDICINE

## 2025-06-19 PROCEDURE — 80202 ASSAY OF VANCOMYCIN: CPT | Performed by: FAMILY MEDICINE

## 2025-06-19 RX ORDER — OXYCODONE HYDROCHLORIDE 5 MG/1
5 TABLET ORAL ONCE
Refills: 0 | Status: COMPLETED | OUTPATIENT
Start: 2025-06-19 | End: 2025-06-19

## 2025-06-19 RX ORDER — DIPHENHYDRAMINE HYDROCHLORIDE 50 MG/ML
50 INJECTION, SOLUTION INTRAMUSCULAR; INTRAVENOUS ONCE
Status: DISCONTINUED | OUTPATIENT
Start: 2025-06-19 | End: 2025-06-19

## 2025-06-19 RX ORDER — DIPHENHYDRAMINE HYDROCHLORIDE 50 MG/ML
50 INJECTION, SOLUTION INTRAMUSCULAR; INTRAVENOUS ONCE
Status: COMPLETED | OUTPATIENT
Start: 2025-06-20 | End: 2025-06-20

## 2025-06-19 RX ADMIN — PREDNISONE 50 MG: 20 TABLET ORAL at 11:59

## 2025-06-19 RX ADMIN — IPRATROPIUM BROMIDE AND ALBUTEROL SULFATE 3 ML: 2.5; .5 SOLUTION RESPIRATORY (INHALATION) at 08:23

## 2025-06-19 RX ADMIN — ENOXAPARIN SODIUM 40 MG: 100 INJECTION SUBCUTANEOUS at 09:26

## 2025-06-19 RX ADMIN — PREDNISONE 50 MG: 20 TABLET ORAL at 18:35

## 2025-06-19 RX ADMIN — PREGABALIN 100 MG: 50 CAPSULE ORAL at 09:03

## 2025-06-19 RX ADMIN — BUDESONIDE 0.25 MG: 0.25 INHALANT RESPIRATORY (INHALATION) at 08:23

## 2025-06-19 RX ADMIN — ALPRAZOLAM 0.5 MG: 0.5 TABLET ORAL at 20:45

## 2025-06-19 RX ADMIN — FLUOXETINE HYDROCHLORIDE 80 MG: 20 CAPSULE ORAL at 09:03

## 2025-06-19 RX ADMIN — LUBIPROSTONE 8 MCG: 8 CAPSULE, GELATIN COATED ORAL at 09:04

## 2025-06-19 RX ADMIN — SENNOSIDES AND DOCUSATE SODIUM 2 TABLET: 50; 8.6 TABLET ORAL at 20:45

## 2025-06-19 RX ADMIN — SUCRALFATE 1 G: 1 SUSPENSION ORAL at 18:35

## 2025-06-19 RX ADMIN — BUSPIRONE HYDROCHLORIDE 15 MG: 15 TABLET ORAL at 20:45

## 2025-06-19 RX ADMIN — SUCRALFATE 1 G: 1 SUSPENSION ORAL at 06:30

## 2025-06-19 RX ADMIN — POTASSIUM CHLORIDE, DEXTROSE MONOHYDRATE AND SODIUM CHLORIDE 75 ML/HR: 150; 5; 900 INJECTION, SOLUTION INTRAVENOUS at 06:30

## 2025-06-19 RX ADMIN — DONEPEZIL HYDROCHLORIDE 10 MG: 5 TABLET ORAL at 20:45

## 2025-06-19 RX ADMIN — NYSTATIN 1 APPLICATION: 100000 POWDER TOPICAL at 09:13

## 2025-06-19 RX ADMIN — Medication 2 L/MIN: at 08:22

## 2025-06-19 RX ADMIN — PANTOPRAZOLE SODIUM 40 MG: 40 INJECTION, POWDER, FOR SOLUTION INTRAVENOUS at 20:46

## 2025-06-19 RX ADMIN — PREGABALIN 100 MG: 50 CAPSULE ORAL at 20:45

## 2025-06-19 RX ADMIN — BUSPIRONE HYDROCHLORIDE 15 MG: 15 TABLET ORAL at 09:03

## 2025-06-19 RX ADMIN — BUSPIRONE HYDROCHLORIDE 10 MG: 15 TABLET ORAL at 09:02

## 2025-06-19 RX ADMIN — LUBIPROSTONE 8 MCG: 8 CAPSULE, GELATIN COATED ORAL at 20:45

## 2025-06-19 RX ADMIN — ONDANSETRON 4 MG: 2 INJECTION, SOLUTION INTRAMUSCULAR; INTRAVENOUS at 10:31

## 2025-06-19 RX ADMIN — CARBIDOPA AND LEVODOPA 2 TABLET: 25; 100 TABLET ORAL at 09:02

## 2025-06-19 RX ADMIN — IPRATROPIUM BROMIDE AND ALBUTEROL SULFATE 3 ML: 2.5; .5 SOLUTION RESPIRATORY (INHALATION) at 19:42

## 2025-06-19 RX ADMIN — CARBIDOPA AND LEVODOPA 2 TABLET: 25; 100 TABLET ORAL at 20:45

## 2025-06-19 RX ADMIN — CARBIDOPA AND LEVODOPA 2 TABLET: 25; 100 TABLET ORAL at 14:25

## 2025-06-19 RX ADMIN — SUCRALFATE 1 G: 1 SUSPENSION ORAL at 12:00

## 2025-06-19 RX ADMIN — BUDESONIDE 0.25 MG: 0.25 INHALANT RESPIRATORY (INHALATION) at 19:41

## 2025-06-19 RX ADMIN — BUPROPION HYDROCHLORIDE 150 MG: 150 TABLET, EXTENDED RELEASE ORAL at 12:00

## 2025-06-19 RX ADMIN — ALPRAZOLAM 0.5 MG: 0.5 TABLET ORAL at 14:25

## 2025-06-19 RX ADMIN — ALPRAZOLAM 0.5 MG: 0.5 TABLET ORAL at 09:03

## 2025-06-19 RX ADMIN — SENNOSIDES AND DOCUSATE SODIUM 2 TABLET: 50; 8.6 TABLET ORAL at 09:03

## 2025-06-19 RX ADMIN — OXYCODONE HYDROCHLORIDE 5 MG: 5 TABLET ORAL at 10:31

## 2025-06-19 RX ADMIN — PANTOPRAZOLE SODIUM 40 MG: 40 INJECTION, POWDER, FOR SOLUTION INTRAVENOUS at 08:57

## 2025-06-19 ASSESSMENT — COGNITIVE AND FUNCTIONAL STATUS - GENERAL
MOBILITY SCORE: 18
DRESSING REGULAR LOWER BODY CLOTHING: A LITTLE
HELP NEEDED FOR BATHING: A LITTLE
WALKING IN HOSPITAL ROOM: A LITTLE
TOILETING: A LITTLE
WALKING IN HOSPITAL ROOM: A LITTLE
MOBILITY SCORE: 18
TURNING FROM BACK TO SIDE WHILE IN FLAT BAD: A LITTLE
DRESSING REGULAR UPPER BODY CLOTHING: A LITTLE
CLIMB 3 TO 5 STEPS WITH RAILING: A LITTLE
HELP NEEDED FOR BATHING: A LITTLE
DRESSING REGULAR UPPER BODY CLOTHING: A LITTLE
MOVING FROM LYING ON BACK TO SITTING ON SIDE OF FLAT BED WITH BEDRAILS: A LITTLE
DAILY ACTIVITIY SCORE: 20
MOVING TO AND FROM BED TO CHAIR: A LITTLE
CLIMB 3 TO 5 STEPS WITH RAILING: A LITTLE
DRESSING REGULAR LOWER BODY CLOTHING: A LITTLE
STANDING UP FROM CHAIR USING ARMS: A LITTLE
DAILY ACTIVITIY SCORE: 20
TURNING FROM BACK TO SIDE WHILE IN FLAT BAD: A LITTLE
TOILETING: A LITTLE
MOVING FROM LYING ON BACK TO SITTING ON SIDE OF FLAT BED WITH BEDRAILS: A LITTLE
MOVING TO AND FROM BED TO CHAIR: A LITTLE
STANDING UP FROM CHAIR USING ARMS: A LITTLE

## 2025-06-19 ASSESSMENT — PAIN SCALES - GENERAL
PAINLEVEL_OUTOF10: 3
PAINLEVEL_OUTOF10: 0 - NO PAIN
PAINLEVEL_OUTOF10: 8

## 2025-06-19 ASSESSMENT — PAIN - FUNCTIONAL ASSESSMENT
PAIN_FUNCTIONAL_ASSESSMENT: 0-10

## 2025-06-19 NOTE — PROGRESS NOTES
Fidelia Broderick is a 80 y.o. female on day 4 of admission presenting with Sepsis, due to unspecified organism, unspecified whether acute organ dysfunction present (Multi).      Subjective   The patient continues to complain of abdominal pain this morning. Large BM noted last night.        Objective     Last Recorded Vitals  BP (!) 144/91 (BP Location: Right arm)   Pulse 68   Temp 36.4 °C (97.5 °F) (Temporal)   Resp 18   Wt 79 kg (174 lb 3.2 oz)   SpO2 96%   Intake/Output last 3 Shifts:    Intake/Output Summary (Last 24 hours) at 6/19/2025 1417  Last data filed at 6/19/2025 1337  Gross per 24 hour   Intake 1698 ml   Output 2250 ml   Net -552 ml       Admission Weight  Weight: 77.1 kg (170 lb) (06/15/25 0516)    Daily Weight  06/15/25 : 79 kg (174 lb 3.2 oz)    Image Results  Vascular US mesenteric artery duplex complete  Preliminary Cardiology Report            [place] [address]   Tel [phone] and Fax [phone]            Preliminary Vascular Lab Report     [facility] US MESENTERIC ARTERY DUPLEX COMPLETE       Patient Name:      FIDELIA BRODERICK  Reading Physician:  [id number] Tyler Crooks DO  Study Date:        6/19/2025    Ordering Physician: [id number] LIANET MARTEL  MRN/PID:           [id number]     Technologist:       Nicolasa FORD  Accession#:        [id number] Technologist 2:  Date of Birth/Age: 1945     Encounter#:         [id number]  Gender:            F  Admission Status:  Inpatient    Location Performed: [facility]       Diagnosis/ICD: r10.84 Generalized abdominal pain  Procedure/CPT: 67145 Mesenteric Duplex scan       PRELIMINARY CONCLUSIONS:  Mesenteric: The TR appears widely patent and Hepatic artery appears widely patent. The splenic artery is not well visualized. The patient was NPO for this study. Celiac and SMA appear patent. All vessels visualized in segments due to bowel gas and patient body habitus. Technically difficult study due to patient  body habitus, bowel gas and patient body habitus. May wish for further means of evaluation.     Imaging & Doppler Findings:     AORTA    AP    Lateral  Distal 1.50 cm 1.50 cm       Aorta PSV         135 cm/s  Celiac Origin  cm/s  Celiac Prox PSV   119 cm/s  Celiac Mid PSV    75 cm/s  Celiac Dist PSV   117 cm/s  SMA Origin PSV    123 cm/s  SMA Prox PSV      184 cm/s  SMA Mid PSV       171 cm/s  TR Prox PSV      87 cm/s  Hepatic PSV       116 cm/s            VASCULAR PRELIMINARY REPORT  completed by Nicolasa FORD on 6/19/2025 at 8:07:47 AM       ** Final **      Physical Exam  Constitutional:       Appearance: Normal appearance.   HENT:      Mouth/Throat:      Mouth: Mucous membranes are dry.   Eyes:      Extraocular Movements: Extraocular movements intact.   Cardiovascular:      Rate and Rhythm: Normal rate.   Pulmonary:      Breath sounds: Wheezing present.   Abdominal:      General: There is distension.      Palpations: Abdomen is soft.      Tenderness: There is abdominal tenderness.   Musculoskeletal:      Right lower leg: Edema present.      Left lower leg: Edema present.   Neurological:      Mental Status: She is oriented to person, place, and time.   Psychiatric:         Mood and Affect: Mood is anxious.         Relevant Results      Results for orders placed or performed during the hospital encounter of 06/15/25 (from the past 24 hours)   Vancomycin   Result Value Ref Range    Vancomycin 11.4 5.0 - 20.0 ug/mL   CBC   Result Value Ref Range    WBC 6.8 4.4 - 11.3 x10*3/uL    nRBC 0.0 0.0 - 0.0 /100 WBCs    RBC 3.79 (L) 4.00 - 5.20 x10*6/uL    Hemoglobin 11.6 (L) 12.0 - 16.0 g/dL    Hematocrit 36.7 36.0 - 46.0 %    MCV 97 80 - 100 fL    MCH 30.6 26.0 - 34.0 pg    MCHC 31.6 (L) 32.0 - 36.0 g/dL    RDW 13.1 11.5 - 14.5 %    Platelets 159 150 - 450 x10*3/uL   Basic Metabolic Panel   Result Value Ref Range    Glucose 99 74 - 99 mg/dL    Sodium 142 136 - 145 mmol/L    Potassium 4.2 3.5 - 5.3 mmol/L     Chloride 106 98 - 107 mmol/L    Bicarbonate 28 21 - 32 mmol/L    Anion Gap 12 10 - 20 mmol/L    Urea Nitrogen 6 6 - 23 mg/dL    Creatinine 0.79 0.50 - 1.05 mg/dL    eGFR 77 >60 mL/min/1.73m*2    Calcium 8.9 8.6 - 10.3 mg/dL   Vascular US mesenteric artery duplex complete   Result Value Ref Range    BSA 1.89 m2                             Assessment & Plan    Fidelia Norman is a 80 y.o. female with a medical history of HTN, COPD/Asthma, and Parkinson's Disease, who presented to ED with c/o N/V, abdominal pain and shortness of breath. In the ED, found to have marked lactic acidosis with SIRS.      SIRS/Lactic  Acidosis  -Unclear etiology but likely related to GI losses and/or aspiration  -Lactate normalized after IV fluids given  -Treating with antibiotics (Cefepime, Vancomycin) for possible aspiration pneumonia discontinued on 6/18/2025  -SLP advanced diet to solids   -Continue with bronchodilators, bronchial hygiene  - BC negative x 3 days  -T. Max 100.9  -ID following  -Procalcitonin level 0.16  -PT/ OT rec moderate intensity therapy    Abdominal pain, severe   IBS/constipation  -Mesenteric ultrasound negative  -Bowel regimen added  -I spoke with ID, who recommends CT abd/pelvis with IV/PO contrast due to continued abdominal pain. She has an allergy to contrast. I discuss the recommendation with the patient in detail and she wants to go ahead the scan regardless of the risk. Will premedicate the patient with prednisone and benadryl for the scan.    Chronic Hypoxic Respiratory Failure on 2-3 L  COPD   Asthma  -Maintain bronchial hygiene    Hypomagnesemia-resolved  Hypokalemia-resolved  -Due to GI losses  -Replaced will repeat levels     Hypertension  -Medical therapy: Losartan, Lasix  -Hold Lasix due to acute dehydration on admission     Parkinson's Disease  -Carbidopa-Levadopa     GERD  -On PPI, Sucralfate     Chronic Back Pain/Cervical Dystonia  -On Lyrica, Tizanidine  -Supportive care     DVT Prophylaxis  -On  Lovenox     Dispo: Will discharge to [facility], when the patient is medically ready for discharge.  The authorization is good from 6/18- 6/22.         LILIBETH Thompson-CNP

## 2025-06-19 NOTE — PROGRESS NOTES
Occupational Therapy                 Therapy Communication Note    Patient Name: Fidelia Norman  MRN: [id number]  Department: [address]  Room: [address] [place], [place] [address]  Today's Date: 6/19/2025     Discipline: Occupational Therapy    Missed Visit: OT Missed Visit: Yes     Missed Visit Reason: Other (Comment) (Pt engaged in nursing procedures. Unavailable for treatment.)    Missed Time: Attempt @ 1502

## 2025-06-19 NOTE — CARE PLAN
The patient's goals for the shift include      The clinical goals for the shift include to remain HDS      Problem: Pain - Adult  Goal: Verbalizes/displays adequate comfort level or baseline comfort level  Outcome: Progressing     Problem: Safety - Adult  Goal: Free from fall injury  Outcome: Progressing     Problem: Discharge Planning  Goal: Discharge to home or other facility with appropriate resources  Outcome: Progressing     Problem: Chronic Conditions and Co-morbidities  Goal: Patient's chronic conditions and co-morbidity symptoms are monitored and maintained or improved  Outcome: Progressing     Problem: Nutrition  Goal: Nutrient intake appropriate for maintaining nutritional needs  Outcome: Progressing     Problem: Skin  Goal: Decreased wound size/increased tissue granulation at next dressing change  Outcome: Progressing  Goal: Participates in plan/prevention/treatment measures  Outcome: Progressing  Goal: Prevent/manage excess moisture  Outcome: Progressing  Goal: Prevent/minimize sheer/friction injuries  Outcome: Progressing  Goal: Promote/optimize nutrition  Outcome: Progressing  Goal: Promote skin healing  Outcome: Progressing

## 2025-06-19 NOTE — PROGRESS NOTES
Speech-Language Pathology    Speech-Language Pathology Clinical Swallow Treatment    Patient Name: Fidelia Norman  MRN: [id number]  : 1945  Today's Date: 25  Start Time: 1355  Stop Time: 1414  Time Calculation (min): 19 min    ASSESSMENT  SLP TX Intervention Outcome: Making Progress Towards Goals  Treatment Tolerance: Patient tolerated treatment well  Able to be safely upgraded to a regular diet with thin liquids.   No further SLP services indicated. Pt appears to be at baseline.     Impressions: Oral and pharyngeal phases of the swallow appear to be back to baseline.     PLAN  Is MBSS recommended? No; no pharyngeal dysphagia suspected.  Recommended solid consistency: Regular (IDDSI level 7)  Recommended liquid consistency: Thin (IDDSI 0)  Recommended medication administration: Whole in thin liquid, Whole in puree  Compensatory swallow strategies:  - Upright positioning for all PO intake  - Slow rate of intake  - Small/SINGLE sips/bites   - One bite/sip at a time    Discharge recommendation: Home with no further SLP  Inpatient/Swing Bed or Outpatient: Inpatient  SLP TX Plan: Continue Plan of Care  SLP Plan: Skilled SLP  SLP Frequency: 2x per week  Duration: 2 weeks  Next Treatment Priority: Ensure safe consumption of diet  Discussed POC: Patient  Patient/Caregiver Agreeable: Yes    SUBJECTIVE  Prior to Session Communication: Bedside nurse  RN cleared pt to participate in session and reported that she has been alert and cooperative today.   Respiratory status: Room air  Positioning: Upright as close as possible to 90 degrees, but partially reclined  Pt was alert, pleasant, cooperative, and attentive for session.    Pain Assessment  Pain Assessment: 0-10  0-10 (Numeric) Pain Score: 0 - No pain    Orientation: Ox4  Ability to follow functional commands: WFL    OBJECTIVE  Therapeutic Swallow Intervention : PO Trials, Caregiver Education, Compensatory Strategies  Further evaluation completed. Pt with good  tolerance for multiple trials of chewable solids.    Treatment/Education:  Results and recommendations were relayed to: Patient  Education provided: Yes   Learner: Patient   Barriers to learning: None   Method of teaching: Verbal and Written   Topic: role of ST, results of assessment, and recommended safe swallow strategies   Outcome of teaching: Pt/family demonstrated good understanding      Goals (start date 6/17/2025):  - Pt will consume prescribed diet (current diet is soft-bite size/thin) without overt s/sx aspiration/penetration in 95% of observed trials.  Status: upgraded to regular for trials today. Excellent oral management of same.   Progress: improved

## 2025-06-19 NOTE — PROGRESS NOTES
Fidelia Norman is a 80 y.o. female on day 4 of admission presenting with Sepsis, due to unspecified organism, unspecified whether acute organ dysfunction present (Multi).    Subjective   Interval History: no fever, no new complaints        Review of Systems    Objective   Range of Vitals (last 24 hours)  Heart Rate:  [62-92]   Temp:  [36.3 °C (97.3 °F)-37.1 °C (98.8 °F)]   Resp:  [18-20]   BP: (144-185)/(81-90)   SpO2:  [91 %-100 %]   Daily Weight  06/15/25 : 79 kg (174 lb 3.2 oz)    Body mass index is 29.9 kg/m².    Physical Exam  Constitutional:       Appearance: Normal appearance.   HENT:      Head: Normocephalic and atraumatic.      Mouth/Throat:      Mouth: Mucous membranes are moist.      Pharynx: Oropharynx is clear.   Eyes:      Pupils: Pupils are equal, round, and reactive to light.   Cardiovascular:      Rate and Rhythm: Normal rate and regular rhythm.      Heart sounds: Normal heart sounds.   Pulmonary:      Effort: Pulmonary effort is normal.      Breath sounds: Normal breath sounds.   Abdominal:      General: Abdomen is flat. Bowel sounds are normal.      Palpations: Abdomen is soft.   Musculoskeletal:      Cervical back: Normal range of motion.   Neurological:      Mental Status: She is alert.         Antibiotics  This patient does not have an active medication from one of the medication groupers.    Relevant Results  Labs  Results from last 72 hours   Lab Units 06/19/25  0642 06/18/25  0523 06/17/25  0720   WBC AUTO x10*3/uL 6.8 9.1 8.7   HEMOGLOBIN g/dL 11.6* 11.3* 12.2   HEMATOCRIT % 36.7 36.4 39.0   PLATELETS AUTO x10*3/uL 159 168 149*     Results from last 72 hours   Lab Units 06/19/25  0642 06/18/25  0523 06/17/25  0720   SODIUM mmol/L 142 142 144   POTASSIUM mmol/L 4.2 4.2 3.3*   CHLORIDE mmol/L 106 107 109*   CO2 mmol/L 28 29 27   BUN mg/dL 6 9 9   CREATININE mg/dL 0.79 0.82 0.84   GLUCOSE mg/dL 99 86 108*   CALCIUM mg/dL 8.9 8.7 8.4*   ANION GAP mmol/L 12 10 11   EGFR mL/min/1.73m*2 77 73 71          Estimated Creatinine Clearance: 59.7 mL/min (by C-G formula based on SCr of 0.79 mg/dL).  CRP   Date Value Ref Range Status   12/23/2020 9.84 (A) mg/dL Final     Comment:     REF VALUE  < 1.00     12/20/2020 4.29 (A) mg/dL Final     Comment:     REF VALUE  < 1.00       Microbiology  Reviewed  Imaging  Reviewed       Assessment/Plan   Abdominal pain, mesenteric US without obvious disease  Leukocytosis  Elevated lactate  Encephalopathy, likely metabolic     Recommendations :  Supportive care  May need a repeat CT with iv  / oral contrast  Discussed with the medical team     I spent minutes in the professional and overall care of this patient  The cultures and susceptibilities were reviewed and discussed with the micro lab, the antibiotics stewardship guidelines were reviewed, the infection control protocols and recommendations were reviewed with the patient and the staff                 Omega Carballo MD

## 2025-06-19 NOTE — CARE PLAN
The patient's goals for the shift include      The clinical goals for the shift include Patient safety

## 2025-06-20 ENCOUNTER — APPOINTMENT (OUTPATIENT)
Dept: RADIOLOGY | Facility: HOSPITAL | Age: 80
DRG: 871 | End: 2025-06-20
Payer: MEDICARE

## 2025-06-20 LAB
ALBUMIN SERPL BCP-MCNC: 3.5 G/DL (ref 3.4–5)
ALP SERPL-CCNC: 58 U/L (ref 33–136)
ALT SERPL W P-5'-P-CCNC: 13 U/L (ref 7–45)
ANION GAP SERPL CALC-SCNC: 13 MMOL/L (ref 10–20)
AST SERPL W P-5'-P-CCNC: 17 U/L (ref 9–39)
BACTERIA BLD CULT: NORMAL
BILIRUB DIRECT SERPL-MCNC: 0.1 MG/DL (ref 0–0.3)
BILIRUB SERPL-MCNC: 0.4 MG/DL (ref 0–1.2)
BUN SERPL-MCNC: 10 MG/DL (ref 6–23)
CALCIUM SERPL-MCNC: 9.2 MG/DL (ref 8.6–10.3)
CHLORIDE SERPL-SCNC: 100 MMOL/L (ref 98–107)
CO2 SERPL-SCNC: 30 MMOL/L (ref 21–32)
CREAT SERPL-MCNC: 0.69 MG/DL (ref 0.5–1.05)
CRP SERPL-MCNC: 1.94 MG/DL
EGFRCR SERPLBLD CKD-EPI 2021: 89 ML/MIN/1.73M*2
ERYTHROCYTE [DISTWIDTH] IN BLOOD BY AUTOMATED COUNT: 12.7 % (ref 11.5–14.5)
GLUCOSE SERPL-MCNC: 164 MG/DL (ref 74–99)
HCT VFR BLD AUTO: 37.1 % (ref 36–46)
HGB BLD-MCNC: 11.9 G/DL (ref 12–16)
LACTATE SERPL-SCNC: 0.6 MMOL/L (ref 0.4–2)
MCH RBC QN AUTO: 30.4 PG (ref 26–34)
MCHC RBC AUTO-ENTMCNC: 32.1 G/DL (ref 32–36)
MCV RBC AUTO: 95 FL (ref 80–100)
NRBC BLD-RTO: 0 /100 WBCS (ref 0–0)
PLATELET # BLD AUTO: 199 X10*3/UL (ref 150–450)
POTASSIUM SERPL-SCNC: 4.6 MMOL/L (ref 3.5–5.3)
PROT SERPL-MCNC: 6.6 G/DL (ref 6.4–8.2)
RBC # BLD AUTO: 3.91 X10*6/UL (ref 4–5.2)
SODIUM SERPL-SCNC: 138 MMOL/L (ref 136–145)
WBC # BLD AUTO: 7.7 X10*3/UL (ref 4.4–11.3)

## 2025-06-20 PROCEDURE — 2500000004 HC RX 250 GENERAL PHARMACY W/ HCPCS (ALT 636 FOR OP/ED): Performed by: INTERNAL MEDICINE

## 2025-06-20 PROCEDURE — 2500000001 HC RX 250 WO HCPCS SELF ADMINISTERED DRUGS (ALT 637 FOR MEDICARE OP): Performed by: INTERNAL MEDICINE

## 2025-06-20 PROCEDURE — 2550000001 HC RX 255 CONTRASTS: Performed by: INTERNAL MEDICINE

## 2025-06-20 PROCEDURE — 2500000002 HC RX 250 W HCPCS SELF ADMINISTERED DRUGS (ALT 637 FOR MEDICARE OP, ALT 636 FOR OP/ED): Performed by: INTERNAL MEDICINE

## 2025-06-20 PROCEDURE — 94640 AIRWAY INHALATION TREATMENT: CPT

## 2025-06-20 PROCEDURE — 78226 HEPATOBILIARY SYSTEM IMAGING: CPT

## 2025-06-20 PROCEDURE — 2500000005 HC RX 250 GENERAL PHARMACY W/O HCPCS: Performed by: INTERNAL MEDICINE

## 2025-06-20 PROCEDURE — 1200000002 HC GENERAL ROOM WITH TELEMETRY DAILY

## 2025-06-20 PROCEDURE — 74177 CT ABD & PELVIS W/CONTRAST: CPT

## 2025-06-20 PROCEDURE — 36415 COLL VENOUS BLD VENIPUNCTURE: CPT | Performed by: NURSE PRACTITIONER

## 2025-06-20 PROCEDURE — 99233 SBSQ HOSP IP/OBS HIGH 50: CPT | Performed by: NURSE PRACTITIONER

## 2025-06-20 PROCEDURE — 80048 BASIC METABOLIC PNL TOTAL CA: CPT | Performed by: NURSE PRACTITIONER

## 2025-06-20 PROCEDURE — 85027 COMPLETE CBC AUTOMATED: CPT | Performed by: NURSE PRACTITIONER

## 2025-06-20 PROCEDURE — 2500000001 HC RX 250 WO HCPCS SELF ADMINISTERED DRUGS (ALT 637 FOR MEDICARE OP)

## 2025-06-20 PROCEDURE — A9537 TC99M MEBROFENIN: HCPCS | Performed by: INTERNAL MEDICINE

## 2025-06-20 PROCEDURE — 2500000002 HC RX 250 W HCPCS SELF ADMINISTERED DRUGS (ALT 637 FOR MEDICARE OP, ALT 636 FOR OP/ED): Performed by: NURSE PRACTITIONER

## 2025-06-20 PROCEDURE — 2500000004 HC RX 250 GENERAL PHARMACY W/ HCPCS (ALT 636 FOR OP/ED): Mod: JZ | Performed by: NURSE PRACTITIONER

## 2025-06-20 PROCEDURE — 99221 1ST HOSP IP/OBS SF/LOW 40: CPT

## 2025-06-20 PROCEDURE — 83605 ASSAY OF LACTIC ACID: CPT | Performed by: INTERNAL MEDICINE

## 2025-06-20 PROCEDURE — 94760 N-INVAS EAR/PLS OXIMETRY 1: CPT

## 2025-06-20 PROCEDURE — 74177 CT ABD & PELVIS W/CONTRAST: CPT | Performed by: RADIOLOGY

## 2025-06-20 PROCEDURE — 78226 HEPATOBILIARY SYSTEM IMAGING: CPT | Performed by: INTERNAL MEDICINE

## 2025-06-20 PROCEDURE — 86140 C-REACTIVE PROTEIN: CPT | Performed by: INTERNAL MEDICINE

## 2025-06-20 PROCEDURE — 3430000001 HC RX 343 DIAGNOSTIC RADIOPHARMACEUTICALS: Performed by: INTERNAL MEDICINE

## 2025-06-20 PROCEDURE — 82248 BILIRUBIN DIRECT: CPT | Performed by: NURSE PRACTITIONER

## 2025-06-20 PROCEDURE — 2500000001 HC RX 250 WO HCPCS SELF ADMINISTERED DRUGS (ALT 637 FOR MEDICARE OP): Performed by: NURSE PRACTITIONER

## 2025-06-20 PROCEDURE — 99222 1ST HOSP IP/OBS MODERATE 55: CPT | Performed by: SURGERY

## 2025-06-20 RX ORDER — BISACODYL 5 MG
10 TABLET, DELAYED RELEASE (ENTERIC COATED) ORAL ONCE
Status: COMPLETED | OUTPATIENT
Start: 2025-06-20 | End: 2025-06-20

## 2025-06-20 RX ORDER — KIT FOR THE PREPARATION OF TECHNETIUM TC 99M MEBROFENIN 45 MG/10ML
5.3 INJECTION, POWDER, LYOPHILIZED, FOR SOLUTION INTRAVENOUS
Status: COMPLETED | OUTPATIENT
Start: 2025-06-20 | End: 2025-06-20

## 2025-06-20 RX ORDER — KETOROLAC TROMETHAMINE 15 MG/ML
15 INJECTION, SOLUTION INTRAMUSCULAR; INTRAVENOUS ONCE
Status: COMPLETED | OUTPATIENT
Start: 2025-06-20 | End: 2025-06-20

## 2025-06-20 RX ORDER — ACETAMINOPHEN 325 MG/1
975 TABLET ORAL EVERY 8 HOURS
Status: DISCONTINUED | OUTPATIENT
Start: 2025-06-20 | End: 2025-06-22 | Stop reason: HOSPADM

## 2025-06-20 RX ORDER — KETOROLAC TROMETHAMINE 15 MG/ML
15 INJECTION, SOLUTION INTRAMUSCULAR; INTRAVENOUS EVERY 6 HOURS
Status: DISCONTINUED | OUTPATIENT
Start: 2025-06-20 | End: 2025-06-22 | Stop reason: HOSPADM

## 2025-06-20 RX ORDER — METHOCARBAMOL 100 MG/ML
1000 INJECTION, SOLUTION INTRAMUSCULAR; INTRAVENOUS ONCE
Status: COMPLETED | OUTPATIENT
Start: 2025-06-20 | End: 2025-06-20

## 2025-06-20 RX ADMIN — SENNOSIDES AND DOCUSATE SODIUM 2 TABLET: 50; 8.6 TABLET ORAL at 10:22

## 2025-06-20 RX ADMIN — KETOROLAC TROMETHAMINE 15 MG: 15 INJECTION, SOLUTION INTRAMUSCULAR; INTRAVENOUS at 14:41

## 2025-06-20 RX ADMIN — IPRATROPIUM BROMIDE AND ALBUTEROL SULFATE 3 ML: 2.5; .5 SOLUTION RESPIRATORY (INHALATION) at 08:43

## 2025-06-20 RX ADMIN — TRAZODONE HYDROCHLORIDE 50 MG: 50 TABLET ORAL at 21:17

## 2025-06-20 RX ADMIN — ZOLPIDEM TARTRATE 10 MG: 5 TABLET ORAL at 01:25

## 2025-06-20 RX ADMIN — CARBIDOPA AND LEVODOPA 2 TABLET: 25; 100 TABLET ORAL at 14:39

## 2025-06-20 RX ADMIN — CARBIDOPA AND LEVODOPA 2 TABLET: 25; 100 TABLET ORAL at 10:22

## 2025-06-20 RX ADMIN — BUPROPION HYDROCHLORIDE 150 MG: 150 TABLET, EXTENDED RELEASE ORAL at 10:50

## 2025-06-20 RX ADMIN — ALPRAZOLAM 0.5 MG: 0.5 TABLET ORAL at 21:18

## 2025-06-20 RX ADMIN — BISACODYL 10 MG: 5 TABLET, COATED ORAL at 14:45

## 2025-06-20 RX ADMIN — KETOROLAC TROMETHAMINE 15 MG: 15 INJECTION, SOLUTION INTRAMUSCULAR; INTRAVENOUS at 12:58

## 2025-06-20 RX ADMIN — ACETAMINOPHEN 975 MG: 325 TABLET, FILM COATED ORAL at 23:10

## 2025-06-20 RX ADMIN — SUCRALFATE 1 G: 1 SUSPENSION ORAL at 00:06

## 2025-06-20 RX ADMIN — IPRATROPIUM BROMIDE AND ALBUTEROL SULFATE 3 ML: 2.5; .5 SOLUTION RESPIRATORY (INHALATION) at 20:03

## 2025-06-20 RX ADMIN — ENOXAPARIN SODIUM 40 MG: 100 INJECTION SUBCUTANEOUS at 10:21

## 2025-06-20 RX ADMIN — HYDROMORPHONE HYDROCHLORIDE 0.5 MG: 1 INJECTION, SOLUTION INTRAMUSCULAR; INTRAVENOUS; SUBCUTANEOUS at 14:40

## 2025-06-20 RX ADMIN — ZOLPIDEM TARTRATE 10 MG: 5 TABLET ORAL at 23:10

## 2025-06-20 RX ADMIN — METHOCARBAMOL 1000 MG: 1000 INJECTION, SOLUTION INTRAMUSCULAR; INTRAVENOUS at 12:58

## 2025-06-20 RX ADMIN — FLUOXETINE HYDROCHLORIDE 80 MG: 20 CAPSULE ORAL at 10:21

## 2025-06-20 RX ADMIN — ALPRAZOLAM 0.5 MG: 0.5 TABLET ORAL at 10:22

## 2025-06-20 RX ADMIN — LUBIPROSTONE 8 MCG: 8 CAPSULE, GELATIN COATED ORAL at 21:18

## 2025-06-20 RX ADMIN — ACETAMINOPHEN 975 MG: 325 TABLET, FILM COATED ORAL at 14:40

## 2025-06-20 RX ADMIN — NYSTATIN 1 APPLICATION: 100000 POWDER TOPICAL at 21:17

## 2025-06-20 RX ADMIN — NYSTATIN 1 APPLICATION: 100000 POWDER TOPICAL at 10:22

## 2025-06-20 RX ADMIN — BUDESONIDE 0.25 MG: 0.25 INHALANT RESPIRATORY (INHALATION) at 20:03

## 2025-06-20 RX ADMIN — SUCRALFATE 1 G: 1 SUSPENSION ORAL at 17:15

## 2025-06-20 RX ADMIN — DIPHENHYDRAMINE HYDROCHLORIDE 50 MG: 50 INJECTION, SOLUTION INTRAMUSCULAR; INTRAVENOUS at 00:06

## 2025-06-20 RX ADMIN — BUSPIRONE HYDROCHLORIDE 15 MG: 15 TABLET ORAL at 21:17

## 2025-06-20 RX ADMIN — SUCRALFATE 1 G: 1 SUSPENSION ORAL at 23:10

## 2025-06-20 RX ADMIN — LUBIPROSTONE 8 MCG: 8 CAPSULE, GELATIN COATED ORAL at 10:26

## 2025-06-20 RX ADMIN — BISACODYL 10 MG: 5 TABLET, COATED ORAL at 14:40

## 2025-06-20 RX ADMIN — PREGABALIN 100 MG: 50 CAPSULE ORAL at 10:21

## 2025-06-20 RX ADMIN — BUDESONIDE 0.25 MG: 0.25 INHALANT RESPIRATORY (INHALATION) at 08:43

## 2025-06-20 RX ADMIN — ALPRAZOLAM 0.5 MG: 0.5 TABLET ORAL at 14:40

## 2025-06-20 RX ADMIN — IOHEXOL 75 ML: 350 INJECTION, SOLUTION INTRAVENOUS at 01:07

## 2025-06-20 RX ADMIN — KIT FOR THE PREPARATION OF TECHNETIUM TC 99M MEBROFENIN 5.3 MILLICURIE: 45 INJECTION, POWDER, LYOPHILIZED, FOR SOLUTION INTRAVENOUS at 12:31

## 2025-06-20 RX ADMIN — IPRATROPIUM BROMIDE AND ALBUTEROL SULFATE 3 ML: 2.5; .5 SOLUTION RESPIRATORY (INHALATION) at 14:52

## 2025-06-20 RX ADMIN — Medication 2 L/MIN: at 20:03

## 2025-06-20 RX ADMIN — PANTOPRAZOLE SODIUM 40 MG: 40 INJECTION, POWDER, FOR SOLUTION INTRAVENOUS at 10:21

## 2025-06-20 RX ADMIN — BUSPIRONE HYDROCHLORIDE 15 MG: 15 TABLET ORAL at 10:23

## 2025-06-20 RX ADMIN — CARBIDOPA AND LEVODOPA 2 TABLET: 25; 100 TABLET ORAL at 21:17

## 2025-06-20 RX ADMIN — DONEPEZIL HYDROCHLORIDE 10 MG: 5 TABLET ORAL at 21:17

## 2025-06-20 RX ADMIN — PANTOPRAZOLE SODIUM 40 MG: 40 INJECTION, POWDER, FOR SOLUTION INTRAVENOUS at 21:17

## 2025-06-20 RX ADMIN — BUSPIRONE HYDROCHLORIDE 10 MG: 15 TABLET ORAL at 10:21

## 2025-06-20 RX ADMIN — PREGABALIN 100 MG: 50 CAPSULE ORAL at 21:17

## 2025-06-20 RX ADMIN — PREDNISONE 50 MG: 20 TABLET ORAL at 00:06

## 2025-06-20 RX ADMIN — TRAZODONE HYDROCHLORIDE 50 MG: 50 TABLET ORAL at 01:25

## 2025-06-20 RX ADMIN — SENNOSIDES AND DOCUSATE SODIUM 2 TABLET: 50; 8.6 TABLET ORAL at 21:17

## 2025-06-20 RX ADMIN — KETOROLAC TROMETHAMINE 15 MG: 15 INJECTION, SOLUTION INTRAMUSCULAR; INTRAVENOUS at 21:18

## 2025-06-20 ASSESSMENT — COGNITIVE AND FUNCTIONAL STATUS - GENERAL
WALKING IN HOSPITAL ROOM: A LITTLE
TOILETING: A LITTLE
MOBILITY SCORE: 18
TURNING FROM BACK TO SIDE WHILE IN FLAT BAD: A LITTLE
DRESSING REGULAR LOWER BODY CLOTHING: A LITTLE
TURNING FROM BACK TO SIDE WHILE IN FLAT BAD: A LITTLE
CLIMB 3 TO 5 STEPS WITH RAILING: A LITTLE
MOVING FROM LYING ON BACK TO SITTING ON SIDE OF FLAT BED WITH BEDRAILS: A LITTLE
MOBILITY SCORE: 18
DAILY ACTIVITIY SCORE: 20
DAILY ACTIVITIY SCORE: 20
HELP NEEDED FOR BATHING: A LITTLE
STANDING UP FROM CHAIR USING ARMS: A LITTLE
TOILETING: A LITTLE
MOVING TO AND FROM BED TO CHAIR: A LITTLE
MOVING TO AND FROM BED TO CHAIR: A LITTLE
DRESSING REGULAR UPPER BODY CLOTHING: A LITTLE
MOVING FROM LYING ON BACK TO SITTING ON SIDE OF FLAT BED WITH BEDRAILS: A LITTLE
CLIMB 3 TO 5 STEPS WITH RAILING: A LITTLE
STANDING UP FROM CHAIR USING ARMS: A LITTLE
HELP NEEDED FOR BATHING: A LITTLE
DRESSING REGULAR UPPER BODY CLOTHING: A LITTLE
DRESSING REGULAR LOWER BODY CLOTHING: A LITTLE
WALKING IN HOSPITAL ROOM: A LITTLE

## 2025-06-20 ASSESSMENT — ENCOUNTER SYMPTOMS
ABDOMINAL PAIN: 1
ABDOMINAL DISTENTION: 1

## 2025-06-20 ASSESSMENT — PAIN - FUNCTIONAL ASSESSMENT
PAIN_FUNCTIONAL_ASSESSMENT: WONG-BAKER FACES
PAIN_FUNCTIONAL_ASSESSMENT: 0-10
PAIN_FUNCTIONAL_ASSESSMENT: 0-10

## 2025-06-20 ASSESSMENT — PAIN SCALES - GENERAL
PAINLEVEL_OUTOF10: 0 - NO PAIN
PAINLEVEL_OUTOF10: 8
PAINLEVEL_OUTOF10: 8

## 2025-06-20 ASSESSMENT — PAIN DESCRIPTION - LOCATION: LOCATION: ABDOMEN

## 2025-06-20 ASSESSMENT — PAIN SCALES - WONG BAKER: WONGBAKER_NUMERICALRESPONSE: NO HURT

## 2025-06-20 ASSESSMENT — ACTIVITIES OF DAILY LIVING (ADL): LACK_OF_TRANSPORTATION: NO

## 2025-06-20 NOTE — CONSULTS
General/Trauma Surgery History and Physical      History Of Present Illness  Fidelia Norman is a 80 y.o. female who was admitted on 6/9/25 after presenting to the ED via EMS with complaints of vomiting, abdominal pain and shortness of breath. On arrival, she was noted to meet SIRS criteria with a lactic acidosis on 9, improved with fluids. CT in the ED workup revealed a gallbladder without stones or inflammatory changes. She has been treated with IV cefepime and vancomycin for possible aspiration pneumonia and received a mesenteric ultrasound with patent SMA, TR, celiac and hepatic arteries, however difficult study due to body habitus. CT repeated with IV contrast after contrast allergy prep was performed that shows a contracted gallbladder and dilation of the CBD. Patient continues to have diffuse abdominal pain during her stay, however has been tolerating a diet without issue, did eat breakfast this morning. She reports abdominal pain and GI upset after meals for a long time. Notes diarrhea at home recently, however feeling constipated today.        Past Medical History  Parkinsons  COPD/asthma, chronic respiratory failure, wears 2 liters oxygen at night  Hypertension  Hyperlipidemia  Chronic back pain  Cervical dystonia  Hypothyroid  IBS    Surgical History  Hysterectomy  Back surgery   Colonoscopy   EGD     Social History   reports that she quit smoking about 11 years ago. Her smoking use included cigarettes. She has never used smokeless tobacco. She reports that she does not drink alcohol and does not use drugs. Lives at home, .    Family History  family history is not on file.     Allergies  Amantadine, Amitriptyline, Baclofen, Bactrim [sulfamethoxazole-trimethoprim], Gadolinium-containing contrast media, Iodinated contrast media, Penicillins, Topamax [topiramate], Valdecoxib, Zoloft [sertraline], and Rivastigmine    Meds  Current Outpatient Medications   Medication Instructions    acetaminophen  "(TYLENOL 8 HOUR) 1,300 mg, Daily    acetaminophen (TYLENOL) 1,000 mg, Daily    albuterol 90 mcg/actuation inhaler 2 puffs, Every 4 hours PRN    ALPRAZolam (XANAX) 0.5 mg, 3 times daily    busPIRone (BUSPAR) 15 mg, 2 times daily    busPIRone (BUSPAR) 10 mg, Daily    carbidopa-levodopa (Sinemet)  mg tablet 2 tablets, 3 times daily    diphenhydrAMINE-acetaminophen (Tylenol PM)  mg per tablet 2 tablets, Nightly PRN    donepezil (Aricept) 10 mg tablet 1 tablet, Nightly    FLUoxetine (PROZAC) 80 mg, oral, Daily    fluticasone-umeclidin-vilanter (TRELEGY-ELLIPTA) 200-62.5-25 mcg blister with device 1 puff, Daily    furosemide (LASIX) 20 mg, Daily PRN    lidocaine-diphenhydrAMINE-Maalox 1:1:1 Magic Mouthwash Take 5mL by mouth every 6 hours as needed    losartan (COZAAR) 50 mg, oral, Daily    lubiprostone (AMITIZA) 24 mcg, oral, 2 times daily    lubricating eye drops ophthalmic solution 1 drop, Both Eyes, As needed    melatonin 70 mg, Nightly    ondansetron (ZOFRAN) 8 mg, Every 8 hours PRN    potassium chloride CR 20 mEq ER tablet 1 tablet, Daily    pregabalin (LYRICA) 100 mg, 2 times daily    rosuvastatin (CRESTOR) 20 mg, Daily    sucralfate (CARAFATE) 1 g, 2 times daily    tiZANidine (ZANAFLEX) 4 mg, 2 times daily    traZODone (DESYREL) 50 mg, oral, Nightly    zolpidem (AMBIEN) 5 mg, oral, Nightly PRN, Do not crush, chew, or split.        Review of Systems   A complete 10 point review of systems was performed and is negative except as noted in the history of present illness.     Last Recorded Vitals  Blood pressure 147/73, pulse 86, temperature 36.4 °C (97.5 °F), temperature source Temporal, resp. rate 18, height 1.626 m (5' 4\"), weight 79 kg (174 lb 3.2 oz), SpO2 90%.    0-10 (Numeric) Pain Score: 3     Physical Exam  Constitutional: No acute distress. Sitting up in bedside chair.  Neuro: Alert, oriented. Follows commands. Tremulous.   Eyes: Extraocular motions intact. No scleral icterus. Conjunctiva pink. "   EENT: Mucous membranes dry. Normal dentition. Hears normal speaking voice.  Cardiovascular: Regular rate.   Respiratory: No increased work of breathing or audible wheeze. Equal expansion.  Abdomen: Soft, obese abdomen. Distended. Mildly tender throughout.  MSK: Moves all extremities. No atrophy.  Lymphatic: No palpable lymph nodes. No lymphedema.   Skin: Warm, dry, intact. No rashes or lesions.   Psychological: Appropriate mood and behavior.           Relevant Results  Laboratory Results:  CBC:   Lab Results   Component Value Date    WBC 7.7 06/20/2025    RBC 3.91 (L) 06/20/2025    HGB 11.9 (L) 06/20/2025    HCT 37.1 06/20/2025     06/20/2025       RFP:   Lab Results   Component Value Date     06/20/2025    K 4.6 06/20/2025     06/20/2025    CO2 30 06/20/2025    BUN 10 06/20/2025    CREATININE 0.69 06/20/2025    CALCIUM 9.2 06/20/2025        LFTs:   Lab Results   Component Value Date    PROT 6.6 06/20/2025    ALBUMIN 3.5 06/20/2025    BILITOT 0.4 06/20/2025    BILIDIR 0.1 06/20/2025    ALKPHOS 58 06/20/2025    AST 17 06/20/2025    ALT 13 06/20/2025         Imaging:  CT abdomen pelvis w IV and oral contrast  Narrative: Interpreted By:  Katherine Francis,   STUDY:  CT ABDOMEN PELVIS W IV AND ORAL CONTRAST;  6/20/2025 1:05 am      INDICATION:  Signs/Symptoms:Severe abdominal pain.      COMPARISON:  CT chest, abdomen, pelvis 06/15/2025.      ACCESSION NUMBER(S):  [id number]      ORDERING CLINICIAN:  LIANET MARTEL      TECHNIQUE:  Axial CT of the abdomen and pelvis was performed. Coronal and  sagittal reconstructions were performed.      Intravenous contrast material was administered without immediate  complication. Oral contrast was also administered.      FINDINGS:  There is motion artifact. There is streak artifact from the patient's  arms along the body and superimposed radiopaque densities.      LOWER CHEST:  Evaluation degraded by motion artifact.  Interval development of small bilateral pleural  effusions with  bibasilar airspace opacities. The heart is enlarged. No pericardial  effusion. There is a small hiatal hernia.      ABDOMEN:      LIVER:  The liver measures 16.9 cm in craniocaudal diameter.  There is a hypoenhancing lesion at the right hepatic lobe measuring  3.6 x 2.0 cm, indeterminate. Fluid attenuation lesion at the liver  measuring up to 2.5 cm are probably representing cysts.      BILE DUCTS:  There is dilation of the common bile duct to 1.1 cm.      GALLBLADDER:  Contracted.      PANCREAS:  No peripancreatic inflammatory changes.      SPLEEN:  Unremarkable.      ADRENAL GLANDS:  Unremarkable.      KIDNEYS AND URETERS:  Symmetrical renal enhancement.  No hydronephrosis or hydroureter is  identified. There is a 2.2 cm cyst at the superior pole of the left  kidney. Additional bilateral subcentimeter hypodensities at the  kidneys are too small to be adequately characterized.      PELVIS:      BLADDER:  Partially distended.      REPRODUCTIVE ORGANS:  The uterus is surgically absent.      BOWEL:  Oral contrast is present at the stomach and small bowel loops. The  distal small bowel and the colon are not opacified. No bowel  obstruction. The appendix is normal.      VESSELS:  Atherosclerotic calcifications within the abdominal aorta and its  branches. No abdominal aortic aneurysm.      PERITONEUM/RETROPERITONEUM/LYMPH NODES:  No free fluid or free air.  No retroperitoneal hemorrhage. No  pathologically enlarged lymph nodes are identified.      BONES AND ABDOMINAL WALL:  Multilevel degenerative changes are noted in the spine.  Postsurgical changes at the lumbar spine with posterior orthopedic  hardware at L4 and L5. Intervertebral disc spacers at L4-L5 and  L5-S1. The abdominal wall soft tissues are within normal limits.      Impression: 1.  No bowel obstruction.  2. Dilation of the common bile duct. Choledocholithiasis or biliary  stricture cannot be excluded by CT. MRCP can help in  further  evaluation.  3. 3.6 cm indeterminate hepatic lesion. This can be further evaluated  with nonemergent contrast-enhanced MRI.  4. Small hiatal hernia.  5. Interval development of small bilateral pleural effusions with  bibasilar airspace opacities, may be secondary to atelectasis or  pneumonia.          MACRO:  Critical Finding:  See findings. Notification was initiated on  6/20/2025 at 3:38 am by  Katherine Francis.  (**-YCF-**) Instructions:      Signed by: Katherine Francis 6/20/2025 3:38 AM  Dictation workstation:   [id number]         Assessment/Plan   This is a 80 y.o. female who was admitted 6/9/25 after presenting to the ED via EMS with complaints of vomiting, abdominal pain and shortness of breath. On arrival, she was noted to meet SIRS criteria with a lactic acidosis on 9, improved with fluids.     CT c/a/p 6/9: gallbladder without stones or inflammatory changes. No acute findings  Mesenteric US 6/18: patent vessels, difficult study due to body habitus  CT repeat 6/19 with contrast: contracted gallbladder, CBD 11mm    Patient continues to have abdominal pain and therefore surgery and GI consulted. Discussed case with medicine and GI, will plan for HIDA. Patient unable to get MRCP due to gadolinium allergy. If HIDA unrevealing, may need to proceed with MRCP with 13 hour allergy prep first.     Plan:  -- HIDA  -- GI consulted   -- Recommend scheduled tylenol, toradol, flexeril, gabapentin, prn narcotic for multimodal pain regimen  -- Recommend NPO if patient is continuing to have severe abdominal pain  -- Continue IV antibiotic coverage  -- IV fluid hydration, antiemetics  -- Further recommendation pending work up            Seen and discussed with Dr. Lala who is in agreement with plan. Please secure chat with questions.    Camila Ornelas PA-C

## 2025-06-20 NOTE — PROGRESS NOTES
Physical Therapy                 Therapy Communication Note    Patient Name: Fidelia Norman  MRN: [id number]  Department: [address]  Room: [address] [place], [place] [address]  Today's Date: 6/20/2025     Discipline: Physical Therapy    Missed Visit: PT Missed Visit: Yes     Missed Visit Reason: Missed Visit Reason: Patient in a medical procedure (second attempt today and patient away at testing. no tx.)    Missed Time: [id number]    Comment:

## 2025-06-20 NOTE — CONSULTS
Reason For Consult  Dilatation of CBD; severe abdominal pain     History Of Present Illness  Fidelia Norman is a 80 y.o. female  presenting to ED 6/15 for complaints of N/V, abdominal pain and SOB. Significant labs noted lactate 8.4, wbc 28.6 SIRS criteria met- fluids given. Patient admitted and being treated for possible aspiration pneumonia. Repeat CT shows contracted gallbladder and dilation of CBD.   Upon assessment patient endorses epigastric pain and diffuse abdominal pain distention that has been going on for close to a year. She denies N/V. States she has intermittent diarrhea and constipation but feels more constipated now. Denies melena or hematochezia. Patient is tolerating PO intakes but states she has discomfort after eating.       CT a/p Dilated CBD, small hiatal hernia, hepatic lesion and probable cysts and contracted gallbladder.     Patient seen by GI   Ordered for a EGD in  but  and not done   EGD and Colonoscopy at [facility] 2023    -Small hiatal hernia.   - Normal stomach. Biopsied.    - Normal duodenal bulb, first portion of the duodenum and second portion of the duodenum.  Biopsied  . 4 benign polyps removed from colon    Patient admitted to [facility] for abdominal pain -HIDA can done in - unremarkable. Symptoms suggestive of IBS with constipation.     Past Medical History  She has a past medical history of Cervical dystonia, Chronic respiratory failure with hypoxia, Class 1 obesity with body mass index (BMI) of 30.0 to 30.9 in adult (2024), COPD (chronic obstructive pulmonary disease), Dry eyes, bilateral, GERD (gastroesophageal reflux disease), Hiatal hernia, HTN (hypertension), Hypothyroid, and Parkinson's disease.    Surgical History  She has a past surgical history that includes Other surgical history (2020); Other surgical history (2020); MR angio head wo IV contrast (06/10/2022); MR angio neck wo IV contrast (06/10/2022); Elbow surgery (Right, 06/15/2020);  "and Wrist surgery (Right).     Social History  She reports that she quit smoking about 11 years ago. Her smoking use included cigarettes. She has never used smokeless tobacco. She reports that she does not drink alcohol and does not use drugs.    Family History  Family History[1]     Allergies  Amantadine, Amitriptyline, Baclofen, Bactrim [sulfamethoxazole-trimethoprim], Gadolinium-containing contrast media, Iodinated contrast media, Penicillins, Topamax [topiramate], Valdecoxib, Zoloft [sertraline], and Rivastigmine    Review of Systems  Review of Systems   Gastrointestinal:  Positive for abdominal distention and abdominal pain.   All other systems reviewed and are negative.        Physical Exam  Physical Exam  Vitals reviewed.   Constitutional:       Appearance: Normal appearance.   Eyes:      Extraocular Movements: Extraocular movements intact.   Cardiovascular:      Rate and Rhythm: Regular rhythm.      Heart sounds: Normal heart sounds.   Pulmonary:      Effort: Pulmonary effort is normal.      Breath sounds: Normal breath sounds.   Abdominal:      General: Bowel sounds are decreased. There is distension.      Palpations: Abdomen is soft.      Tenderness: There is generalized abdominal tenderness and tenderness in the epigastric area.   Genitourinary:     Rectum: Normal.   Skin:     General: Skin is warm.   Neurological:      Mental Status: She is alert and oriented to person, place, and time.   Psychiatric:         Behavior: Behavior normal.           Last Recorded Vitals  Blood pressure 147/73, pulse 86, temperature 36.4 °C (97.5 °F), temperature source Temporal, resp. rate 18, height 1.626 m (5' 4\"), weight 79 kg (174 lb 3.2 oz), SpO2 90%.    Relevant Results  Results for orders placed or performed during the hospital encounter of 06/15/25 (from the past 24 hours)   CBC   Result Value Ref Range    WBC 7.7 4.4 - 11.3 x10*3/uL    nRBC 0.0 0.0 - 0.0 /100 WBCs    RBC 3.91 (L) 4.00 - 5.20 x10*6/uL    Hemoglobin " 11.9 (L) 12.0 - 16.0 g/dL    Hematocrit 37.1 36.0 - 46.0 %    MCV 95 80 - 100 fL    MCH 30.4 26.0 - 34.0 pg    MCHC 32.1 32.0 - 36.0 g/dL    RDW 12.7 11.5 - 14.5 %    Platelets 199 150 - 450 x10*3/uL   Basic Metabolic Panel   Result Value Ref Range    Glucose 164 (H) 74 - 99 mg/dL    Sodium 138 136 - 145 mmol/L    Potassium 4.6 3.5 - 5.3 mmol/L    Chloride 100 98 - 107 mmol/L    Bicarbonate 30 21 - 32 mmol/L    Anion Gap 13 10 - 20 mmol/L    Urea Nitrogen 10 6 - 23 mg/dL    Creatinine 0.69 0.50 - 1.05 mg/dL    eGFR 89 >60 mL/min/1.73m*2    Calcium 9.2 8.6 - 10.3 mg/dL   Lactate   Result Value Ref Range    Lactate 0.6 0.4 - 2.0 mmol/L   C-reactive protein   Result Value Ref Range    C-Reactive Protein 1.94 (H) <1.00 mg/dL   Hepatic function panel   Result Value Ref Range    Albumin 3.5 3.4 - 5.0 g/dL    Bilirubin, Total 0.4 0.0 - 1.2 mg/dL    Bilirubin, Direct 0.1 0.0 - 0.3 mg/dL    Alkaline Phosphatase 58 33 - 136 U/L    ALT 13 7 - 45 U/L    AST 17 9 - 39 U/L    Total Protein 6.6 6.4 - 8.2 g/dL        CT abdomen pelvis w IV and oral contrast  Result Date: 6/20/2025  Interpreted By:  Katherine Francis, STUDY: CT ABDOMEN PELVIS W IV AND ORAL CONTRAST;  6/20/2025 1:05 am   INDICATION: Signs/Symptoms:Severe abdominal pain.   COMPARISON: CT chest, abdomen, pelvis 06/15/2025.   ACCESSION NUMBER(S): [id number]   ORDERING CLINICIAN: LIANET MARTEL   TECHNIQUE: Axial CT of the abdomen and pelvis was performed. Coronal and sagittal reconstructions were performed.   Intravenous contrast material was administered without immediate complication. Oral contrast was also administered.   FINDINGS: There is motion artifact. There is streak artifact from the patient's arms along the body and superimposed radiopaque densities.   LOWER CHEST: Evaluation degraded by motion artifact. Interval development of small bilateral pleural effusions with bibasilar airspace opacities. The heart is enlarged. No pericardial effusion. There is a small  hiatal hernia.   ABDOMEN:   LIVER: The liver measures 16.9 cm in craniocaudal diameter. There is a hypoenhancing lesion at the right hepatic lobe measuring 3.6 x 2.0 cm, indeterminate. Fluid attenuation lesion at the liver measuring up to 2.5 cm are probably representing cysts.   BILE DUCTS: There is dilation of the common bile duct to 1.1 cm.   GALLBLADDER: Contracted.   PANCREAS: No peripancreatic inflammatory changes.   SPLEEN: Unremarkable.   ADRENAL GLANDS: Unremarkable.   KIDNEYS AND URETERS: Symmetrical renal enhancement.  No hydronephrosis or hydroureter is identified. There is a 2.2 cm cyst at the superior pole of the left kidney. Additional bilateral subcentimeter hypodensities at the kidneys are too small to be adequately characterized.   PELVIS:   BLADDER: Partially distended.   REPRODUCTIVE ORGANS: The uterus is surgically absent.   BOWEL: Oral contrast is present at the stomach and small bowel loops. The distal small bowel and the colon are not opacified. No bowel obstruction. The appendix is normal.   VESSELS: Atherosclerotic calcifications within the abdominal aorta and its branches. No abdominal aortic aneurysm.   PERITONEUM/RETROPERITONEUM/LYMPH NODES: No free fluid or free air.  No retroperitoneal hemorrhage. No pathologically enlarged lymph nodes are identified.   BONES AND ABDOMINAL WALL: Multilevel degenerative changes are noted in the spine. Postsurgical changes at the lumbar spine with posterior orthopedic hardware at L4 and L5. Intervertebral disc spacers at L4-L5 and L5-S1. The abdominal wall soft tissues are within normal limits.       1.  No bowel obstruction. 2. Dilation of the common bile duct. Choledocholithiasis or biliary stricture cannot be excluded by CT. MRCP can help in further evaluation. 3. 3.6 cm indeterminate hepatic lesion. This can be further evaluated with nonemergent contrast-enhanced MRI. 4. Small hiatal hernia. 5. Interval development of small bilateral pleural  effusions with bibasilar airspace opacities, may be secondary to atelectasis or pneumonia.     MACRO: Critical Finding:  See findings. Notification was initiated on 6/20/2025 at 3:38 am by  Katherine Francis.  (**-YCF-**) Instructions:   Signed by: Katherine Francis 6/20/2025 3:38 AM Dictation workstation:   [id number]    Vascular US mesenteric artery duplex complete  Result Date: 6/19/2025          [place] [address]  Tel [phone] and Fax [phone]  Vascular Lab Report VASC US MESENTERIC ARTERY DUPLEX COMPLETE  Patient Name:      WILLIAM BRODERICK         Reading Physician:  Nell Crooks DO Study Date:        6/19/2025           Ordering Physician: [id number] LIANET MARTEL MRN/PID:           [id number]            Technologist:       Nicolasa FORD Accession#:        [id number]        Technologist 2: Date of Birth/Age: 1945 / 80 years Encounter#:         [id number] Gender:            F Admission Status:  Inpatient           Location Performed: [facility]  Diagnosis/ICD: r10.84 Generalized abdominal pain CPT Codes:     42027 Mesenteric Duplex scan  CONCLUSIONS: Mesenteric: The TR appears widely patent and Hepatic artery appears widely patent. The splenic artery is not well visualized. The patient was NPO for this study. Celiac and SMA appear patent. All vessels visualized in segments due to bowel gas and patient body habitus. Technically difficult study due to patient body habitus, bowel gas and patient body habitus. May wish for further means of evaluation.  Imaging & Doppler Findings:  AORTA    AP    Lateral Distal 1.50 cm 1.50 cm  Aorta PSV         135 cm/s Celiac Origin  cm/s Celiac Prox PSV   119 cm/s Celiac Mid PSV    75 cm/s Celiac Dist PSV   117 cm/s SMA Origin PSV    123 cm/s SMA Prox PSV      184 cm/s SMA Mid PSV       171 cm/s TR Prox PSV      87 cm/s Hepatic PSV       116 cm/s   [id number] Tyler Crooks DO Electronically signed by Nell Scott  Fried DO on 6/19/2025 at 2:20:23 PM  ** Final **     XR chest 1 view  Result Date: 6/18/2025  Interpreted By:  Vish Micehlle, STUDY: XR CHEST 1 VIEW;  6/17/2025 8:15 am   INDICATION: Signs/Symptoms:aspiration.     COMPARISON: 06/15/2020   ACCESSION NUMBER(S): [id number]   ORDERING CLINICIAN: TURNER ALMEIDA   FINDINGS: AP radiograph of the chest was provided.       CARDIOMEDIASTINAL SILHOUETTE: Stable cardiomediastinal silhouette.   LUNGS: No focal consolidation, pneumothorax or pleural effusion.   ABDOMEN: No remarkable upper abdominal findings.   BONES: No acute osseous changes.       1.  No evidence of acute cardiopulmonary process.       MACRO: None   Signed by: Vish Michelle 6/18/2025 10:29 PM Dictation workstation:   [id number]    ECG 12 lead  Result Date: 6/17/2025  Accelerated Junctional rhythm Nonspecific ST and T wave abnormality Abnormal ECG When compared with ECG of 17-JUN-2024 19:10, Junctional rhythm has replaced Sinus rhythm ST more depressed in Inferior leads Nonspecific T wave abnormality, improved in Lateral leads QT has shortened    CT chest abdomen pelvis wo IV contrast  Result Date: 6/15/2025  Interpreted By:  Acacia Pelletier, STUDY: CT CHEST ABDOMEN PELVIS WO CONTRAST;  6/15/2025 6:40 am   INDICATION: Signs/Symptoms:sob, abd pain, vomiting and diarhhea.   COMPARISON: Chest CT 07/19/2023, CT abdomen pelvis 06/17/2024   ACCESSION NUMBER(S): [id number]   ORDERING CLINICIAN: CAMMIE EDGE   TECHNIQUE: Axial noncontrast CT images of the chest, abdomen and pelvis with coronal and sagittal reconstructed images.   FINDINGS: Lack of intravenous contrast limits evaluation of vessel, solid organs and bowel.   CHEST:   VESSELS: No aortic aneurysm. HEART: Normal size.  No pericardial effusion. MEDIASTINUM AND JEAN: No pathologically enlarged thoracic lymph nodes. LUNG, PLEURA, LARGE AIRWAYS: No pleural effusion or pneumothorax. No pulmonary consolidation or suspicious pulmonary nodule. Mild  emphysema. CHEST WALL AND LOWER NECK: Within normal limits. No acute osseous abnormality. Exaggerated thoracic kyphosis. Diffuse thoracic degenerative disc changes.   ABDOMEN: Limited evaluation particularly the upper abdomen due to motion artifact and artifact from the patient's overlying arms.   BONES: No acute osseous abnormality. L4-5 laminectomy. Posterior fusion hardware at L4-5 and interbody spacers at L4-5 and L5-S1. No evidence of hardware complication. ABDOMINAL WALL: Within normal limits.   LIVER: 2 cm hypodense lesion in the right lobe of the liver is incompletely characterized, but grossly stable in size. BILE DUCTS: No biliary dilatation. GALLBLADDER: No calcified gallstones. No pericholecystic inflammatory changes. PANCREAS: No peripancreatic inflammatory stranding or duct dilatation. SPLEEN: Within normal limits. ADRENALS: Within normal limits. KIDNEYS: No hydronephrosis or perinephric fluid collection. No urinary tract calculus. URETERS: No hydroureter.   VESSELS: No aortic aneurysm. Moderate aortic calcification. RETROPERITONEUM: No pathologically enlarged lymph nodes.   PELVIS:   REPRODUCTIVE ORGANS: Status post hysterectomy. BLADDER: Within normal limits.   BOWEL: No dilated bowel.  Normal appendix. PERITONEUM: No ascites or free air, no fluid collection.       No acute abnormality of the chest, abdomen and pelvis.       MACRO: None   Signed by: Acacia Pelletier 6/15/2025 7:00 AM Dictation workstation:   [id number]    CT head wo IV contrast  Result Date: 6/15/2025  Interpreted By:  Acacia Pelletier, STUDY: CT HEAD WO IV CONTRAST;  6/15/2025 6:40 am   INDICATION: Signs/Symptoms:ams.   COMPARISON: 06/17/2024   ACCESSION NUMBER(S): [id number]   ORDERING CLINICIAN: CAMMIE EDGE   TECHNIQUE: Axial noncontrast CT images of the head.   FINDINGS: BRAIN PARENCHYMA: Mild deep and periventricular white matter hypodensities are nonspecific, but favored to represent chronic small vessel ischemic changes.   Gray-white matter interfaces are preserved. No mass effect or midline shift.   HEMORRHAGE: No acute intracranial hemorrhage. VENTRICLES and EXTRA-AXIAL SPACES: The ventricles and sulci are within normal limits in size for brain volume. No abnormal extraaxial fluid collection. EXTRACRANIAL SOFT TISSUES: Within normal limits. PARANASAL SINUSES/MASTOIDS:  The visualized paranasal sinuses and mastoid air cells are aerated. CALVARIUM: No depressed skull fracture. No destructive osseous lesion.   OTHER FINDINGS: None.       No acute intracranial abnormality.     MACRO: None   Signed by: Acacia Pelletier 6/15/2025 6:45 AM Dictation workstation:   [id number]    XR chest 1 view  Result Date: 6/15/2025  Interpreted By:  Acacia Pleletier, STUDY: XR CHEST 1 VIEW;  6/15/2025 5:32 am   INDICATION: Signs/Symptoms:sob.   COMPARISON: 06/17/2024   ACCESSION NUMBER(S): [id number]   ORDERING CLINICIAN: CAMMIE EDGE   FINDINGS:     CARDIOMEDIASTINAL SILHOUETTE: Cardiomediastinal silhouette is normal in size and configuration.   LUNGS: No pulmonary consolidation, pleural effusion or pneumothorax.   ABDOMEN: No remarkable upper abdominal findings.   BONES: No acute osseous abnormality.       No acute cardiopulmonary process.   MACRO: None   Signed by: Acacia Pelletier 6/15/2025 6:31 AM Dictation workstation:   [id number]     Scheduled medications  Scheduled Medications[2]  Continuous medications  Continuous Medications[3]  PRN medications  PRN Medications[4]      Assessment/Plan   80 y.o. female  presenting to ED 6/15 for complaints of N/V, abdominal pain and SOB. Significant labs noted lactate 8.4, wbc 28.6 SIRS criteria met- fluids given. Patient admitted and being treated for possible aspiration pneumonia. Repeat CT shows contracted gallbladder and dilation of CBD.   Patient worked up in the past with HIDA unremarkable and US with CBD 1.2cm.  Abdominal pain that has been ongoing for a year with normal LFTs, afebrile, no jaundice or  scleral icterus noted.    CT noting contracted GB, dilated CBD and stool throughout colon.  LFTS wnl, TB wnl.   Negative blood cultures     Etiology likely chronic cholecystitis with dilated CBD. CT noting stool burden in colon- abdominal pain likely related to IBS-constipation with overflow diarrhea.     HIDA scan-consider follow up MRCP   NPO   Surgery consulted   Continue antiemetics and analgesics as needed  IV fluids     GI will continue to follow   Plan discussed with Dr Haim Prieto, APRN-CNP         [1] No family history on file.  [2] ALPRAZolam, 0.5 mg, oral, TID  budesonide, 0.25 mg, nebulization, BID   And  ipratropium-albuteroL, 3 mL, nebulization, TID  buPROPion XL, 150 mg, oral, q24h  busPIRone, 10 mg, oral, Daily  busPIRone, 15 mg, oral, BID  carbidopa-levodopa, 2 tablet, oral, TID  donepezil, 10 mg, oral, Nightly  enoxaparin, 40 mg, subcutaneous, Daily  FLUoxetine, 80 mg, oral, Daily  iohexol, 500 mL, oral, Once in imaging  lubiprostone, 8 mcg, oral, BID  nystatin, 1 Application, Topical, BID  pantoprazole, 40 mg, intravenous, BID  pregabalin, 100 mg, oral, BID  sennosides-docusate sodium, 2 tablet, oral, BID  sucralfate, 1 g, oral, q6h [place]  traZODone, 50 mg, oral, Nightly  zolpidem, 10 mg, oral, Nightly  [3]    [4] PRN medications: acetaminophen, acetaminophen, albuterol, bisacodyl, bisacodyl, ondansetron, oxygen, promethazine, tiZANidine

## 2025-06-20 NOTE — CARE PLAN
Pt found on RA 92% states she wears oxygen during the day PRN and 2L @ night. We will cont to monitor

## 2025-06-20 NOTE — CARE PLAN
The patient's goals for the shift include      The clinical goals for the shift include Patient will report pain <5      Problem: Pain - Adult  Goal: Verbalizes/displays adequate comfort level or baseline comfort level  Outcome: Progressing     Problem: Safety - Adult  Goal: Free from fall injury  Outcome: Progressing     Problem: Discharge Planning  Goal: Discharge to home or other facility with appropriate resources  Outcome: Progressing     Problem: Chronic Conditions and Co-morbidities  Goal: Patient's chronic conditions and co-morbidity symptoms are monitored and maintained or improved  Outcome: Progressing     Problem: Nutrition  Goal: Nutrient intake appropriate for maintaining nutritional needs  Outcome: Progressing     Problem: Skin  Goal: Decreased wound size/increased tissue granulation at next dressing change  Outcome: Progressing  Flowsheets (Taken 6/19/2025 2256 by Sammi Johnson, RN)  Decreased wound size/increased tissue granulation at next dressing change:   Promote sleep for wound healing   Protective dressings over bony prominences  Goal: Participates in plan/prevention/treatment measures  Outcome: Progressing  Flowsheets (Taken 6/19/2025 2256 by Sammi Johnson RN)  Participates in plan/prevention/treatment measures: Elevate heels  Goal: Prevent/manage excess moisture  Outcome: Progressing  Flowsheets (Taken 6/19/2025 2256 by Sammi Johnson, RN)  Prevent/manage excess moisture:   Cleanse incontinence/protect with barrier cream   Monitor for/manage infection if present   Moisturize dry skin  Goal: Prevent/minimize sheer/friction injuries  Outcome: Progressing  Flowsheets (Taken 6/19/2025 2256 by Sammi Johnson, RN)  Prevent/minimize sheer/friction injuries:   Complete micro-shifts as needed if patient unable. Adjust patient position to relieve pressure points, not a full turn   Increase activity/out of bed for meals   HOB 30 degrees or less   Turn/reposition every 2 hours/use  positioning/transfer devices  Goal: Promote/optimize nutrition  Outcome: Progressing  Flowsheets (Taken 6/19/2025 2256 by Sammi Johnson RN)  Promote/optimize nutrition: Monitor/record intake including meals  Goal: Promote skin healing  Outcome: Progressing  Flowsheets (Taken 6/19/2025 2256 by Sammi Johnson RN)  Promote skin healing:   Assess skin/pad under line(s)/device(s)   Protective dressings over bony prominences

## 2025-06-20 NOTE — PROGRESS NOTES
06/20/25 0743   Discharge Planning   Living Arrangements Spouse/significant other   Support Systems Children;Spouse/significant other   Assistance Needed A&OX4 and dependent for assist for ADLs and ambulates with cane; doesn't drive; 2L NC @ HS (need new O2 supplier-was [employer] but they are now out of network with insurance)currently room air; PCP Dr Abdirahman [place]   Type of Residence Private residence   Number of Stairs to Enter Residence 4   Number of Stairs Within Residence 0   Do you have animals or pets at home? No   Who is requesting discharge planning? Provider   Type of Post Acute Facility Services Skilled nursing   Expected Discharge Disposition SNF  (AUTH received and is good from 6/18/25 to 6/22/25 (5 days) for transition to [facility])   Does the patient need discharge transport arranged? Yes   RoundTrip coordination needed? Yes   Has discharge transport been arranged? No   Financial Resource Strain   How hard is it for you to pay for the very basics like food, housing, medical care, and heating? Not hard   Housing Stability   In the last 12 months, was there a time when you were not able to pay the mortgage or rent on time? N   In the past 12 months, how many times have you moved where you were living? 0   At any time in the past 12 months, were you homeless or living in a shelter (including now)? N   Transportation Needs   In the past 12 months, has lack of transportation kept you from medical appointments or from getting medications? no   In the past 12 months, has lack of transportation kept you from meetings, work, or from getting things needed for daily living? No

## 2025-06-20 NOTE — CARE PLAN
The clinical goals for the shift include Patient will remain HDS and tolerate CT scan this shift      Problem: Safety - Adult  Goal: Free from fall injury  Outcome: Progressing     Problem: Pain - Adult  Goal: Verbalizes/displays adequate comfort level or baseline comfort level  Outcome: Progressing     Problem: Chronic Conditions and Co-morbidities  Goal: Patient's chronic conditions and co-morbidity symptoms are monitored and maintained or improved  Outcome: Progressing

## 2025-06-20 NOTE — PROGRESS NOTES
Fidelia Norman is a 80 y.o. female on day 5 of admission presenting with Sepsis, due to unspecified organism, unspecified whether acute organ dysfunction present (Multi).    Subjective   Interval History: no fever, no new complaints        Review of Systems    Objective   Range of Vitals (last 24 hours)  Heart Rate:  [68-94]   Temp:  [36.2 °C (97.2 °F)-36.6 °C (97.9 °F)]   Resp:  [15-20]   BP: (105-157)/(71-91)   SpO2:  [89 %-97 %]   Daily Weight  06/15/25 : 79 kg (174 lb 3.2 oz)    Body mass index is 29.9 kg/m².    Physical Exam  Constitutional:       Appearance: Normal appearance.   HENT:      Head: Normocephalic and atraumatic.      Mouth/Throat:      Mouth: Mucous membranes are moist.      Pharynx: Oropharynx is clear.   Eyes:      Pupils: Pupils are equal, round, and reactive to light.   Cardiovascular:      Rate and Rhythm: Normal rate and regular rhythm.      Heart sounds: Normal heart sounds.   Pulmonary:      Effort: Pulmonary effort is normal.      Breath sounds: Normal breath sounds.   Abdominal:      General: Abdomen is flat. Bowel sounds are normal.      Palpations: Abdomen is soft.   Musculoskeletal:      Cervical back: Normal range of motion.   Neurological:      Mental Status: She is alert.         Antibiotics  This patient does not have an active medication from one of the medication groupers.    Relevant Results  Labs  Results from last 72 hours   Lab Units 06/20/25  0509 06/19/25  0642 06/18/25  0523   WBC AUTO x10*3/uL 7.7 6.8 9.1   HEMOGLOBIN g/dL 11.9* 11.6* 11.3*   HEMATOCRIT % 37.1 36.7 36.4   PLATELETS AUTO x10*3/uL 199 159 168     Results from last 72 hours   Lab Units 06/20/25  0509 06/19/25  0642 06/18/25  0523   SODIUM mmol/L 138 142 142   POTASSIUM mmol/L 4.6 4.2 4.2   CHLORIDE mmol/L 100 106 107   CO2 mmol/L 30 28 29   BUN mg/dL 10 6 9   CREATININE mg/dL 0.69 0.79 0.82   GLUCOSE mg/dL 164* 99 86   CALCIUM mg/dL 9.2 8.9 8.7   ANION GAP mmol/L 13 12 10   EGFR mL/min/1.73m*2 89 77 73      Results from last 72 hours   Lab Units 06/20/25  0509   ALK PHOS U/L 58   BILIRUBIN TOTAL mg/dL 0.4   BILIRUBIN DIRECT mg/dL 0.1   PROTEIN TOTAL g/dL 6.6   ALT U/L 13   AST U/L 17   ALBUMIN g/dL 3.5     Estimated Creatinine Clearance: 68.3 mL/min (by C-G formula based on SCr of 0.69 mg/dL).  C-Reactive Protein   Date Value Ref Range Status   06/20/2025 1.94 (H) <1.00 mg/dL Final   06/19/2025 2.40 (H) <1.00 mg/dL Final     CRP   Date Value Ref Range Status   12/23/2020 9.84 (A) mg/dL Final     Comment:     REF VALUE  < 1.00       Microbiology  Reviewed  Imaging  Reviewed       Assessment/Plan   Abdominal pain, mesenteric US without obvious disease, ct was reviewed  Leukocytosis  Elevated lactate  Encephalopathy, likely metabolic     Recommendations :  Supportive care  Surgery evaluation  Discussed with the medical team     I spent minutes in the professional and overall care of this patient  The cultures and susceptibilities were reviewed and discussed with the micro lab, the antibiotics stewardship guidelines were reviewed, the infection control protocols and recommendations were reviewed with the patient and the staff                 Omega Carballo MD

## 2025-06-20 NOTE — PROGRESS NOTES
Fidelia Norman is a 80 y.o. female on day 5 of admission presenting with Sepsis, due to unspecified organism, unspecified whether acute organ dysfunction present (Multi).      Subjective   The patient complains of severe abdominal pain this morning. CT abdomen/pelvis with IV completed this morning.      Objective     Last Recorded Vitals  /83 (BP Location: Right arm, Patient Position: Lying)   Pulse 83   Temp 36.7 °C (98.1 °F) (Temporal)   Resp 18   Wt 79 kg (174 lb 3.2 oz)   SpO2 96%   Intake/Output last 3 Shifts:    Intake/Output Summary (Last 24 hours) at 6/20/2025 1516  Last data filed at 6/20/2025 1431  Gross per 24 hour   Intake 540 ml   Output 2600 ml   Net -2060 ml       Admission Weight  Weight: 77.1 kg (170 lb) (06/15/25 0516)    Daily Weight  06/15/25 : 79 kg (174 lb 3.2 oz)    Image Results  CT abdomen pelvis w IV and oral contrast  Narrative: Interpreted By:  Katherine Francis,   STUDY:  CT ABDOMEN PELVIS W IV AND ORAL CONTRAST;  6/20/2025 1:05 am      INDICATION:  Signs/Symptoms:Severe abdominal pain.      COMPARISON:  CT chest, abdomen, pelvis 06/15/2025.      ACCESSION NUMBER(S):  [id number]      ORDERING CLINICIAN:  LIANET MARTEL      TECHNIQUE:  Axial CT of the abdomen and pelvis was performed. Coronal and  sagittal reconstructions were performed.      Intravenous contrast material was administered without immediate  complication. Oral contrast was also administered.      FINDINGS:  There is motion artifact. There is streak artifact from the patient's  arms along the body and superimposed radiopaque densities.      LOWER CHEST:  Evaluation degraded by motion artifact.  Interval development of small bilateral pleural effusions with  bibasilar airspace opacities. The heart is enlarged. No pericardial  effusion. There is a small hiatal hernia.      ABDOMEN:      LIVER:  The liver measures 16.9 cm in craniocaudal diameter.  There is a hypoenhancing lesion at the right hepatic lobe measuring  3.6  x 2.0 cm, indeterminate. Fluid attenuation lesion at the liver  measuring up to 2.5 cm are probably representing cysts.      BILE DUCTS:  There is dilation of the common bile duct to 1.1 cm.      GALLBLADDER:  Contracted.      PANCREAS:  No peripancreatic inflammatory changes.      SPLEEN:  Unremarkable.      ADRENAL GLANDS:  Unremarkable.      KIDNEYS AND URETERS:  Symmetrical renal enhancement.  No hydronephrosis or hydroureter is  identified. There is a 2.2 cm cyst at the superior pole of the left  kidney. Additional bilateral subcentimeter hypodensities at the  kidneys are too small to be adequately characterized.      PELVIS:      BLADDER:  Partially distended.      REPRODUCTIVE ORGANS:  The uterus is surgically absent.      BOWEL:  Oral contrast is present at the stomach and small bowel loops. The  distal small bowel and the colon are not opacified. No bowel  obstruction. The appendix is normal.      VESSELS:  Atherosclerotic calcifications within the abdominal aorta and its  branches. No abdominal aortic aneurysm.      PERITONEUM/RETROPERITONEUM/LYMPH NODES:  No free fluid or free air.  No retroperitoneal hemorrhage. No  pathologically enlarged lymph nodes are identified.      BONES AND ABDOMINAL WALL:  Multilevel degenerative changes are noted in the spine.  Postsurgical changes at the lumbar spine with posterior orthopedic  hardware at L4 and L5. Intervertebral disc spacers at L4-L5 and  L5-S1. The abdominal wall soft tissues are within normal limits.      Impression: 1.  No bowel obstruction.  2. Dilation of the common bile duct. Choledocholithiasis or biliary  stricture cannot be excluded by CT. MRCP can help in further  evaluation.  3. 3.6 cm indeterminate hepatic lesion. This can be further evaluated  with nonemergent contrast-enhanced MRI.  4. Small hiatal hernia.  5. Interval development of small bilateral pleural effusions with  bibasilar airspace opacities, may be secondary to atelectasis  or  pneumonia.          MACRO:  Critical Finding:  See findings. Notification was initiated on  6/20/2025 at 3:38 am by  Katherine Penny.  (**-YCF-**) Instructions:      Signed by: Katherine Francis 6/20/2025 3:38 AM  Dictation workstation:   [id number]      Physical Exam  Constitutional:       Appearance: Normal appearance.   HENT:      Mouth/Throat:      Mouth: Mucous membranes are dry.   Eyes:      Extraocular Movements: Extraocular movements intact.   Cardiovascular:      Rate and Rhythm: Normal rate.   Abdominal:      General: There is distension.      Palpations: Abdomen is soft.      Tenderness: There is abdominal tenderness.   Musculoskeletal:      Right lower leg: Edema present.      Left lower leg: Edema present.   Neurological:      Mental Status: She is oriented to person, place, and time.   Psychiatric:         Mood and Affect: Mood is anxious.         Relevant Results      Results for orders placed or performed during the hospital encounter of 06/15/25 (from the past 24 hours)   CBC   Result Value Ref Range    WBC 7.7 4.4 - 11.3 x10*3/uL    nRBC 0.0 0.0 - 0.0 /100 WBCs    RBC 3.91 (L) 4.00 - 5.20 x10*6/uL    Hemoglobin 11.9 (L) 12.0 - 16.0 g/dL    Hematocrit 37.1 36.0 - 46.0 %    MCV 95 80 - 100 fL    MCH 30.4 26.0 - 34.0 pg    MCHC 32.1 32.0 - 36.0 g/dL    RDW 12.7 11.5 - 14.5 %    Platelets 199 150 - 450 x10*3/uL   Basic Metabolic Panel   Result Value Ref Range    Glucose 164 (H) 74 - 99 mg/dL    Sodium 138 136 - 145 mmol/L    Potassium 4.6 3.5 - 5.3 mmol/L    Chloride 100 98 - 107 mmol/L    Bicarbonate 30 21 - 32 mmol/L    Anion Gap 13 10 - 20 mmol/L    Urea Nitrogen 10 6 - 23 mg/dL    Creatinine 0.69 0.50 - 1.05 mg/dL    eGFR 89 >60 mL/min/1.73m*2    Calcium 9.2 8.6 - 10.3 mg/dL   Lactate   Result Value Ref Range    Lactate 0.6 0.4 - 2.0 mmol/L   C-reactive protein   Result Value Ref Range    C-Reactive Protein 1.94 (H) <1.00 mg/dL   Hepatic function panel   Result Value Ref Range    Albumin 3.5  3.4 - 5.0 g/dL    Bilirubin, Total 0.4 0.0 - 1.2 mg/dL    Bilirubin, Direct 0.1 0.0 - 0.3 mg/dL    Alkaline Phosphatase 58 33 - 136 U/L    ALT 13 7 - 45 U/L    AST 17 9 - 39 U/L    Total Protein 6.6 6.4 - 8.2 g/dL                             Assessment & Plan    Fidelia Norman is a 80 y.o. female with a medical history of HTN, COPD/Asthma, and Parkinson's Disease, who presented to ED with c/o N/V, abdominal pain and shortness of breath. In the ED, found to have marked lactic acidosis with SIRS.      SIRS/Lactic  Acidosis  -Unclear etiology but likely related to GI losses and/or aspiration  -Lactate normalized after IV fluids given  -Treating with antibiotics (Cefepime, Vancomycin) for possible aspiration pneumonia discontinued on 6/18/2025  -SLP advanced diet to solids   -Continue with bronchodilators, bronchial hygiene  - BC negative x 3 days  -T. Max 100.9  -ID following  -Procalcitonin level 0.16  -PT/ OT rec moderate intensity therapy    Abdominal pain, severe   IBS  Constipation- resolved  -Mesenteric ultrasound negative  -Bowel regimen added  -Pain management with scheduled Toradol, robaxin, acetaminophen, and Dilaudid for breakthrough pain  -CT abdomen/pelvis completed after premedication for allergy. The scan shows CBD dilatation.   -General surgery rec Hida scan (pending results)    Chronic Hypoxic Respiratory Failure on 2-3 L  COPD   Asthma  -Maintain bronchial hygiene    Hypomagnesemia-resolved  Hypokalemia-resolved  -Due to GI losses  -Replaced will repeat levels     Hypertension  -Medical therapy: Losartan, Lasix  -Hold Lasix due to acute dehydration on admission     Parkinson's Disease  -Carbidopa-Levadopa     GERD  -On PPI, Sucralfate     Chronic Back Pain/Cervical Dystonia  -On Lyrica, Tizanidine  -Supportive care     DVT Prophylaxis  -On Lovenox     Dispo: Will discharge to [facility], when the patient is medically ready for discharge.  The authorization is good from 6/18- 6/22.         Doris  ERLINDA Blake, APRN-CNP

## 2025-06-20 NOTE — PROGRESS NOTES
Occupational Therapy                 Therapy Communication Note    Patient Name: Fidelia Norman  MRN: [id number]  Department: [address]  Room: [address] [place] [address]-A  Today's Date: 6/20/2025     Discipline: Occupational Therapy    Missed Time: Attempted @1350    Comment: Chart reviewed, RN cleared pt for session. Pt was out of the room for a period of time throughout the day. When pt returned to room she said she did not feel well and did not want to participate on this date. Encouragement and education provided. RN aware of no session.

## 2025-06-21 LAB
ANION GAP SERPL CALC-SCNC: 12 MMOL/L (ref 10–20)
BUN SERPL-MCNC: 15 MG/DL (ref 6–23)
CALCIUM SERPL-MCNC: 8.6 MG/DL (ref 8.6–10.3)
CHLORIDE SERPL-SCNC: 100 MMOL/L (ref 98–107)
CO2 SERPL-SCNC: 31 MMOL/L (ref 21–32)
CREAT SERPL-MCNC: 1.01 MG/DL (ref 0.5–1.05)
CRP SERPL-MCNC: 0.59 MG/DL
EGFRCR SERPLBLD CKD-EPI 2021: 57 ML/MIN/1.73M*2
ERYTHROCYTE [DISTWIDTH] IN BLOOD BY AUTOMATED COUNT: 12.9 % (ref 11.5–14.5)
GLUCOSE SERPL-MCNC: 86 MG/DL (ref 74–99)
HCT VFR BLD AUTO: 34.5 % (ref 36–46)
HGB BLD-MCNC: 10.9 G/DL (ref 12–16)
LACTATE SERPL-SCNC: 1.4 MMOL/L (ref 0.4–2)
MCH RBC QN AUTO: 30.3 PG (ref 26–34)
MCHC RBC AUTO-ENTMCNC: 31.6 G/DL (ref 32–36)
MCV RBC AUTO: 96 FL (ref 80–100)
NRBC BLD-RTO: 0 /100 WBCS (ref 0–0)
PLATELET # BLD AUTO: 190 X10*3/UL (ref 150–450)
POTASSIUM SERPL-SCNC: 3.5 MMOL/L (ref 3.5–5.3)
RBC # BLD AUTO: 3.6 X10*6/UL (ref 4–5.2)
SODIUM SERPL-SCNC: 139 MMOL/L (ref 136–145)
WBC # BLD AUTO: 8.7 X10*3/UL (ref 4.4–11.3)

## 2025-06-21 PROCEDURE — 2500000001 HC RX 250 WO HCPCS SELF ADMINISTERED DRUGS (ALT 637 FOR MEDICARE OP): Performed by: INTERNAL MEDICINE

## 2025-06-21 PROCEDURE — 2500000002 HC RX 250 W HCPCS SELF ADMINISTERED DRUGS (ALT 637 FOR MEDICARE OP, ALT 636 FOR OP/ED): Performed by: NURSE PRACTITIONER

## 2025-06-21 PROCEDURE — 2500000002 HC RX 250 W HCPCS SELF ADMINISTERED DRUGS (ALT 637 FOR MEDICARE OP, ALT 636 FOR OP/ED): Performed by: INTERNAL MEDICINE

## 2025-06-21 PROCEDURE — 2500000004 HC RX 250 GENERAL PHARMACY W/ HCPCS (ALT 636 FOR OP/ED): Performed by: INTERNAL MEDICINE

## 2025-06-21 PROCEDURE — 83605 ASSAY OF LACTIC ACID: CPT | Performed by: INTERNAL MEDICINE

## 2025-06-21 PROCEDURE — 94760 N-INVAS EAR/PLS OXIMETRY 1: CPT

## 2025-06-21 PROCEDURE — 2500000004 HC RX 250 GENERAL PHARMACY W/ HCPCS (ALT 636 FOR OP/ED): Mod: JZ | Performed by: NURSE PRACTITIONER

## 2025-06-21 PROCEDURE — 2500000001 HC RX 250 WO HCPCS SELF ADMINISTERED DRUGS (ALT 637 FOR MEDICARE OP): Performed by: NURSE PRACTITIONER

## 2025-06-21 PROCEDURE — 1100000001 HC PRIVATE ROOM DAILY

## 2025-06-21 PROCEDURE — 97530 THERAPEUTIC ACTIVITIES: CPT | Mod: GP,CQ

## 2025-06-21 PROCEDURE — 36415 COLL VENOUS BLD VENIPUNCTURE: CPT | Performed by: INTERNAL MEDICINE

## 2025-06-21 PROCEDURE — 80048 BASIC METABOLIC PNL TOTAL CA: CPT | Performed by: NURSE PRACTITIONER

## 2025-06-21 PROCEDURE — 86140 C-REACTIVE PROTEIN: CPT | Performed by: INTERNAL MEDICINE

## 2025-06-21 PROCEDURE — 2500000005 HC RX 250 GENERAL PHARMACY W/O HCPCS: Performed by: INTERNAL MEDICINE

## 2025-06-21 PROCEDURE — 97530 THERAPEUTIC ACTIVITIES: CPT | Mod: GO,CO

## 2025-06-21 PROCEDURE — 94640 AIRWAY INHALATION TREATMENT: CPT

## 2025-06-21 PROCEDURE — 97116 GAIT TRAINING THERAPY: CPT | Mod: GP,CQ

## 2025-06-21 PROCEDURE — 99232 SBSQ HOSP IP/OBS MODERATE 35: CPT | Performed by: NURSE PRACTITIONER

## 2025-06-21 PROCEDURE — 97535 SELF CARE MNGMENT TRAINING: CPT | Mod: GO,CO

## 2025-06-21 PROCEDURE — 85027 COMPLETE CBC AUTOMATED: CPT | Performed by: NURSE PRACTITIONER

## 2025-06-21 RX ORDER — POTASSIUM CHLORIDE 20 MEQ/1
20 TABLET, EXTENDED RELEASE ORAL ONCE
Status: DISCONTINUED | OUTPATIENT
Start: 2025-06-21 | End: 2025-06-22 | Stop reason: HOSPADM

## 2025-06-21 RX ADMIN — FLUOXETINE HYDROCHLORIDE 80 MG: 20 CAPSULE ORAL at 08:03

## 2025-06-21 RX ADMIN — KETOROLAC TROMETHAMINE 15 MG: 15 INJECTION, SOLUTION INTRAMUSCULAR; INTRAVENOUS at 14:32

## 2025-06-21 RX ADMIN — BUSPIRONE HYDROCHLORIDE 15 MG: 15 TABLET ORAL at 20:04

## 2025-06-21 RX ADMIN — SUCRALFATE 1 G: 1 SUSPENSION ORAL at 11:45

## 2025-06-21 RX ADMIN — Medication 2 L/MIN: at 19:11

## 2025-06-21 RX ADMIN — DONEPEZIL HYDROCHLORIDE 10 MG: 5 TABLET ORAL at 20:05

## 2025-06-21 RX ADMIN — ZOLPIDEM TARTRATE 10 MG: 5 TABLET ORAL at 22:58

## 2025-06-21 RX ADMIN — LUBIPROSTONE 8 MCG: 8 CAPSULE, GELATIN COATED ORAL at 20:06

## 2025-06-21 RX ADMIN — BUSPIRONE HYDROCHLORIDE 15 MG: 15 TABLET ORAL at 08:04

## 2025-06-21 RX ADMIN — NYSTATIN 1 APPLICATION: 100000 POWDER TOPICAL at 20:10

## 2025-06-21 RX ADMIN — BUDESONIDE 0.25 MG: 0.25 INHALANT RESPIRATORY (INHALATION) at 19:11

## 2025-06-21 RX ADMIN — CARBIDOPA AND LEVODOPA 2 TABLET: 25; 100 TABLET ORAL at 20:05

## 2025-06-21 RX ADMIN — CARBIDOPA AND LEVODOPA 2 TABLET: 25; 100 TABLET ORAL at 08:02

## 2025-06-21 RX ADMIN — ACETAMINOPHEN 975 MG: 325 TABLET, FILM COATED ORAL at 14:32

## 2025-06-21 RX ADMIN — IPRATROPIUM BROMIDE AND ALBUTEROL SULFATE 3 ML: 2.5; .5 SOLUTION RESPIRATORY (INHALATION) at 19:11

## 2025-06-21 RX ADMIN — BUDESONIDE 0.25 MG: 0.25 INHALANT RESPIRATORY (INHALATION) at 07:33

## 2025-06-21 RX ADMIN — ACETAMINOPHEN 975 MG: 325 TABLET, FILM COATED ORAL at 22:57

## 2025-06-21 RX ADMIN — SUCRALFATE 1 G: 1 SUSPENSION ORAL at 06:18

## 2025-06-21 RX ADMIN — ACETAMINOPHEN 975 MG: 325 TABLET, FILM COATED ORAL at 06:18

## 2025-06-21 RX ADMIN — BUPROPION HYDROCHLORIDE 150 MG: 150 TABLET, EXTENDED RELEASE ORAL at 11:45

## 2025-06-21 RX ADMIN — KETOROLAC TROMETHAMINE 15 MG: 15 INJECTION, SOLUTION INTRAMUSCULAR; INTRAVENOUS at 08:04

## 2025-06-21 RX ADMIN — KETOROLAC TROMETHAMINE 15 MG: 15 INJECTION, SOLUTION INTRAMUSCULAR; INTRAVENOUS at 02:27

## 2025-06-21 RX ADMIN — IPRATROPIUM BROMIDE AND ALBUTEROL SULFATE 3 ML: 2.5; .5 SOLUTION RESPIRATORY (INHALATION) at 13:54

## 2025-06-21 RX ADMIN — SENNOSIDES AND DOCUSATE SODIUM 2 TABLET: 50; 8.6 TABLET ORAL at 08:03

## 2025-06-21 RX ADMIN — PANTOPRAZOLE SODIUM 40 MG: 40 INJECTION, POWDER, FOR SOLUTION INTRAVENOUS at 20:07

## 2025-06-21 RX ADMIN — SUCRALFATE 1 G: 1 SUSPENSION ORAL at 17:08

## 2025-06-21 RX ADMIN — ALPRAZOLAM 0.5 MG: 0.5 TABLET ORAL at 14:32

## 2025-06-21 RX ADMIN — BUSPIRONE HYDROCHLORIDE 10 MG: 15 TABLET ORAL at 08:04

## 2025-06-21 RX ADMIN — PANTOPRAZOLE SODIUM 40 MG: 40 INJECTION, POWDER, FOR SOLUTION INTRAVENOUS at 08:04

## 2025-06-21 RX ADMIN — ENOXAPARIN SODIUM 40 MG: 100 INJECTION SUBCUTANEOUS at 08:03

## 2025-06-21 RX ADMIN — IPRATROPIUM BROMIDE AND ALBUTEROL SULFATE 3 ML: 2.5; .5 SOLUTION RESPIRATORY (INHALATION) at 07:33

## 2025-06-21 RX ADMIN — KETOROLAC TROMETHAMINE 15 MG: 15 INJECTION, SOLUTION INTRAMUSCULAR; INTRAVENOUS at 20:07

## 2025-06-21 RX ADMIN — Medication 1 CAPSULE: at 17:08

## 2025-06-21 RX ADMIN — SUCRALFATE 1 G: 1 SUSPENSION ORAL at 22:57

## 2025-06-21 RX ADMIN — ALPRAZOLAM 0.5 MG: 0.5 TABLET ORAL at 08:03

## 2025-06-21 RX ADMIN — TRAZODONE HYDROCHLORIDE 50 MG: 50 TABLET ORAL at 20:06

## 2025-06-21 RX ADMIN — LUBIPROSTONE 8 MCG: 8 CAPSULE, GELATIN COATED ORAL at 08:04

## 2025-06-21 RX ADMIN — CARBIDOPA AND LEVODOPA 2 TABLET: 25; 100 TABLET ORAL at 14:32

## 2025-06-21 RX ADMIN — ALPRAZOLAM 0.5 MG: 0.5 TABLET ORAL at 20:04

## 2025-06-21 RX ADMIN — NYSTATIN 1 APPLICATION: 100000 POWDER TOPICAL at 08:05

## 2025-06-21 RX ADMIN — PREGABALIN 100 MG: 50 CAPSULE ORAL at 20:04

## 2025-06-21 RX ADMIN — PREGABALIN 100 MG: 50 CAPSULE ORAL at 08:02

## 2025-06-21 ASSESSMENT — COGNITIVE AND FUNCTIONAL STATUS - GENERAL
DAILY ACTIVITIY SCORE: 20
DRESSING REGULAR UPPER BODY CLOTHING: A LITTLE
PERSONAL GROOMING: A LITTLE
CLIMB 3 TO 5 STEPS WITH RAILING: A LOT
TOILETING: TOTAL
MOVING FROM LYING ON BACK TO SITTING ON SIDE OF FLAT BED WITH BEDRAILS: A LITTLE
DRESSING REGULAR LOWER BODY CLOTHING: A LITTLE
STANDING UP FROM CHAIR USING ARMS: A LITTLE
MOVING TO AND FROM BED TO CHAIR: A LITTLE
DRESSING REGULAR LOWER BODY CLOTHING: TOTAL
MOBILITY SCORE: 18
STANDING UP FROM CHAIR USING ARMS: A LITTLE
WALKING IN HOSPITAL ROOM: A LOT
DRESSING REGULAR UPPER BODY CLOTHING: A LITTLE
DAILY ACTIVITIY SCORE: 14
WALKING IN HOSPITAL ROOM: A LITTLE
MOBILITY SCORE: 18
TURNING FROM BACK TO SIDE WHILE IN FLAT BAD: A LITTLE
HELP NEEDED FOR BATHING: A LITTLE
HELP NEEDED FOR BATHING: A LOT
MOVING TO AND FROM BED TO CHAIR: A LITTLE
CLIMB 3 TO 5 STEPS WITH RAILING: A LITTLE
TOILETING: A LITTLE

## 2025-06-21 ASSESSMENT — PAIN - FUNCTIONAL ASSESSMENT
PAIN_FUNCTIONAL_ASSESSMENT: 0-10
PAIN_FUNCTIONAL_ASSESSMENT: 0-10

## 2025-06-21 ASSESSMENT — PAIN SCALES - GENERAL
PAINLEVEL_OUTOF10: 6
PAINLEVEL_OUTOF10: 3
PAINLEVEL_OUTOF10: 0 - NO PAIN
PAINLEVEL_OUTOF10: 0 - NO PAIN

## 2025-06-21 ASSESSMENT — ACTIVITIES OF DAILY LIVING (ADL): HOME_MANAGEMENT_TIME_ENTRY: 15

## 2025-06-21 NOTE — CARE PLAN
The patient's goals for the shift include      The clinical goals for the shift include Manage patient pain

## 2025-06-21 NOTE — PROGRESS NOTES
Patient: Fidelia Norman  Room/bed: 134/134-A  Admitted on: 6/15/2025    Age: 80 y.o.   Gender: female  Code Status:  Full Code   Admitting Dx: Sepsis, due to unspecified organism, unspecified whether acute organ dysfunction present (Multi) [A41.9]    MRN: [id number]  PCP: Bogdan Montana MD       Subjective   Seen and examined in her room this AM. Feels better but still having a lot of abdominal discomfort and sense of fullness. Feels like wearing a band around her lower abdomen. She denies chest pain, breathing difficulties, N/V/D/C, fever, or chills.      Objective    Physical Exam   Constitutional: A&O x 3; NAD; calm and cooperative; talkative   Eyes: EOM's intact  HEENT: Normocephalic, Atraumatic. Oral mucosa moist.   Neck: Supple. No JVD, lymphadenopathy.   Lungs: Fair air movement with a few bibasilar crackles. Nonlabored on room air.   Heart: RRR  Abdomen: Softly distended with lower abdominal tenderness. +BS. Obese.   MS/Extremities: DELVALLE equally x 4. BLE edema. Peripheral pulses intact bilaterally.   Neuro: A&O x 2-3; no focal deficits; gross motor and sensation intact.   Skin: Warm and dry. No rashes or lesions  Psych: Normal affect.      Temp:  [36.2 °C (97.2 °F)-36.7 °C (98.1 °F)] 36.6 °C (97.9 °F)  Heart Rate:  [] 75  Resp:  [17-20] 20  BP: (114-154)/(59-82) 114/73    Vitals:    06/15/25 1040   Weight: 79 kg (174 lb 3.2 oz)             I/Os    Intake/Output Summary (Last 24 hours) at 6/21/2025 1434  Last data filed at 6/21/2025 1249  Gross per 24 hour   Intake 1800 ml   Output 225 ml   Net 1575 ml       Labs:   Results from last 72 hours   Lab Units 06/21/25  0634 06/20/25  0509 06/19/25  0642   SODIUM mmol/L 139 138 142   POTASSIUM mmol/L 3.5 4.6 4.2   CHLORIDE mmol/L 100 100 106   CO2 mmol/L 31 30 28   BUN mg/dL 15 10 6   CREATININE mg/dL 1.01 0.69 0.79   GLUCOSE mg/dL 86 164* 99   CALCIUM mg/dL 8.6 9.2 8.9   ANION GAP mmol/L 12 13 12   EGFR mL/min/1.73m*2 57* 89 77      Results from last 72  hours   Lab Units 06/21/25  0634 06/20/25  0509 06/19/25  0642   WBC AUTO x10*3/uL 8.7 7.7 6.8   HEMOGLOBIN g/dL 10.9* 11.9* 11.6*   HEMATOCRIT % 34.5* 37.1 36.7   PLATELETS AUTO x10*3/uL 190 199 159      Lab Results   Component Value Date    CALCIUM 8.6 06/21/2025    PHOS 2.1 (L) 07/20/2023      Lab Results   Component Value Date    CRP 0.59 06/21/2025      Micro/ID:   No results found for the last 90 days.    .ID  Lab Results   Component Value Date    URINECULTURE No growth 06/15/2025    BLOODCULT No growth at 4 days -  FINAL REPORT 06/15/2025       Images:  CT Chest/Abdomen/Pelvis:  Impression: No acute abnormality of the chest, abdomen and pelvis.    CXR:   Impression: No acute cardiopulmonary process.      CT Head:   Impression: No acute intracranial abnormality.     HIDA:  Impression:     Patent cystic duct ,no evidence of obstructive acute cholecystitis.     Meds    Scheduled medications  Scheduled Medications[1]  Continuous medications  Continuous Medications[2]  PRN medications  PRN Medications[3]     Assessment and Plan    Fidelia Norman is a 80 y.o. female with a medical history of HTN, COPD/Asthma, and Parkinson's Disease, who presented to ED with c/o N/V, abdominal pain and shortness of breath. In the ED, found to have marked lactic acidosis with SIRS.     Acute on Chronic Abdominal Pain  -Reports worsening, progressive pain  -CT c/a/p 6/9: gallbladder without stones or inflammatory changes. No acute findings  -Mesenteric US 6/18: patent vessels, difficult study due to body habitus  -CT repeat 6/19 with contrast: contracted gallbladder, CBD 11mm  -Urine culture negative  -No constipation  -GI and General Surgery consulted  -Unable to obtain MRCP due to gadolinium allergy.   -HIDA scan negative for acute cholecystitis or cystic duct abnormality  -Diet modifications with clear liquid  -Multimodal pain regimen, antiemetics  -Will add probiotics  -Afebrile. No leukocytosis.   -Await further recommendations  from GI/Surgery    SIRS/Lactic  Acidosis - Resolved  -Unclear etiology but likely related to GI losses and/or aspiration   -Lactate normalized after IV fluids given  -Treated with antibiotics (Cefepime, Vancomycin) for possible aspiration pneumonia - completed on 6/18/25  -Continue with bronchodilators, bronchial hygiene  -Final BC negative  -Urine culture negative  -Afebrile. No leukocytosis.     Chronic Hypoxic Respiratory Failure  -H/O COPD/Asthma  -Maintain bronchial hygiene    Hypertension  -Medical therapy: Losartan, Lasix  -Holding Lasix due to acute dehydration on admission    Parkinson's Disease  -Carbidopa-Levadopa    GERD  -On PPI, Sucralfate    Chronic Back Pain/Cervical Dystonia  -On Lyrica, Tizanidine  -Supportive care    DVT Prophylaxis  -On Lovenox    Fluids/Electrolytes/Nutrition  -Laboratory data reviewed: CBC/BMP  -Electrolytes stable.   -No nutritional concerns at this time.      Disposition  -Plan of care discussed with attending, Dr. Harley.   -Discharge plan pending clinical improvement.       Tammy Landa, APRN-CNP        [1] acetaminophen, 975 mg, oral, q8h  ALPRAZolam, 0.5 mg, oral, TID  budesonide, 0.25 mg, nebulization, BID   And  ipratropium-albuteroL, 3 mL, nebulization, TID  buPROPion XL, 150 mg, oral, q24h  busPIRone, 10 mg, oral, Daily  busPIRone, 15 mg, oral, BID  carbidopa-levodopa, 2 tablet, oral, TID  donepezil, 10 mg, oral, Nightly  enoxaparin, 40 mg, subcutaneous, Daily  FLUoxetine, 80 mg, oral, Daily  iohexol, 500 mL, oral, Once in imaging  ketorolac, 15 mg, intravenous, q6h  lubiprostone, 8 mcg, oral, BID  nystatin, 1 Application, Topical, BID  pantoprazole, 40 mg, intravenous, BID  potassium chloride CR, 20 mEq, oral, Once  pregabalin, 100 mg, oral, BID  sennosides-docusate sodium, 2 tablet, oral, BID  sucralfate, 1 g, oral, q6h [place]  traZODone, 50 mg, oral, Nightly  zolpidem, 10 mg, oral, Nightly     [2]    [3] PRN medications: albuterol, bisacodyl, bisacodyl,  HYDROmorphone, ondansetron, oxygen, promethazine, tiZANidine

## 2025-06-21 NOTE — PROGRESS NOTES
Fidelia Norman is a 80 y.o. female on day 6 of admission presenting with Sepsis, due to unspecified organism, unspecified whether acute organ dysfunction present (Multi).    Subjective   Interval History: no fever, intermittent abdominal pain, no new complaints       Review of Systems    Objective   Range of Vitals (last 24 hours)  Heart Rate:  []   Temp:  [36.2 °C (97.2 °F)-36.7 °C (98.1 °F)]   Resp:  [17-20]   BP: (119-160)/(59-83)   SpO2:  [90 %-100 %]   Daily Weight  06/15/25 : 79 kg (174 lb 3.2 oz)    Body mass index is 29.9 kg/m².    Physical Exam  Constitutional:       Appearance: Normal appearance.   HENT:      Head: Normocephalic and atraumatic.      Mouth/Throat:      Mouth: Mucous membranes are moist.      Pharynx: Oropharynx is clear.   Eyes:      Pupils: Pupils are equal, round, and reactive to light.   Cardiovascular:      Rate and Rhythm: Normal rate and regular rhythm.      Heart sounds: Normal heart sounds.   Pulmonary:      Effort: Pulmonary effort is normal.      Breath sounds: Normal breath sounds.   Abdominal:      General: Abdomen is flat. Bowel sounds are normal.      Palpations: Abdomen is soft.   Musculoskeletal:      Cervical back: Normal range of motion.   Neurological:      Mental Status: She is alert.         Antibiotics  This patient does not have an active medication from one of the medication groupers.    Relevant Results  Labs  Results from last 72 hours   Lab Units 06/21/25  0634 06/20/25  0509 06/19/25  0642   WBC AUTO x10*3/uL 8.7 7.7 6.8   HEMOGLOBIN g/dL 10.9* 11.9* 11.6*   HEMATOCRIT % 34.5* 37.1 36.7   PLATELETS AUTO x10*3/uL 190 199 159     Results from last 72 hours   Lab Units 06/21/25  0634 06/20/25  0509 06/19/25  0642   SODIUM mmol/L 139 138 142   POTASSIUM mmol/L 3.5 4.6 4.2   CHLORIDE mmol/L 100 100 106   CO2 mmol/L 31 30 28   BUN mg/dL 15 10 6   CREATININE mg/dL 1.01 0.69 0.79   GLUCOSE mg/dL 86 164* 99   CALCIUM mg/dL 8.6 9.2 8.9   ANION GAP mmol/L 12 13 12    EGFR mL/min/1.73m*2 57* 89 77     Results from last 72 hours   Lab Units 06/20/25  0509   ALK PHOS U/L 58   BILIRUBIN TOTAL mg/dL 0.4   BILIRUBIN DIRECT mg/dL 0.1   PROTEIN TOTAL g/dL 6.6   ALT U/L 13   AST U/L 17   ALBUMIN g/dL 3.5     Estimated Creatinine Clearance: 46.7 mL/min (by C-G formula based on SCr of 1.01 mg/dL).  C-Reactive Protein   Date Value Ref Range Status   06/21/2025 0.59 <1.00 mg/dL Final   06/20/2025 1.94 (H) <1.00 mg/dL Final   06/19/2025 2.40 (H) <1.00 mg/dL Final     Microbiology  Reviewed  Imaging  Reviewed       Assessment/Plan   Abdominal pain, mesenteric US without obvious disease, ct was reviewed, HIDA is negative  Leukocytosis  Elevated lactate  Encephalopathy, likely metabolic     Recommendations :  Supportive care  Discussed with the medical team     I spent minutes in the professional and overall care of this patient  The cultures and susceptibilities were reviewed and discussed with the micro lab, the antibiotics stewardship guidelines were reviewed, the infection control protocols and recommendations were reviewed with the patient and the staff                 Omega Carballo MD

## 2025-06-21 NOTE — PROGRESS NOTES
Physical Therapy    Physical Therapy Treatment    Patient Name: Fidelia Norman  MRN: [id number]  Department: [address]  Room: [address] [place] [address]  Today's Date: 6/21/2025  Time Calculation  Start Time: 1037  Stop Time: 1100  Time Calculation (min): 23 min         Assessment/Plan   PT Assessment  PT Assessment Results: Decreased mobility, Obesity, Pain (deconditionig)  Rehab Prognosis: Good  Barriers to Discharge Home: Caregiver assistance, Physical needs  Caregiver Assistance: Caregiver assistance needed per identified barriers - however, level of patient's required assistance exceeds assistance available at home  Physical Needs: 24hr mobility assistance needed, High falls risk due to function or environment  End of Session Communication: Bedside nurse  Assessment Comment: Pt demo good effort with tx this session minimal assistance needed throughout tx. While at EOB pt struggled to hold herself up cueing needed to scoot to EOB. Performed multiple sit to stands CGA continue to recommend MOD intensity skilled PT at DC.  End of Session Patient Position: Bed, 3 rail up, Alarm on (call light provided)  PT Plan  Inpatient/Swing Bed or Outpatient: Inpatient  PT Plan  Treatment/Interventions: Bed mobility, Transfer training, Gait training, Strengthening, Therapeutic exercise  PT Plan: Ongoing PT  PT Frequency: 3 times per week  PT Discharge Recommendations: Moderate intensity level of continued care  Equipment Recommended upon Discharge: Wheeled walker  PT Recommended Transfer Status: Assist x1  PT - OK to Discharge: Yes    PT Visit Info:  PT Received On: 06/21/25     General Visit Information:   General  Reason for Referral: 81 yo female admitted 2' to Nausea/vomiting, SOB, abdominal pain, bowel issues  Referred By: Tammy MCELROY-CNP  Past Medical History Relevant to Rehab: Parkinsons, asthma, chronic respiratory failure 2L O2 at night, HTN, HLD, hypothyroidism, chronic back pain.  Family/Caregiver Present: No  Prior to Session  Communication: Bedside nurse  Patient Position Received: Bed, 3 rail up, Alarm off, not on at start of session  General Comment: Pt upon arrival up in bed pleasant and agreeable to tx.    Subjective   Precautions:  Precautions  Medical Precautions: Fall precautions (delano irvin purewick)            Objective   Pain:  Pain Assessment  Pain Assessment: 0-10  0-10 (Numeric) Pain Score: 0 - No pain  Cognition:  Cognition  Overall Cognitive Status: Within Functional Limits  Orientation Level: Oriented X4  Coordination:  Movements are Fluid and Coordinated: Yes  Heel to Shin: Impaired (Pt demonstrates difficulty crossing midline w/ foot)  Postural Control:  Static Sitting Balance  Static Sitting-Balance Support: Bilateral upper extremity supported, Feet supported  Static Sitting-Level of Assistance: Close supervision  Static Standing Balance  Static Standing-Balance Support: Bilateral upper extremity supported (FWW)  Static Standing-Level of Assistance: Contact guard (x 2)  Static Standing-Comment/Number of Minutes: x2mins  Dynamic Standing Balance  Dynamic Standing-Balance Support:  (FWW)  Dynamic Standing-Level of Assistance:  (x 2)  Activity Tolerance:  Activity Tolerance  Endurance: Tolerates less than 10 min exercise, no significant change in vital signs  Treatments:                                Bed Mobility  Bed Mobility: Yes  Bed Mobility 1  Bed Mobility 1: Supine to sitting  Level of Assistance 1: Close supervision (with increased time)  Bed Mobility 2  Bed Mobility  2: Sitting to supine  Level of Assistance 2: Close supervision    Ambulation/Gait Training  Ambulation/Gait Training Performed: Yes  Ambulation/Gait Training 1  Surface 1: Level tile  Device 1: Rolling walker  Assistance 1: Minimum assistance (x 2)  Quality of Gait 1: Decreased step length (Decreased chase)  Comments/Distance (ft) 1: 4' forward and 4' backward  Transfers  Transfer: Yes  Transfer 1  Transfer From 1: Bed to  Transfer to 1:  Stand  Technique 1: Sit to stand, Stand to sit  Transfer Device 1: Walker (FWW)  Transfer Level of Assistance 1: Minimum assistance (x 2)    Stairs  Stairs: No                     Outcome Measures:  [facility] Basic Mobility  Turning from your back to your side while in a flat bed without using bedrails: None  Moving from lying on your back to sitting on the side of a flat bed without using bedrails: None  Moving to and from bed to chair (including a wheelchair): A little  Standing up from a chair using your arms (e.g. wheelchair or bedside chair): A little  To walk in hospital room: A lot  Climbing 3-5 steps with railing: A lot  Basic Mobility - Total Score: 18    Education Documentation  Handouts, taught by Valente Gil PTA at 6/21/2025 11:27 AM.  Learner: Patient  Readiness: Eager  Method: Explanation, Demonstration  Response: Verbalizes Understanding, Demonstrated Understanding    Home Exercise Program, taught by Valente Gil PTA at 6/21/2025 11:27 AM.  Learner: Patient  Readiness: Eager  Method: Explanation, Demonstration  Response: Verbalizes Understanding, Demonstrated Understanding    Precautions, taught by Valente Gil PTA at 6/21/2025 11:27 AM.  Learner: Patient  Readiness: Eager  Method: Explanation, Demonstration  Response: Verbalizes Understanding, Demonstrated Understanding    Body Mechanics, taught by Valente Gil PTA at 6/21/2025 11:27 AM.  Learner: Patient  Readiness: Eager  Method: Explanation, Demonstration  Response: Verbalizes Understanding, Demonstrated Understanding    Mobility Training, taught by Valente Gil PTA at 6/21/2025 11:27 AM.  Learner: Patient  Readiness: Eager  Method: Explanation, Demonstration  Response: Verbalizes Understanding, Demonstrated Understanding    Education Comments  No comments found.        OP EDUCATION:       Encounter Problems       Encounter Problems (Active)       Mobility       STG - Patient will ambulate 50-80 feet MOD I with FWW (Progressing)        Start:  06/17/25    Expected End:  07/01/25            STG - Patient will ascend and descend four to six stairs MOD I with B rails (Progressing)       Start:  06/17/25    Expected End:  07/01/25               PT Transfers       STG - Patient to transfer to and from sit to supine independently (Progressing)       Start:  06/17/25    Expected End:  07/01/25            STG - Patient will transfer sit to and from stand MOD I with FWW (Progressing)       Start:  06/17/25    Expected End:  07/01/25               Pain - Adult

## 2025-06-21 NOTE — PROGRESS NOTES
Occupational Therapy    Occupational Therapy Treatment    Name: iFdelia Norman  MRN: [id number]  Department: [address]  Room: [address] [place] [address]  Date: 06/21/25  Time Calculation  Start Time: 0936  Stop Time: 1006  Time Calculation (min): 30 min    Assessment:  OT Assessment: Pt continues to present with decreased ADL performance, decreased functional mobility, decreased endurance. Recommend continued skilled OT at a mod intensity to maximize pt safety and independence after discharge.  Prognosis: Good  Barriers to Discharge Home: Caregiver assistance, Physical needs  Caregiver Assistance: Caregiver assistance needed per identified barriers - however, level of patient's required assistance exceeds assistance available at home  Physical Needs: 24hr mobility assistance needed, 24hr ADL assistance needed, High falls risk due to function or environment  Evaluation/Treatment Tolerance: Patient limited by fatigue  Medical Staff Made Aware: Yes  End of Session Communication: Bedside nurse  End of Session Patient Position: Bed, 3 rail up, Alarm on (call light provided)  Plan:  Treatment Interventions: ADL retraining, Functional transfer training, Endurance training, UE strengthening/ROM, Compensatory technique education  OT Frequency: 3 times per week  OT Discharge Recommendations: Moderate intensity level of continued care  Equipment Recommended upon Discharge: Wheeled walker (owns)  OT Recommended Transfer Status: Assist of 2  OT - OK to Discharge: Yes (per ot poc)    Subjective     OT Visit Info:  OT Received On: 06/21/25  General:  General  Reason for Referral: 81 yo female admitted 2' to Nausea/vomiting, SOB, abdominal pain, bowel issues  Referred By: Tammy MCELROY-CNP  Past Medical History Relevant to Rehab: Parkinsons, asthma, chronic respiratory failure 2L O2 at night, HTN, HLD, hypothyroidism, chronic back pain.  Family/Caregiver Present: No  Prior to Session Communication: Bedside nurse  Patient Position Received: Bed, 3  rail up, Alarm off, not on at start of session  General Comment: pt agreeable/pleasant for tx session  Precautions:  Medical Precautions: Fall precautions (albaro, delano, antonio)    Pain Assessment:  Pain Assessment  Pain Assessment: 0-10  0-10 (Numeric) Pain Score: 6  Pain Location: Abdomen    Objective   Cognition:  Overall Cognitive Status: Within Functional Limits  Arousal/Alertness: Appropriate responses to stimuli  Orientation Level: Oriented X4  Activities of Daily Living:      Grooming  Grooming Level of Assistance: Setup  Grooming Where Assessed: Edge of bed  Grooming Comments: oral hygiene task with s/u with pt demo ability to apply toothpaste onto tooth brush    UE Bathing  UE Bathing Level of Assistance: Setup  UE Bathing Where Assessed: Edge of bed  UE Bathing Comments: facial hygiene task eob with s/u    UE Dressing  UE Dressing Level of Assistance: Minimum assistance  UE Dressing Where Assessed: Edge of bed  UE Dressing Comments: pt doffed gown with SBA and donned gown with Nacho to thread BUE arms through completely       Bed Mobility/Transfers: Bed Mobility  Bed Mobility: Yes  Bed Mobility 1  Bed Mobility 1: Supine to sitting  Level of Assistance 1: Close supervision (with increased time)  Bed Mobility 2  Bed Mobility  2: Sitting to supine  Level of Assistance 2: Close supervision  Bed Mobility Comments 2: with HOB elevated and pt positioned in neutral position to maximize comfort    Functional Mobility:  Functional Mobility  Functional Mobility Performed: No (pt declined getting OOB this date and encouraged to perform func mobility to chair)  Sitting Balance:  Static Sitting Balance  Static Sitting-Balance Support: Feet supported  Static Sitting-Level of Assistance: Close supervision  Dynamic Sitting Balance  Dynamic Sitting-Balance Support: No upper extremity supported  Dynamic Sitting-Level of Assistance: Close supervision  Dynamic Sitting-Balance: Reaching for objects, Forward lean      Outcome  Measures:  [facility] Daily Activity  Putting on and taking off regular lower body clothing: Total  Bathing (including washing, rinsing, drying): A lot  Putting on and taking off regular upper body clothing: A little  Toileting, which includes using toilet, bedpan or urinal: Total  Taking care of personal grooming such as brushing teeth: A little  Eating Meals: None  Daily Activity - Total Score: 14    Education Documentation  Handouts, taught by HENRY Rainey at 6/21/2025 11:02 AM.  Learner: Patient  Readiness: Acceptance  Method: Explanation  Response: Verbalizes Understanding    Body Mechanics, taught by HENRY Rainey at 6/21/2025 11:02 AM.  Learner: Patient  Readiness: Acceptance  Method: Explanation  Response: Verbalizes Understanding    Precautions, taught by HENRY Rainey at 6/21/2025 11:02 AM.  Learner: Patient  Readiness: Acceptance  Method: Explanation  Response: Verbalizes Understanding    Home Exercise Program, taught by HENRY Raieny at 6/21/2025 11:02 AM.  Learner: Patient  Readiness: Acceptance  Method: Explanation  Response: Verbalizes Understanding    ADL Training, taught by HENRY Rainey at 6/21/2025 11:02 AM.  Learner: Patient  Readiness: Acceptance  Method: Explanation  Response: Verbalizes Understanding    Goals:  Encounter Problems       Encounter Problems (Active)       OT Goals       Pt will complete ujcm-xz-nyeo transfers using LRD in preparation for ADLs with SBA  (Progressing)       Start:  06/17/25    Expected End:  07/01/25            Pt will demo LE ADL completion with min, using AE if needed.  (Progressing)       Start:  06/17/25    Expected End:  07/01/25            Pt will increase endurance to tolerate 15min of OOB activity with no more than 1 rest break in order to increase ability to engage in ADL completion.  (Progressing)       Start:  06/17/25    Expected End:  07/01/25            Pt will demo Grooming/UE ADL completion with setup, using adaptive  strategies and energy conservation techniques as applicable.  (Progressing)       Start:  06/17/25    Expected End:  07/01/25            Pt will complete mary hygiene and clothing management during toileting ADL with min A, using DME and adaptive strategies as neccesary  (Progressing)       Start:  06/17/25    Expected End:  07/01/25

## 2025-06-22 VITALS
SYSTOLIC BLOOD PRESSURE: 133 MMHG | WEIGHT: 174.2 LBS | HEIGHT: 64 IN | OXYGEN SATURATION: 96 % | RESPIRATION RATE: 18 BRPM | TEMPERATURE: 97.2 F | BODY MASS INDEX: 29.74 KG/M2 | DIASTOLIC BLOOD PRESSURE: 72 MMHG | HEART RATE: 81 BPM

## 2025-06-22 PROBLEM — A41.9 SEPSIS, DUE TO UNSPECIFIED ORGANISM, UNSPECIFIED WHETHER ACUTE ORGAN DYSFUNCTION PRESENT (MULTI): Status: RESOLVED | Noted: 2025-06-15 | Resolved: 2025-06-22

## 2025-06-22 LAB
ANION GAP SERPL CALC-SCNC: 11 MMOL/L (ref 10–20)
BUN SERPL-MCNC: 9 MG/DL (ref 6–23)
CALCIUM SERPL-MCNC: 8.1 MG/DL (ref 8.6–10.3)
CHLORIDE SERPL-SCNC: 101 MMOL/L (ref 98–107)
CO2 SERPL-SCNC: 31 MMOL/L (ref 21–32)
CREAT SERPL-MCNC: 0.89 MG/DL (ref 0.5–1.05)
EGFRCR SERPLBLD CKD-EPI 2021: 66 ML/MIN/1.73M*2
ERYTHROCYTE [DISTWIDTH] IN BLOOD BY AUTOMATED COUNT: 12.9 % (ref 11.5–14.5)
GLUCOSE SERPL-MCNC: 76 MG/DL (ref 74–99)
HCT VFR BLD AUTO: 35.8 % (ref 36–46)
HGB BLD-MCNC: 11.5 G/DL (ref 12–16)
MCH RBC QN AUTO: 30.7 PG (ref 26–34)
MCHC RBC AUTO-ENTMCNC: 32.1 G/DL (ref 32–36)
MCV RBC AUTO: 96 FL (ref 80–100)
NRBC BLD-RTO: 0 /100 WBCS (ref 0–0)
PLATELET # BLD AUTO: 216 X10*3/UL (ref 150–450)
POTASSIUM SERPL-SCNC: 3.4 MMOL/L (ref 3.5–5.3)
RBC # BLD AUTO: 3.75 X10*6/UL (ref 4–5.2)
SODIUM SERPL-SCNC: 140 MMOL/L (ref 136–145)
WBC # BLD AUTO: 6.4 X10*3/UL (ref 4.4–11.3)

## 2025-06-22 PROCEDURE — 2500000001 HC RX 250 WO HCPCS SELF ADMINISTERED DRUGS (ALT 637 FOR MEDICARE OP): Performed by: NURSE PRACTITIONER

## 2025-06-22 PROCEDURE — 99239 HOSP IP/OBS DSCHRG MGMT >30: CPT | Performed by: NURSE PRACTITIONER

## 2025-06-22 PROCEDURE — 2500000004 HC RX 250 GENERAL PHARMACY W/ HCPCS (ALT 636 FOR OP/ED): Performed by: INTERNAL MEDICINE

## 2025-06-22 PROCEDURE — 94760 N-INVAS EAR/PLS OXIMETRY 1: CPT

## 2025-06-22 PROCEDURE — 2500000001 HC RX 250 WO HCPCS SELF ADMINISTERED DRUGS (ALT 637 FOR MEDICARE OP): Performed by: INTERNAL MEDICINE

## 2025-06-22 PROCEDURE — 2500000004 HC RX 250 GENERAL PHARMACY W/ HCPCS (ALT 636 FOR OP/ED): Mod: JZ | Performed by: NURSE PRACTITIONER

## 2025-06-22 PROCEDURE — 85027 COMPLETE CBC AUTOMATED: CPT | Performed by: NURSE PRACTITIONER

## 2025-06-22 PROCEDURE — 94640 AIRWAY INHALATION TREATMENT: CPT

## 2025-06-22 PROCEDURE — 80048 BASIC METABOLIC PNL TOTAL CA: CPT | Performed by: NURSE PRACTITIONER

## 2025-06-22 PROCEDURE — 36415 COLL VENOUS BLD VENIPUNCTURE: CPT | Performed by: NURSE PRACTITIONER

## 2025-06-22 PROCEDURE — 2500000002 HC RX 250 W HCPCS SELF ADMINISTERED DRUGS (ALT 637 FOR MEDICARE OP, ALT 636 FOR OP/ED): Performed by: INTERNAL MEDICINE

## 2025-06-22 RX ORDER — POTASSIUM CHLORIDE 1.5 G/1.58G
40 POWDER, FOR SOLUTION ORAL ONCE
Status: COMPLETED | OUTPATIENT
Start: 2025-06-22 | End: 2025-06-22

## 2025-06-22 RX ADMIN — BUPROPION HYDROCHLORIDE 150 MG: 150 TABLET, EXTENDED RELEASE ORAL at 11:14

## 2025-06-22 RX ADMIN — FLUOXETINE HYDROCHLORIDE 80 MG: 20 CAPSULE ORAL at 08:01

## 2025-06-22 RX ADMIN — BUDESONIDE 0.25 MG: 0.25 INHALANT RESPIRATORY (INHALATION) at 07:14

## 2025-06-22 RX ADMIN — KETOROLAC TROMETHAMINE 15 MG: 15 INJECTION, SOLUTION INTRAMUSCULAR; INTRAVENOUS at 08:00

## 2025-06-22 RX ADMIN — SUCRALFATE 1 G: 1 SUSPENSION ORAL at 05:49

## 2025-06-22 RX ADMIN — ACETAMINOPHEN 975 MG: 325 TABLET, FILM COATED ORAL at 05:49

## 2025-06-22 RX ADMIN — ACETAMINOPHEN 975 MG: 325 TABLET, FILM COATED ORAL at 14:09

## 2025-06-22 RX ADMIN — CARBIDOPA AND LEVODOPA 2 TABLET: 25; 100 TABLET ORAL at 08:01

## 2025-06-22 RX ADMIN — SUCRALFATE 1 G: 1 SUSPENSION ORAL at 11:14

## 2025-06-22 RX ADMIN — ENOXAPARIN SODIUM 40 MG: 100 INJECTION SUBCUTANEOUS at 08:01

## 2025-06-22 RX ADMIN — NYSTATIN 1 APPLICATION: 100000 POWDER TOPICAL at 08:05

## 2025-06-22 RX ADMIN — PREGABALIN 100 MG: 50 CAPSULE ORAL at 08:01

## 2025-06-22 RX ADMIN — CARBIDOPA AND LEVODOPA 2 TABLET: 25; 100 TABLET ORAL at 14:09

## 2025-06-22 RX ADMIN — ALPRAZOLAM 0.5 MG: 0.5 TABLET ORAL at 14:09

## 2025-06-22 RX ADMIN — IPRATROPIUM BROMIDE AND ALBUTEROL SULFATE 3 ML: 2.5; .5 SOLUTION RESPIRATORY (INHALATION) at 07:14

## 2025-06-22 RX ADMIN — ALPRAZOLAM 0.5 MG: 0.5 TABLET ORAL at 08:01

## 2025-06-22 RX ADMIN — KETOROLAC TROMETHAMINE 15 MG: 15 INJECTION, SOLUTION INTRAMUSCULAR; INTRAVENOUS at 03:35

## 2025-06-22 RX ADMIN — Medication 1 CAPSULE: at 08:01

## 2025-06-22 RX ADMIN — POTASSIUM CHLORIDE 40 MEQ: 1.5 POWDER, FOR SOLUTION ORAL at 11:14

## 2025-06-22 RX ADMIN — SUCRALFATE 1 G: 1 SUSPENSION ORAL at 17:03

## 2025-06-22 RX ADMIN — LUBIPROSTONE 8 MCG: 8 CAPSULE, GELATIN COATED ORAL at 08:04

## 2025-06-22 RX ADMIN — KETOROLAC TROMETHAMINE 15 MG: 15 INJECTION, SOLUTION INTRAMUSCULAR; INTRAVENOUS at 14:09

## 2025-06-22 RX ADMIN — PANTOPRAZOLE SODIUM 40 MG: 40 INJECTION, POWDER, FOR SOLUTION INTRAVENOUS at 08:00

## 2025-06-22 RX ADMIN — BUSPIRONE HYDROCHLORIDE 10 MG: 15 TABLET ORAL at 08:02

## 2025-06-22 RX ADMIN — BUSPIRONE HYDROCHLORIDE 15 MG: 15 TABLET ORAL at 08:02

## 2025-06-22 ASSESSMENT — COGNITIVE AND FUNCTIONAL STATUS - GENERAL
MOVING FROM LYING ON BACK TO SITTING ON SIDE OF FLAT BED WITH BEDRAILS: A LITTLE
DRESSING REGULAR UPPER BODY CLOTHING: A LITTLE
MOBILITY SCORE: 18
TOILETING: A LITTLE
PERSONAL GROOMING: A LITTLE
CLIMB 3 TO 5 STEPS WITH RAILING: A LITTLE
DAILY ACTIVITIY SCORE: 19
MOVING TO AND FROM BED TO CHAIR: A LITTLE
TURNING FROM BACK TO SIDE WHILE IN FLAT BAD: A LITTLE
DRESSING REGULAR LOWER BODY CLOTHING: A LITTLE
STANDING UP FROM CHAIR USING ARMS: A LITTLE
WALKING IN HOSPITAL ROOM: A LITTLE
HELP NEEDED FOR BATHING: A LITTLE

## 2025-06-22 ASSESSMENT — PAIN SCALES - GENERAL: PAINLEVEL_OUTOF10: 3

## 2025-06-22 NOTE — CARE PLAN
The patient's goals for the shift include      The clinical goals for the shift include Manage pain    Problem: Pain - Adult  Goal: Verbalizes/displays adequate comfort level or baseline comfort level  Outcome: Progressing     Problem: Safety - Adult  Goal: Free from fall injury  Outcome: Progressing     Problem: Discharge Planning  Goal: Discharge to home or other facility with appropriate resources  Outcome: Progressing     Problem: Chronic Conditions and Co-morbidities  Goal: Patient's chronic conditions and co-morbidity symptoms are monitored and maintained or improved  Outcome: Progressing     Problem: Nutrition  Goal: Nutrient intake appropriate for maintaining nutritional needs  Outcome: Progressing     Problem: Skin  Goal: Prevent/minimize sheer/friction injuries  Outcome: Progressing  Flowsheets (Taken 6/22/2025 5984)  Prevent/minimize sheer/friction injuries:   Complete micro-shifts as needed if patient unable. Adjust patient position to relieve pressure points, not a full turn   Increase activity/out of bed for meals   Use pull sheet   HOB 30 degrees or less   Turn/reposition every 2 hours/use positioning/transfer devices   Utilize specialty bed per algorithm

## 2025-06-22 NOTE — DISCHARGE SUMMARY
Discharge Diagnosis  Lactic acidosis   Abdominal pain, CBD 11mm        Issues Requiring Follow-Up  Outpatient MRCP    Discharge Meds     Medication List      START taking these medications     lidocaine-diphenhydrAMINE-Maalox 1:1:1 638--40 mg/30 mL liquid   mouthwash; Commonly known as: Magic Mouthwash; Take 5mL by mouth every 6   hours as needed     CONTINUE taking these medications     * acetaminophen 500 mg tablet; Commonly known as: Tylenol   * acetaminophen 650 mg ER tablet; Commonly known as: Tylenol 8 HOUR   albuterol 90 mcg/actuation inhaler   ALPRAZolam 0.5 mg tablet; Commonly known as: Xanax   * busPIRone 15 mg tablet; Commonly known as: Buspar   * busPIRone 10 mg tablet; Commonly known as: Buspar   carbidopa-levodopa  mg tablet; Commonly known as: Sinemet   diphenhydrAMINE-acetaminophen  mg per tablet; Commonly known as:   Tylenol PM   donepezil 10 mg tablet; Commonly known as: Aricept   FLUoxetine 20 mg capsule; Commonly known as: PROzac; Take 4 capsules (80   mg) by mouth once daily.   fluticasone-umeclidin-vilanter 200-62.5-25 mcg blister with device;   Commonly known as: TRELEGY-ELLIPTA   furosemide 20 mg tablet; Commonly known as: Lasix   losartan 50 mg tablet; Commonly known as: Cozaar; Take 1 tablet (50 mg)   by mouth once daily.   lubiprostone 24 mcg capsule; Commonly known as: Amitiza; Take 1 capsule   (24 mcg) by mouth 2 times a day.   lubricating eye drops ophthalmic solution; Administer 1 drop into both   eyes if needed for dry eyes.   melatonin 10 mg tablet   ondansetron 8 mg tablet; Commonly known as: Zofran   potassium chloride CR 20 mEq ER tablet; Commonly known as: Klor-Con M20   pregabalin 100 mg capsule; Commonly known as: Lyrica   rosuvastatin 20 mg tablet; Commonly known as: Crestor   sucralfate 1 gram tablet; Commonly known as: Carafate   tiZANidine 4 mg capsule; Commonly known as: Zanaflex   traZODone 50 mg tablet; Commonly known as: Desyrel; Take 1 tablet (50    mg) by mouth once daily at bedtime.   zolpidem 5 mg tablet; Commonly known as: Ambien; Take 1 tablet (5 mg) by   mouth as needed at bedtime for sleep. Do not crush, chew, or split.  * This list has 4 medication(s) that are the same as other medications   prescribed for you. Read the directions carefully, and ask your doctor or   other care provider to review them with you.       Test Results Pending At Discharge  Pending Labs       No current pending labs.            Hospital Course   Fidelia Norman is a 80 y.o. female with a medical history of HTN, COPD/Asthma, and Parkinson's Disease, who presented to ED with c/o N/V, abdominal pain and shortness of breath. In the ED, found to have marked lactic acidosis with SIRS.      Acute on chronic abdominal pain  SIRS/Lactic  Acidosis - Resolved  -Reports worsening, progressive pain  -CT c/a/p 6/9: gallbladder without stones or inflammatory changes. No acute findings  -Mesenteric US 6/18: patent vessels, difficult study due to body habitus  -CT repeat 6/19 with contrast: contracted gallbladder, CBD 11 mm  -Treated with antibiotics (Cefepime, Vancomycin) for possible aspiration pneumonia - completed on 6/18/25  -Urine culture negative  -Unable to obtain MRCP due to gadolinium allergy.   -HIDA scan negative for acute cholecystitis or cystic duct abnormality  -Diet modifications with clear liquid  -Multimodal pain regimen, antiemetics  -Follow up with GI outpatient for MRCP; referral placed  -Discharged to [facility]    Discharge time > 30 minutes     Pertinent Physical Exam At Time of Discharge  Physical Exam   Constitutional: A&O x 3; NAD; calm and cooperative; talkative   Eyes: EOM's intact  HEENT: Normocephalic, Atraumatic. Oral mucosa moist.   Neck: Supple. No JVD, lymphadenopathy.   Lungs: Fair air movement with a few bibasilar crackles. Nonlabored on room air.   Heart: RRR  Abdomen: Softly distended with lower abdominal tenderness. +BS. Obese.   MS/Extremities: DELVALLE  equally x 4. BLE edema. Peripheral pulses intact bilaterally.   Neuro: A&O x 2-3; no focal deficits; gross motor and sensation intact.   Skin: Warm and dry. No rashes or lesions  Psych: Normal affect.      Outpatient Follow-Up  No future appointments.      Doris Blake, APRN-CNP

## 2025-06-22 NOTE — PROGRESS NOTES
Fidelia Norman is a 80 y.o. female on day 7 of admission presenting with Sepsis, due to unspecified organism, unspecified whether acute organ dysfunction present (Multi).    Subjective   Interval History: no fever, intermittent abdominal pain, no new complaints       Review of Systems    Objective   Range of Vitals (last 24 hours)  Heart Rate:  [69-99]   Temp:  [36.2 °C (97.2 °F)-36.8 °C (98.2 °F)]   Resp:  [16-20]   BP: (114-169)/(67-87)   SpO2:  [93 %-99 %]   Daily Weight  06/15/25 : 79 kg (174 lb 3.2 oz)    Body mass index is 29.9 kg/m².    Physical Exam  Constitutional:       Appearance: Normal appearance.   HENT:      Head: Normocephalic and atraumatic.      Mouth/Throat:      Mouth: Mucous membranes are moist.      Pharynx: Oropharynx is clear.   Eyes:      Pupils: Pupils are equal, round, and reactive to light.   Cardiovascular:      Rate and Rhythm: Normal rate and regular rhythm.      Heart sounds: Normal heart sounds.   Pulmonary:      Effort: Pulmonary effort is normal.      Breath sounds: Normal breath sounds.   Abdominal:      General: Abdomen is flat. Bowel sounds are normal.      Palpations: Abdomen is soft.   Musculoskeletal:      Cervical back: Normal range of motion.   Neurological:      Mental Status: She is alert.         Antibiotics  This patient does not have an active medication from one of the medication groupers.    Relevant Results  Labs  Results from last 72 hours   Lab Units 06/22/25  0549 06/21/25  0634 06/20/25  0509   WBC AUTO x10*3/uL 6.4 8.7 7.7   HEMOGLOBIN g/dL 11.5* 10.9* 11.9*   HEMATOCRIT % 35.8* 34.5* 37.1   PLATELETS AUTO x10*3/uL 216 190 199     Results from last 72 hours   Lab Units 06/22/25  0549 06/21/25  0634 06/20/25  0509   SODIUM mmol/L 140 139 138   POTASSIUM mmol/L 3.4* 3.5 4.6   CHLORIDE mmol/L 101 100 100   CO2 mmol/L 31 31 30   BUN mg/dL 9 15 10   CREATININE mg/dL 0.89 1.01 0.69   GLUCOSE mg/dL 76 86 164*   CALCIUM mg/dL 8.1* 8.6 9.2   ANION GAP mmol/L 11 12 13    EGFR mL/min/1.73m*2 66 57* 89     Results from last 72 hours   Lab Units 06/20/25  0509   ALK PHOS U/L 58   BILIRUBIN TOTAL mg/dL 0.4   BILIRUBIN DIRECT mg/dL 0.1   PROTEIN TOTAL g/dL 6.6   ALT U/L 13   AST U/L 17   ALBUMIN g/dL 3.5     Estimated Creatinine Clearance: 53 mL/min (by C-G formula based on SCr of 0.89 mg/dL).  C-Reactive Protein   Date Value Ref Range Status   06/21/2025 0.59 <1.00 mg/dL Final   06/20/2025 1.94 (H) <1.00 mg/dL Final   06/19/2025 2.40 (H) <1.00 mg/dL Final     Microbiology  Reviewed  Imaging  Reviewed       Assessment/Plan   Abdominal pain, mesenteric US without obvious disease, ct was reviewed, HIDA is negative  Leukocytosis, resolved  Elevated lactate, resolved  Encephalopathy, likely metabolic     Recommendations :  Supportive care  Discussed with the medical team     I spent minutes in the professional and overall care of this patient  The cultures and susceptibilities were reviewed and discussed with the micro lab, the antibiotics stewardship guidelines were reviewed, the infection control protocols and recommendations were reviewed with the patient and the staff                 Omega Carballo MD

## 2025-06-25 ENCOUNTER — LAB REQUISITION (OUTPATIENT)
Dept: LAB | Facility: HOSPITAL | Age: 80
End: 2025-06-25
Payer: MEDICARE

## 2025-06-25 ENCOUNTER — NURSING HOME VISIT (OUTPATIENT)
Dept: POST ACUTE CARE | Facility: EXTERNAL LOCATION | Age: 80
End: 2025-06-25
Payer: MEDICARE

## 2025-06-25 DIAGNOSIS — G20.B2 PARKINSON'S DISEASE WITH DYSKINESIA AND FLUCTUATING MANIFESTATIONS: ICD-10-CM

## 2025-06-25 DIAGNOSIS — G20.A1 PARKINSON'S DISEASE WITHOUT DYSKINESIA, WITHOUT MENTION OF FLUCTUATIONS (MULTI): ICD-10-CM

## 2025-06-25 DIAGNOSIS — G47.33 OBSTRUCTIVE SLEEP APNEA: ICD-10-CM

## 2025-06-25 DIAGNOSIS — J44.9 CHRONIC OBSTRUCTIVE PULMONARY DISEASE, UNSPECIFIED COPD TYPE (MULTI): Primary | ICD-10-CM

## 2025-06-25 DIAGNOSIS — A41.9 SEPSIS, UNSPECIFIED ORGANISM (MULTI): ICD-10-CM

## 2025-06-25 DIAGNOSIS — R10.9 UNSPECIFIED ABDOMINAL PAIN: ICD-10-CM

## 2025-06-25 DIAGNOSIS — I10 ESSENTIAL (PRIMARY) HYPERTENSION: ICD-10-CM

## 2025-06-25 PROCEDURE — 99305 1ST NF CARE MODERATE MDM 35: CPT | Performed by: FAMILY MEDICINE

## 2025-06-25 ASSESSMENT — ENCOUNTER SYMPTOMS
HEADACHES: 0
WEAKNESS: 1
CHILLS: 0
CONSTIPATION: 0
SHORTNESS OF BREATH: 0
SORE THROAT: 0
DIARRHEA: 0
NERVOUS/ANXIOUS: 1
COUGH: 0
DYSURIA: 0
AGITATION: 1
VOMITING: 0
NAUSEA: 0
ABDOMINAL PAIN: 0
FATIGUE: 0
DYSPHORIC MOOD: 1
FEVER: 0

## 2025-06-25 NOTE — PROGRESS NOTES
Subjective   Patient ID: Fidelia Norman is a 80 y.o. female who is acute skilled care being seen and evaluated for multiple medical problems.    HPI 80-year-old who presented to the emergency room with complaints of nausea vomiting abdominal pain and shortness of breath.  She was found to have marked lactic acidosis with criteria meeting SIRS diagnosis is known to have a contracted gallbladder and was treated with antibiotics for possible aspiration pneumonia which she completed on June 18.  Urine was negative.  Follow-up with gastroenterology for MRCP and referral has been placed.  She has longstanding anxiety and difficulty sleeping.  Of note she reported taking 70 mg of melatonin at night at home.  We were allowing her only 10 mg at night here.  She is insistent today that she is wanting to go home.  Therapy reports that she is able to ambulate with her walker safely in her room as well as toilet herself and she was able to dress herself independently today.  She did refuse admission labs today including CBC and CMP.    Review of Systems   Constitutional:  Negative for chills, fatigue and fever.   HENT:  Negative for congestion and sore throat.    Eyes:  Negative for visual disturbance.   Respiratory:  Negative for cough and shortness of breath.    Cardiovascular:  Negative for chest pain.   Gastrointestinal:  Negative for abdominal pain, constipation, diarrhea, nausea and vomiting.   Genitourinary:  Negative for dysuria.   Musculoskeletal:  Positive for gait problem.   Neurological:  Positive for weakness. Negative for headaches.   Psychiatric/Behavioral:  Positive for agitation and dysphoric mood. The patient is nervous/anxious.        Objective   LMP  (LMP Unknown)     Physical Exam  Vitals reviewed: Weight 179 blood pressure 129/79 temp 98.2 pulse 76.   Constitutional:       Appearance: Normal appearance.   HENT:      Mouth/Throat:      Mouth: Mucous membranes are moist.      Pharynx: Oropharynx is clear.    Cardiovascular:      Rate and Rhythm: Normal rate and regular rhythm.      Heart sounds: Normal heart sounds.   Pulmonary:      Effort: No respiratory distress.      Breath sounds: Normal breath sounds. No wheezing.   Abdominal:      General: There is no distension.      Palpations: Abdomen is soft.      Tenderness: There is no abdominal tenderness.   Musculoskeletal:      Right lower leg: No edema.      Left lower leg: No edema.   Neurological:      General: No focal deficit present.      Mental Status: She is alert.         Assessment/Plan   Problem List Items Addressed This Visit           ICD-10-CM    COPD (chronic obstructive pulmonary disease) (Multi) - Primary J44.9    Essential (primary) hypertension I10    Parkinson's disease G20.A1    Obstructive sleep apnea G47.33   She refused labs today is insistent on going home, per therapy discussion she is able to perform short distance ambulation with her walker as well as dressing herself and toileting herself safely.  Nataly to follow-up with her primary care doctor regarding her issues with anxiety as well as sleep disorder for medication adjustments as she is self-medicating at home quite a bit.     Goals    None

## 2025-06-25 NOTE — LETTER
Patient: Fidelia Norman  : 1945    Encounter Date: 2025    Subjective  Patient ID: Fidelia Norman is a 80 y.o. female who is acute skilled care being seen and evaluated for multiple medical problems.    HPI 80-year-old who presented to the emergency room with complaints of nausea vomiting abdominal pain and shortness of breath.  She was found to have marked lactic acidosis with criteria meeting SIRS diagnosis is known to have a contracted gallbladder and was treated with antibiotics for possible aspiration pneumonia which she completed on .  Urine was negative.  Follow-up with gastroenterology for MRCP and referral has been placed.  She has longstanding anxiety and difficulty sleeping.  Of note she reported taking 70 mg of melatonin at night at home.  We were allowing her only 10 mg at night here.  She is insistent today that she is wanting to go home.  Therapy reports that she is able to ambulate with her walker safely in her room as well as toilet herself and she was able to dress herself independently today.  She did refuse admission labs today including CBC and CMP.    Review of Systems   Constitutional:  Negative for chills, fatigue and fever.   HENT:  Negative for congestion and sore throat.    Eyes:  Negative for visual disturbance.   Respiratory:  Negative for cough and shortness of breath.    Cardiovascular:  Negative for chest pain.   Gastrointestinal:  Negative for abdominal pain, constipation, diarrhea, nausea and vomiting.   Genitourinary:  Negative for dysuria.   Musculoskeletal:  Positive for gait problem.   Neurological:  Positive for weakness. Negative for headaches.   Psychiatric/Behavioral:  Positive for agitation and dysphoric mood. The patient is nervous/anxious.        Objective  LMP  (LMP Unknown)     Physical Exam  Vitals reviewed: Weight 179 blood pressure 129/79 temp 98.2 pulse 76.   Constitutional:       Appearance: Normal appearance.   HENT:      Mouth/Throat:      Mouth:  Mucous membranes are moist.      Pharynx: Oropharynx is clear.   Cardiovascular:      Rate and Rhythm: Normal rate and regular rhythm.      Heart sounds: Normal heart sounds.   Pulmonary:      Effort: No respiratory distress.      Breath sounds: Normal breath sounds. No wheezing.   Abdominal:      General: There is no distension.      Palpations: Abdomen is soft.      Tenderness: There is no abdominal tenderness.   Musculoskeletal:      Right lower leg: No edema.      Left lower leg: No edema.   Neurological:      General: No focal deficit present.      Mental Status: She is alert.         Assessment/Plan  Problem List Items Addressed This Visit           ICD-10-CM    COPD (chronic obstructive pulmonary disease) (Multi) - Primary J44.9    Essential (primary) hypertension I10    Parkinson's disease G20.A1    Obstructive sleep apnea G47.33   She refused labs today is insistent on going home, per therapy discussion she is able to perform short distance ambulation with her walker as well as dressing herself and toileting herself safely.  Nataly to follow-up with her primary care doctor regarding her issues with anxiety as well as sleep disorder for medication adjustments as she is self-medicating at home quite a bit.     Goals    None         Electronically Signed By: Mary Rosenbaum MD   6/25/25  4:45 PM

## 2025-06-30 ENCOUNTER — HOSPITAL ENCOUNTER (EMERGENCY)
Facility: HOSPITAL | Age: 80
Discharge: HOME | End: 2025-06-30
Attending: EMERGENCY MEDICINE
Payer: MEDICARE

## 2025-06-30 ENCOUNTER — APPOINTMENT (OUTPATIENT)
Dept: RADIOLOGY | Facility: HOSPITAL | Age: 80
End: 2025-06-30
Payer: MEDICARE

## 2025-06-30 VITALS
BODY MASS INDEX: 30.56 KG/M2 | DIASTOLIC BLOOD PRESSURE: 57 MMHG | WEIGHT: 179 LBS | SYSTOLIC BLOOD PRESSURE: 130 MMHG | HEART RATE: 68 BPM | TEMPERATURE: 96.8 F | RESPIRATION RATE: 13 BRPM | OXYGEN SATURATION: 100 % | HEIGHT: 64 IN
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
## 2025-06-30 LAB
ALBUMIN SERPL BCP-MCNC: 3.5 G/DL (ref 3.4–5)
ALP SERPL-CCNC: 80 U/L (ref 33–136)
ALT SERPL W P-5'-P-CCNC: 4 U/L (ref 7–45)
ANION GAP SERPL CALC-SCNC: 10 MMOL/L (ref 10–20)
AST SERPL W P-5'-P-CCNC: 13 U/L (ref 9–39)
BASOPHILS # BLD AUTO: 0.05 X10*3/UL (ref 0–0.1)
BASOPHILS NFR BLD AUTO: 0.8 %
BILIRUB SERPL-MCNC: 0.3 MG/DL (ref 0–1.2)
BUN SERPL-MCNC: 13 MG/DL (ref 6–23)
CALCIUM SERPL-MCNC: 9.1 MG/DL (ref 8.6–10.3)
CHLORIDE SERPL-SCNC: 107 MMOL/L (ref 98–107)
CO2 SERPL-SCNC: 27 MMOL/L (ref 21–32)
CREAT SERPL-MCNC: 0.83 MG/DL (ref 0.5–1.05)
EGFRCR SERPLBLD CKD-EPI 2021: 72 ML/MIN/1.73M*2
EOSINOPHIL # BLD AUTO: 0.21 X10*3/UL (ref 0–0.4)
EOSINOPHIL NFR BLD AUTO: 3.5 %
ERYTHROCYTE [DISTWIDTH] IN BLOOD BY AUTOMATED COUNT: 12.9 % (ref 11.5–14.5)
GLUCOSE SERPL-MCNC: 102 MG/DL (ref 74–99)
HCT VFR BLD AUTO: 38.9 % (ref 36–46)
HGB BLD-MCNC: 12 G/DL (ref 12–16)
IMM GRANULOCYTES # BLD AUTO: 0.03 X10*3/UL (ref 0–0.5)
IMM GRANULOCYTES NFR BLD AUTO: 0.5 % (ref 0–0.9)
LACTATE SERPL-SCNC: 1.1 MMOL/L (ref 0.4–2)
LYMPHOCYTES # BLD AUTO: 1.2 X10*3/UL (ref 0.8–3)
LYMPHOCYTES NFR BLD AUTO: 20.1 %
MCH RBC QN AUTO: 30.3 PG (ref 26–34)
MCHC RBC AUTO-ENTMCNC: 30.8 G/DL (ref 32–36)
MCV RBC AUTO: 98 FL (ref 80–100)
MONOCYTES # BLD AUTO: 0.48 X10*3/UL (ref 0.05–0.8)
MONOCYTES NFR BLD AUTO: 8 %
NEUTROPHILS # BLD AUTO: 4 X10*3/UL (ref 1.6–5.5)
NEUTROPHILS NFR BLD AUTO: 67.1 %
NRBC BLD-RTO: 0 /100 WBCS (ref 0–0)
PLATELET # BLD AUTO: 187 X10*3/UL (ref 150–450)
POTASSIUM SERPL-SCNC: 4.3 MMOL/L (ref 3.5–5.3)
PROT SERPL-MCNC: 6.5 G/DL (ref 6.4–8.2)
RBC # BLD AUTO: 3.96 X10*6/UL (ref 4–5.2)
SODIUM SERPL-SCNC: 140 MMOL/L (ref 136–145)
WBC # BLD AUTO: 6 X10*3/UL (ref 4.4–11.3)

## 2025-06-30 PROCEDURE — 80053 COMPREHEN METABOLIC PANEL: CPT

## 2025-06-30 PROCEDURE — 99284 EMERGENCY DEPT VISIT MOD MDM: CPT | Mod: 25 | Performed by: EMERGENCY MEDICINE

## 2025-06-30 PROCEDURE — 85025 COMPLETE CBC W/AUTO DIFF WBC: CPT

## 2025-06-30 PROCEDURE — 71045 X-RAY EXAM CHEST 1 VIEW: CPT

## 2025-06-30 PROCEDURE — 83605 ASSAY OF LACTIC ACID: CPT

## 2025-06-30 PROCEDURE — 71045 X-RAY EXAM CHEST 1 VIEW: CPT | Performed by: INTERNAL MEDICINE

## 2025-06-30 PROCEDURE — 36415 COLL VENOUS BLD VENIPUNCTURE: CPT | Performed by: EMERGENCY MEDICINE

## 2025-06-30 ASSESSMENT — LIFESTYLE VARIABLES
EVER HAD A DRINK FIRST THING IN THE MORNING TO STEADY YOUR NERVES TO GET RID OF A HANGOVER: NO
HAVE PEOPLE ANNOYED YOU BY CRITICIZING YOUR DRINKING: NO
TOTAL SCORE: 0
HAVE YOU EVER FELT YOU SHOULD CUT DOWN ON YOUR DRINKING: NO
EVER FELT BAD OR GUILTY ABOUT YOUR DRINKING: NO

## 2025-06-30 ASSESSMENT — PAIN SCALES - GENERAL
PAINLEVEL_OUTOF10: 8

## 2025-06-30 ASSESSMENT — PAIN - FUNCTIONAL ASSESSMENT
PAIN_FUNCTIONAL_ASSESSMENT: 0-10

## 2025-06-30 ASSESSMENT — PAIN DESCRIPTION - LOCATION: LOCATION: ABDOMEN

## 2025-06-30 NOTE — ED TRIAGE NOTES
Patient here for fall and abdominal pain Patient states she was walking to her bathroom and slipped on water causing her to fall. No head injury, no thinners. Endorses abdominal pain

## 2025-06-30 NOTE — ED PROVIDER NOTES
Emergency Department Provider Note        History of Present Illness     History provided by: Patient  Limitations to History: None  External Records Reviewed with Brief Summary: Discharge Summary from 6/22/2025 which showed patient was recently admitted for acute on chronic back pain with SIRS/lactic acidosis treated with antibiotics cefepime and vancomycin for possible aspiration pneumonia completed on 6/18/2025, CT 6/19 with contracted gallbladder and CBD of 11 mm but HIDA scan was negative for acute cholecystitis or cystic duct abnormality.  Patient was discharged to [facility]    HPI:  Fidelia Norman is a 80 y.o. female with PMH of HTN, COPD/Asthma, and Parkinson's Disease presenting after a fall.  Patient reports she left the nursing home yesterday go back home as they were not really doing much for her.  She woke up this morning and wanted to use the restroom and slipped on the bathroom floor and fell seated on her buttock.  Reports she did not hit her head.  Reports she does not have any pain currently in her buttock or lower back.  She did endorse having urinary and fecal incontinence as she could not hold herself.  Her  who was sleeping called EMS who brought her to the ED.  Denies any numbness, tingling, weakness in her upper or lower extremities.  Denies dizziness or lightheadedness or syncope or seizure-like movements.  Denies taking any blood thinners.  Patient reports that because of her Parkinson's she falls often and especially after being discharged from her last admission she has been feeling weaker.  Otherwise patient reports chronic abdominal pain however feels that it has been worse over the past 2 days mostly in the right quadrant.  Does endorse watery diarrhea for the past few days.  Denies nausea, vomiting, constipation, dysuria.  She does report some chest pain on the right side, denies palpitation, denies shortness of breath except for her baseline oxygen requirement of 2 L,  denies cough, rhinorrhea, fever but does report chills currently.  Patient reports she has trouble sleeping usually does not sleep well last night.    Physical Exam   Triage vitals:  T 36 °C (96.8 °F)  HR 69  /70  RR 16  O2 100 % Supplemental oxygen    General: Intermittently shutting her eyes and falling aslee however easily arousable with verbal in response to questions appropriately prompting  Eyes: Gaze conjugate.  No scleral icterus or injection  HENT: Normo-cephalic, atraumatic. No stridor  CV: Regular rate, regular rhythm. Radial pulses 2+ bilaterally.  Reproducible chest pain on palpation of left chest.  Resp: Breathing non-labored, speaking in full sentences.  Left lower lobe crackles heard.  GI: Soft, mildly distended, mildly tender diffusely. No rebound or guarding.  MSK/Extremities: No gross bony deformities. Moving all extremities.  +1 bilateral lower extremity edema left more than right.  Skin: Warm. Appropriate color  Neuro: Alert. Oriented. Face symmetric. Speech is fluent.  Gross strength and sensation intact in b/l UE and LEs  Psych: Appropriate mood and affect    Medical Decision Making & ED Course   Medical Decision Makin y.o. female PMH of HTN, COPD/Asthma, and Parkinson's Disease presenting after a mechanical fall.  Patient hemodynamically stable except for baseline 2 L oxygen requirement.  Workup in the ED significant for CBC with normal white count hemoglobin and platelets, CMP within normal limits, lactate 1.1,chest x-ray with increased small right pleural effusion with superimposed atelectasis/infiltrate.  Given patient is at baseline oxygen with no increased work of breathing and asymptomatic deferred treatment.  However patient's intermittently falling asleep and reports that she has had recurrent falls with her  being worried that she is refusing to go to a nursing home but keeps falling at home from her Parkinson's.  Social work was consulted, patient has home  health agency that comes to her house and is refusing inpatient admission for nursing home placement.  She was discharged with strict return precautions.  ----      Differential diagnoses considered include but are not limited to: Mechanical fall, syncope, fractures     Social Determinants of Health which Significantly Impact Care: Recurrent falls at home with patient refusing to go to nursing home The following actions were taken to address these social determinants: Patient offered evaluation by Social Work    EKG Independent Interpretation: The EKG obtained at 9:37 AM was independently interpreted by myself. It demonstrates sinus rhythm with a ventricular rate of 65. normal axis. Intervals are normal. ST segments showed no significant ST elevation or depression.  Improved rhythm compared to prior EKG from 6/15/2025.    Independent Result Review and Interpretation: Relevant laboratory and radiographic results were reviewed and independently interpreted by myself.  As necessary, they are commented on in the ED Course.    Chronic conditions affecting the patient's care: As documented above in [facility]    The patient was discussed with the following consultants/services: None    Care Considerations: As documented above in [facility]    ED Course:     Disposition   As a result of the work-up, the patient was discharged home.  she was informed of her diagnosis and instructed to come back with any concerns or worsening of condition.  she and was agreeable to the plan as discussed above.  she was given the opportunity to ask questions.  All of the patient's questions were answered.      Patient seen and discussed with ED attending physician.    Melani Najera MD  Emergency Medicine       Melani Najera MD  Resident  06/30/25 [id number]

## 2025-06-30 NOTE — DISCHARGE INSTRUCTIONS
Thank you for allowing us to participate in your care.  You are seen in the ED after a fall.  We did an EKG to rule out any arrhythmia with your heart which was normal.  We also did blood work did not show any sign of infection or bleeding.  We likely think you had a mechanical fall.  Please read the handout on preventing falls and continue to follow-up with your primary care physician and your home health agency.  Please return to the ED with any concerning symptoms.

## 2025-07-01 LAB
HOLD SPECIMEN: NORMAL

## 2025-07-18 LAB
P OFFSET: 178 MS
P ONSET: 158 MS
Q ONSET: 205 MS
QRS COUNT: 13 BEATS
QRS DURATION: 88 MS
QT INTERVAL: 412 MS
QTC CALCULATION(BAZETT): 481 MS
QTC FREDERICIA: 457 MS
R AXIS: -19 DEGREES
T AXIS: 29 DEGREES
T OFFSET: 411 MS
VENTRICULAR RATE: 82 BPM

## 2025-07-23 ENCOUNTER — APPOINTMENT (OUTPATIENT)
Dept: CARDIOLOGY | Facility: HOSPITAL | Age: 80
DRG: 689 | End: 2025-07-23
Payer: MEDICARE

## 2025-07-23 ENCOUNTER — APPOINTMENT (OUTPATIENT)
Dept: RADIOLOGY | Facility: HOSPITAL | Age: 80
DRG: 689 | End: 2025-07-23
Payer: MEDICARE

## 2025-07-23 ENCOUNTER — HOSPITAL ENCOUNTER (INPATIENT)
Facility: HOSPITAL | Age: 80
LOS: 2 days | Discharge: HOME HEALTH | DRG: 689 | End: 2025-07-26
Attending: STUDENT IN AN ORGANIZED HEALTH CARE EDUCATION/TRAINING PROGRAM | Admitting: INTERNAL MEDICINE
Payer: MEDICARE

## 2025-07-23 DIAGNOSIS — K82.8 GALLBLADDER SLUDGE: ICD-10-CM

## 2025-07-23 DIAGNOSIS — N39.0 UTI (URINARY TRACT INFECTION), UNCOMPLICATED: ICD-10-CM

## 2025-07-23 DIAGNOSIS — R11.2 NAUSEA AND VOMITING, UNSPECIFIED VOMITING TYPE: Primary | ICD-10-CM

## 2025-07-23 DIAGNOSIS — J44.9 CHRONIC OBSTRUCTIVE PULMONARY DISEASE, UNSPECIFIED COPD TYPE (MULTI): ICD-10-CM

## 2025-07-23 LAB
ALBUMIN SERPL BCP-MCNC: 4.3 G/DL (ref 3.4–5)
ALP SERPL-CCNC: 73 U/L (ref 33–136)
ALT SERPL W P-5'-P-CCNC: 17 U/L (ref 7–45)
ANION GAP BLDV CALCULATED.4IONS-SCNC: 10 MMOL/L (ref 10–25)
ANION GAP SERPL CALC-SCNC: 20 MMOL/L (ref 10–20)
AST SERPL W P-5'-P-CCNC: 24 U/L (ref 9–39)
BASE EXCESS BLDV CALC-SCNC: 2.3 MMOL/L (ref -2–3)
BASOPHILS # BLD AUTO: 0.04 X10*3/UL (ref 0–0.1)
BASOPHILS NFR BLD AUTO: 0.2 %
BILIRUB SERPL-MCNC: 0.7 MG/DL (ref 0–1.2)
BNP SERPL-MCNC: 493 PG/ML (ref 0–99)
BODY TEMPERATURE: ABNORMAL
BUN SERPL-MCNC: 10 MG/DL (ref 6–23)
CA-I BLDV-SCNC: 1.11 MMOL/L (ref 1.1–1.33)
CALCIUM SERPL-MCNC: 10 MG/DL (ref 8.6–10.3)
CARDIAC TROPONIN I PNL SERPL HS: 6 NG/L (ref 0–13)
CARDIAC TROPONIN I PNL SERPL HS: 8 NG/L (ref 0–13)
CHLORIDE BLDV-SCNC: 100 MMOL/L (ref 98–107)
CHLORIDE SERPL-SCNC: 96 MMOL/L (ref 98–107)
CO2 SERPL-SCNC: 25 MMOL/L (ref 21–32)
CREAT SERPL-MCNC: 0.91 MG/DL (ref 0.5–1.05)
EGFRCR SERPLBLD CKD-EPI 2021: 65 ML/MIN/1.73M*2
EOSINOPHIL # BLD AUTO: 0 X10*3/UL (ref 0–0.4)
EOSINOPHIL NFR BLD AUTO: 0 %
ERYTHROCYTE [DISTWIDTH] IN BLOOD BY AUTOMATED COUNT: 12.6 % (ref 11.5–14.5)
FLUAV RNA RESP QL NAA+PROBE: NOT DETECTED
FLUBV RNA RESP QL NAA+PROBE: NOT DETECTED
GLUCOSE BLDV-MCNC: 197 MG/DL (ref 74–99)
GLUCOSE SERPL-MCNC: 185 MG/DL (ref 74–99)
HCO3 BLDV-SCNC: 26.5 MMOL/L (ref 22–26)
HCT VFR BLD AUTO: 46.3 % (ref 36–46)
HCT VFR BLD EST: 47 % (ref 36–46)
HGB BLD-MCNC: 15.2 G/DL (ref 12–16)
HGB BLDV-MCNC: 15.8 G/DL (ref 12–16)
IMM GRANULOCYTES # BLD AUTO: 0.06 X10*3/UL (ref 0–0.5)
IMM GRANULOCYTES NFR BLD AUTO: 0.4 % (ref 0–0.9)
INHALED O2 CONCENTRATION: 36 %
LACTATE BLDV-SCNC: 2.2 MMOL/L (ref 0.4–2)
LACTATE BLDV-SCNC: 3.2 MMOL/L (ref 0.4–2)
LACTATE SERPL-SCNC: 1.7 MMOL/L (ref 0.4–2)
LACTATE SERPL-SCNC: 2.9 MMOL/L (ref 0.4–2)
LYMPHOCYTES # BLD AUTO: 0.91 X10*3/UL (ref 0.8–3)
LYMPHOCYTES NFR BLD AUTO: 5.4 %
MCH RBC QN AUTO: 30 PG (ref 26–34)
MCHC RBC AUTO-ENTMCNC: 32.8 G/DL (ref 32–36)
MCV RBC AUTO: 92 FL (ref 80–100)
MONOCYTES # BLD AUTO: 1.11 X10*3/UL (ref 0.05–0.8)
MONOCYTES NFR BLD AUTO: 6.6 %
NEUTROPHILS # BLD AUTO: 14.59 X10*3/UL (ref 1.6–5.5)
NEUTROPHILS NFR BLD AUTO: 87.4 %
NRBC BLD-RTO: 0 /100 WBCS (ref 0–0)
OXYHGB MFR BLDV: 93.6 % (ref 45–75)
PCO2 BLDV: 39 MM HG (ref 41–51)
PH BLDV: 7.44 PH (ref 7.33–7.43)
PLATELET # BLD AUTO: 299 X10*3/UL (ref 150–450)
PO2 BLDV: 81 MM HG (ref 35–45)
POTASSIUM BLDV-SCNC: 3.4 MMOL/L (ref 3.5–5.3)
POTASSIUM SERPL-SCNC: 3.9 MMOL/L (ref 3.5–5.3)
PROT SERPL-MCNC: 8.3 G/DL (ref 6.4–8.2)
RBC # BLD AUTO: 5.06 X10*6/UL (ref 4–5.2)
RSV RNA RESP QL NAA+PROBE: NOT DETECTED
SAO2 % BLDV: 96 % (ref 45–75)
SARS-COV-2 RNA RESP QL NAA+PROBE: NOT DETECTED
SODIUM BLDV-SCNC: 133 MMOL/L (ref 136–145)
SODIUM SERPL-SCNC: 137 MMOL/L (ref 136–145)
WBC # BLD AUTO: 16.7 X10*3/UL (ref 4.4–11.3)

## 2025-07-23 PROCEDURE — 85025 COMPLETE CBC W/AUTO DIFF WBC: CPT | Performed by: STUDENT IN AN ORGANIZED HEALTH CARE EDUCATION/TRAINING PROGRAM

## 2025-07-23 PROCEDURE — 87040 BLOOD CULTURE FOR BACTERIA: CPT | Mod: GEALAB | Performed by: STUDENT IN AN ORGANIZED HEALTH CARE EDUCATION/TRAINING PROGRAM

## 2025-07-23 PROCEDURE — 96375 TX/PRO/DX INJ NEW DRUG ADDON: CPT

## 2025-07-23 PROCEDURE — 36415 COLL VENOUS BLD VENIPUNCTURE: CPT | Performed by: STUDENT IN AN ORGANIZED HEALTH CARE EDUCATION/TRAINING PROGRAM

## 2025-07-23 PROCEDURE — 71250 CT THORAX DX C-: CPT | Mod: FOREIGN READ | Performed by: RADIOLOGY

## 2025-07-23 PROCEDURE — 83605 ASSAY OF LACTIC ACID: CPT | Performed by: STUDENT IN AN ORGANIZED HEALTH CARE EDUCATION/TRAINING PROGRAM

## 2025-07-23 PROCEDURE — 96368 THER/DIAG CONCURRENT INF: CPT

## 2025-07-23 PROCEDURE — 99285 EMERGENCY DEPT VISIT HI MDM: CPT | Mod: 25 | Performed by: STUDENT IN AN ORGANIZED HEALTH CARE EDUCATION/TRAINING PROGRAM

## 2025-07-23 PROCEDURE — 84484 ASSAY OF TROPONIN QUANT: CPT | Performed by: STUDENT IN AN ORGANIZED HEALTH CARE EDUCATION/TRAINING PROGRAM

## 2025-07-23 PROCEDURE — 87637 SARSCOV2&INF A&B&RSV AMP PRB: CPT | Performed by: STUDENT IN AN ORGANIZED HEALTH CARE EDUCATION/TRAINING PROGRAM

## 2025-07-23 PROCEDURE — 99223 1ST HOSP IP/OBS HIGH 75: CPT | Performed by: INTERNAL MEDICINE

## 2025-07-23 PROCEDURE — 96365 THER/PROPH/DIAG IV INF INIT: CPT

## 2025-07-23 PROCEDURE — 70450 CT HEAD/BRAIN W/O DYE: CPT | Performed by: RADIOLOGY

## 2025-07-23 PROCEDURE — 74176 CT ABD & PELVIS W/O CONTRAST: CPT

## 2025-07-23 PROCEDURE — 76705 ECHO EXAM OF ABDOMEN: CPT | Mod: FOREIGN READ | Performed by: RADIOLOGY

## 2025-07-23 PROCEDURE — 76705 ECHO EXAM OF ABDOMEN: CPT

## 2025-07-23 PROCEDURE — 70450 CT HEAD/BRAIN W/O DYE: CPT

## 2025-07-23 PROCEDURE — 84132 ASSAY OF SERUM POTASSIUM: CPT | Performed by: STUDENT IN AN ORGANIZED HEALTH CARE EDUCATION/TRAINING PROGRAM

## 2025-07-23 PROCEDURE — 93005 ELECTROCARDIOGRAM TRACING: CPT

## 2025-07-23 PROCEDURE — 83880 ASSAY OF NATRIURETIC PEPTIDE: CPT | Performed by: STUDENT IN AN ORGANIZED HEALTH CARE EDUCATION/TRAINING PROGRAM

## 2025-07-23 PROCEDURE — 74176 CT ABD & PELVIS W/O CONTRAST: CPT | Mod: FOREIGN READ | Performed by: RADIOLOGY

## 2025-07-23 PROCEDURE — 2500000004 HC RX 250 GENERAL PHARMACY W/ HCPCS (ALT 636 FOR OP/ED): Performed by: STUDENT IN AN ORGANIZED HEALTH CARE EDUCATION/TRAINING PROGRAM

## 2025-07-23 RX ORDER — METRONIDAZOLE 500 MG/100ML
500 INJECTION, SOLUTION INTRAVENOUS ONCE
Status: COMPLETED | OUTPATIENT
Start: 2025-07-23 | End: 2025-07-23

## 2025-07-23 RX ORDER — ONDANSETRON HYDROCHLORIDE 2 MG/ML
4 INJECTION, SOLUTION INTRAVENOUS ONCE
Status: COMPLETED | OUTPATIENT
Start: 2025-07-23 | End: 2025-07-23

## 2025-07-23 RX ORDER — CEFEPIME HYDROCHLORIDE 2 G/50ML
2 INJECTION, SOLUTION INTRAVENOUS ONCE
Status: COMPLETED | OUTPATIENT
Start: 2025-07-23 | End: 2025-07-23

## 2025-07-23 RX ADMIN — SODIUM CHLORIDE, SODIUM LACTATE, POTASSIUM CHLORIDE, AND CALCIUM CHLORIDE 1000 ML: .6; .31; .03; .02 INJECTION, SOLUTION INTRAVENOUS at 19:35

## 2025-07-23 RX ADMIN — METRONIDAZOLE 500 MG: 500 SOLUTION INTRAVENOUS at 19:34

## 2025-07-23 RX ADMIN — CEFEPIME HYDROCHLORIDE 2 G: 2 INJECTION, SOLUTION INTRAVENOUS at 19:34

## 2025-07-23 RX ADMIN — ONDANSETRON 4 MG: 2 INJECTION, SOLUTION INTRAMUSCULAR; INTRAVENOUS at 19:33

## 2025-07-23 ASSESSMENT — PAIN - FUNCTIONAL ASSESSMENT: PAIN_FUNCTIONAL_ASSESSMENT: UNABLE TO SELF-REPORT

## 2025-07-24 ENCOUNTER — APPOINTMENT (OUTPATIENT)
Facility: CLINIC | Age: 80
End: 2025-07-24
Payer: MEDICARE

## 2025-07-24 ENCOUNTER — APPOINTMENT (OUTPATIENT)
Dept: RADIOLOGY | Facility: HOSPITAL | Age: 80
DRG: 689 | End: 2025-07-24
Payer: MEDICARE

## 2025-07-24 PROBLEM — K21.9 GASTROESOPHAGEAL REFLUX DISEASE WITHOUT ESOPHAGITIS: Status: ACTIVE | Noted: 2020-12-21

## 2025-07-24 PROBLEM — G93.41 ACUTE METABOLIC ENCEPHALOPATHY: Status: ACTIVE | Noted: 2025-07-24

## 2025-07-24 LAB
ANION GAP SERPL CALC-SCNC: 17 MMOL/L (ref 10–20)
APPEARANCE UR: ABNORMAL
ATRIAL RATE: 79 BPM
BACTERIA #/AREA URNS AUTO: ABNORMAL /HPF
BILIRUB UR STRIP.AUTO-MCNC: NEGATIVE MG/DL
BUN SERPL-MCNC: 14 MG/DL (ref 6–23)
CALCIUM SERPL-MCNC: 9.1 MG/DL (ref 8.6–10.3)
CHLORIDE SERPL-SCNC: 99 MMOL/L (ref 98–107)
CO2 SERPL-SCNC: 28 MMOL/L (ref 21–32)
COLOR UR: YELLOW
CREAT SERPL-MCNC: 0.89 MG/DL (ref 0.5–1.05)
EGFRCR SERPLBLD CKD-EPI 2021: 66 ML/MIN/1.73M*2
ERYTHROCYTE [DISTWIDTH] IN BLOOD BY AUTOMATED COUNT: 12.6 % (ref 11.5–14.5)
GLUCOSE SERPL-MCNC: 134 MG/DL (ref 74–99)
GLUCOSE UR STRIP.AUTO-MCNC: NORMAL MG/DL
HCT VFR BLD AUTO: 40.6 % (ref 36–46)
HGB BLD-MCNC: 13.7 G/DL (ref 12–16)
HYALINE CASTS #/AREA URNS AUTO: ABNORMAL /LPF
KETONES UR STRIP.AUTO-MCNC: ABNORMAL MG/DL
LEUKOCYTE ESTERASE UR QL STRIP.AUTO: ABNORMAL
MCH RBC QN AUTO: 29.8 PG (ref 26–34)
MCHC RBC AUTO-ENTMCNC: 33.7 G/DL (ref 32–36)
MCV RBC AUTO: 89 FL (ref 80–100)
MUCOUS THREADS #/AREA URNS AUTO: ABNORMAL /LPF
NITRITE UR QL STRIP.AUTO: NEGATIVE
NRBC BLD-RTO: 0 /100 WBCS (ref 0–0)
P AXIS: 42 DEGREES
P OFFSET: 181 MS
P ONSET: 151 MS
PH UR STRIP.AUTO: 6.5 [PH]
PLATELET # BLD AUTO: 245 X10*3/UL (ref 150–450)
POTASSIUM SERPL-SCNC: 3.3 MMOL/L (ref 3.5–5.3)
PR INTERVAL: 124 MS
PROT UR STRIP.AUTO-MCNC: ABNORMAL MG/DL
Q ONSET: 213 MS
QRS COUNT: 13 BEATS
QRS DURATION: 82 MS
QT INTERVAL: 462 MS
QTC CALCULATION(BAZETT): 529 MS
QTC FREDERICIA: 506 MS
R AXIS: -25 DEGREES
RBC # BLD AUTO: 4.59 X10*6/UL (ref 4–5.2)
RBC # UR STRIP.AUTO: ABNORMAL MG/DL
RBC #/AREA URNS AUTO: >20 /HPF
SODIUM SERPL-SCNC: 141 MMOL/L (ref 136–145)
SP GR UR STRIP.AUTO: 1.03
SQUAMOUS #/AREA URNS AUTO: ABNORMAL /HPF
T AXIS: 1 DEGREES
T OFFSET: 444 MS
UROBILINOGEN UR STRIP.AUTO-MCNC: NORMAL MG/DL
VENTRICULAR RATE: 79 BPM
WBC # BLD AUTO: 14.6 X10*3/UL (ref 4.4–11.3)
WBC #/AREA URNS AUTO: ABNORMAL /HPF

## 2025-07-24 PROCEDURE — 2500000001 HC RX 250 WO HCPCS SELF ADMINISTERED DRUGS (ALT 637 FOR MEDICARE OP): Performed by: INTERNAL MEDICINE

## 2025-07-24 PROCEDURE — A9537 TC99M MEBROFENIN: HCPCS | Performed by: SURGERY

## 2025-07-24 PROCEDURE — 81001 URINALYSIS AUTO W/SCOPE: CPT | Performed by: STUDENT IN AN ORGANIZED HEALTH CARE EDUCATION/TRAINING PROGRAM

## 2025-07-24 PROCEDURE — 85027 COMPLETE CBC AUTOMATED: CPT | Performed by: INTERNAL MEDICINE

## 2025-07-24 PROCEDURE — 87086 URINE CULTURE/COLONY COUNT: CPT | Mod: GEALAB | Performed by: STUDENT IN AN ORGANIZED HEALTH CARE EDUCATION/TRAINING PROGRAM

## 2025-07-24 PROCEDURE — 3430000001 HC RX 343 DIAGNOSTIC RADIOPHARMACEUTICALS: Performed by: SURGERY

## 2025-07-24 PROCEDURE — 94760 N-INVAS EAR/PLS OXIMETRY 1: CPT

## 2025-07-24 PROCEDURE — 94664 DEMO&/EVAL PT USE INHALER: CPT

## 2025-07-24 PROCEDURE — 80048 BASIC METABOLIC PNL TOTAL CA: CPT | Performed by: INTERNAL MEDICINE

## 2025-07-24 PROCEDURE — 78226 HEPATOBILIARY SYSTEM IMAGING: CPT | Performed by: INTERNAL MEDICINE

## 2025-07-24 PROCEDURE — 78226 HEPATOBILIARY SYSTEM IMAGING: CPT

## 2025-07-24 PROCEDURE — 99233 SBSQ HOSP IP/OBS HIGH 50: CPT | Performed by: STUDENT IN AN ORGANIZED HEALTH CARE EDUCATION/TRAINING PROGRAM

## 2025-07-24 PROCEDURE — 2500000005 HC RX 250 GENERAL PHARMACY W/O HCPCS: Performed by: STUDENT IN AN ORGANIZED HEALTH CARE EDUCATION/TRAINING PROGRAM

## 2025-07-24 PROCEDURE — 1200000002 HC GENERAL ROOM WITH TELEMETRY DAILY

## 2025-07-24 PROCEDURE — 36415 COLL VENOUS BLD VENIPUNCTURE: CPT | Performed by: INTERNAL MEDICINE

## 2025-07-24 PROCEDURE — 99222 1ST HOSP IP/OBS MODERATE 55: CPT | Performed by: NURSE PRACTITIONER

## 2025-07-24 PROCEDURE — 99221 1ST HOSP IP/OBS SF/LOW 40: CPT | Performed by: PSYCHIATRY & NEUROLOGY

## 2025-07-24 PROCEDURE — 2500000002 HC RX 250 W HCPCS SELF ADMINISTERED DRUGS (ALT 637 FOR MEDICARE OP, ALT 636 FOR OP/ED): Performed by: INTERNAL MEDICINE

## 2025-07-24 PROCEDURE — 94640 AIRWAY INHALATION TREATMENT: CPT

## 2025-07-24 PROCEDURE — 2500000004 HC RX 250 GENERAL PHARMACY W/ HCPCS (ALT 636 FOR OP/ED): Performed by: STUDENT IN AN ORGANIZED HEALTH CARE EDUCATION/TRAINING PROGRAM

## 2025-07-24 PROCEDURE — 2500000001 HC RX 250 WO HCPCS SELF ADMINISTERED DRUGS (ALT 637 FOR MEDICARE OP): Performed by: STUDENT IN AN ORGANIZED HEALTH CARE EDUCATION/TRAINING PROGRAM

## 2025-07-24 PROCEDURE — 2500000004 HC RX 250 GENERAL PHARMACY W/ HCPCS (ALT 636 FOR OP/ED): Performed by: INTERNAL MEDICINE

## 2025-07-24 RX ORDER — BUDESONIDE 0.5 MG/2ML
0.5 INHALANT ORAL
Status: DISCONTINUED | OUTPATIENT
Start: 2025-07-24 | End: 2025-07-26 | Stop reason: HOSPADM

## 2025-07-24 RX ORDER — BUDESONIDE 0.5 MG/2ML
0.5 INHALANT ORAL
Status: DISCONTINUED | OUTPATIENT
Start: 2025-07-24 | End: 2025-07-24

## 2025-07-24 RX ORDER — HEPARIN SODIUM 5000 [USP'U]/ML
5000 INJECTION, SOLUTION INTRAVENOUS; SUBCUTANEOUS EVERY 8 HOURS
Status: DISCONTINUED | OUTPATIENT
Start: 2025-07-24 | End: 2025-07-26 | Stop reason: HOSPADM

## 2025-07-24 RX ORDER — ONDANSETRON 4 MG/1
4 TABLET, FILM COATED ORAL EVERY 8 HOURS PRN
Status: DISCONTINUED | OUTPATIENT
Start: 2025-07-24 | End: 2025-07-24

## 2025-07-24 RX ORDER — CALCIUM CARBONATE 200(500)MG
500 TABLET,CHEWABLE ORAL 4 TIMES DAILY PRN
Status: DISCONTINUED | OUTPATIENT
Start: 2025-07-24 | End: 2025-07-26 | Stop reason: HOSPADM

## 2025-07-24 RX ORDER — ALUMINUM HYDROXIDE, MAGNESIUM HYDROXIDE, AND SIMETHICONE 1200; 120; 1200 MG/30ML; MG/30ML; MG/30ML
30 SUSPENSION ORAL EVERY 6 HOURS PRN
Status: DISCONTINUED | OUTPATIENT
Start: 2025-07-24 | End: 2025-07-26 | Stop reason: HOSPADM

## 2025-07-24 RX ORDER — ALPRAZOLAM 0.5 MG/1
0.5 TABLET ORAL 3 TIMES DAILY
Status: DISCONTINUED | OUTPATIENT
Start: 2025-07-24 | End: 2025-07-26 | Stop reason: HOSPADM

## 2025-07-24 RX ORDER — ALBUTEROL SULFATE 0.83 MG/ML
3 SOLUTION RESPIRATORY (INHALATION) EVERY 2 HOUR PRN
Status: DISCONTINUED | OUTPATIENT
Start: 2025-07-24 | End: 2025-07-26 | Stop reason: HOSPADM

## 2025-07-24 RX ORDER — NYSTATIN 100000 U/G
OINTMENT TOPICAL 2 TIMES DAILY
Status: DISCONTINUED | OUTPATIENT
Start: 2025-07-24 | End: 2025-07-26 | Stop reason: HOSPADM

## 2025-07-24 RX ORDER — DONEPEZIL HYDROCHLORIDE 5 MG/1
10 TABLET, FILM COATED ORAL NIGHTLY
Status: DISCONTINUED | OUTPATIENT
Start: 2025-07-24 | End: 2025-07-26 | Stop reason: HOSPADM

## 2025-07-24 RX ORDER — ACETAMINOPHEN 160 MG/5ML
650 SOLUTION ORAL EVERY 4 HOURS PRN
Status: DISCONTINUED | OUTPATIENT
Start: 2025-07-24 | End: 2025-07-26 | Stop reason: HOSPADM

## 2025-07-24 RX ORDER — IPRATROPIUM BROMIDE AND ALBUTEROL SULFATE 2.5; .5 MG/3ML; MG/3ML
3 SOLUTION RESPIRATORY (INHALATION)
Status: DISCONTINUED | OUTPATIENT
Start: 2025-07-24 | End: 2025-07-26 | Stop reason: HOSPADM

## 2025-07-24 RX ORDER — EAR PLUGS
1 EACH OTIC (EAR) 3 TIMES DAILY
Status: DISCONTINUED | OUTPATIENT
Start: 2025-07-24 | End: 2025-07-26 | Stop reason: HOSPADM

## 2025-07-24 RX ORDER — SODIUM CHLORIDE 9 MG/ML
100 INJECTION, SOLUTION INTRAVENOUS CONTINUOUS
Status: DISCONTINUED | OUTPATIENT
Start: 2025-07-24 | End: 2025-07-25

## 2025-07-24 RX ORDER — TRAZODONE HYDROCHLORIDE 50 MG/1
50 TABLET ORAL NIGHTLY
Status: DISCONTINUED | OUTPATIENT
Start: 2025-07-24 | End: 2025-07-26 | Stop reason: HOSPADM

## 2025-07-24 RX ORDER — ACETAMINOPHEN 325 MG/1
650 TABLET ORAL EVERY 4 HOURS PRN
Status: DISCONTINUED | OUTPATIENT
Start: 2025-07-24 | End: 2025-07-26 | Stop reason: HOSPADM

## 2025-07-24 RX ORDER — METRONIDAZOLE 500 MG/100ML
500 INJECTION, SOLUTION INTRAVENOUS EVERY 8 HOURS
Status: DISCONTINUED | OUTPATIENT
Start: 2025-07-24 | End: 2025-07-24

## 2025-07-24 RX ORDER — ACETAMINOPHEN 650 MG/1
650 SUPPOSITORY RECTAL EVERY 4 HOURS PRN
Status: DISCONTINUED | OUTPATIENT
Start: 2025-07-24 | End: 2025-07-26 | Stop reason: HOSPADM

## 2025-07-24 RX ORDER — PREGABALIN 50 MG/1
100 CAPSULE ORAL 2 TIMES DAILY
Status: DISCONTINUED | OUTPATIENT
Start: 2025-07-24 | End: 2025-07-26 | Stop reason: HOSPADM

## 2025-07-24 RX ORDER — KIT FOR THE PREPARATION OF TECHNETIUM TC 99M MEBROFENIN 45 MG/10ML
5.9 INJECTION, POWDER, LYOPHILIZED, FOR SOLUTION INTRAVENOUS
Status: COMPLETED | OUTPATIENT
Start: 2025-07-24 | End: 2025-07-24

## 2025-07-24 RX ORDER — PANTOPRAZOLE SODIUM 40 MG/10ML
40 INJECTION, POWDER, LYOPHILIZED, FOR SOLUTION INTRAVENOUS
Status: DISCONTINUED | OUTPATIENT
Start: 2025-07-25 | End: 2025-07-26 | Stop reason: HOSPADM

## 2025-07-24 RX ORDER — ALBUTEROL SULFATE 0.83 MG/ML
3 SOLUTION RESPIRATORY (INHALATION) EVERY 6 HOURS PRN
Status: DISCONTINUED | OUTPATIENT
Start: 2025-07-24 | End: 2025-07-24

## 2025-07-24 RX ORDER — CEFTRIAXONE 1 G/50ML
1 INJECTION, SOLUTION INTRAVENOUS EVERY 12 HOURS
Status: DISCONTINUED | OUTPATIENT
Start: 2025-07-24 | End: 2025-07-26 | Stop reason: HOSPADM

## 2025-07-24 RX ORDER — IPRATROPIUM BROMIDE AND ALBUTEROL SULFATE 2.5; .5 MG/3ML; MG/3ML
3 SOLUTION RESPIRATORY (INHALATION)
Status: DISCONTINUED | OUTPATIENT
Start: 2025-07-24 | End: 2025-07-24

## 2025-07-24 RX ORDER — PROCHLORPERAZINE EDISYLATE 5 MG/ML
10 INJECTION INTRAMUSCULAR; INTRAVENOUS EVERY 6 HOURS PRN
Status: DISCONTINUED | OUTPATIENT
Start: 2025-07-24 | End: 2025-07-26 | Stop reason: HOSPADM

## 2025-07-24 RX ORDER — ONDANSETRON HYDROCHLORIDE 2 MG/ML
4 INJECTION, SOLUTION INTRAVENOUS EVERY 4 HOURS PRN
Status: DISCONTINUED | OUTPATIENT
Start: 2025-07-24 | End: 2025-07-26 | Stop reason: HOSPADM

## 2025-07-24 RX ORDER — FLUOXETINE 20 MG/1
100 CAPSULE ORAL DAILY
Status: DISCONTINUED | OUTPATIENT
Start: 2025-07-24 | End: 2025-07-26 | Stop reason: HOSPADM

## 2025-07-24 RX ORDER — CARBIDOPA AND LEVODOPA 25; 100 MG/1; MG/1
2 TABLET ORAL 3 TIMES DAILY
Status: DISCONTINUED | OUTPATIENT
Start: 2025-07-24 | End: 2025-07-26 | Stop reason: HOSPADM

## 2025-07-24 RX ORDER — ESOMEPRAZOLE MAGNESIUM 40 MG/1
40 GRANULE, DELAYED RELEASE ORAL
Status: DISCONTINUED | OUTPATIENT
Start: 2025-07-25 | End: 2025-07-26 | Stop reason: HOSPADM

## 2025-07-24 RX ORDER — ONDANSETRON HYDROCHLORIDE 2 MG/ML
4 INJECTION, SOLUTION INTRAVENOUS EVERY 8 HOURS PRN
Status: DISCONTINUED | OUTPATIENT
Start: 2025-07-24 | End: 2025-07-24

## 2025-07-24 RX ORDER — BUSPIRONE HYDROCHLORIDE 15 MG/1
15 TABLET ORAL 2 TIMES DAILY
Status: DISCONTINUED | OUTPATIENT
Start: 2025-07-24 | End: 2025-07-26 | Stop reason: HOSPADM

## 2025-07-24 RX ORDER — PANTOPRAZOLE SODIUM 40 MG/1
40 TABLET, DELAYED RELEASE ORAL
Status: DISCONTINUED | OUTPATIENT
Start: 2025-07-25 | End: 2025-07-26 | Stop reason: HOSPADM

## 2025-07-24 RX ADMIN — CEFTRIAXONE 1 G: 1 INJECTION, SOLUTION INTRAVENOUS at 16:00

## 2025-07-24 RX ADMIN — HEPARIN SODIUM 5000 UNITS: 5000 INJECTION, SOLUTION INTRAVENOUS; SUBCUTANEOUS at 03:44

## 2025-07-24 RX ADMIN — IPRATROPIUM BROMIDE AND ALBUTEROL SULFATE 3 ML: 2.5; .5 SOLUTION RESPIRATORY (INHALATION) at 19:41

## 2025-07-24 RX ADMIN — PREGABALIN 100 MG: 50 CAPSULE ORAL at 10:09

## 2025-07-24 RX ADMIN — BUSPIRONE HYDROCHLORIDE 15 MG: 15 TABLET ORAL at 10:09

## 2025-07-24 RX ADMIN — FLUOXETINE HYDROCHLORIDE 100 MG: 20 CAPSULE ORAL at 10:09

## 2025-07-24 RX ADMIN — CEFTRIAXONE 1 G: 1 INJECTION, SOLUTION INTRAVENOUS at 06:03

## 2025-07-24 RX ADMIN — ONDANSETRON 4 MG: 2 INJECTION, SOLUTION INTRAMUSCULAR; INTRAVENOUS at 02:30

## 2025-07-24 RX ADMIN — IPRATROPIUM BROMIDE AND ALBUTEROL SULFATE 3 ML: 2.5; .5 SOLUTION RESPIRATORY (INHALATION) at 07:51

## 2025-07-24 RX ADMIN — PROCHLORPERAZINE EDISYLATE 10 MG: 5 INJECTION INTRAMUSCULAR; INTRAVENOUS at 16:46

## 2025-07-24 RX ADMIN — NYSTATIN: 100000 OINTMENT TOPICAL at 22:02

## 2025-07-24 RX ADMIN — ONDANSETRON 4 MG: 2 INJECTION, SOLUTION INTRAMUSCULAR; INTRAVENOUS at 13:50

## 2025-07-24 RX ADMIN — HEPARIN SODIUM 5000 UNITS: 5000 INJECTION, SOLUTION INTRAVENOUS; SUBCUTANEOUS at 10:09

## 2025-07-24 RX ADMIN — Medication 1 APPLICATION: at 21:58

## 2025-07-24 RX ADMIN — SODIUM CHLORIDE 100 ML/HR: 0.9 INJECTION, SOLUTION INTRAVENOUS at 16:41

## 2025-07-24 RX ADMIN — BUDESONIDE 0.5 MG: 0.5 INHALANT RESPIRATORY (INHALATION) at 19:41

## 2025-07-24 RX ADMIN — ALPRAZOLAM 0.5 MG: 0.5 TABLET ORAL at 15:38

## 2025-07-24 RX ADMIN — ALPRAZOLAM 0.5 MG: 0.5 TABLET ORAL at 10:09

## 2025-07-24 RX ADMIN — Medication 1 APPLICATION: at 15:39

## 2025-07-24 RX ADMIN — SODIUM CHLORIDE 100 ML/HR: 0.9 INJECTION, SOLUTION INTRAVENOUS at 03:43

## 2025-07-24 RX ADMIN — IPRATROPIUM BROMIDE AND ALBUTEROL SULFATE 3 ML: 2.5; .5 SOLUTION RESPIRATORY (INHALATION) at 13:51

## 2025-07-24 RX ADMIN — ALPRAZOLAM 0.5 MG: 0.5 TABLET ORAL at 21:59

## 2025-07-24 RX ADMIN — CARBIDOPA AND LEVODOPA 2 TABLET: 25; 100 TABLET ORAL at 10:09

## 2025-07-24 RX ADMIN — BUDESONIDE 0.5 MG: 0.5 INHALANT RESPIRATORY (INHALATION) at 07:51

## 2025-07-24 RX ADMIN — PREGABALIN 100 MG: 50 CAPSULE ORAL at 21:58

## 2025-07-24 RX ADMIN — METRONIDAZOLE 500 MG: 500 SOLUTION INTRAVENOUS at 11:25

## 2025-07-24 RX ADMIN — Medication 1 DOSE: at 19:41

## 2025-07-24 RX ADMIN — DONEPEZIL HYDROCHLORIDE 10 MG: 5 TABLET ORAL at 21:59

## 2025-07-24 RX ADMIN — CARBIDOPA AND LEVODOPA 2 TABLET: 25; 100 TABLET ORAL at 15:38

## 2025-07-24 RX ADMIN — KIT FOR THE PREPARATION OF TECHNETIUM TC 99M MEBROFENIN 5.9 MILLICURIE: 45 INJECTION, POWDER, LYOPHILIZED, FOR SOLUTION INTRAVENOUS at 08:50

## 2025-07-24 RX ADMIN — HEPARIN SODIUM 5000 UNITS: 5000 INJECTION, SOLUTION INTRAVENOUS; SUBCUTANEOUS at 17:35

## 2025-07-24 RX ADMIN — BUSPIRONE HYDROCHLORIDE 15 MG: 15 TABLET ORAL at 21:59

## 2025-07-24 RX ADMIN — NYSTATIN: 100000 OINTMENT TOPICAL at 10:10

## 2025-07-24 RX ADMIN — Medication: at 07:51

## 2025-07-24 RX ADMIN — METRONIDAZOLE 500 MG: 500 SOLUTION INTRAVENOUS at 03:44

## 2025-07-24 RX ADMIN — CARBIDOPA AND LEVODOPA 2 TABLET: 25; 100 TABLET ORAL at 21:59

## 2025-07-24 SDOH — SOCIAL STABILITY: SOCIAL INSECURITY: ARE YOU OR HAVE YOU BEEN THREATENED OR ABUSED PHYSICALLY, EMOTIONALLY, OR SEXUALLY BY ANYONE?: UNABLE TO ASSESS

## 2025-07-24 SDOH — ECONOMIC STABILITY: FOOD INSECURITY: HOW HARD IS IT FOR YOU TO PAY FOR THE VERY BASICS LIKE FOOD, HOUSING, MEDICAL CARE, AND HEATING?: SOMEWHAT HARD

## 2025-07-24 SDOH — ECONOMIC STABILITY: FOOD INSECURITY: WITHIN THE PAST 12 MONTHS, YOU WORRIED THAT YOUR FOOD WOULD RUN OUT BEFORE YOU GOT THE MONEY TO BUY MORE.: NEVER TRUE

## 2025-07-24 SDOH — ECONOMIC STABILITY: HOUSING INSECURITY: IN THE LAST 12 MONTHS, WAS THERE A TIME WHEN YOU WERE NOT ABLE TO PAY THE MORTGAGE OR RENT ON TIME?: NO

## 2025-07-24 SDOH — SOCIAL STABILITY: SOCIAL INSECURITY: DO YOU FEEL ANYONE HAS EXPLOITED OR TAKEN ADVANTAGE OF YOU FINANCIALLY OR OF YOUR PERSONAL PROPERTY?: UNABLE TO ASSESS

## 2025-07-24 SDOH — SOCIAL STABILITY: SOCIAL INSECURITY: HAS ANYONE EVER THREATENED TO HURT YOUR FAMILY OR YOUR PETS?: UNABLE TO ASSESS

## 2025-07-24 SDOH — SOCIAL STABILITY: SOCIAL INSECURITY: WITHIN THE LAST YEAR, HAVE YOU BEEN HUMILIATED OR EMOTIONALLY ABUSED IN OTHER WAYS BY YOUR PARTNER OR EX-PARTNER?: NO

## 2025-07-24 SDOH — SOCIAL STABILITY: SOCIAL INSECURITY: WITHIN THE LAST YEAR, HAVE YOU BEEN AFRAID OF YOUR PARTNER OR EX-PARTNER?: NO

## 2025-07-24 SDOH — SOCIAL STABILITY: SOCIAL INSECURITY: WERE YOU ABLE TO COMPLETE ALL THE BEHAVIORAL HEALTH SCREENINGS?: YES

## 2025-07-24 SDOH — SOCIAL STABILITY: SOCIAL INSECURITY
WITHIN THE LAST YEAR, HAVE YOU BEEN RAPED OR FORCED TO HAVE ANY KIND OF SEXUAL ACTIVITY BY YOUR PARTNER OR EX-PARTNER?: NO

## 2025-07-24 SDOH — ECONOMIC STABILITY: TRANSPORTATION INSECURITY: IN THE PAST 12 MONTHS, HAS LACK OF TRANSPORTATION KEPT YOU FROM MEDICAL APPOINTMENTS OR FROM GETTING MEDICATIONS?: NO

## 2025-07-24 SDOH — SOCIAL STABILITY: SOCIAL INSECURITY: HAVE YOU HAD ANY THOUGHTS OF HARMING ANYONE ELSE?: UNABLE TO ASSESS

## 2025-07-24 SDOH — ECONOMIC STABILITY: INCOME INSECURITY: IN THE PAST 12 MONTHS HAS THE ELECTRIC, GAS, OIL, OR WATER COMPANY THREATENED TO SHUT OFF SERVICES IN YOUR HOME?: NO

## 2025-07-24 SDOH — ECONOMIC STABILITY: HOUSING INSECURITY: IN THE PAST 12 MONTHS, HOW MANY TIMES HAVE YOU MOVED WHERE YOU WERE LIVING?: 1

## 2025-07-24 SDOH — ECONOMIC STABILITY: FOOD INSECURITY: WITHIN THE PAST 12 MONTHS, THE FOOD YOU BOUGHT JUST DIDN'T LAST AND YOU DIDN'T HAVE MONEY TO GET MORE.: NEVER TRUE

## 2025-07-24 SDOH — SOCIAL STABILITY: SOCIAL INSECURITY
WITHIN THE LAST YEAR, HAVE YOU BEEN KICKED, HIT, SLAPPED, OR OTHERWISE PHYSICALLY HURT BY YOUR PARTNER OR EX-PARTNER?: NO

## 2025-07-24 SDOH — SOCIAL STABILITY: SOCIAL INSECURITY: DOES ANYONE TRY TO KEEP YOU FROM HAVING/CONTACTING OTHER FRIENDS OR DOING THINGS OUTSIDE YOUR HOME?: UNABLE TO ASSESS

## 2025-07-24 SDOH — SOCIAL STABILITY: SOCIAL INSECURITY: DO YOU FEEL UNSAFE GOING BACK TO THE PLACE WHERE YOU ARE LIVING?: UNABLE TO ASSESS

## 2025-07-24 SDOH — SOCIAL STABILITY: SOCIAL INSECURITY: ARE THERE ANY APPARENT SIGNS OF INJURIES/BEHAVIORS THAT COULD BE RELATED TO ABUSE/NEGLECT?: UNABLE TO ASSESS

## 2025-07-24 SDOH — SOCIAL STABILITY: SOCIAL INSECURITY: HAVE YOU HAD THOUGHTS OF HARMING ANYONE ELSE?: UNABLE TO ASSESS

## 2025-07-24 SDOH — SOCIAL STABILITY: SOCIAL INSECURITY: ABUSE: ADULT

## 2025-07-24 ASSESSMENT — LIFESTYLE VARIABLES
HOW OFTEN DO YOU HAVE A DRINK CONTAINING ALCOHOL: NEVER
AUDIT-C TOTAL SCORE: 0
HOW OFTEN DO YOU HAVE 6 OR MORE DRINKS ON ONE OCCASION: NEVER
HOW MANY STANDARD DRINKS CONTAINING ALCOHOL DO YOU HAVE ON A TYPICAL DAY: PATIENT DOES NOT DRINK
SKIP TO QUESTIONS 9-10: 1
AUDIT-C TOTAL SCORE: 0

## 2025-07-24 ASSESSMENT — COGNITIVE AND FUNCTIONAL STATUS - GENERAL
MOVING FROM LYING ON BACK TO SITTING ON SIDE OF FLAT BED WITH BEDRAILS: A LOT
MOBILITY SCORE: 12
PATIENT BASELINE BEDBOUND: NO
MOVING TO AND FROM BED TO CHAIR: A LOT
MOBILITY SCORE: 15
DAILY ACTIVITIY SCORE: 11
DRESSING REGULAR UPPER BODY CLOTHING: A LOT
STANDING UP FROM CHAIR USING ARMS: A LITTLE
WALKING IN HOSPITAL ROOM: A LOT
CLIMB 3 TO 5 STEPS WITH RAILING: A LOT
EATING MEALS: A LOT
TOILETING: TOTAL
HELP NEEDED FOR BATHING: A LITTLE
DRESSING REGULAR LOWER BODY CLOTHING: A LITTLE
DRESSING REGULAR UPPER BODY CLOTHING: A LITTLE
TOILETING: A LITTLE
WALKING IN HOSPITAL ROOM: A LOT
PERSONAL GROOMING: A LOT
TURNING FROM BACK TO SIDE WHILE IN FLAT BAD: A LITTLE
MOVING TO AND FROM BED TO CHAIR: A LOT
HELP NEEDED FOR BATHING: A LOT
CLIMB 3 TO 5 STEPS WITH RAILING: A LOT
MOVING FROM LYING ON BACK TO SITTING ON SIDE OF FLAT BED WITH BEDRAILS: A LITTLE
DRESSING REGULAR LOWER BODY CLOTHING: A LOT
DAILY ACTIVITIY SCORE: 20
STANDING UP FROM CHAIR USING ARMS: A LOT
TURNING FROM BACK TO SIDE WHILE IN FLAT BAD: A LOT

## 2025-07-24 ASSESSMENT — ACTIVITIES OF DAILY LIVING (ADL)
JUDGMENT_ADEQUATE_SAFELY_COMPLETE_DAILY_ACTIVITIES: YES
LACK_OF_TRANSPORTATION: NO
HEARING - RIGHT EAR: FUNCTIONAL
GROOMING: INDEPENDENT
LACK_OF_TRANSPORTATION: NO
ADEQUATE_TO_COMPLETE_ADL: YES
FEEDING YOURSELF: INDEPENDENT
PATIENT'S MEMORY ADEQUATE TO SAFELY COMPLETE DAILY ACTIVITIES?: YES
HEARING - LEFT EAR: FUNCTIONAL
TOILETING: INDEPENDENT
DRESSING YOURSELF: NEEDS ASSISTANCE
BATHING: INDEPENDENT
LACK_OF_TRANSPORTATION: NO
WALKS IN HOME: NEEDS ASSISTANCE

## 2025-07-24 ASSESSMENT — ENCOUNTER SYMPTOMS
SHORTNESS OF BREATH: 0
CONFUSION: 1
VOMITING: 1
ABDOMINAL PAIN: 1
TREMORS: 1
APPETITE CHANGE: 1
ACTIVITY CHANGE: 1
NAUSEA: 1

## 2025-07-24 ASSESSMENT — PATIENT HEALTH QUESTIONNAIRE - PHQ9
SUM OF ALL RESPONSES TO PHQ9 QUESTIONS 1 & 2: 0
2. FEELING DOWN, DEPRESSED OR HOPELESS: NOT AT ALL
1. LITTLE INTEREST OR PLEASURE IN DOING THINGS: NOT AT ALL

## 2025-07-24 ASSESSMENT — PAIN - FUNCTIONAL ASSESSMENT: PAIN_FUNCTIONAL_ASSESSMENT: 0-10

## 2025-07-24 ASSESSMENT — PAIN SCALES - GENERAL: PAINLEVEL_OUTOF10: 0 - NO PAIN

## 2025-07-24 NOTE — ED PROVIDER NOTES
CC: Nausea (Dont know her last known well, patient is usually alert and orient to 3 and is now unable to answer questions, N/V during ems, 4mg of zofran given in squad )     HPI:  Patient is a 80-year-old female with a history of Parkinson's who is well-known to EMS for lift assist presenting the emergency department with vomiting.  EMS reports she appears significantly off her baseline.  She is tremorous and diaphoretic on exam.  She has significant abdominal pain with palpation.  History limited given patient's acute distress.    Records Reviewed:  Recent available ED and inpatient notes reviewed in EMR.    PMHx/PSHx:  Per HPI.   - has a past medical history of Cervical dystonia, Chronic respiratory failure with hypoxia, Class 1 obesity with body mass index (BMI) of 30.0 to 30.9 in adult, COPD (chronic obstructive pulmonary disease), Dry eyes, bilateral, GERD (gastroesophageal reflux disease), Hiatal hernia, HTN (hypertension), Hypothyroid, and Parkinson's disease.  - has a past surgical history that includes Other surgical history (12/29/2020); Other surgical history (12/29/2020); MR angio head wo IV contrast (06/10/2022); MR angio neck wo IV contrast (06/10/2022); Elbow surgery (Right, 06/15/2020); and Wrist surgery (Right).  - has COPD (chronic obstructive pulmonary disease) (Multi); Chronic respiratory failure with hypoxia; Essential (primary) hypertension; IBS (irritable bowel syndrome); UTI (urinary tract infection), uncomplicated; Prolonged Q-T interval on ECG; Lactate blood increase; Abdominal pain; Hemorrhoids; History of Parkinson's disease; Sepsis due to urinary tract infection (Multi); COPD with asthma (Multi); Parkinson's disease; Obstructive sleep apnea; and Nausea and vomiting, unspecified vomiting type on their problem list.    Medications:  Reviewed in EMR. See EMR for complete list of medications and doses.    Allergies:  Amantadine, Amitriptyline, Baclofen, Bactrim  [sulfamethoxazole-trimethoprim], Gadolinium-containing contrast media, Iodinated contrast media, Penicillins, Topamax [topiramate], Valdecoxib, Zoloft [sertraline], and Rivastigmine    Social History:  - Tobacco:  reports that she quit smoking about 11 years ago. Her smoking use included cigarettes. She has never used smokeless tobacco.   - Alcohol:  reports no history of alcohol use.   - Illicit Drugs:  reports no history of drug use.     ROS:  Per HPI.       ???????????????????????????????????????????????????????????????  Triage Vitals:  T 36.6 °C (97.9 °F)  HR 73  /84  RR 18  O2 95 % Supplemental oxygen (4 liters)    Physical Exam  ???????????????????????????????????????????????????????????????  GEN: in acute distress  HEAD: atraumatic  EYES: PERRL  ENT: Nonbilious nonbloody emesis around mouth, tolerating secretions  CVS/CHEST: reg rate, nl rhythm  PULM: Bilateral breath sounds no wheeze appreciated  GI: Generalized tenderness to palpation, positive Page sign no rebound or guarding   NEURO: Moving all extremities spontaneously, tremulous  SKIN: Diaphoretic    EKG:  As interpreted by me EKG is a very poor baseline given patient's tremors.  Appears to be normal sinus rhythm at 79 bpm with occasional PVC.  Appears to have a left axis deviation.  Occasional PVC. Very difficult to interpret ST segments or even QTc.    Assessment and Plan:  Patient is a 80-year-old female who presents to the emergency department with vomiting.  She has significant abdominal pain on exam therefore likely intra-abdominal versus intracranial.  She has a nonfocal neurologic exam but is tremulous and diaphoretic with a history of Parkinson's.  Given patient's acute distress history was initially limited.  Therefore CT of her head and CT of her abdomen pelvis obtained.  CT shows high intensity debris's in the gallbladder with combination of sludge and stone.  Reach out to Dr. Zimmerman who is on consultation.  She was initially  covered with antibiotics with a concern for sepsis given diaphoresis, vomiting, tachypnea and leukocytosis.  Given penicillin allergy she received cefepime and Flagyl.  Sepsis reperfusion exam performed at 2200 is markedly improved mentation.  Patient admitted to medicine with general surgery consultation.    ED Course:  ED Course as of 07/23/25 2333   Wed Jul 23, 2025   2221 WBC(!): 16.7 [HD]   2222 IMPRESSION:  No evidence of acute cortical infarct or intracranial hemorrhage.  Senescent changes. Stable examination.   [HD]   2222 IMPRESSION:  1.Hiatal hernia.  2.High density debris within the gallbladder likely combination  of sludge and stones.  3.Hepatic cysts.  4.Emphysematous changes.   [HD]      ED Course User Index  [HD] Camila Lim DO         Diagnoses as of 07/23/25 2333   Nausea and vomiting, unspecified vomiting type   Gallbladder sludge       Disposition:  Admitted     Camila Lim DO      Procedures ? [employer] last updated 7/23/2025 11:33 PM        Camila Lim DO  07/23/25 0042

## 2025-07-24 NOTE — CONSULTS
Wound Care Consult     Visit Date: 7/24/2025      Patient Name: Fidelia Norman         MRN: [id number]             Reason for Consult: excoriation        Wound History: unknown     Pertinent Labs: see record      Assessment:  Patient seen laying in bed, body tremors, diaphoretic. Red rash with satellite lesions under left breast, mary area, gluteal crevice. Nystatin already in use.  Zinc ordered to protect from incontinence.  Waffle boots added for RAB heels bilaterally.  Ursula, PCNA and Mandy PCNA gave complete soap and water bedbath, shower cap, bed change.            Wound Plan: Continue great care. Please message with any questions.      hSruti Das RN, [facility]  7/24/2025  11:01 AM

## 2025-07-24 NOTE — CONSULTS
"Inpatient consult to Neurology  Consult performed by: Mu Gomez MD  Consult ordered by: Dwain Cerrato MD          History Of Present Illness  Fidelia Norman is a 80 y.o. female presenting with change in mental status, nausea/vomiting, unable to swallow food, liquids, or medication.  She has chronic Parkinson's disease with cervical dystonia, treated by Dr. Kaye at [facility]- receives Botox injections for cervical dystonia.  Current medications were reviewed- she has not been receiving her oral medications.  Past Medical History  Medical History[1]  Surgical History  Surgical History[2]  Social History  Social History[3]  Allergies  Amantadine, Amitriptyline, Baclofen, Bactrim [sulfamethoxazole-trimethoprim], Gadolinium-containing contrast media, Iodinated contrast media, Penicillins, Topamax [topiramate], Valdecoxib, Zoloft [sertraline], and Rivastigmine  Prescriptions Prior to Admission[4]    Review of Systems  Neurological Exam  Mental Status  Drowsy. Orientation: Not oriented to month or name of the hospital. Memory: Unable to provide a medical history. Speech: Limited speech.    Cranial Nerves  CN II: Visual fields full to confrontation.  CN III, IV, VI: Extraocular movements intact bilaterally.  CN V: Facial sensation is normal.  CN VII: Full and symmetric facial movement.    Motor   Increased muscle tone. The following abnormal movements were seen: Frequent coarse tremors in the right upper extremity.    Diffuse weakness in all 4 extremities.    Gait    Gait not assessed, patient is diffusely weak.      Physical Exam    Eyes:      Extraocular Movements: Extraocular movements intact.         Last Recorded Vitals  Blood pressure 176/68, pulse 77, temperature 36.2 °C (97.2 °F), temperature source Temporal, resp. rate 20, height (!) 1.549 m (5' 1\"), weight 79.1 kg (174 lb 6.1 oz), SpO2 95%.    Relevant Results                                    I have personally reviewed the following imaging results: "   Imaging  NM hepatobiliary  Result Date: 7/24/2025  Normal hepatobiliary imaging with no sign of cystic duct obstruction.     MACRO: None   Signed by: Kim Quintana 7/24/2025 11:13 AM Dictation workstation:   [id number]    US gallbladder  Result Date: 7/23/2025  Hepatic septated cyst measuring 2.8 x 1.7 x 2.2 cm. No cholelithiasis, gallbladder wall thickening, or biliary dilatation is appreciated. Gallbladder sludge and stones noted on the recent CT is not appreciated on this exam. Signed by Jd Castillo, DO    CT chest abdomen pelvis wo IV contrast  Result Date: 7/23/2025  1.Hiatal hernia. 2.High density debris within the gallbladder likely combination of sludge and stones. 3.Hepatic cysts. 4.Emphysematous changes. Signed by Jd Castillo DO    CT head wo IV contrast  Result Date: 7/23/2025  No evidence of acute cortical infarct or intracranial hemorrhage. Senescent changes. Stable examination.   MACRO: None   Signed by: Henok Quinteros 7/23/2025 8:46 PM Dictation workstation:   [id number]      Cardiology, Vascular, and Other Imaging           Assessment/Plan   Assessment & Plan  Nausea and vomiting, unspecified vomiting type    Acute metabolic encephalopathy  Impression: acute metabolic encephalopathy with change in mental status, likely related to possible gastroenteritis, versus urinary tract infection, versus C. Diff- per impression of Dr. Wilkes.  She has severe progressive Parkinson's disease and cervical dystonia- cannot treat the PD without her being able to ingest carbidopa-levodopa- there is no IV form.  Agree with current medications for N/V and will follow up tomorrow.           I spent 45 minutes in the professional and overall care of this patient.      Mu Gomez MD       [1]   Past Medical History:  Diagnosis Date    Cervical dystonia     Chronic respiratory failure with hypoxia     Class 1 obesity with body mass index (BMI) of 30.0 to 30.9 in adult 01/22/2024    COPD (chronic  obstructive pulmonary disease)     Dry eyes, bilateral     GERD (gastroesophageal reflux disease)     Hiatal hernia     HTN (hypertension)     Hypothyroid     Parkinson's disease    [2]   Past Surgical History:  Procedure Laterality Date    ELBOW SURGERY Right 06/15/2020    approx date    MR HEAD ANGIO WO IV CONTRAST  06/10/2022    MR HEAD ANGIO WO IV CONTRAST 6/10/2022 [facility]    MR NECK ANGIO WO IV CONTRAST  06/10/2022    MR NECK ANGIO WO IV CONTRAST 6/10/2022 [facility]    OTHER SURGICAL HISTORY  2020    Back surgery    OTHER SURGICAL HISTORY  2020    Hysterectomy    WRIST SURGERY Right    [3]   Social History  Tobacco Use    Smoking status: Former     Current packs/day: 0.00     Types: Cigarettes     Quit date: 2013     Years since quittin.9    Smokeless tobacco: Never   Vaping Use    Vaping status: Never Used   Substance Use Topics    Alcohol use: Never    Drug use: Never   [4]   Medications Prior to Admission   Medication Sig Dispense Refill Last Dose/Taking    acetaminophen (Tylenol 8 HOUR) 650 mg ER tablet Take 2 tablets (1,300 mg) by mouth once daily. Do not crush, chew, or split.       acetaminophen (Tylenol) 500 mg tablet Take 2 tablets (1,000 mg) by mouth once daily.       albuterol 90 mcg/actuation inhaler Inhale 2 puffs every 4 hours if needed for wheezing or shortness of breath.       ALPRAZolam (Xanax) 0.5 mg tablet Take 1 tablet (0.5 mg) by mouth 3 times a day. Takes 1.5 mg at HS       busPIRone (Buspar) 10 mg tablet Take 1 tablet (10 mg) by mouth once daily.       busPIRone (Buspar) 15 mg tablet Take 1 tablet (15 mg) by mouth 2 times a day.       carbidopa-levodopa (Sinemet)  mg tablet Take 2 tablets by mouth 3 times a day.       diphenhydrAMINE-acetaminophen (Tylenol PM)  mg per tablet Take 2 tablets by mouth as needed at bedtime for sleep.       donepezil (Aricept) 10 mg tablet Take 1 tablet (10 mg) by mouth once daily at bedtime.        FLUoxetine (PROzac) 20 mg capsule Take 4 capsules (80 mg) by mouth once daily. (Patient taking differently: Take 5 capsules (100 mg) by mouth once daily.)       fluticasone-umeclidin-vilanter (TRELEGY-ELLIPTA) 200-62.5-25 mcg blister with device Inhale 1 puff once daily.       furosemide (Lasix) 20 mg tablet Take 1 tablet (20 mg) by mouth once daily as needed.       lidocaine-diphenhydrAMINE-Maalox 1:1:1 Magic Mouthwash Take 5mL by mouth every 6 hours as needed 360 mL 3     losartan (Cozaar) 50 mg tablet Take 1 tablet (50 mg) by mouth once daily. (Patient not taking: Reported on 6/15/2025) 30 tablet 1     lubiprostone (Amitiza) 24 mcg capsule Take 1 capsule (24 mcg) by mouth 2 times a day. (Patient taking differently: Take 8 mcg by mouth 2 times a day. Spouse unsure) 30 capsule 0     lubricating eye drops ophthalmic solution Administer 1 drop into both eyes if needed for dry eyes. 30 each 0     melatonin 10 mg tablet Take 7 tablets (70 mg) by mouth once daily at bedtime.       ondansetron (Zofran) 8 mg tablet Take 1 tablet (8 mg) by mouth every 8 hours if needed.       potassium chloride CR 20 mEq ER tablet Take 1 tablet (20 mEq) by mouth once daily.       pregabalin (Lyrica) 100 mg capsule Take 1 capsule (100 mg) by mouth 2 times a day.       rosuvastatin (Crestor) 20 mg tablet Take 1 tablet (20 mg) by mouth once daily.       sucralfate (Carafate) 1 gram tablet Take 1 tablet (1 g) by mouth twice a day.       tiZANidine (Zanaflex) 4 mg capsule Take 1 capsule (4 mg) by mouth 2 times a day.       traZODone (Desyrel) 50 mg tablet Take 1 tablet (50 mg) by mouth once daily at bedtime. 30 tablet 1     zolpidem (Ambien) 5 mg tablet Take 1 tablet (5 mg) by mouth as needed at bedtime for sleep. Do not crush, chew, or split. 30 tablet 0

## 2025-07-24 NOTE — H&P
History Of Present Illness  Fidelia Norman is a 80 y.o. female with history of Parkinson's disease, COPD, HTN and HLD presenting with nausea, vomiting, worsening tremors and alteration in mental status. Pt is presently confused and oriented only to self and is thus not able to provide any meaningful history. Information was obtained from  whom I spoke to over the phone.  says pt has been sick for the last couple of days with nausea, vomiting, not eating or drinking, weakness, not getting up from chair, increasing tremors and decrease in level of responsiveness. Pt has not taken her meds including her PD meds. He says she seemed worse today so he called EMS and she was brought to the ED. Labs in ED were notable for a lactate of 2.9 and WBC 16.7. CT of the abdomen and pelvis was read as showing high density debris within the gallbladder that is likely a combination of sludge and stones.     Past Medical History  Medical History[1]    Past Surgical History  Surgical History[2]     Social History  She reports that she quit smoking about 11 years ago. Her smoking use included cigarettes. She has never used smokeless tobacco. She reports that she does not drink alcohol and does not use drugs.    Family History  Positive for DM in both parents. No family history of CAD or cancer     Allergies  Amantadine, Amitriptyline, Baclofen, Bactrim [sulfamethoxazole-trimethoprim], Gadolinium-containing contrast media, Iodinated contrast media, Penicillins, Topamax [topiramate], Valdecoxib, Zoloft [sertraline], and Rivastigmine    Medications  Current Outpatient Medications   Medication Instructions    acetaminophen (TYLENOL 8 HOUR) 1,300 mg, Daily    acetaminophen (TYLENOL) 1,000 mg, Daily    albuterol 90 mcg/actuation inhaler 2 puffs, Every 4 hours PRN    ALPRAZolam (XANAX) 0.5 mg, 3 times daily    busPIRone (BUSPAR) 15 mg, 2 times daily    busPIRone (BUSPAR) 10 mg, Daily    carbidopa-levodopa (Sinemet)  mg tablet  2 tablets, 3 times daily    diphenhydrAMINE-acetaminophen (Tylenol PM)  mg per tablet 2 tablets, Nightly PRN    donepezil (Aricept) 10 mg tablet 1 tablet, Nightly    FLUoxetine (PROZAC) 80 mg, oral, Daily    fluticasone-umeclidin-vilanter (TRELEGY-ELLIPTA) 200-62.5-25 mcg blister with device 1 puff, Daily    furosemide (LASIX) 20 mg, Daily PRN    lidocaine-diphenhydrAMINE-Maalox 1:1:1 Magic Mouthwash Take 5mL by mouth every 6 hours as needed    losartan (COZAAR) 50 mg, oral, Daily    lubiprostone (AMITIZA) 24 mcg, oral, 2 times daily    lubricating eye drops ophthalmic solution 1 drop, Both Eyes, As needed    melatonin 70 mg, Nightly    ondansetron (ZOFRAN) 8 mg, Every 8 hours PRN    potassium chloride CR 20 mEq ER tablet 1 tablet, Daily    pregabalin (LYRICA) 100 mg, 2 times daily    rosuvastatin (CRESTOR) 20 mg, Daily    sucralfate (CARAFATE) 1 g, 2 times daily    tiZANidine (ZANAFLEX) 4 mg, 2 times daily    traZODone (DESYREL) 50 mg, oral, Nightly    zolpidem (AMBIEN) 5 mg, oral, Nightly PRN, Do not crush, chew, or split.         Review of Systems   Reason unable to perform ROS: Pt's impaired mental state.   Constitutional:  Positive for activity change and appetite change.   Respiratory:  Negative for shortness of breath.    Gastrointestinal:  Positive for abdominal pain, nausea and vomiting.   Skin:  Positive for rash.   Neurological:  Positive for tremors.   Psychiatric/Behavioral:  Positive for confusion.         Physical Exam  Constitutional:       General: She is in acute distress.      Appearance: She is ill-appearing and diaphoretic. She is not toxic-appearing.      Comments: Elderly female who is quite tremulous and restless in bed   HENT:      Head: Normocephalic and atraumatic.      Right Ear: External ear normal.      Left Ear: External ear normal.      Mouth/Throat:      Mouth: Mucous membranes are dry.      Pharynx: Oropharynx is clear. No oropharyngeal exudate or posterior oropharyngeal  "erythema.     Eyes:      General: No scleral icterus.        Right eye: No discharge.         Left eye: No discharge.      Conjunctiva/sclera: Conjunctivae normal.       Cardiovascular:      Rate and Rhythm: Normal rate and regular rhythm.      Heart sounds: No murmur heard.  Pulmonary:      Breath sounds: No wheezing, rhonchi or rales.   Abdominal:      General: There is distension.      Palpations: There is no mass.      Tenderness: There is abdominal tenderness. There is no guarding.      Hernia: No hernia is present.     Musculoskeletal:      Cervical back: Neck supple.      Right lower leg: No edema.      Left lower leg: No edema.   Lymphadenopathy:      Cervical: No cervical adenopathy.     Skin:     General: Skin is warm.      Findings: Rash present.      Comments: Candida intertrigo in groin. Please refer to images in media section of EPIC     Neurological:      Mental Status: She is alert. She is disoriented.      Motor: Weakness present.     Psychiatric:      Comments: Flat affect          Last Recorded Vitals  Blood pressure 167/69, pulse 97, temperature 36.6 °C (97.9 °F), temperature source Temporal, resp. rate (!) 32, height (!) 1.549 m (5' 1\"), weight 79.1 kg (174 lb 6.1 oz), SpO2 97%.    Relevant Results   Latest Reference Range & Units 07/23/25 19:26 07/23/25 19:30 07/23/25 21:32   GLUCOSE 74 - 99 mg/dL 185 (H)     SODIUM 136 - 145 mmol/L 137     POTASSIUM 3.5 - 5.3 mmol/L 3.9     CHLORIDE 98 - 107 mmol/L 96 (L)     Bicarbonate 21 - 32 mmol/L 25     Anion Gap 10 - 20 mmol/L 20     Blood Urea Nitrogen 6 - 23 mg/dL 10     Creatinine 0.50 - 1.05 mg/dL 0.91     EGFR >60 mL/min/1.73m*2 65     Calcium 8.6 - 10.3 mg/dL 10.0     Albumin 3.4 - 5.0 g/dL 4.3     Alkaline Phosphatase 33 - 136 U/L 73     ALT 7 - 45 U/L 17     AST 9 - 39 U/L 24     Bilirubin Total 0.0 - 1.2 mg/dL 0.7     Total Protein 6.4 - 8.2 g/dL 8.3 (H)     Lactate 0.4 - 2.0 mmol/L 2.9 (H)  1.7   BNP 0 - 99 pg/mL 493 (H)     Troponin I, High " Sensitivity 0 - 13 ng/L 6  8   WBC 4.4 - 11.3 x10*3/uL 16.7 (H)     nRBC 0.0 - 0.0 /100 WBCs 0.0     RBC 4.00 - 5.20 x10*6/uL 5.06     HEMOGLOBIN 12.0 - 16.0 g/dL 15.2     HEMATOCRIT 36.0 - 46.0 % 46.3 (H)     MCV 80 - 100 fL 92     MCH 26.0 - 34.0 pg 30.0     MCHC 32.0 - 36.0 g/dL 32.8     RED CELL DISTRIBUTION WIDTH 11.5 - 14.5 % 12.6     Platelets 150 - 450 x10*3/uL 299     Neutrophils % 40.0 - 80.0 % 87.4     Immature Granulocytes %, Automated 0.0 - 0.9 % 0.4     Lymphocytes % 13.0 - 44.0 % 5.4     Monocytes % 2.0 - 10.0 % 6.6     Eosinophils % 0.0 - 6.0 % 0.0     Basophils % 0.0 - 2.0 % 0.2     Neutrophils Absolute 1.60 - 5.50 x10*3/uL 14.59 (H)     Immature Granulocytes Absolute, Automated 0.00 - 0.50 x10*3/uL 0.06     Lymphocytes Absolute 0.80 - 3.00 x10*3/uL 0.91     Monocytes Absolute 0.05 - 0.80 x10*3/uL 1.11 (H)     Eosinophils Absolute 0.00 - 0.40 x10*3/uL 0.00     Basophils Absolute 0.00 - 0.10 x10*3/uL 0.04     Blood Culture  Rpt (P)  Rpt (P)     Flu A Result Not Detected   Not Detected    Flu B Result Not Detected   Not Detected    RSV PCR Not Detected   Not Detected    Coronavirus 2019, PCR Not Detected   Not Detected    POCT pH, Venous 7.33 - 7.43 pH 7.44 (H)     POCT pCO2, Venous 41 - 51 mm Hg 39 (L)     POCT pO2, Venous 35 - 45 mm Hg 81 (H)     POCT SO2, Venous 45 - 75 % 96 (H)     POCT Oxy Hemoglobin, Venous 45.0 - 75.0 % 93.6 (H)     POCT Hematocrit Calculated, Venous 36.0 - 46.0 % 47.0 (H)     POCT Sodium, Venous 136 - 145 mmol/L 133 (L)     POCT Potassium, Venous 3.5 - 5.3 mmol/L 3.4 (L)     POCT Chloride, Venous 98 - 107 mmol/L 100     POCT Ionized Calicum, Venous 1.10 - 1.33 mmol/L 1.11     POCT Glucose, Venous 74 - 99 mg/dL 197 (H)     POCT Lactate, Venous 0.4 - 2.0 mmol/L 3.2 (H)  2.2 (H)   POCT Base Excess, Venous -2.0 - 3.0 mmol/L 2.3     POCT HCO3 Calculated, Venous 22.0 - 26.0 mmol/L 26.5 (H)     POCT Hemoglobin, Venous 12.0 - 16.0 g/dL 15.8     POCT Anion Gap, Venous 10.0 - 25.0  mmol/L 10.0     FiO2 % 36     Patient Temperature  COMMENT ONLY     (H): Data is abnormally high  (L): Data is abnormally low  (P): Preliminary  Rpt: View report in Results Review for more information    CT HEAD  IMPRESSION:  No evidence of acute cortical infarct or intracranial hemorrhage.  Senescent changes. Stable examination.      MACRO:  None      Signed by: Henok Quinteros 7/23/2025 8:46 PM    CT CHEST/ABD/PELVIS  IMPRESSION:  1.Hiatal hernia.  2.High density debris within the gallbladder likely combination  of sludge and stones.  3.Hepatic cysts.  4.Emphysematous changes.    Signed by Jd Castillo,     US GALLBLADDER  IMPRESSION:  Hepatic septated cyst measuring 2.8 x 1.7 x 2.2 cm.  No cholelithiasis, gallbladder wall thickening, or biliary dilatation  is appreciated.  Gallbladder sludge and stones noted on the recent CT is not  appreciated on this exam.    Signed by Jd Castillo DO  Assessment & Plan    Acute metabolic encephalopathy  - Appears to be related to gallbladder disease/sepsis as well as pt not taking her PD meds  - Treatment of underlying condition(s)     Nausea, vomiting and abdominal distension with gallbladder sludge seen on CT imaging  - Concern for acute cholecystitis  - IV metronidazole and ceftriaxone  - NPO  - IVF  - General surgery consulted    Parkinson's disease  - Currently worse due to not being able to take her PD meds    Candida intertrigo  - Topical nystatin    I spent 75 minutes in the professional and overall care of this patient.      Dwain Cerrato MD         [1]   Past Medical History:  Diagnosis Date    Cervical dystonia     Chronic respiratory failure with hypoxia     Class 1 obesity with body mass index (BMI) of 30.0 to 30.9 in adult 01/22/2024    COPD (chronic obstructive pulmonary disease)     Dry eyes, bilateral     GERD (gastroesophageal reflux disease)     Hiatal hernia     HTN (hypertension)     Hypothyroid     Parkinson's disease    [2]   Past Surgical  History:  Procedure Laterality Date    ELBOW SURGERY Right 06/15/2020    approx date    MR HEAD ANGIO WO IV CONTRAST  06/10/2022    MR HEAD ANGIO WO IV CONTRAST 6/10/2022 [facility]    MR NECK ANGIO WO IV CONTRAST  06/10/2022    MR NECK ANGIO WO IV CONTRAST 6/10/2022 [facility]    OTHER SURGICAL HISTORY  12/29/2020    Back surgery    OTHER SURGICAL HISTORY  12/29/2020    Hysterectomy    WRIST SURGERY Right

## 2025-07-24 NOTE — PROGRESS NOTES
"    [facility]  Department of Hospital Medicine    PROGRESS NOTE    Fidelia Norman is a 80 y.o. female on day 0 of admission presenting with Nausea and vomiting, unspecified vomiting type.    Subjective   Still nauseous with abdominal pain.       Objective     Physical Exam:  Con: awake, alert; moderate distress;   Eyes: conjunctiva wnl; EOMI;   ENMT: hearing intact; MMM;   MSK: ROM wnl; digits wnl;   GI: Mild abdominal tenderness  Resp: normal work of breathing; no cyanosis;   Neuro: moving all extremities; bilateral tremors  Psych: oriented to situation; affect wnl;     Last Recorded Vitals:  /78 (BP Location: Left arm, Patient Position: Lying)   Pulse 86   Temp 36.5 °C (97.7 °F) (Temporal)   Resp 21   Ht (!) 1.549 m (5' 1\")   Wt 79.1 kg (174 lb 6.1 oz)   LMP  (LMP Unknown)   SpO2 94%   BMI 32.95 kg/m²      Scheduled medications:  Scheduled Medications[1]  Continuous medications:  Continuous Medications[2]  PRN medications:  PRN Medications[3]     Relevant Results:  Lab Results   Component Value Date    WBC 14.6 (H) 07/24/2025    HGB 13.7 07/24/2025    HCT 40.6 07/24/2025    MCV 89 07/24/2025     07/24/2025      Lab Results   Component Value Date    GLUCOSE 134 (H) 07/24/2025    CALCIUM 9.1 07/24/2025     07/24/2025    K 3.3 (L) 07/24/2025    CO2 28 07/24/2025    CL 99 07/24/2025    BUN 14 07/24/2025    CREATININE 0.89 07/24/2025                        Assessment/Plan   Assessment & Plan  Nausea and vomiting, unspecified vomiting type    Fidelia Norman is a 80 y.o. female with history of Parkinson's disease, COPD, HTN and HLD presenting with nausea, vomiting, worsening tremors and alteration in mental status.  says pt has been sick for the last couple of days with nausea, vomiting, not eating or drinking, weakness, not getting up from chair, increasing tremors and decrease in level of responsiveness. Pt has not taken her meds including her PD " meds. He says she seemed worse today so he called EMS and she was brought to the ED. Labs in ED were notable for a lactate of 2.9 and WBC 16.7. CT of the abdomen and pelvis was read as showing high density debris within the gallbladder suggestive of sludge and stones, but subsequent ultrasound showed a normal gallbladder with no sludge or stones, and HIDA scan was negative.    Nausea, vomiting, leukocytosis:  > Review of chart shows recent admission for pneumonia with broad-spectrum antibiotics given.  Suspect gastroenteritis versus UTI versus C. difficile.  On my review of imaging, the CT shows a normal gallbladder with significant artifact from motion and spine hardware, and a hyperdensity under the liver located well outside the gallbladder that is present on prior imaging.   -IV fluid  -Antiemetics  -UA still pending collection  -Empiric ceftriaxone  -Stool studies if she develops diarrhea   -advance diet as tolerated  -Surgery consulted    Parkinson's:  Dementia:  Anxiety:  -Continue carbidopa levodopa, donepezil, pregabalin, alprazolam, buspirone, fluoxetine, trazodone  -Neurology consulted    COPD:  -Albuterol as needed  -ICS and DuoNebs RT    DVT PPx: Subcu heparin    Dispo: Inpatient, likely 1-3 more midnights pending clinical improvement and culture results         Darci Wilkes MD    Patient Name:  Fidelia Norman   MRN:   [id number]   Room[facility]Bed:  [facility]        [1] ALPRAZolam, 0.5 mg, oral, TID  budesonide, 0.5 mg, nebulization, BID   And  ipratropium-albuteroL, 3 mL, nebulization, TID  busPIRone, 15 mg, oral, BID  carbidopa-levodopa, 2 tablet, oral, TID  cefTRIAXone, 1 g, intravenous, q12h  donepezil, 10 mg, oral, Nightly  [START ON 7/25/2025] pantoprazole, 40 mg, oral, Daily before breakfast   Or  [START ON 7/25/2025] esomeprazole, 40 mg, nasoduodenal tube, Daily before breakfast   Or  [START ON 7/25/2025] pantoprazole, 40 mg, intravenous, Daily before breakfast  FLUoxetine, 100 mg, oral, Daily  heparin  (porcine), 5,000 Units, subcutaneous, q8h  metroNIDAZOLE, 500 mg, intravenous, q8h  nystatin, , Topical, BID  pregabalin, 100 mg, oral, BID  traZODone, 50 mg, oral, Nightly  zinc oxide, 1 Application, Topical, TID    [2] sodium chloride 0.9%, 100 mL/hr, Last Rate: 100 mL/hr (07/24/25 0343)    [3] PRN medications: acetaminophen **OR** acetaminophen **OR** acetaminophen, albuterol, alum-mag hydroxide-simeth, calcium carbonate, [DISCONTINUED] ondansetron **OR** ondansetron, oxygen

## 2025-07-24 NOTE — PROGRESS NOTES
07/24/25 0913   Discharge Planning   Living Arrangements Spouse/significant other   Support Systems Spouse/significant other   Assistance Needed Currently not alert but responds to questions-doesn't open eyes/not answering questions-stated 'I don't know' baseline A&OX4; independent assist at times for ADLs with cane; doesn't drive; 2L NC @[facility]-currently 3L NC-no CPAP; PCP Dr Abdirahman [facility]-no current home care in [facility]   Type of Residence Private residence   Number of Stairs to Enter Residence 4   Number of Stairs Within Residence 0   Do you have animals or pets at home? No   Who is requesting discharge planning? Provider   Expected Discharge Disposition Home  (DC dispo is pending workup and PT OT. Pt recently dcd skilled to [facility] 6/15/25)   Does the patient need discharge transport arranged? Yes   [facility] coordination needed? Yes   Has discharge transport been arranged? No   Financial Resource Strain   How hard is it for you to pay for the very basics like food, housing, medical care, and heating? Not hard   Housing Stability   In the last 12 months, was there a time when you were not able to pay the mortgage or rent on time? N   In the past 12 months, how many times have you moved where you were living? 0   At any time in the past 12 months, were you homeless or living in a shelter (including now)? N   Transportation Needs   In the past 12 months, has lack of transportation kept you from medical appointments or from getting medications? no   In the past 12 months, has lack of transportation kept you from meetings, work, or from getting things needed for daily living? No

## 2025-07-24 NOTE — CONSULTS
Reason For Consult  Concern for cholecystitis    History Of Present Illness  Fidelia Norman is a 80 y.o. female presenting with change in mental status, worse tremors and vomiting.     Pt was brought in by EMS for sx of inc confusion, dec PO intake, vomiting, weakness that were worsening over the past 2 days.     Surgery was consulted for concerns of acute cholecystitis.     CT in ER with density in gallbladder but US gallbladder without stones of pericholecystic fluid or wall thickening.     Blood work with leukocytosis, elevated lactate initially, kidney function WNL.    history of Parkinson's disease, dementia, COPD, HTN, KHUSHI, and HLD.    On exam today pt is confused and not able to answer questions appropriately.     Past Medical History  She has a past medical history of Cervical dystonia, Chronic respiratory failure with hypoxia, Class 1 obesity with body mass index (BMI) of 30.0 to 30.9 in adult (01/22/2024), COPD (chronic obstructive pulmonary disease), Dry eyes, bilateral, GERD (gastroesophageal reflux disease), Hiatal hernia, HTN (hypertension), Hypothyroid, and Parkinson's disease.    Surgical History  She has a past surgical history that includes Other surgical history (12/29/2020); Other surgical history (12/29/2020); MR angio head wo IV contrast (06/10/2022); MR angio neck wo IV contrast (06/10/2022); Elbow surgery (Right, 06/15/2020); and Wrist surgery (Right).     Social History  She reports that she quit smoking about 11 years ago. Her smoking use included cigarettes. She has never used smokeless tobacco. She reports that she does not drink alcohol and does not use drugs.    Family History  Family History[1]     Allergies  Amantadine, Amitriptyline, Baclofen, Bactrim [sulfamethoxazole-trimethoprim], Gadolinium-containing contrast media, Iodinated contrast media, Penicillins, Topamax [topiramate], Valdecoxib, Zoloft [sertraline], and Rivastigmine    Review of Systems  Review of Systems   Reason unable  "to perform ROS: confused and responds yes to every question including questions that would contradict previously affirmed sx.          Physical Exam  Physical Exam  Vitals reviewed.   Constitutional:       Appearance: She is ill-appearing and diaphoretic.   HENT:      Head: Normocephalic and atraumatic.      Mouth/Throat:      Mouth: Mucous membranes are dry.     Eyes:      General: No scleral icterus.        Right eye: No discharge.         Left eye: No discharge.      Conjunctiva/sclera: Conjunctivae normal.       Cardiovascular:      Rate and Rhythm: Normal rate and regular rhythm.      Pulses: Normal pulses.      Heart sounds: Murmur (3/6 RUSB, LUSB) heard.      No friction rub. No gallop.   Pulmonary:      Comments: Dim medardo throughout  Tachypnea.  Abdominal:      Palpations: Abdomen is soft.      Tenderness: There is abdominal tenderness.   Genitourinary:     Comments: External catheter in place and clear yellow urine in canister    Musculoskeletal:      Right lower leg: No edema.      Left lower leg: No edema.     Neurological:      Mental Status: She is alert. She is disoriented.      Motor: Weakness (generalized) present.      Comments: Tremor throughout body, worse in arms and legs   Psychiatric:         Attention and Perception: She is inattentive.         Mood and Affect: Affect is flat.         Speech: Speech is delayed.         Cognition and Memory: Cognition is impaired.         Judgment: Judgment is impulsive and inappropriate.            Last Recorded Vitals  Blood pressure 142/78, pulse 71, temperature 36.5 °C (97.7 °F), temperature source Temporal, resp. rate 21, height (!) 1.549 m (5' 1\"), weight 79.1 kg (174 lb 6.1 oz), SpO2 97%.    Relevant Results    US gallbladder  Result Date: 7/23/2025  STUDY: Abdominal Ultrasound; 7/23/2025 11:20 PM. INDICATION: Vomiting. Gallbladder debris on recent CT. COMPARISON: CT A/P 7/23/2025. ACCESSION NUMBER(S): [id number] ORDERING CLINICIAN: CAMMIE EDGE " TECHNIQUE: Ultrasound of the Right Upper Quadrant. Multiple images of the abdomen were obtained. FINDINGS: LIVER: The liver demonstrates a septated cyst measuring 2.8 x 1.7 x 2.2 cm. liver does appear to be heterogeneous.  GALLBLADDER: The gallbladder is anechoic. There are no stones. There is no pericholecystic fluid or wall thickening. Sonographic Page's sign is negative.   BILE DUCTS: The common bile duct measures 0.6 cm. There is no intrahepatic biliary dilatation.   PANCREAS: The pancreas is not seen due to overlying bowel gas.  RIGHT KIDNEY: The right kidney measures 10.2 cm in length. Renal cortical echotexture is normal. There is no hydronephrosis. There are no stones. There are no cysts. There is no evidence of free fluid within the abdomen.    Hepatic septated cyst measuring 2.8 x 1.7 x 2.2 cm. No cholelithiasis, gallbladder wall thickening, or biliary dilatation is appreciated. Gallbladder sludge and stones noted on the recent CT is not appreciated on this exam. Signed by Jd Castillo, DO    CT chest abdomen pelvis wo IV contrast  Result Date: 7/23/2025  STUDY: CT Chest, Abdomen, and Pelvis without IV Contrast; 07/23/2025 8:13 PM INDICATION: Vomiting, altered mental status. COMPARISON: CT AP 06/20/2025, 12/18/2020.  CT CAP 06/15/2025. ACCESSION NUMBER(S): [id number] ORDERING CLINICIAN: CAMMIE EDGE TECHNIQUE: CT of the chest, abdomen, and pelvis was performed.  Contiguous axial images were obtained at 3 mm slice thickness through the chest, abdomen, and pelvis.  Coronal and sagittal reconstructions at 3 mm slice thickness were performed.  No intravenous contrast was administered.  1056 DICOM images received. FINDINGS: Please note that the evaluation of vessels, lymph nodes and organs is limited without intravenous contrast. CHEST: MEDIASTINUM: The heart is normal in size without pericardial effusion.  Coronary artery calcifications are identified.  LUNGS/PLEURA: There is no pleural effusion,  pleural thickening, or pneumothorax. The airways are patent. Lungs reveals diffuse emphysematous changes.  There is parenchymal scarring right lower lobe.  Consolidation, interstitial disease, or suspicious nodules. LYMPH NODES: Thoracic lymph nodes are not enlarged. ABDOMEN:  LIVER: No hepatomegaly.  There are a few hepatic cysts unchanged. BILE DUCTS: No intrahepatic or extrahepatic biliary ductal dilatation.  GALLBLADDER: The gallbladder reveals high density debris likely combination of sludge and stones.. STOMACH: Hiatal hernia is demonstrated..  PANCREAS: No masses or ductal dilatation.  SPLEEN: No splenomegaly or focal splenic lesion.  ADRENAL GLANDS: No thickening or nodules.  KIDNEYS AND URETERS: Kidneys are normal in size and location.  No renal or ureteral calculi.  PELVIS:  BLADDER: No abnormalities identified.  REPRODUCTIVE ORGANS: No abnormalities identified.  BOWEL: No abnormalities identified.  VESSELS: No abnormalities identified.  Abdominal aorta is normal in caliber.  PERITONEUM/RETROPERITONEUM/LYMPH NODES: No free fluid.  No pneumoperitoneum.  No lymphadenopathy.  ABDOMINAL WALL: No abnormalities identified. SOFT TISSUES: No abnormalities identified.  BONES: No acute fracture or aggressive osseous lesion.  Moderate multilevel disc disease is demonstrated.    1.Hiatal hernia. 2.High density debris within the gallbladder likely combination of sludge and stones. 3.Hepatic cysts. 4.Emphysematous changes. Signed by Jd Castillo,     CT head wo IV contrast  Result Date: 7/23/2025  Interpreted By:  Henok Quinteros, STUDY: CT HEAD WO IV CONTRAST;  7/23/2025 8:13 pm   INDICATION: Signs/Symptoms:vomiting.     COMPARISON: 06/2015   ACCESSION NUMBER(S): [id number]   ORDERING CLINICIAN: CAMMIE EDGE   TECHNIQUE: Noncontrast axial CT scan of head was performed. Angled reformats in brain and bone windows were generated. The images were reviewed in bone, brain, blood and soft tissue windows.   FINDINGS:  CSF Spaces: The ventricles, sulci and basal cisterns are within normal limits. There is no extraaxial fluid collection. Stable global volume loss and chronic small vessel ischemic change   Parenchyma:  The grey-white differentiation is intact. There is no mass effect or midline shift.  There is no intracranial hemorrhage.   Calvarium: The calvarium is unremarkable.   Paranasal sinuses and mastoids: Visualized paranasal sinuses and mastoids are clear.       No evidence of acute cortical infarct or intracranial hemorrhage. Senescent changes. Stable examination.   MACRO: None   Signed by: Henok Quinteros 7/23/2025 8:46 PM Dictation workstation:   [id number]    XR chest 1 view  Result Date: 6/30/2025  Interpreted By:  Kaylene Up, STUDY: XR CHEST 1 VIEW;  6/30/2025 10:21 am   INDICATION: Signs/Symptoms:left lower lobe crackles.   COMPARISON: 06/17/2025   ACCESSION NUMBER(S): [id number]   ORDERING CLINICIAN: INDIO BEAULIEU   FINDINGS: AP radiograph of the chest was provided. Patient is rotated.       CARDIOMEDIASTINAL SILHOUETTE: Cardiomediastinal silhouette is unchanged size and configuration.   LUNGS: Increased blunting of the right costophrenic angle with right bibasilar opacities are seen. No pulmonary edema or pneumothorax.   ABDOMEN: No remarkable upper abdominal findings.   BONES: No acute osseous changes.       Increased small right pleural effusion with superimposed atelectasis/infiltrate.   Signed by: Kaylene Up 6/30/2025 11:06 AM Dictation workstation:   [id number]      Scheduled medications  Scheduled Medications[2]  Continuous medications  Continuous Medications[3]  PRN medications  PRN Medications[4]  Results for orders placed or performed during the hospital encounter of 07/23/25 (from the past 24 hours)   BLOOD GAS VENOUS FULL PANEL   Result Value Ref Range    POCT pH, Venous 7.44 (H) 7.33 - 7.43 pH    POCT pCO2, Venous 39 (L) 41 - 51 mm Hg    POCT pO2, Venous 81 (H) 35 - 45 mm Hg     POCT SO2, Venous 96 (H) 45 - 75 %    POCT Oxy Hemoglobin, Venous 93.6 (H) 45.0 - 75.0 %    POCT Hematocrit Calculated, Venous 47.0 (H) 36.0 - 46.0 %    POCT Sodium, Venous 133 (L) 136 - 145 mmol/L    POCT Potassium, Venous 3.4 (L) 3.5 - 5.3 mmol/L    POCT Chloride, Venous 100 98 - 107 mmol/L    POCT Ionized Calicum, Venous 1.11 1.10 - 1.33 mmol/L    POCT Glucose, Venous 197 (H) 74 - 99 mg/dL    POCT Lactate, Venous 3.2 (H) 0.4 - 2.0 mmol/L    POCT Base Excess, Venous 2.3 -2.0 - 3.0 mmol/L    POCT HCO3 Calculated, Venous 26.5 (H) 22.0 - 26.0 mmol/L    POCT Hemoglobin, Venous 15.8 12.0 - 16.0 g/dL    POCT Anion Gap, Venous 10.0 10.0 - 25.0 mmol/L    Patient Temperature      FiO2 36 %   CBC and Auto Differential   Result Value Ref Range    WBC 16.7 (H) 4.4 - 11.3 x10*3/uL    nRBC 0.0 0.0 - 0.0 /100 WBCs    RBC 5.06 4.00 - 5.20 x10*6/uL    Hemoglobin 15.2 12.0 - 16.0 g/dL    Hematocrit 46.3 (H) 36.0 - 46.0 %    MCV 92 80 - 100 fL    MCH 30.0 26.0 - 34.0 pg    MCHC 32.8 32.0 - 36.0 g/dL    RDW 12.6 11.5 - 14.5 %    Platelets 299 150 - 450 x10*3/uL    Neutrophils % 87.4 40.0 - 80.0 %    Immature Granulocytes %, Automated 0.4 0.0 - 0.9 %    Lymphocytes % 5.4 13.0 - 44.0 %    Monocytes % 6.6 2.0 - 10.0 %    Eosinophils % 0.0 0.0 - 6.0 %    Basophils % 0.2 0.0 - 2.0 %    Neutrophils Absolute 14.59 (H) 1.60 - 5.50 x10*3/uL    Immature Granulocytes Absolute, Automated 0.06 0.00 - 0.50 x10*3/uL    Lymphocytes Absolute 0.91 0.80 - 3.00 x10*3/uL    Monocytes Absolute 1.11 (H) 0.05 - 0.80 x10*3/uL    Eosinophils Absolute 0.00 0.00 - 0.40 x10*3/uL    Basophils Absolute 0.04 0.00 - 0.10 x10*3/uL   Comprehensive Metabolic Panel   Result Value Ref Range    Glucose 185 (H) 74 - 99 mg/dL    Sodium 137 136 - 145 mmol/L    Potassium 3.9 3.5 - 5.3 mmol/L    Chloride 96 (L) 98 - 107 mmol/L    Bicarbonate 25 21 - 32 mmol/L    Anion Gap 20 10 - 20 mmol/L    Urea Nitrogen 10 6 - 23 mg/dL    Creatinine 0.91 0.50 - 1.05 mg/dL    eGFR 65 >60  mL/min/1.73m*2    Calcium 10.0 8.6 - 10.3 mg/dL    Albumin 4.3 3.4 - 5.0 g/dL    Alkaline Phosphatase 73 33 - 136 U/L    Total Protein 8.3 (H) 6.4 - 8.2 g/dL    AST 24 9 - 39 U/L    Bilirubin, Total 0.7 0.0 - 1.2 mg/dL    ALT 17 7 - 45 U/L   Lactate   Result Value Ref Range    Lactate 2.9 (H) 0.4 - 2.0 mmol/L   Blood Culture    Specimen: Peripheral Venipuncture; Blood culture   Result Value Ref Range    Blood Culture Loaded on Instrument - Culture in progress    Blood Culture    Specimen: Peripheral Venipuncture; Blood culture   Result Value Ref Range    Blood Culture Loaded on Instrument - Culture in progress    B-Type Natriuretic Peptide   Result Value Ref Range     (H) 0 - 99 pg/mL   Troponin I, High Sensitivity, Initial   Result Value Ref Range    Troponin I, High Sensitivity 6 0 - 13 ng/L   Sars-CoV-2, Influenza A/B and RSV PCR   Result Value Ref Range    Coronavirus 2019, PCR Not Detected Not Detected    Flu A Result Not Detected Not Detected    Flu B Result Not Detected Not Detected    RSV PCR Not Detected Not Detected   Troponin, High Sensitivity, 1 Hour   Result Value Ref Range    Troponin I, High Sensitivity 8 0 - 13 ng/L   Blood Gas Lactic Acid, Venous   Result Value Ref Range    POCT Lactate, Venous 2.2 (H) 0.4 - 2.0 mmol/L   Lactate   Result Value Ref Range    Lactate 1.7 0.4 - 2.0 mmol/L        Assessment/Plan     Pt is a 80 year old female with dementia and parkinsons with change in mental status  - imaging reviewed. CT with questionable density in gallbladder but US gallbladder without stones of pericholecystic fluid or wall thickening.   - labs reviewed. LFTs are WNL. Kidney function is WNL. CBC with leukocytosis. UA has not been collected. Recommend this be collected, discussed with nursing.   - no acute surgical intervention at this time.   - do not feel this is acute cholecystitis however could do hida scan if unable to find cause for sx medically.  - may have diet.  - PPI daily.   -  remainder of care per primary.     Pt seen with and discussed with Dr. Zimmerman who is in agreement with plan of care.       Geeta Bradford, APRN-CNP         [1] No family history on file.  [2] ALPRAZolam, 0.5 mg, oral, TID  budesonide, 0.5 mg, nebulization, BID   And  ipratropium-albuteroL, 3 mL, nebulization, TID  busPIRone, 15 mg, oral, BID  carbidopa-levodopa, 2 tablet, oral, TID  cefTRIAXone, 1 g, intravenous, q12h  donepezil, 10 mg, oral, Nightly  FLUoxetine, 100 mg, oral, Daily  heparin (porcine), 5,000 Units, subcutaneous, q8h  metroNIDAZOLE, 500 mg, intravenous, q8h  nystatin, , Topical, BID  pregabalin, 100 mg, oral, BID  traZODone, 50 mg, oral, Nightly    [3] sodium chloride 0.9%, 100 mL/hr, Last Rate: 100 mL/hr (07/24/25 0343)    [4] PRN medications: acetaminophen **OR** acetaminophen **OR** acetaminophen, albuterol, alum-mag hydroxide-simeth, calcium carbonate, ondansetron **OR** ondansetron, oxygen

## 2025-07-24 NOTE — ASSESSMENT & PLAN NOTE
Impression: acute metabolic encephalopathy with change in mental status, likely related to possible gastroenteritis, versus urinary tract infection, versus C. Diff- per impression of Dr. Wilkes.  She has severe progressive Parkinson's disease and cervical dystonia- cannot treat the PD without her being able to ingest carbidopa-levodopa- there is no IV form.  Agree with current medications for N/V and will follow up tomorrow.

## 2025-07-25 LAB
ANION GAP SERPL CALC-SCNC: 13 MMOL/L (ref 10–20)
BUN SERPL-MCNC: 12 MG/DL (ref 6–23)
CALCIUM SERPL-MCNC: 8.2 MG/DL (ref 8.6–10.3)
CHLORIDE SERPL-SCNC: 103 MMOL/L (ref 98–107)
CO2 SERPL-SCNC: 29 MMOL/L (ref 21–32)
CREAT SERPL-MCNC: 0.82 MG/DL (ref 0.5–1.05)
EGFRCR SERPLBLD CKD-EPI 2021: 73 ML/MIN/1.73M*2
ERYTHROCYTE [DISTWIDTH] IN BLOOD BY AUTOMATED COUNT: 12.5 % (ref 11.5–14.5)
GLUCOSE SERPL-MCNC: 111 MG/DL (ref 74–99)
HCT VFR BLD AUTO: 39.7 % (ref 36–46)
HGB BLD-MCNC: 12.3 G/DL (ref 12–16)
MCH RBC QN AUTO: 29.4 PG (ref 26–34)
MCHC RBC AUTO-ENTMCNC: 31 G/DL (ref 32–36)
MCV RBC AUTO: 95 FL (ref 80–100)
NRBC BLD-RTO: 0 /100 WBCS (ref 0–0)
PLATELET # BLD AUTO: 183 X10*3/UL (ref 150–450)
POTASSIUM SERPL-SCNC: 2.8 MMOL/L (ref 3.5–5.3)
RBC # BLD AUTO: 4.19 X10*6/UL (ref 4–5.2)
SODIUM SERPL-SCNC: 142 MMOL/L (ref 136–145)
WBC # BLD AUTO: 8.4 X10*3/UL (ref 4.4–11.3)

## 2025-07-25 PROCEDURE — 2500000001 HC RX 250 WO HCPCS SELF ADMINISTERED DRUGS (ALT 637 FOR MEDICARE OP): Performed by: NURSE PRACTITIONER

## 2025-07-25 PROCEDURE — 85027 COMPLETE CBC AUTOMATED: CPT | Performed by: STUDENT IN AN ORGANIZED HEALTH CARE EDUCATION/TRAINING PROGRAM

## 2025-07-25 PROCEDURE — 94760 N-INVAS EAR/PLS OXIMETRY 1: CPT

## 2025-07-25 PROCEDURE — 99233 SBSQ HOSP IP/OBS HIGH 50: CPT | Performed by: STUDENT IN AN ORGANIZED HEALTH CARE EDUCATION/TRAINING PROGRAM

## 2025-07-25 PROCEDURE — 2500000005 HC RX 250 GENERAL PHARMACY W/O HCPCS: Performed by: STUDENT IN AN ORGANIZED HEALTH CARE EDUCATION/TRAINING PROGRAM

## 2025-07-25 PROCEDURE — 99232 SBSQ HOSP IP/OBS MODERATE 35: CPT | Performed by: NURSE PRACTITIONER

## 2025-07-25 PROCEDURE — 97161 PT EVAL LOW COMPLEX 20 MIN: CPT | Mod: GP

## 2025-07-25 PROCEDURE — 94640 AIRWAY INHALATION TREATMENT: CPT

## 2025-07-25 PROCEDURE — 97165 OT EVAL LOW COMPLEX 30 MIN: CPT | Mod: GO

## 2025-07-25 PROCEDURE — 2500000004 HC RX 250 GENERAL PHARMACY W/ HCPCS (ALT 636 FOR OP/ED): Performed by: INTERNAL MEDICINE

## 2025-07-25 PROCEDURE — 80048 BASIC METABOLIC PNL TOTAL CA: CPT | Performed by: STUDENT IN AN ORGANIZED HEALTH CARE EDUCATION/TRAINING PROGRAM

## 2025-07-25 PROCEDURE — 92610 EVALUATE SWALLOWING FUNCTION: CPT | Mod: GN

## 2025-07-25 PROCEDURE — 36415 COLL VENOUS BLD VENIPUNCTURE: CPT | Performed by: STUDENT IN AN ORGANIZED HEALTH CARE EDUCATION/TRAINING PROGRAM

## 2025-07-25 PROCEDURE — 2500000004 HC RX 250 GENERAL PHARMACY W/ HCPCS (ALT 636 FOR OP/ED): Performed by: NURSE PRACTITIONER

## 2025-07-25 PROCEDURE — 1200000002 HC GENERAL ROOM WITH TELEMETRY DAILY

## 2025-07-25 PROCEDURE — 99232 SBSQ HOSP IP/OBS MODERATE 35: CPT

## 2025-07-25 PROCEDURE — 2500000002 HC RX 250 W HCPCS SELF ADMINISTERED DRUGS (ALT 637 FOR MEDICARE OP, ALT 636 FOR OP/ED): Performed by: INTERNAL MEDICINE

## 2025-07-25 PROCEDURE — 2500000001 HC RX 250 WO HCPCS SELF ADMINISTERED DRUGS (ALT 637 FOR MEDICARE OP): Performed by: INTERNAL MEDICINE

## 2025-07-25 RX ORDER — POLYETHYLENE GLYCOL 3350 17 G/17G
17 POWDER, FOR SOLUTION ORAL DAILY
Status: DISCONTINUED | OUTPATIENT
Start: 2025-07-25 | End: 2025-07-26 | Stop reason: HOSPADM

## 2025-07-25 RX ORDER — DOCUSATE SODIUM 100 MG/1
100 CAPSULE, LIQUID FILLED ORAL 2 TIMES DAILY
Status: DISCONTINUED | OUTPATIENT
Start: 2025-07-25 | End: 2025-07-26 | Stop reason: HOSPADM

## 2025-07-25 RX ORDER — POTASSIUM CHLORIDE 14.9 MG/ML
20 INJECTION INTRAVENOUS
Status: DISPENSED | OUTPATIENT
Start: 2025-07-25 | End: 2025-07-25

## 2025-07-25 RX ORDER — ZOLPIDEM TARTRATE 5 MG/1
5 TABLET ORAL ONCE
Status: COMPLETED | OUTPATIENT
Start: 2025-07-26 | End: 2025-07-25

## 2025-07-25 RX ADMIN — PREGABALIN 100 MG: 50 CAPSULE ORAL at 20:46

## 2025-07-25 RX ADMIN — HEPARIN SODIUM 5000 UNITS: 5000 INJECTION, SOLUTION INTRAVENOUS; SUBCUTANEOUS at 18:16

## 2025-07-25 RX ADMIN — NYSTATIN: 100000 OINTMENT TOPICAL at 10:56

## 2025-07-25 RX ADMIN — ALPRAZOLAM 0.5 MG: 0.5 TABLET ORAL at 20:47

## 2025-07-25 RX ADMIN — ACETAMINOPHEN 650 MG: 325 TABLET ORAL at 20:46

## 2025-07-25 RX ADMIN — CEFTRIAXONE 1 G: 1 INJECTION, SOLUTION INTRAVENOUS at 16:35

## 2025-07-25 RX ADMIN — IPRATROPIUM BROMIDE AND ALBUTEROL SULFATE 3 ML: 2.5; .5 SOLUTION RESPIRATORY (INHALATION) at 19:50

## 2025-07-25 RX ADMIN — Medication 1 APPLICATION: at 20:47

## 2025-07-25 RX ADMIN — BUDESONIDE 0.5 MG: 0.5 INHALANT RESPIRATORY (INHALATION) at 07:39

## 2025-07-25 RX ADMIN — CARBIDOPA AND LEVODOPA 2 TABLET: 25; 100 TABLET ORAL at 10:55

## 2025-07-25 RX ADMIN — ONDANSETRON 4 MG: 2 INJECTION, SOLUTION INTRAMUSCULAR; INTRAVENOUS at 10:57

## 2025-07-25 RX ADMIN — BUSPIRONE HYDROCHLORIDE 15 MG: 15 TABLET ORAL at 20:47

## 2025-07-25 RX ADMIN — HEPARIN SODIUM 5000 UNITS: 5000 INJECTION, SOLUTION INTRAVENOUS; SUBCUTANEOUS at 10:55

## 2025-07-25 RX ADMIN — FLUOXETINE HYDROCHLORIDE 100 MG: 20 CAPSULE ORAL at 10:55

## 2025-07-25 RX ADMIN — NYSTATIN: 100000 OINTMENT TOPICAL at 20:52

## 2025-07-25 RX ADMIN — Medication 1 DOSE: at 19:50

## 2025-07-25 RX ADMIN — POTASSIUM CHLORIDE 20 MEQ: 14.9 INJECTION, SOLUTION INTRAVENOUS at 12:30

## 2025-07-25 RX ADMIN — CEFTRIAXONE 1 G: 1 INJECTION, SOLUTION INTRAVENOUS at 04:48

## 2025-07-25 RX ADMIN — CARBIDOPA AND LEVODOPA 2 TABLET: 25; 100 TABLET ORAL at 16:35

## 2025-07-25 RX ADMIN — POLYETHYLENE GLYCOL 3350 17 G: 17 POWDER, FOR SOLUTION ORAL at 10:55

## 2025-07-25 RX ADMIN — CARBIDOPA AND LEVODOPA 2 TABLET: 25; 100 TABLET ORAL at 20:46

## 2025-07-25 RX ADMIN — DONEPEZIL HYDROCHLORIDE 10 MG: 5 TABLET ORAL at 20:46

## 2025-07-25 RX ADMIN — BUSPIRONE HYDROCHLORIDE 15 MG: 15 TABLET ORAL at 10:55

## 2025-07-25 RX ADMIN — ALPRAZOLAM 0.5 MG: 0.5 TABLET ORAL at 16:35

## 2025-07-25 RX ADMIN — Medication: at 07:39

## 2025-07-25 RX ADMIN — BUDESONIDE 0.5 MG: 0.5 INHALANT RESPIRATORY (INHALATION) at 19:50

## 2025-07-25 RX ADMIN — IPRATROPIUM BROMIDE AND ALBUTEROL SULFATE 3 ML: 2.5; .5 SOLUTION RESPIRATORY (INHALATION) at 07:39

## 2025-07-25 RX ADMIN — DOCUSATE SODIUM 100 MG: 100 CAPSULE, LIQUID FILLED ORAL at 20:47

## 2025-07-25 RX ADMIN — POTASSIUM CHLORIDE 20 MEQ: 14.9 INJECTION, SOLUTION INTRAVENOUS at 10:57

## 2025-07-25 RX ADMIN — PANTOPRAZOLE SODIUM 40 MG: 40 INJECTION, POWDER, FOR SOLUTION INTRAVENOUS at 06:14

## 2025-07-25 RX ADMIN — POTASSIUM CHLORIDE 20 MEQ: 14.9 INJECTION, SOLUTION INTRAVENOUS at 15:14

## 2025-07-25 RX ADMIN — Medication 1 APPLICATION: at 16:36

## 2025-07-25 RX ADMIN — ZOLPIDEM TARTRATE 5 MG: 5 TABLET, FILM COATED ORAL at 23:58

## 2025-07-25 RX ADMIN — TRAZODONE HYDROCHLORIDE 50 MG: 50 TABLET ORAL at 20:46

## 2025-07-25 RX ADMIN — IPRATROPIUM BROMIDE AND ALBUTEROL SULFATE 3 ML: 2.5; .5 SOLUTION RESPIRATORY (INHALATION) at 13:35

## 2025-07-25 RX ADMIN — PREGABALIN 100 MG: 50 CAPSULE ORAL at 10:54

## 2025-07-25 RX ADMIN — HEPARIN SODIUM 5000 UNITS: 5000 INJECTION, SOLUTION INTRAVENOUS; SUBCUTANEOUS at 01:50

## 2025-07-25 RX ADMIN — ALPRAZOLAM 0.5 MG: 0.5 TABLET ORAL at 10:54

## 2025-07-25 ASSESSMENT — COGNITIVE AND FUNCTIONAL STATUS - GENERAL
MOVING TO AND FROM BED TO CHAIR: A LOT
WALKING IN HOSPITAL ROOM: A LOT
WALKING IN HOSPITAL ROOM: A LITTLE
DRESSING REGULAR UPPER BODY CLOTHING: A LOT
DRESSING REGULAR UPPER BODY CLOTHING: A LITTLE
PERSONAL GROOMING: A LITTLE
MOVING TO AND FROM BED TO CHAIR: A LITTLE
STANDING UP FROM CHAIR USING ARMS: A LITTLE
DAILY ACTIVITIY SCORE: 15
CLIMB 3 TO 5 STEPS WITH RAILING: A LOT
MOBILITY SCORE: 12
TOILETING: TOTAL
WALKING IN HOSPITAL ROOM: A LOT
MOVING TO AND FROM BED TO CHAIR: A LITTLE
MOVING TO AND FROM BED TO CHAIR: A LOT
TURNING FROM BACK TO SIDE WHILE IN FLAT BAD: A LOT
MOVING FROM LYING ON BACK TO SITTING ON SIDE OF FLAT BED WITH BEDRAILS: A LITTLE
MOBILITY SCORE: 16
HELP NEEDED FOR BATHING: A LOT
TURNING FROM BACK TO SIDE WHILE IN FLAT BAD: A LITTLE
MOVING FROM LYING ON BACK TO SITTING ON SIDE OF FLAT BED WITH BEDRAILS: A LITTLE
STANDING UP FROM CHAIR USING ARMS: A LITTLE
HELP NEEDED FOR BATHING: A LOT
TOILETING: A LITTLE
PERSONAL GROOMING: A LOT
MOBILITY SCORE: 12
DRESSING REGULAR LOWER BODY CLOTHING: A LOT
MOVING FROM LYING ON BACK TO SITTING ON SIDE OF FLAT BED WITH BEDRAILS: A LOT
DAILY ACTIVITIY SCORE: 11
EATING MEALS: A LOT
TOILETING: TOTAL
DAILY ACTIVITIY SCORE: 11
DAILY ACTIVITIY SCORE: 19
PERSONAL GROOMING: A LOT
WALKING IN HOSPITAL ROOM: A LOT
TOILETING: TOTAL
CLIMB 3 TO 5 STEPS WITH RAILING: A LOT
DRESSING REGULAR LOWER BODY CLOTHING: A LITTLE
MOVING FROM LYING ON BACK TO SITTING ON SIDE OF FLAT BED WITH BEDRAILS: A LOT
MOBILITY SCORE: 17
STANDING UP FROM CHAIR USING ARMS: A LOT
CLIMB 3 TO 5 STEPS WITH RAILING: A LOT
HELP NEEDED FOR BATHING: A LOT
HELP NEEDED FOR BATHING: A LITTLE
DRESSING REGULAR UPPER BODY CLOTHING: A LITTLE
DRESSING REGULAR LOWER BODY CLOTHING: A LOT
EATING MEALS: A LOT
STANDING UP FROM CHAIR USING ARMS: A LOT
DRESSING REGULAR LOWER BODY CLOTHING: A LOT
DRESSING REGULAR UPPER BODY CLOTHING: A LOT
TURNING FROM BACK TO SIDE WHILE IN FLAT BAD: A LITTLE
PERSONAL GROOMING: A LITTLE
CLIMB 3 TO 5 STEPS WITH RAILING: A LOT
TURNING FROM BACK TO SIDE WHILE IN FLAT BAD: A LOT

## 2025-07-25 ASSESSMENT — PAIN DESCRIPTION - LOCATION: LOCATION: ABDOMEN

## 2025-07-25 ASSESSMENT — PAIN - FUNCTIONAL ASSESSMENT
PAIN_FUNCTIONAL_ASSESSMENT: 0-10

## 2025-07-25 ASSESSMENT — PAIN SCALES - GENERAL
PAINLEVEL_OUTOF10: 0 - NO PAIN
PAINLEVEL_OUTOF10: 0 - NO PAIN
PAINLEVEL_OUTOF10: 3
PAINLEVEL_OUTOF10: 0 - NO PAIN

## 2025-07-25 ASSESSMENT — ACTIVITIES OF DAILY LIVING (ADL)
LACK_OF_TRANSPORTATION: NO
BATHING_ASSISTANCE: MODERATE
ADL_ASSISTANCE: INDEPENDENT
ADL_ASSISTANCE: INDEPENDENT

## 2025-07-25 NOTE — PROGRESS NOTES
"    [facility]  Department of Hospital Medicine    PROGRESS NOTE    Fidelia Norman is a 80 y.o. female on day 1 of admission presenting with Nausea and vomiting, unspecified vomiting type.    Subjective   Nausea improving.  Tolerating liquid diet.  Would like to advance.       Objective     Physical Exam:  Con: awake, alert; moderate distress;   Eyes: conjunctiva wnl; EOMI;   ENMT: hearing intact; MMM;   MSK: ROM wnl; digits wnl;   GI: Mild abdominal tenderness  Resp: normal work of breathing; no cyanosis;   Neuro: moving all extremities; bilateral tremors  Psych: oriented to situation; affect wnl;     Last Recorded Vitals:  /78 (BP Location: Right arm, Patient Position: Lying)   Pulse 67   Temp 36 °C (96.8 °F) (Temporal)   Resp 16   Ht (!) 1.549 m (5' 1\")   Wt 79.1 kg (174 lb 6.1 oz)   LMP  (LMP Unknown)   SpO2 97%   BMI 32.95 kg/m²      Scheduled medications:  Scheduled Medications[1]  Continuous medications:  Continuous Medications[2]  PRN medications:  PRN Medications[3]     Relevant Results:  Lab Results   Component Value Date    WBC 8.4 07/25/2025    HGB 12.3 07/25/2025    HCT 39.7 07/25/2025    MCV 95 07/25/2025     07/25/2025      Lab Results   Component Value Date    GLUCOSE 111 (H) 07/25/2025    CALCIUM 8.2 (L) 07/25/2025     07/25/2025    K 2.8 (LL) 07/25/2025    CO2 29 07/25/2025     07/25/2025    BUN 12 07/25/2025    CREATININE 0.82 07/25/2025                        Assessment/Plan   Assessment & Plan  Nausea and vomiting, unspecified vomiting type    Acute metabolic encephalopathy    Fidelia Norman is a 80 y.o. female with history of Parkinson's disease, COPD, HTN and HLD presenting with nausea, vomiting, worsening tremors and alteration in mental status.  says pt has been sick for the last couple of days with nausea, vomiting, not eating or drinking, weakness, not getting up from chair, increasing tremors and decrease in level " of responsiveness. Pt has not taken her meds including her PD meds. He says she seemed worse today so he called EMS and she was brought to the ED. Labs in ED were notable for a lactate of 2.9 and WBC 16.7. CT of the abdomen and pelvis was read as showing high density debris within the gallbladder suggestive of sludge and stones, but subsequent ultrasound showed a normal gallbladder with no sludge or stones, and HIDA scan was negative.     Nausea, vomiting, leukocytosis:  > Review of chart shows recent admission for pneumonia with broad-spectrum antibiotics given.  Suspect gastroenteritis versus UTI versus less likely C. difficile.  On my review of imaging, the CT shows a normal gallbladder with significant artifact from motion and spine hardware, and a hyperdensity under the liver located well outside the gallbladder that is present on prior imaging.  Urinalysis with pyuria.  Urine culture pending, likely will be negative since antibiotics were already given.   -Antiemetics  -Urine culture in process  -Empiric ceftriaxone  -Stool studies if she develops diarrhea   -advance diet as tolerated  -Surgery consulted, ordered HIDA which was negative, signed off    Parkinson's:  Dementia:  Anxiety:  -Continue carbidopa levodopa, donepezil, pregabalin, alprazolam, buspirone, fluoxetine, trazodone  -Neurology consulted  - PT/OT    COPD:  -Albuterol as needed  -ICS and DuoNebs RT    DVT PPx: Subcu heparin    Dispo: Inpatient, likely 1-3 more midnights pending clinical improvement, culture results, PT/OT          Darci Wilkes MD    Patient Name:  Fidelia Norman   MRN:   [id number]   Room/Bed:  112/112-A          [1] ALPRAZolam, 0.5 mg, oral, TID  budesonide, 0.5 mg, nebulization, BID   And  ipratropium-albuteroL, 3 mL, nebulization, TID  busPIRone, 15 mg, oral, BID  carbidopa-levodopa, 2 tablet, oral, TID  cefTRIAXone, 1 g, intravenous, q12h  docusate sodium, 100 mg, oral, BID  donepezil, 10 mg, oral, Nightly  pantoprazole, 40  mg, oral, Daily before breakfast   Or  esomeprazole, 40 mg, nasoduodenal tube, Daily before breakfast   Or  pantoprazole, 40 mg, intravenous, Daily before breakfast  FLUoxetine, 100 mg, oral, Daily  heparin (porcine), 5,000 Units, subcutaneous, q8h  nystatin, , Topical, BID  polyethylene glycol, 17 g, oral, Daily  potassium chloride, 20 mEq, intravenous, q2h  pregabalin, 100 mg, oral, BID  traZODone, 50 mg, oral, Nightly  zinc oxide, 1 Application, Topical, TID     [2]    [3] PRN medications: acetaminophen **OR** acetaminophen **OR** acetaminophen, albuterol, alum-mag hydroxide-simeth, calcium carbonate, [DISCONTINUED] ondansetron **OR** ondansetron, oxygen, prochlorperazine

## 2025-07-25 NOTE — CARE PLAN
The patient's goals for the shift include      The clinical goals for the shift include patient remain safe during shift      Problem: Pain - Adult  Goal: Verbalizes/displays adequate comfort level or baseline comfort level  Outcome: Progressing     Problem: Safety - Adult  Goal: Free from fall injury  Outcome: Progressing     Problem: Discharge Planning  Goal: Discharge to home or other facility with appropriate resources  Outcome: Progressing     Problem: Chronic Conditions and Co-morbidities  Goal: Patient's chronic conditions and co-morbidity symptoms are monitored and maintained or improved  Outcome: Progressing     Problem: Nutrition  Goal: Nutrient intake appropriate for maintaining nutritional needs  Outcome: Progressing

## 2025-07-25 NOTE — PROGRESS NOTES
07/25/25 1015   Discharge Planning   Living Arrangements Spouse/significant other   Support Systems Spouse/significant other   Assistance Needed A&OX4; independent assist at times for ADLs with cane; doesn't drive; 2L NC @[facility]-currently 3L NC-no CPAP; PCP Dr Abdirahman [place] [facility] in [facility]   Type of Residence Private residence   Number of Stairs to Enter Residence 4   Number of Stairs Within Residence 0   Do you have animals or pets at home? No   Who is requesting discharge planning? Provider   Expected Discharge Disposition Home  (DC dispo is pending workup and PT OT. Pt recently dcd skilled to [facility] 6/15/25)   Does the patient need discharge transport arranged? Yes   [employer] coordination needed? Yes   Has discharge transport been arranged? No   Financial Resource Strain   How hard is it for you to pay for the very basics like food, housing, medical care, and heating? Not hard   Housing Stability   In the last 12 months, was there a time when you were not able to pay the mortgage or rent on time? N   In the past 12 months, how many times have you moved where you were living? 0   At any time in the past 12 months, were you homeless or living in a shelter (including now)? N   Transportation Needs   In the past 12 months, has lack of transportation kept you from medical appointments or from getting medications? no   In the past 12 months, has lack of transportation kept you from meetings, work, or from getting things needed for daily living? No

## 2025-07-25 NOTE — PROGRESS NOTES
Occupational Therapy    Evaluation    Patient Name: Fidelia Norman  MRN: [id number]  Department: [address]  Room: [address] [place] [address]  Today's Date: 7/25/2025  Time Calculation  Start Time: 1440  Stop Time: 1459  Time Calculation (min): 19 min    Assessment  IP OT Assessment  OT Assessment: Pt presents with decreased ADL performance, decreased functional mobility, decreased endurance. Recommend continued skilled OT at a mod intensity to maximize pt safety and independence after discharge.  Prognosis: Good  Barriers to Discharge Home: Caregiver assistance, Physical needs  Caregiver Assistance: Caregiver assistance needed per identified barriers - however, level of patient's required assistance exceeds assistance available at home  Physical Needs: Intermittent mobility assistance needed, Intermittent ADL assistance needed, High falls risk due to function or environment  Evaluation/Treatment Tolerance: Patient tolerated treatment well  Medical Staff Made Aware: Yes  End of Session Communication: Bedside nurse  End of Session Patient Position: Up in chair, Alarm on  Plan:  Treatment Interventions: ADL retraining, Functional transfer training, Endurance training, UE strengthening/ROM  OT Frequency: 3 times per week (During this acute inpatient hospitalization. Based on current functional status & rehab potential, patient is anticipated to tolerate and benefit from 5+ days/wk of skilled rehabilitative therapy after discharge from this acute inpatient hospitalization.)  OT Discharge Recommendations: Moderate intensity level of continued care  Equipment Recommended upon Discharge: Wheeled walker (owns)  OT Recommended Transfer Status: Assist of 1  OT - OK to Discharge: Yes (per OT POC)    Subjective   Current Problem:  1. Nausea and vomiting, unspecified vomiting type        2. Gallbladder sludge          OT Visit Info:  OT Received On: 07/25/25  General Visit Info:  General  Reason for Referral: 81 yo female referred to OT for impaired  ADLs/mobility  Referred By: VIN Wilkes MD  Past Medical History Relevant to Rehab: Parkinson's disease, COPD, HTN and HLD  Co-Treatment: PT  Co-Treatment Reason: maximize pt safety and outcomes  Prior to Session Communication: Bedside nurse  Patient Position Received: Bed, 3 rail up, Alarm on  General Comment: Pt pleasant, cooperative with therapy evaluation  Precautions:  Medical Precautions: Fall precautions, Oxygen therapy device and L/min (3L NC, tele, purewick, IV)     Date/Time Vitals Session Patient Position Pulse Resp SpO2 BP MAP (mmHg)    07/25/25 1335 --  --  74  --  95 %  --  --           Pain:  Pain Assessment  Pain Assessment: 0-10  0-10 (Numeric) Pain Score: 0 - No pain    Objective   Cognition:  Overall Cognitive Status: Impaired  Arousal/Alertness: Appropriate responses to stimuli  Orientation Level: Disoriented to situation           Home Living:  Type of Home: House  Lives With: Spouse  Home Adaptive Equipment: Walker rolling or standard, Cane (rollator)  Home Layout: One level  Home Access: Stairs to enter with rails  Entrance Stairs-Rails: Both  Entrance Stairs-Number of Steps: 4  Bathroom Shower/Tub: Walk-in shower  Bathroom Toilet: Handicapped height  Bathroom Equipment: Grab bars in shower   Prior Function:  Level of [place]: Independent with ADLs and functional transfers, Needs assistance with homemaking  Receives Help From: Family  ADL Assistance: Independent  Homemaking Assistance: Needs assistance (spouse assists)  Ambulatory Assistance: Independent (usually uses cane)  Prior Function Comments: reports hx of falls     ADL:  Eating Assistance: Independent  Grooming Assistance: Stand by  Bathing Assistance: Moderate  UE Dressing Assistance: Minimal  LE Dressing Assistance: Moderate  Toileting Assistance with Device: Maximal  Functional Assistance: Minimal  ADL Comments: Able to don socks using figure 4 in sitting with assist for balance. Unassessed ADLs anticipated  Activity  Tolerance:  Endurance: Tolerates 10 - 20 min exercise with multiple rests  Bed Mobility/Transfers: Bed Mobility  Bed Mobility: Yes  Bed Mobility 1  Bed Mobility 1: Supine to sitting  Level of Assistance 1: Minimum assistance  Bed Mobility Comments 1: Assist at UE to achieve upright position    Transfers  Transfer: Yes  Transfer 1  Transfer From 1: Bed to  Transfer to 1: Stand  Technique 1: Sit to stand, Stand to sit  Transfer Device 1: Walker  Transfer Level of Assistance 1: Contact guard  Transfers 2  Transfer From 2: Stand to  Transfer to 2: Chair with arms  Technique 2: Stand to sit  Transfer Device 2: Walker  Transfer Level of Assistance 2: Contact guard  Trials/Comments 2: Uncontrolled descent d/t balance deficits/weakness      Functional Mobility:  Functional Mobility  Functional Mobility Performed: Yes  Functional Mobility 1  Surface 1: Level tile  Device 1: Rolling walker  Assistance 1: Contact guard  Comments 1: Sidestepped from bed to chair with CGA for balance, safety  Sitting Balance:  Static Sitting Balance  Static Sitting-Balance Support: Feet supported  Static Sitting-Level of Assistance: Close supervision  Dynamic Sitting Balance  Dynamic Sitting-Balance Support: Feet unsupported  Dynamic Sitting-Level of Assistance: Moderate assistance  Dynamic Sitting-Balance: Lateral lean, Forward lean (ADL)  Dynamic Sitting-Comments: retropulsive  Standing Balance:  Static Standing Balance  Static Standing-Balance Support: Bilateral upper extremity supported  Static Standing-Level of Assistance: Contact guard    Strength:  Strength Comments: BUE grossly 4/5, WFL     Coordination:  Movements are Fluid and Coordinated: No  Upper Body Coordination: chronic bilateral hand tremors   Hand Function:  Hand Function  Gross Grasp: Functional  Coordination: Impaired  Extremities: RUE   RUE : Within Functional Limits and LUE   LUE: Within Functional Limits    Outcome Measures: [facility] Daily Activity  Putting on and taking off  regular lower body clothing: A lot  Bathing (including washing, rinsing, drying): A lot  Putting on and taking off regular upper body clothing: A little  Toileting, which includes using toilet, bedpan or urinal: Total  Taking care of personal grooming such as brushing teeth: A little  Eating Meals: None  Daily Activity - Total Score: 15      Education Documentation  Body Mechanics, taught by Suraj Llanes OT at 7/25/2025  3:32 PM.  Learner: Patient  Readiness: Acceptance  Method: Explanation  Response: Verbalizes Understanding    Precautions, taught by Suraj Llanes OT at 7/25/2025  3:32 PM.  Learner: Patient  Readiness: Acceptance  Method: Explanation  Response: Verbalizes Understanding    ADL Training, taught by Suraj Llanes OT at 7/25/2025  3:32 PM.  Learner: Patient  Readiness: Acceptance  Method: Explanation  Response: Verbalizes Understanding    Education Comments  No comments found.      Goals:   Encounter Problems       Encounter Problems (Active)       OT Goals       Pt will demo Grooming/UE ADL completion with setup, using adaptive strategies and energy conservation techniques as applicable.        Start:  07/25/25    Expected End:  08/08/25            Pt will demo LE ADL completion with SBA, using AE if needed.        Start:  07/25/25    Expected End:  08/08/25            Pt will complete cpol-zi-eoef transfers using LRD in preparation for ADLs with supervision        Start:  07/25/25    Expected End:  08/08/25            Pt will tolerate 10min stand during functional task completion with no more than 1 rest break in order to increase endurance for functional task completion.        Start:  07/25/25    Expected End:  08/08/25            Pt will increase static/dynamic standing balance to Good to increase safety and independence with functional task completion.         Start:  07/25/25    Expected End:  08/08/25

## 2025-07-25 NOTE — PROGRESS NOTES
Fidelia Norman is a 80 y.o. female on day 1 of admission presenting with Nausea and vomiting, unspecified vomiting type.    Subjective   Pt admitted with change in mental status and vomiting.     No acute overnight events.   Pt able to have conversation this am.   She reports her tremors are worse than baseline and she feels shaky.   She c/o SOB and cough.   She c/o heartburn and nausea but no vomiting.   She c/o chronic abd discomfort and constipation.   Denies urinary sx.      Objective     Physical Exam  Vitals reviewed.   Constitutional:       General: She is not in acute distress.     Appearance: She is obese. She is ill-appearing and diaphoretic. She is not toxic-appearing.   HENT:      Head: Normocephalic and atraumatic.      Mouth/Throat:      Mouth: Mucous membranes are moist.     Eyes:      General: No scleral icterus.        Right eye: No discharge.         Left eye: No discharge.      Conjunctiva/sclera: Conjunctivae normal.       Cardiovascular:      Rate and Rhythm: Normal rate and regular rhythm.      Pulses: Normal pulses.      Heart sounds: Murmur (2/6 RUSB, LUSB) heard.      No friction rub. No gallop.   Pulmonary:      Effort: Pulmonary effort is normal. No respiratory distress.      Breath sounds: No stridor. Wheezing present. No rhonchi or rales.      Comments: Exp wheezing medardo throughout  Chest:      Chest wall: No tenderness.   Abdominal:      General: There is distension.      Palpations: Abdomen is soft.      Tenderness: There is abdominal tenderness.      Comments: Abd soft, tender throughout. Hypoactive BS x 4 q.     Musculoskeletal:      Right lower leg: No edema.      Left lower leg: No edema.     Skin:     Capillary Refill: Capillary refill takes less than 2 seconds.     Neurological:      Mental Status: She is alert.      Motor: Weakness (generalized) present.     Psychiatric:         Attention and Perception: Attention normal.         Mood and Affect: Mood normal.         Behavior:  "Behavior normal. Behavior is cooperative.         Thought Content: Thought content normal.         Judgment: Judgment normal.         Last Recorded Vitals  Blood pressure 144/84, pulse 71, temperature 36.2 °C (97.2 °F), temperature source Temporal, resp. rate 17, height (!) 1.549 m (5' 1\"), weight 79.1 kg (174 lb 6.1 oz), SpO2 99%.  Intake/Output last 3 Shifts:  I/O last 3 completed shifts:  In: 1568.3 (19.8 mL/kg) [P.O.:390; I.V.:928.3 (11.7 mL/kg); IV Piggyback:250]  Out: 870 (11 mL/kg) [Urine:870 (0.3 mL/kg/hr)]  Weight: 79.1 kg     Relevant Results    NM hepatobiliary  Result Date: 7/24/2025  Interpreted By:  Kim Quintana and Igbinovia Osato STUDY: NM HEPATOBILIARY;  7/24/2025 10:22 am   INDICATION: Signs/Symptoms:Equivocal ct and US results for acute soco.  15     COMPARISON: None.   ACCESSION NUMBER(S): [id number]   ORDERING CLINICIAN: DARLENE QUIÑONES   TECHNIQUE: DIVISION OF NUCLEAR MEDICINE HEPATOBILIARY SCAN ([place])   The patient received an intravenous dose of 5.9 mCi of Tc-99m mebrofenin (Choletec).  Sequential images of the upper abdomen were then acquired over the next 60 minutes.   FINDINGS: There is prompt accumulation of activity within the liver and normal subsequent excretion via the biliary ductal system into the small bowel.  The gallbladder first visualizes at about  15 minutes after radiopharmaceutical injection and progressively fills.       Normal hepatobiliary imaging with no sign of cystic duct obstruction.     MACRO: None   Signed by: Kim Quintana 7/24/2025 11:13 AM Dictation workstation:   [id number]    Electrocardiogram, 12-lead PRN ACS symptoms  Result Date: 7/24/2025   Poor data quality, interpretation may be adversely affected Sinus rhythm with occasional Premature ventricular complexes Possible Lateral infarct (cited on or before 30-JUN-2025) Abnormal ECG When compared with ECG of 30-JUN-2025 09:37, (unconfirmed) Premature ventricular complexes are now Present ST now depressed " in Inferior leads T wave inversion now evident in Lateral leads QT has lengthened    US gallbladder  Result Date: 7/23/2025  STUDY: Abdominal Ultrasound; 7/23/2025 11:20 PM. INDICATION: Vomiting. Gallbladder debris on recent CT. COMPARISON: CT A/P 7/23/2025. ACCESSION NUMBER(S): [id number] ORDERING CLINICIAN: CAMMIE EDGE TECHNIQUE: Ultrasound of the Right Upper Quadrant. Multiple images of the abdomen were obtained. FINDINGS: LIVER: The liver demonstrates a septated cyst measuring 2.8 x 1.7 x 2.2 cm. liver does appear to be heterogeneous.  GALLBLADDER: The gallbladder is anechoic. There are no stones. There is no pericholecystic fluid or wall thickening. Sonographic Page's sign is negative.   BILE DUCTS: The common bile duct measures 0.6 cm. There is no intrahepatic biliary dilatation.   PANCREAS: The pancreas is not seen due to overlying bowel gas.  RIGHT KIDNEY: The right kidney measures 10.2 cm in length. Renal cortical echotexture is normal. There is no hydronephrosis. There are no stones. There are no cysts. There is no evidence of free fluid within the abdomen.    Hepatic septated cyst measuring 2.8 x 1.7 x 2.2 cm. No cholelithiasis, gallbladder wall thickening, or biliary dilatation is appreciated. Gallbladder sludge and stones noted on the recent CT is not appreciated on this exam. Signed by Jd Castillo, DO    CT chest abdomen pelvis wo IV contrast  Result Date: 7/23/2025  STUDY: CT Chest, Abdomen, and Pelvis without IV Contrast; 07/23/2025 8:13 PM INDICATION: Vomiting, altered mental status. COMPARISON: CT AP 06/20/2025, 12/18/2020.  CT CAP 06/15/2025. ACCESSION NUMBER(S): [id number] ORDERING CLINICIAN: CAMMIE EDGE TECHNIQUE: CT of the chest, abdomen, and pelvis was performed.  Contiguous axial images were obtained at 3 mm slice thickness through the chest, abdomen, and pelvis.  Coronal and sagittal reconstructions at 3 mm slice thickness were performed.  No intravenous contrast was  administered.  1056 DICOM images received. FINDINGS: Please note that the evaluation of vessels, lymph nodes and organs is limited without intravenous contrast. CHEST: MEDIASTINUM: The heart is normal in size without pericardial effusion.  Coronary artery calcifications are identified.  LUNGS/PLEURA: There is no pleural effusion, pleural thickening, or pneumothorax. The airways are patent. Lungs reveals diffuse emphysematous changes.  There is parenchymal scarring right lower lobe.  Consolidation, interstitial disease, or suspicious nodules. LYMPH NODES: Thoracic lymph nodes are not enlarged. ABDOMEN:  LIVER: No hepatomegaly.  There are a few hepatic cysts unchanged. BILE DUCTS: No intrahepatic or extrahepatic biliary ductal dilatation.  GALLBLADDER: The gallbladder reveals high density debris likely combination of sludge and stones.. STOMACH: Hiatal hernia is demonstrated..  PANCREAS: No masses or ductal dilatation.  SPLEEN: No splenomegaly or focal splenic lesion.  ADRENAL GLANDS: No thickening or nodules.  KIDNEYS AND URETERS: Kidneys are normal in size and location.  No renal or ureteral calculi.  PELVIS:  BLADDER: No abnormalities identified.  REPRODUCTIVE ORGANS: No abnormalities identified.  BOWEL: No abnormalities identified.  VESSELS: No abnormalities identified.  Abdominal aorta is normal in caliber.  PERITONEUM/RETROPERITONEUM/LYMPH NODES: No free fluid.  No pneumoperitoneum.  No lymphadenopathy.  ABDOMINAL WALL: No abnormalities identified. SOFT TISSUES: No abnormalities identified.  BONES: No acute fracture or aggressive osseous lesion.  Moderate multilevel disc disease is demonstrated.    1.Hiatal hernia. 2.High density debris within the gallbladder likely combination of sludge and stones. 3.Hepatic cysts. 4.Emphysematous changes. Signed by Jd Castillo DO    CT head wo IV contrast  Result Date: 7/23/2025  Interpreted By:  Henok Quinteros, STUDY: CT HEAD WO IV CONTRAST;  7/23/2025 8:13 pm    INDICATION: Signs/Symptoms:vomiting.     COMPARISON: 06/2015   ACCESSION NUMBER(S): [id number]   ORDERING CLINICIAN: CAMMIE EDGE   TECHNIQUE: Noncontrast axial CT scan of head was performed. Angled reformats in brain and bone windows were generated. The images were reviewed in bone, brain, blood and soft tissue windows.   FINDINGS: CSF Spaces: The ventricles, sulci and basal cisterns are within normal limits. There is no extraaxial fluid collection. Stable global volume loss and chronic small vessel ischemic change   Parenchyma:  The grey-white differentiation is intact. There is no mass effect or midline shift.  There is no intracranial hemorrhage.   Calvarium: The calvarium is unremarkable.   Paranasal sinuses and mastoids: Visualized paranasal sinuses and mastoids are clear.       No evidence of acute cortical infarct or intracranial hemorrhage. Senescent changes. Stable examination.   MACRO: None   Signed by: Henok Quinteros 7/23/2025 8:46 PM Dictation workstation:   [id number]    XR chest 1 view  Result Date: 6/30/2025  Interpreted By:  Kaylene Up, STUDY: XR CHEST 1 VIEW;  6/30/2025 10:21 am   INDICATION: Signs/Symptoms:left lower lobe crackles.   COMPARISON: 06/17/2025   ACCESSION NUMBER(S): [id number]   ORDERING CLINICIAN: INDIO BEAULIEU   FINDINGS: AP radiograph of the chest was provided. Patient is rotated.       CARDIOMEDIASTINAL SILHOUETTE: Cardiomediastinal silhouette is unchanged size and configuration.   LUNGS: Increased blunting of the right costophrenic angle with right bibasilar opacities are seen. No pulmonary edema or pneumothorax.   ABDOMEN: No remarkable upper abdominal findings.   BONES: No acute osseous changes.       Increased small right pleural effusion with superimposed atelectasis/infiltrate.   Signed by: Kaylene Up 6/30/2025 11:06 AM Dictation workstation:   [id number]      Scheduled medications  Scheduled Medications[1]  Continuous medications  Continuous  Medications[2]  PRN medications  PRN Medications[3]  Results for orders placed or performed during the hospital encounter of 07/23/25 (from the past 24 hours)   Basic metabolic panel   Result Value Ref Range    Glucose 134 (H) 74 - 99 mg/dL    Sodium 141 136 - 145 mmol/L    Potassium 3.3 (L) 3.5 - 5.3 mmol/L    Chloride 99 98 - 107 mmol/L    Bicarbonate 28 21 - 32 mmol/L    Anion Gap 17 10 - 20 mmol/L    Urea Nitrogen 14 6 - 23 mg/dL    Creatinine 0.89 0.50 - 1.05 mg/dL    eGFR 66 >60 mL/min/1.73m*2    Calcium 9.1 8.6 - 10.3 mg/dL   CBC   Result Value Ref Range    WBC 14.6 (H) 4.4 - 11.3 x10*3/uL    nRBC 0.0 0.0 - 0.0 /100 WBCs    RBC 4.59 4.00 - 5.20 x10*6/uL    Hemoglobin 13.7 12.0 - 16.0 g/dL    Hematocrit 40.6 36.0 - 46.0 %    MCV 89 80 - 100 fL    MCH 29.8 26.0 - 34.0 pg    MCHC 33.7 32.0 - 36.0 g/dL    RDW 12.6 11.5 - 14.5 %    Platelets 245 150 - 450 x10*3/uL   Urinalysis with Reflex Culture and Microscopic   Result Value Ref Range    Color, Urine Yellow Light-Yellow, Yellow, Dark-Yellow    Appearance, Urine Turbid (N) Clear    Specific Gravity, Urine 1.033 1.005 - 1.035    pH, Urine 6.5 5.0, 5.5, 6.0, 6.5, 7.0, 7.5, 8.0    Protein, Urine 70 (1+) (A) NEGATIVE, 10 (TRACE), 20 (TRACE) mg/dL    Glucose, Urine Normal Normal mg/dL    Blood, Urine 0.06 (1+) (A) NEGATIVE mg/dL    Ketones, Urine 40 (2+) (A) NEGATIVE mg/dL    Bilirubin, Urine NEGATIVE NEGATIVE mg/dL    Urobilinogen, Urine Normal Normal mg/dL    Nitrite, Urine NEGATIVE NEGATIVE    Leukocyte Esterase, Urine 250 Suleman/uL (A) NEGATIVE   Microscopic Only, Urine   Result Value Ref Range    WBC, Urine 6-10 (A) 1-5, NONE /HPF    RBC, Urine >20 (A) NONE, 1-2, 3-5 /HPF    Squamous Epithelial Cells, Urine 1-9 (SPARSE) Reference range not established. /HPF    Bacteria, Urine 4+ (A) NONE SEEN /HPF    Mucus, Urine FEW Reference range not established. /LPF    Hyaline Casts, Urine 1+ (A) NONE /LPF                            Assessment & Plan  Nausea and vomiting,  unspecified vomiting type    Acute metabolic encephalopathy    Pt is a 80 year old female with dementia and parkinsons with change in mental status   - imaging reviewed.DOMINIC WNL.  - labs reviewed. UA with protein, RBC, WBC, bacteria. C+S in process. Potassium 2.8, replacement ordered. Cont to monitor labs.  - recommend cont IV ABX for UTI.  - recommend miralax daily with colace BID and can cont upon discharge.   - recommend clear liquid diet and advance as tolerated.   - may require swallow study as she had coughing with drinking liquids.  - resp treatments as ordered.   - PPI daily, should cont upon discharge.  - no acute surgical intervention at this time. Do not feel that her current health condition is at all related to her gallbladder. Surgery will sign off. Please direct message with any new general surgery concerns.     - remainder of care per primary.       I spent 35 minutes in the professional and overall care of this patient.    Plan of care discussed with Dr. Zimmerman who is in agreement with plan of care.     Geeta Bradford, APRN-CNP           [1] ALPRAZolam, 0.5 mg, oral, TID  budesonide, 0.5 mg, nebulization, BID   And  ipratropium-albuteroL, 3 mL, nebulization, TID  busPIRone, 15 mg, oral, BID  carbidopa-levodopa, 2 tablet, oral, TID  cefTRIAXone, 1 g, intravenous, q12h  donepezil, 10 mg, oral, Nightly  pantoprazole, 40 mg, oral, Daily before breakfast   Or  esomeprazole, 40 mg, nasoduodenal tube, Daily before breakfast   Or  pantoprazole, 40 mg, intravenous, Daily before breakfast  FLUoxetine, 100 mg, oral, Daily  heparin (porcine), 5,000 Units, subcutaneous, q8h  nystatin, , Topical, BID  pregabalin, 100 mg, oral, BID  traZODone, 50 mg, oral, Nightly  zinc oxide, 1 Application, Topical, TID    [2]    [3] PRN medications: acetaminophen **OR** acetaminophen **OR** acetaminophen, albuterol, alum-mag hydroxide-simeth, calcium carbonate, [DISCONTINUED] ondansetron **OR** ondansetron, oxygen,  prochlorperazine

## 2025-07-25 NOTE — PROGRESS NOTES
[facility] Neurology Progress Note    Fidelia Norman is a 80 y.o. female on day 1 of admission presenting with Nausea and vomiting, unspecified vomiting type.  Subjective   Fidelia is a 80-year-old female with a history of Parkinson's Disease with cervical dystonia, HTN, and HLD.     I examined the patient face to face in her room this morning. She is awake, alert, and resting comfortably in bed on exam. Patient is less drowsy this morning compared to neurological exam yesterday. She reports feeling hungry this morning and has been able to sip on ginger ale. She has not yet had a swallow evaluation for clearance to pass oral medications. She is alert only to name, month and place. She is not able to state her age or the year. She states she does not know why she is in the hospital but remembers when I explain her symptoms.         Objective     Vitals:    07/24/25 1910 07/24/25 1941 07/25/25 0356 07/25/25 0812   BP: 172/80  144/84 174/78   BP Location: Right arm  Right arm Right arm   Patient Position: Lying  Lying Lying   Pulse: 76  71 67   Resp: 18  17 16   Temp: 36.4 °C (97.5 °F)  36.2 °C (97.2 °F) 36 °C (96.8 °F)   TempSrc: Temporal  Temporal Temporal   SpO2: 95% 97% 99% 97%   Weight:       Height:             Physical Exam  Vitals and nursing note reviewed.   Constitutional:       General: She is awake.      Appearance: Normal appearance.     Neurological:      Mental Status: She is alert.      Cranial Nerves: Cranial nerves 2-12 are intact.      Sensory: Sensation is intact.      Motor: Tremor present.     Psychiatric:         Attention and Perception: Attention and perception normal.         Mood and Affect: Mood and affect normal.         Speech: Speech normal.         Behavior: Behavior normal. Behavior is cooperative.         Thought Content: Thought content normal.       Neurological Exam  Mental Status  Awake and alert. Speech is normal. Language is fluent with no aphasia. Attention and concentration are  normal. Fund of knowledge is appropriate for level of education.    Cranial Nerves  CN II-XII grossly intact.    Motor  Normal muscle bulk throughout. No fasciculations present. The following abnormal movements were seen: R>L rest tremor of the upper extremities, cervical dystonia.   No pronator drift.    Sensory  Normal sensation.Light touch is normal in upper and lower extremities.     Coordination   Tremors.      Scheduled medications  Scheduled Medications[1]  Continuous medications  Continuous Medications[2]  PRN medications  PRN Medications[3]     Lab Results   Component Value Date    WBC 8.4 07/25/2025    RBC 4.19 07/25/2025    HGB 12.3 07/25/2025    HCT 39.7 07/25/2025     07/25/2025     07/25/2025    K 2.8 (LL) 07/25/2025     07/25/2025    BUN 12 07/25/2025    CREATININE 0.82 07/25/2025    EGFR 73 07/25/2025    CALCIUM 8.2 (L) 07/25/2025    ALKPHOS 73 07/23/2025    AST 24 07/23/2025    ALT 17 07/23/2025    MG 1.83 06/17/2025    TADBQHTN33 2,242 (H) 07/20/2023    HGBA1C 4.9 01/23/2024    LDLCALC 58 01/23/2024    CHOL 141 01/23/2024    HDL 48.0 01/23/2024    TRIG 177 (H) 01/23/2024    TSH 0.30 (L) 06/09/2022       Below CT and MRI images and reports have been personally reviewed in PACS, agree with interpretations.    CT head wo IV contrast 07/23/2025    Narrative  Interpreted By:  Henok Quinteros,  STUDY:  CT HEAD WO IV CONTRAST;  7/23/2025 8:13 pm    INDICATION:  Signs/Symptoms:vomiting.      COMPARISON:  06/2015    ACCESSION NUMBER(S):  [id number]    ORDERING CLINICIAN:  CAMMIE EDGE    TECHNIQUE:  Noncontrast axial CT scan of head was performed. Angled reformats in  brain and bone windows were generated. The images were reviewed in  bone, brain, blood and soft tissue windows.    FINDINGS:  CSF Spaces: The ventricles, sulci and basal cisterns are within  normal limits. There is no extraaxial fluid collection. Stable global  volume loss and chronic small vessel ischemic  change    Parenchyma:  The grey-white differentiation is intact. There is no  mass effect or midline shift.  There is no intracranial hemorrhage.    Calvarium: The calvarium is unremarkable.    Paranasal sinuses and mastoids: Visualized paranasal sinuses and  mastoids are clear.    Impression  No evidence of acute cortical infarct or intracranial hemorrhage.  Senescent changes. Stable examination.    MACRO:  None    Signed by: Henok Quinteros 7/23/2025 8:46 PM  Dictation workstation:   [id number]      CT head wo IV contrast 06/15/2025    Narrative  Interpreted By:  Acacia Pelletier,  STUDY:  CT HEAD WO IV CONTRAST;  6/15/2025 6:40 am    INDICATION:  Signs/Symptoms:ams.    COMPARISON:  06/17/2024    ACCESSION NUMBER(S):  [id number]    ORDERING CLINICIAN:  CAMMIE EDGE    TECHNIQUE:  Axial noncontrast CT images of the head.    FINDINGS:  BRAIN PARENCHYMA: Mild deep and periventricular white matter  hypodensities are nonspecific, but favored to represent chronic small  vessel ischemic changes.  Gray-white matter interfaces are preserved.  No mass effect or midline shift.    HEMORRHAGE: No acute intracranial hemorrhage.  VENTRICLES and EXTRA-AXIAL SPACES: The ventricles and sulci are  within normal limits in size for brain volume. No abnormal extraaxial  fluid collection. EXTRACRANIAL SOFT TISSUES: Within normal limits.  PARANASAL SINUSES/MASTOIDS:  The visualized paranasal sinuses and  mastoid air cells are aerated. CALVARIUM: No depressed skull  fracture. No destructive osseous lesion.    OTHER FINDINGS: None.    Impression  No acute intracranial abnormality.      MACRO:  None    Signed by: Acacia Pelletier 6/15/2025 6:45 AM  Dictation workstation:   [id number]                          Assessment/Plan      Assessment & Plan  Nausea and vomiting, unspecified vomiting type    Acute metabolic encephalopathy      IMPRESSION:  Fidelia Norman is a 80 y.o. female with a history of Parkinson's Disease with cervical dystonia,  HTN, and HLD. Neurology was consulted for worsening tremor and altered mental status. Patient presented to the ED after several days of nausea, vomiting, decreased PO intake, weakness, and increased tremor. Labs in ED were notable for a lactate of 2.9 and WBC 16.7. Urinalysis was positive for UTI. Potassium 2.8. She was placed on IV fluids and empiric ceftriaxone pending urine culture.     Neurological exam is significant for R>L rest tremor of the upper extremities, cervical dystonia, and disorientation. She is oriented to name, month, and place, but not age, birthdate or year. She is much more responsive on exam today compared to last neurological exam yesterday 7/25. She reports feeling hungry this morning and has been able to sip on ginger ale. She has not yet had a swallow evaluation for clearance to pass oral medications. She states she does not know why she is in the hospital but remembers when I explain her symptoms. Altered mental status is likely due to metabolic encephalopathy in the setting of UTI and hypokalemia. Mental status has improved with IV fluids and antibiotics. Worsening tremor is likely secondary to missed Sinemet dosing while inpatient and current infection. Oral Sinemet should be resumed after passing swallow evaluation.     Case discussed with neurology attending, Dr. Mu Gomez who agrees with the plan.    RECOMMENDATIONS:  - Defer management of hypokalemia and UTI to primary team  - Resume oral Sinemet after passing swallow evaluation  - Follow up with her [facility] neurologist outpatient on discharge         I personally spent 25 minutes today, exclusive of procedures, providing care for this patient, including preparation, face to face time, documentation and other services such as review of medical records, diagnostic result, patient education, counseling, coordination of care as specified in the encounter.    Opal Hernández PA-C           [1] ALPRAZolam, 0.5 mg, oral, TID  budesonide, 0.5  mg, nebulization, BID   And  ipratropium-albuteroL, 3 mL, nebulization, TID  busPIRone, 15 mg, oral, BID  carbidopa-levodopa, 2 tablet, oral, TID  cefTRIAXone, 1 g, intravenous, q12h  docusate sodium, 100 mg, oral, BID  donepezil, 10 mg, oral, Nightly  pantoprazole, 40 mg, oral, Daily before breakfast   Or  esomeprazole, 40 mg, nasoduodenal tube, Daily before breakfast   Or  pantoprazole, 40 mg, intravenous, Daily before breakfast  FLUoxetine, 100 mg, oral, Daily  heparin (porcine), 5,000 Units, subcutaneous, q8h  nystatin, , Topical, BID  polyethylene glycol, 17 g, oral, Daily  potassium chloride, 20 mEq, intravenous, q2h  pregabalin, 100 mg, oral, BID  traZODone, 50 mg, oral, Nightly  zinc oxide, 1 Application, Topical, TID     [2]    [3] PRN medications: acetaminophen **OR** acetaminophen **OR** acetaminophen, albuterol, alum-mag hydroxide-simeth, calcium carbonate, [DISCONTINUED] ondansetron **OR** ondansetron, oxygen, prochlorperazine

## 2025-07-25 NOTE — PROGRESS NOTES
Physical Therapy    Physical Therapy Evaluation    Patient Name: Fidelia Norman  MRN: [id number]  Department: [address]  Room: [address] [place] [address]  Today's Date: 7/25/2025   Time Calculation  Start Time: 1434  Stop Time: 1459  Time Calculation (min): 25 min    Assessment/Plan   PT Assessment  PT Assessment Results: Decreased strength, Decreased endurance, Decreased mobility  Rehab Prognosis: Good  Barriers to Discharge Home: Caregiver assistance, Physical needs  Caregiver Assistance: Caregiver assistance needed per identified barriers - however, level of patient's required assistance exceeds assistance available at home  Physical Needs: Stair navigation into home limited by function/safety, In-home setup navigation limited by function/safety, Ambulating household distances limited by function/safety, 24hr mobility assistance needed, High falls risk due to function or environment  Evaluation/Treatment Tolerance: Patient limited by fatigue  Medical Staff Made Aware: Yes  Strengths: Access to adaptive/assistive products, Rehab experience, Capable of completing ADLs semi/independent, Housing layout  Barriers to Participation: Comorbidities  End of Session Communication: Bedside nurse  Assessment Comment: Pt is 80 yr female with altered mental status and nauesa/vomitting resulting in impaired safety awareness and mobility. Pt completed bed mobility and transfers with assistance. Pt would benefit from skilled PT interventions to increase endurance, strength and mobility needed  to complete daily living activities. Recommend mod. intensity skilled PT interventions.  End of Session Patient Position: Up in chair, Alarm on (all needs within reach)  IP OR SWING BED PT PLAN  Inpatient or Swing Bed: Inpatient  PT Plan  Treatment/Interventions: Bed mobility, Transfer training, Gait training, Stair training, Strengthening, Endurance training, Therapeutic exercise, Therapeutic activity, Positioning  PT Plan: Ongoing PT  PT Frequency: 3 times per  week (per this acute hospital stay)  PT Discharge Recommendations: Moderate intensity level of continued care (Based on current functional status and rehab potential, patient is anticipated to tolerate and benefit from 5 or more days per week of skilled rehabilitative therapy after discharge from this acute inpatient hospitalization.)  Equipment Recommended upon Discharge: Wheeled walker, Straight cane (owns)  PT Recommended Transfer Status: Assist x1 (w/FWW)  PT - OK to Discharge: Yes (per PT POC)    Subjective     PT Visit Info:  PT Received On: 07/25/25  General Visit Information:  General  Reason for Referral: Pt is 80 yr female with altered mental status and nauesa/vomitting resulting in impaired safety awareness, decreased endurance and mobility.  Referred By: Darci Wilkes MD  Past Medical History Relevant to Rehab: Parkinson's disease, COPD, HTN, HLD, GERD, hypothyroid, chronic respiratory failure with hypoxia  Family/Caregiver Present: No  Co-Treatment: OT  Co-Treatment Reason: To maximize pt safety and functional outcomes due to decreased endurance.  Prior to Session Communication: Bedside nurse  Patient Position Received: Bed, 3 rail up, Alarm on  General Comment: Pt was pleasant and cooperative to working with therapy.  Home Living:  Home Living  Type of Home: House  Lives With: Spouse  Home Adaptive Equipment: Walker rolling or standard (+ rollator)  Home Layout: One level  Home Access: Stairs to enter with rails  Entrance Stairs-Rails: Both  Entrance Stairs-Number of Steps: 4  Bathroom Shower/Tub: Walk-in shower  Bathroom Toilet: Handicapped height  Bathroom Equipment: Grab bars around toilet, Grab bars in shower  Prior Level of Function:  Prior Function Per Pt/Caregiver Report  Level of [place]: Independent with ADLs and functional transfers, Independent with homemaking with ambulation  ADL Assistance: Independent  Homemaking Assistance: Needs assistance (spouse assists)  Ambulatory Assistance:  Independent (with cane)  Prior Function Comments: + fall history. Pt reports having multiple falls within last 6 months, reports hitting head but denies LOC.  Precautions:  Precautions  Medical Precautions: Fall precautions, Oxygen therapy device and L/min (3L O2 NC)  Precautions Comment: telemetry, antonio wick      Date/Time Vitals Session Patient Position Pulse Resp SpO2 BP MAP (mmHg)    07/25/25 1541 --  --  78  16  96 %  119/63  82            Objective   Pain:  Pain Assessment  Pain Assessment: 0-10  0-10 (Numeric) Pain Score: 0 - No pain  Cognition:  Cognition  Overall Cognitive Status: Impaired  Orientation Level: Disoriented to situation    General Assessments:                  Activity Tolerance  Endurance: Tolerates 10 - 20 min exercise with multiple rests    Sensation  Light Touch: No apparent deficits    Coordination  Movements are Fluid and Coordinated: Yes    Static Sitting Balance  Static Sitting-Balance Support: Feet supported, No upper extremity supported  Static Sitting-Level of Assistance: Close supervision  Dynamic Sitting Balance  Dynamic Sitting-Balance Support: Feet supported  Dynamic Sitting-Level of Assistance: Moderate assistance  Dynamic Sitting-Comments: donning R/L socks & MMT; noted retropulsive lean    Static Standing Balance  Static Standing-Balance Support: Bilateral upper extremity supported  Static Standing-Level of Assistance: Contact guard  Static Standing-Comment/Number of Minutes: with FWW  Dynamic Standing Balance  Dynamic Standing-Balance Support: Bilateral upper extremity supported  Dynamic Standing-Level of Assistance: Contact guard  Dynamic Standing-Balance: Turning  Dynamic Standing-Comments: with FWW  Functional Assessments:       Bed Mobility  Bed Mobility: Yes  Bed Mobility 1  Bed Mobility 1: Supine to sitting  Level of Assistance 1: Minimum assistance  Bed Mobility Comments 1: PT positioned in front of pt; pt used PT's UEs as assist for trunk to achieve upright  position  Bed Mobility 2  Bed Mobility  2: Scooting  Level of Assistance 2: Close supervision  Bed Mobility Comments 2: towards EOB    Transfers  Transfer: Yes  Transfer 1  Transfer From 1: Sit to, Stand to  Transfer to 1: Sit, Stand  Technique 1: Sit to stand, Stand to sit  Transfer Device 1: Walker, Gait belt  Transfer Level of Assistance 1: Contact guard  Trials/Comments 1: with FWW from bed to chair; pt impulsively stood up; VC for reaching back; uncontrolled descent 2/2 decreased endurance    Ambulation/Gait Training  Ambulation/Gait Training Performed: Yes  Ambulation/Gait Training 1  Surface 1: Level tile  Device 1: Rolling walker  Gait Support Devices: Gait belt  Assistance 1: Contact guard  Quality of Gait 1: Narrow base of support, Decreased step length (decreased chase)  Comments/Distance (ft) 1: pt took 4 steps: 2 forward and 2 back    Stairs  Stairs: No  Extremity/Trunk Assessments:  RLE   RLE : Exceptions to WFL  AROM RLE (degrees)  RLE AROM Comment: Hip flexion, knee flexion/extension, ankle plantarflexion/dorsiflexion WFL  Strength RLE  R Hip Flexion: 4/5  R Knee Flexion: 4/5  R Knee Extension: 4/5  R Ankle Dorsiflexion: 4/5  R Ankle Plantar Flexion: 4/5  LLE   LLE : Exceptions to WFL  AROM LLE (degrees)  LLE AROM Comment: Hip flexion, knee flexion/extension, ankle plantarflexion/dorsiflexion WFL  Strength LLE  L Hip Flexion: 4/5  L Knee Flexion: 4/5  L Knee Extension: 4/5  L Ankle Dorsiflexion: 4/5  L Ankle Plantar Flexion: 3+/5  Outcome Measures:  [facility] Basic Mobility  Turning from your back to your side while in a flat bed without using bedrails: A little  Moving from lying on your back to sitting on the side of a flat bed without using bedrails: A little  Moving to and from bed to chair (including a wheelchair): A little  Standing up from a chair using your arms (e.g. wheelchair or bedside chair): A little  To walk in hospital room: A lot  Climbing 3-5 steps with railing: A lot  Basic Mobility  - Total Score: 16    Encounter Problems       Encounter Problems (Active)       Endurance       Pt will be able to tolerate 5 >/= minutes of therex, theract and/or NMR with </= 5 minutes of rest breaks.  (Progressing)       Start:  07/25/25    Expected End:  08/08/25               Mobility       STG - Patient will ascend and descend four stairs with SPC and close supervision to be able to enter/exit home.  (Progressing)       Start:  07/25/25    Expected End:  08/08/25            Patient will be able to ambulate 25' feet with SPC and close supervision enabling her to go to/from bathroom.  (Progressing)       Start:  07/25/25    Expected End:  08/08/25               PT Transfers       Patient will complete sit<>stand transfers with SPC and close supervision.  (Progressing)       Start:  07/25/25    Expected End:  08/08/25               Safety       Pt will verbalize and demo understanding of safe body mechanics and safety awareness with FWW during transfers and walking allowing her to complete functional tasks. (Progressing)       Start:  07/25/25    Expected End:  08/08/25                   Education Documentation  Body Mechanics, taught by MANJEET MosqueraPT at 7/25/2025  3:33 PM.  Learner: Patient  Readiness: Eager  Method: Explanation, Demonstration  Response: Needs Reinforcement  Comment: Role of acute pt, d/c recommendation, safe body mechanics during bed mobility and transfers    Mobility Training, taught by OSCAR Mosquera at 7/25/2025  3:33 PM.  Learner: Patient  Readiness: Eager  Method: Explanation, Demonstration  Response: Needs Reinforcement  Comment: Role of acute pt, d/c recommendation, safe body mechanics during bed mobility and transfers    Education Comments  No comments found.    Completion of this session, clinical decision making, and documentation performed under the supervision/direction of Neli Craig.

## 2025-07-25 NOTE — PROGRESS NOTES
Speech-Language Pathology    Speech-Language Pathology Clinical Swallow Evaluation    Patient Name: Fidelia Norman  MRN: [id number]  : 1945  Today's Date: 25  Start Time: 1455  Stop Time: 1509  Time Calculation (min): 14 min    ASSESSMENT     Functional oral phase and no suspected pharyngeal phase dysphagia based on clinical swallow evaluation.   Prognosis: Good    PLAN    Recommendations:    Is MBSS recommended? No; no pharyngeal dysphagia suspected.  Solid consistency: Regular (IDDSI level 7)  Liquid consistency: Thin (IDDSI 0)  Medication administration: Whole in thin liquid, Whole in puree    Compensatory swallow strategies:  - Upright positioning for all PO intake    Any change to mental/medical/respiratory status please make NPO and alert SLP. MD and RN aware.     Recommended frequency/duration:  Skilled SLP services recommended: No         SUBJECTIVE  PMHx relevant to rehab:   Fidelia Norman is a 80 y.o. female with history of Parkinson's disease, COPD, HTN and HLD presenting with nausea, vomiting, worsening tremors and alteration in mental status.  says pt has been sick for the last couple of days with nausea, vomiting, not eating or drinking, weakness, not getting up from chair, increasing tremors and decrease in level of responsiveness. Pt has not taken her meds including her PD meds. He says she seemed worse today so he called EMS and she was brought to the ED. Labs in ED were notable for a lactate of 2.9 and WBC 16.7. CT of the abdomen and pelvis was read as showing high density debris within the gallbladder suggestive of sludge and stones, but subsequent ultrasound showed a normal gallbladder with no sludge or stones, and HIDA scan was negative.     Chief complaint: Pt was admitted on 25 due to   Chief Complaint   Patient presents with    Nausea     Dont know her last known well, patient is usually alert and orient to 3 and is now unable to answer questions, N/V during ems, 4mg of  zofran given in squad    . She was found to have Nausea and vomiting, unspecified vomiting type.    Relevant imaging results:  CT chest abdomen 7/23/25    General Visit Information:  SLP Received On: 07/25/25  Patient Class: Inpatient  Living Environment: Home  Ordering Physician: Dr. Wilkes  Reason for Referral: concern for dysphagia with PD  Prior to Session Communication: Bedside nurse    RN cleared pt to participate in session and reported that she swallowed her pills crushed in jello without impairment    Pt reported no difficulty with swallowing.      [employer] Diet: Regular/thin  Current Diet : regular, thin    Status at time of evaluation:  Pain Assessment  Pain Assessment: 0-10  0-10 (Numeric) Pain Score: 0 - No pain    Pt was alert, pleasant, cooperative, and attentive for session.  Orientation: Ox4  Ability to follow functional commands: WFL  Nutritional status: Appears well-nourished/no concerns    Respiratory status: Supplemental oxygen via NC  Baseline Vocal Quality: Dysphonic  Volitional Cough: Strong  Volitional Swallow: Within Functional Limits  Patient positioning: Upright in chair     OBJECTIVE  Clinical swallow evaluation completed and consisted of interview, oral motor assessment, and PO trials (cracker, apple sauce and water).    ORAL PHASE: Natural dentition in good condition. Oral mucosa were pink, moist, and free of obvious lesions. Lingual strength and ROM were good. Labial strength/ROM were wfl. Labial seal was adequate. Mastication of regular solids was wfl. A/P transit and oral clearance were wfl.    PHARYNGEAL PHASE: Laryngeal elevation was visualized or palpated with all trials, however adequacy of hyolaryngeal elevation/excursion cannot be determined at bedside. No immediate or delayed s/sx aspiration/penetration were observed with any consistencies.    Was 3oz challenge administered: Yes; pt drank 3oz of thin liquid in one attempt, without breaking, with no overt s/sx  aspiration/penetration observed.       Inpatient Education:  Extensive education provided to patient regarding current swallow function, recommendations/results, and POC.    Barriers to learning: None   Method of teaching: Verbal and Written   Topic: role of ST, results of assessment, risk for aspiration, and recommended safe swallow strategies   Outcome of teaching: Pt/family demonstrated good understanding  Treatment provided: No

## 2025-07-26 VITALS
WEIGHT: 174.38 LBS | TEMPERATURE: 97.5 F | OXYGEN SATURATION: 94 % | SYSTOLIC BLOOD PRESSURE: 149 MMHG | RESPIRATION RATE: 16 BRPM | DIASTOLIC BLOOD PRESSURE: 69 MMHG | BODY MASS INDEX: 32.92 KG/M2 | HEIGHT: 61 IN | HEART RATE: 73 BPM

## 2025-07-26 LAB
ANION GAP SERPL CALC-SCNC: 10 MMOL/L (ref 10–20)
BACTERIA UR CULT: NORMAL
BUN SERPL-MCNC: 8 MG/DL (ref 6–23)
CALCIUM SERPL-MCNC: 8.1 MG/DL (ref 8.6–10.3)
CHLORIDE SERPL-SCNC: 102 MMOL/L (ref 98–107)
CO2 SERPL-SCNC: 31 MMOL/L (ref 21–32)
CREAT SERPL-MCNC: 0.8 MG/DL (ref 0.5–1.05)
EGFRCR SERPLBLD CKD-EPI 2021: 76 ML/MIN/1.73M*2
ERYTHROCYTE [DISTWIDTH] IN BLOOD BY AUTOMATED COUNT: 12.4 % (ref 11.5–14.5)
GLUCOSE SERPL-MCNC: 94 MG/DL (ref 74–99)
HCT VFR BLD AUTO: 37.9 % (ref 36–46)
HGB BLD-MCNC: 11.7 G/DL (ref 12–16)
MAGNESIUM SERPL-MCNC: 1.25 MG/DL (ref 1.6–2.4)
MCH RBC QN AUTO: 29.2 PG (ref 26–34)
MCHC RBC AUTO-ENTMCNC: 30.9 G/DL (ref 32–36)
MCV RBC AUTO: 95 FL (ref 80–100)
NRBC BLD-RTO: 0 /100 WBCS (ref 0–0)
PLATELET # BLD AUTO: 166 X10*3/UL (ref 150–450)
POTASSIUM SERPL-SCNC: 3 MMOL/L (ref 3.5–5.3)
RBC # BLD AUTO: 4.01 X10*6/UL (ref 4–5.2)
SODIUM SERPL-SCNC: 140 MMOL/L (ref 136–145)
WBC # BLD AUTO: 7.3 X10*3/UL (ref 4.4–11.3)

## 2025-07-26 PROCEDURE — 2500000004 HC RX 250 GENERAL PHARMACY W/ HCPCS (ALT 636 FOR OP/ED): Performed by: STUDENT IN AN ORGANIZED HEALTH CARE EDUCATION/TRAINING PROGRAM

## 2025-07-26 PROCEDURE — 99239 HOSP IP/OBS DSCHRG MGMT >30: CPT | Performed by: STUDENT IN AN ORGANIZED HEALTH CARE EDUCATION/TRAINING PROGRAM

## 2025-07-26 PROCEDURE — 94760 N-INVAS EAR/PLS OXIMETRY 1: CPT

## 2025-07-26 PROCEDURE — 2500000002 HC RX 250 W HCPCS SELF ADMINISTERED DRUGS (ALT 637 FOR MEDICARE OP, ALT 636 FOR OP/ED): Performed by: INTERNAL MEDICINE

## 2025-07-26 PROCEDURE — 2500000004 HC RX 250 GENERAL PHARMACY W/ HCPCS (ALT 636 FOR OP/ED): Performed by: NURSE PRACTITIONER

## 2025-07-26 PROCEDURE — 2500000004 HC RX 250 GENERAL PHARMACY W/ HCPCS (ALT 636 FOR OP/ED): Performed by: INTERNAL MEDICINE

## 2025-07-26 PROCEDURE — 85027 COMPLETE CBC AUTOMATED: CPT | Performed by: STUDENT IN AN ORGANIZED HEALTH CARE EDUCATION/TRAINING PROGRAM

## 2025-07-26 PROCEDURE — 36415 COLL VENOUS BLD VENIPUNCTURE: CPT | Performed by: STUDENT IN AN ORGANIZED HEALTH CARE EDUCATION/TRAINING PROGRAM

## 2025-07-26 PROCEDURE — 2500000002 HC RX 250 W HCPCS SELF ADMINISTERED DRUGS (ALT 637 FOR MEDICARE OP, ALT 636 FOR OP/ED): Performed by: STUDENT IN AN ORGANIZED HEALTH CARE EDUCATION/TRAINING PROGRAM

## 2025-07-26 PROCEDURE — 94640 AIRWAY INHALATION TREATMENT: CPT

## 2025-07-26 PROCEDURE — 83735 ASSAY OF MAGNESIUM: CPT | Performed by: STUDENT IN AN ORGANIZED HEALTH CARE EDUCATION/TRAINING PROGRAM

## 2025-07-26 PROCEDURE — 80048 BASIC METABOLIC PNL TOTAL CA: CPT | Performed by: STUDENT IN AN ORGANIZED HEALTH CARE EDUCATION/TRAINING PROGRAM

## 2025-07-26 PROCEDURE — 2500000001 HC RX 250 WO HCPCS SELF ADMINISTERED DRUGS (ALT 637 FOR MEDICARE OP): Performed by: NURSE PRACTITIONER

## 2025-07-26 PROCEDURE — 2500000001 HC RX 250 WO HCPCS SELF ADMINISTERED DRUGS (ALT 637 FOR MEDICARE OP): Performed by: INTERNAL MEDICINE

## 2025-07-26 PROCEDURE — 99233 SBSQ HOSP IP/OBS HIGH 50: CPT | Performed by: PSYCHIATRY & NEUROLOGY

## 2025-07-26 PROCEDURE — 2500000005 HC RX 250 GENERAL PHARMACY W/O HCPCS: Performed by: STUDENT IN AN ORGANIZED HEALTH CARE EDUCATION/TRAINING PROGRAM

## 2025-07-26 RX ORDER — POTASSIUM CHLORIDE 20 MEQ/1
40 TABLET, EXTENDED RELEASE ORAL ONCE
Status: COMPLETED | OUTPATIENT
Start: 2025-07-26 | End: 2025-07-26

## 2025-07-26 RX ORDER — POTASSIUM CHLORIDE 20 MEQ/1
20 TABLET, EXTENDED RELEASE ORAL ONCE
Status: COMPLETED | OUTPATIENT
Start: 2025-07-26 | End: 2025-07-26

## 2025-07-26 RX ORDER — MAGNESIUM SULFATE HEPTAHYDRATE 40 MG/ML
2 INJECTION, SOLUTION INTRAVENOUS ONCE
Status: COMPLETED | OUTPATIENT
Start: 2025-07-26 | End: 2025-07-26

## 2025-07-26 RX ORDER — EAR PLUGS
1 EACH OTIC (EAR) 3 TIMES DAILY
Start: 2025-07-26

## 2025-07-26 RX ORDER — POTASSIUM CHLORIDE 14.9 MG/ML
20 INJECTION INTRAVENOUS
Status: DISCONTINUED | OUTPATIENT
Start: 2025-07-26 | End: 2025-07-26

## 2025-07-26 RX ADMIN — DOCUSATE SODIUM 100 MG: 100 CAPSULE, LIQUID FILLED ORAL at 09:12

## 2025-07-26 RX ADMIN — POTASSIUM CHLORIDE EXTENDED-RELEASE 20 MEQ: 1500 TABLET ORAL at 14:56

## 2025-07-26 RX ADMIN — FLUOXETINE HYDROCHLORIDE 100 MG: 20 CAPSULE ORAL at 09:13

## 2025-07-26 RX ADMIN — CEFTRIAXONE 1 G: 1 INJECTION, SOLUTION INTRAVENOUS at 03:16

## 2025-07-26 RX ADMIN — POTASSIUM CHLORIDE 20 MEQ: 14.9 INJECTION, SOLUTION INTRAVENOUS at 09:13

## 2025-07-26 RX ADMIN — POTASSIUM CHLORIDE 20 MEQ: 14.9 INJECTION, SOLUTION INTRAVENOUS at 13:19

## 2025-07-26 RX ADMIN — CARBIDOPA AND LEVODOPA 2 TABLET: 25; 100 TABLET ORAL at 14:56

## 2025-07-26 RX ADMIN — PANTOPRAZOLE SODIUM 40 MG: 40 TABLET, DELAYED RELEASE ORAL at 06:09

## 2025-07-26 RX ADMIN — POTASSIUM CHLORIDE EXTENDED-RELEASE 40 MEQ: 1500 TABLET ORAL at 09:12

## 2025-07-26 RX ADMIN — IPRATROPIUM BROMIDE AND ALBUTEROL SULFATE 3 ML: 2.5; .5 SOLUTION RESPIRATORY (INHALATION) at 07:13

## 2025-07-26 RX ADMIN — Medication: at 07:13

## 2025-07-26 RX ADMIN — ALPRAZOLAM 0.5 MG: 0.5 TABLET ORAL at 14:56

## 2025-07-26 RX ADMIN — CARBIDOPA AND LEVODOPA 2 TABLET: 25; 100 TABLET ORAL at 09:12

## 2025-07-26 RX ADMIN — BUSPIRONE HYDROCHLORIDE 15 MG: 15 TABLET ORAL at 09:12

## 2025-07-26 RX ADMIN — MAGNESIUM SULFATE HEPTAHYDRATE 2 G: 40 INJECTION, SOLUTION INTRAVENOUS at 09:33

## 2025-07-26 RX ADMIN — HEPARIN SODIUM 5000 UNITS: 5000 INJECTION, SOLUTION INTRAVENOUS; SUBCUTANEOUS at 00:23

## 2025-07-26 RX ADMIN — Medication 1 DOSE: at 13:17

## 2025-07-26 RX ADMIN — ALPRAZOLAM 0.5 MG: 0.5 TABLET ORAL at 09:12

## 2025-07-26 RX ADMIN — IPRATROPIUM BROMIDE AND ALBUTEROL SULFATE 3 ML: 2.5; .5 SOLUTION RESPIRATORY (INHALATION) at 13:17

## 2025-07-26 RX ADMIN — BUDESONIDE 0.5 MG: 0.5 INHALANT RESPIRATORY (INHALATION) at 07:13

## 2025-07-26 RX ADMIN — PREGABALIN 100 MG: 50 CAPSULE ORAL at 09:12

## 2025-07-26 RX ADMIN — ONDANSETRON 4 MG: 2 INJECTION, SOLUTION INTRAMUSCULAR; INTRAVENOUS at 10:32

## 2025-07-26 NOTE — NURSING NOTE
"Patient tearful, upset and agitated. Crying that, \"no one cares\". Patient listing/discussing recent stressors of loss of son, water leaking in her roof/living room, marriage with her , repeat admissions and related expenses, as well as general financial hardship. Patient states, \"I would rather die than go to nursing home. I've been dealing with this illness [parkinsons] for 24 years and I am tired of it. No one cares, I want to die\". In addition patient very unstable when walking, this RN feels safest with 2 person/walker assist. Patient insistent on returning home. Emotional support given.     Discussed concerns with MSW who could see come see patient at end of day. Spoke with Dr. Wilkes by phone regarding concerns for safety of discharge. Dr. Wilkes came to bedside and spoke with patient and spouse. Continue with discharge to home plan.   "

## 2025-07-26 NOTE — CARE PLAN
The patient's goals for the shift include      The clinical goals for the shift include pt will free from nausea/vomitting throughout shift    Over the shift, the patient did not make progress toward the following goals. Barriers to progression include confusion at times. Recommendations to address these barriers include reassessment.

## 2025-07-26 NOTE — DISCHARGE SUMMARY
Discharge Diagnosis  Nausea and vomiting, unspecified vomiting type  UTI         Issues Requiring Follow-Up  Hospital follow-up    Discharge Meds     Medication List      START taking these medications     pulse oximeter; SpO2 range 88-92% on 2L   zinc oxide 40 % ointment ointment; Apply 1 Application topically 3 times   a day. Apply to mary and buttocks.     CONTINUE taking these medications     * acetaminophen 500 mg tablet; Commonly known as: Tylenol   * acetaminophen 650 mg ER tablet; Commonly known as: Tylenol 8 HOUR   albuterol 90 mcg/actuation inhaler   ALPRAZolam 0.5 mg tablet; Commonly known as: Xanax   * busPIRone 15 mg tablet; Commonly known as: Buspar   * busPIRone 10 mg tablet; Commonly known as: Buspar   carbidopa-levodopa  mg tablet; Commonly known as: Sinemet   diphenhydrAMINE-acetaminophen  mg per tablet; Commonly known as:   Tylenol PM   donepezil 10 mg tablet; Commonly known as: Aricept   FLUoxetine 20 mg capsule; Commonly known as: PROzac; Take 4 capsules (80   mg) by mouth once daily.   fluticasone-umeclidin-vilanter 200-62.5-25 mcg blister with device;   Commonly known as: TRELEGY-ELLIPTA   furosemide 20 mg tablet; Commonly known as: Lasix   lidocaine-diphenhydrAMINE-Maalox 1:1:1 688--40 mg/30 mL liquid   mouthwash; Commonly known as: Magic Mouthwash; Take 5mL by mouth every 6   hours as needed   lubiprostone 24 mcg capsule; Commonly known as: Amitiza; Take 1 capsule   (24 mcg) by mouth 2 times a day.   lubricating eye drops ophthalmic solution; Administer 1 drop into both   eyes if needed for dry eyes.   melatonin 10 mg tablet   ondansetron 8 mg tablet; Commonly known as: Zofran   potassium chloride CR 20 mEq ER tablet; Commonly known as: Klor-Con M20   pregabalin 100 mg capsule; Commonly known as: Lyrica   rosuvastatin 20 mg tablet; Commonly known as: Crestor   sucralfate 1 gram tablet; Commonly known as: Carafate   tiZANidine 4 mg capsule; Commonly known as: Zanaflex    traZODone 50 mg tablet; Commonly known as: Desyrel; Take 1 tablet (50   mg) by mouth once daily at bedtime.   zolpidem 5 mg tablet; Commonly known as: Ambien; Take 1 tablet (5 mg) by   mouth as needed at bedtime for sleep. Do not crush, chew, or split.  * This list has 4 medication(s) that are the same as other medications   prescribed for you. Read the directions carefully, and ask your doctor or   other care provider to review them with you.     STOP taking these medications     losartan 50 mg tablet; Commonly known as: Cozaar       Test Results Pending At Discharge  Pending Labs       Order Current Status    Urinalysis with Reflex Culture and Microscopic In process    Blood Culture Preliminary result    Blood Culture Preliminary result            Hospital Course   Fidelia Norman is a 80 y.o. female with history of Parkinson's disease, COPD, HTN and HLD presenting with nausea, vomiting, worsening tremors and alteration in mental status.  says pt has been sick for the last couple of days with nausea, vomiting, not eating or drinking, weakness, not getting up from chair, increasing tremors and decrease in level of responsiveness. Pt has not taken her meds including her PD meds. He says she seemed worse today so he called EMS and she was brought to the ED. Labs in ED were notable for a lactate of 2.9 and WBC 16.7. CT of the abdomen and pelvis was read as showing high density debris within the gallbladder suggestive of sludge and stones, but subsequent ultrasound showed a normal gallbladder with no sludge or stones.  Surgery consulted, and ordered a HIDA scan which was negative.  Urinalysis showed pyuria.  Urine culture grew multiple organisms, not speciated.  Treated empirically for simple cystitis with 3 days of ceftriaxone; nausea resolved and mentation improved back to baseline.  Neurology was consulted; no changes necessary to Parkinson's medications.  SLP was consulted, and patient passed bedside swallow  evaluation.  PT/OT were consulted and recommended SNF, but patient declined and opted to go home with home health care.     Pertinent Physical Exam At Time of Discharge  Con: awake, alert; no distress;   Eyes: conjunctiva wnl; EOMI;   ENMT: hearing intact; MMM;   MSK: ROM wnl; digits wnl;   GI: Mild abdominal tenderness  Resp: normal work of breathing; no cyanosis;   Neuro: moving all extremities; bilateral tremors  Psych: oriented to situation; affect wnl;     Outpatient Follow-Up  No future appointments.    Time spent on discharge was greater than 30 minutes.      Darci Wilkes MD

## 2025-07-26 NOTE — PROGRESS NOTES
07/26/25 0922   Discharge Planning   Expected Discharge Disposition Home H  (Patient active with [facility] and accepted back. HC orders and AVS attached in Careport and notified of dc today.)   Patient Choice   Provider Choice list and CMS website (https://medicare.gov/care-compare#search) for post-acute Quality and Resource Measure Data were provided and reviewed with: Patient   Patient / Family choosing to utilize agency / facility established prior to hospitalization Yes   Intensity of Service   Intensity of Service 0-30 min

## 2025-07-26 NOTE — PROGRESS NOTES
07/26/25 9013   Discharge Planning   Assistance Needed RN consulted ANU re: discharged pt who is a 2-person assist, but refuses SNF and only wants to return home with [facility].  Pt had also told RN that her roof is leaking.  Pt stated she they only bring in $1000./mo and can't afford the bills they are getting from the hospital.  Pt told RN that her  was yelling at her because she didn't know exactly what time she was leaving the hospital.  Esha is caretaker.  Pt told RN she would rather die than go to a nursing home.  She told RN she has been dealing with Parkinsons for 24 yrs and no one cares she wants to die. Since pt is already discharged, asked RN to consult MD/resident to come see pt.  Pt seen and discharged.  ANU making referral to Adult Protective Services.

## 2025-07-26 NOTE — NURSING NOTE
1900 Assumed care of pt.     2046 Medications given and assessment completed on pt. Complains of some pain to abdomen, medicated as ordered. Vitals stable.

## 2025-07-26 NOTE — PROGRESS NOTES
07/26/25 0907   Discharge Planning   Expected Discharge Disposition Home H  (PT/OT recomended moderate. Patient declined SNF and wants to dc with [facility], referral sent in careport, awaiting response.)   Does the patient need discharge transport arranged?   (spouse to transport at discharge)   Patient Choice   Provider Choice list and CMS website (https://medicare.gov/care-compare#search) for post-acute Quality and Resource Measure Data were provided and reviewed with: Patient   Patient / Family choosing to utilize agency / facility established prior to hospitalization No  ([facility])   Stroke Family Assessment   Stroke Family Assessment Needed No   Intensity of Service   Intensity of Service 0-30 min

## 2025-07-26 NOTE — ASSESSMENT & PLAN NOTE
Impression: acute metabolic encephalopathy resolved (due to infection- per hospitalist).  The patient's PD symptoms are much less, so continue the current medications.  Patient denies any dysphagia.  As far as the insomnia, the patient states that she is going home today, and I anticipate her insomnia will decrease after discharge.  She has medications for the insomnia at home.  She will be following up with Dr. Kaye, her Neurologist.

## 2025-07-26 NOTE — PROGRESS NOTES
"Fidelia Norman is a 80 y.o. female on day 2 of admission presenting with Nausea and vomiting, unspecified vomiting type.      Subjective   Patient complains of insomnia, some nausea       Objective     Last Recorded Vitals  Blood pressure 149/69, pulse 73, temperature 36.4 °C (97.5 °F), resp. rate 16, height (!) 1.549 m (5' 1\"), weight 79.1 kg (174 lb 6.1 oz), SpO2 94%.    Physical Exam  Neurological Exam  Alert and smiling  Normal speech, oriented x3  No tremor in the upper extremities  Relevant Results                                                   Assessment & Plan  Nausea and vomiting, unspecified vomiting type    Acute metabolic encephalopathy  I  Parkinson disease (Multi)  Impression: acute metabolic encephalopathy resolved (due to infection- per hospitalist).  The patient's PD symptoms are much less, so continue the current medications.  Patient denies any dysphagia.  As far as the insomnia, the patient states that she is going home today, and I anticipate her insomnia will decrease after discharge.  She has medications for the insomnia at home.  She will be following up with Dr. Kaye, her Neurologist.        I spent 25 minutes in the professional and overall care of this patient.      Mu Gomez MD  "

## 2025-07-27 LAB
BACTERIA BLD CULT: NORMAL
BACTERIA BLD CULT: NORMAL

## 2025-07-28 LAB
BACTERIA BLD CULT: NORMAL
BACTERIA BLD CULT: NORMAL

## 2025-08-09 LAB
ATRIAL RATE: 79 BPM
P AXIS: 42 DEGREES
P OFFSET: 181 MS
P ONSET: 151 MS
PR INTERVAL: 124 MS
Q ONSET: 213 MS
QRS COUNT: 13 BEATS
QRS DURATION: 82 MS
QT INTERVAL: 462 MS
QTC CALCULATION(BAZETT): 529 MS
QTC FREDERICIA: 506 MS
R AXIS: -25 DEGREES
T AXIS: 1 DEGREES
T OFFSET: 444 MS
VENTRICULAR RATE: 79 BPM